# Patient Record
Sex: FEMALE | Race: BLACK OR AFRICAN AMERICAN | Employment: UNEMPLOYED | URBAN - METROPOLITAN AREA
[De-identification: names, ages, dates, MRNs, and addresses within clinical notes are randomized per-mention and may not be internally consistent; named-entity substitution may affect disease eponyms.]

---

## 2021-10-15 ENCOUNTER — TRANSFERRED RECORDS (OUTPATIENT)
Dept: HEALTH INFORMATION MANAGEMENT | Facility: CLINIC | Age: 21
End: 2021-10-15
Payer: COMMERCIAL

## 2021-10-26 ENCOUNTER — TRANSFERRED RECORDS (OUTPATIENT)
Dept: HEALTH INFORMATION MANAGEMENT | Facility: CLINIC | Age: 21
End: 2021-10-26
Payer: COMMERCIAL

## 2022-02-28 ENCOUNTER — MEDICAL CORRESPONDENCE (OUTPATIENT)
Dept: HEALTH INFORMATION MANAGEMENT | Facility: CLINIC | Age: 22
End: 2022-02-28
Payer: COMMERCIAL

## 2022-03-03 ENCOUNTER — TELEPHONE (OUTPATIENT)
Dept: CARDIOLOGY | Facility: CLINIC | Age: 22
End: 2022-03-03
Payer: COMMERCIAL

## 2022-03-03 NOTE — TELEPHONE ENCOUNTER
"[facility] Call Center    Phone Message    May a detailed message be left on voicemail: unknown     Reason for Call: Please call patient to schedule cardiology referral. Dx is not within the scope of [facility] scheduling guidelines. Thank you!    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

## 2022-03-08 ENCOUNTER — CARE COORDINATION (OUTPATIENT)
Dept: CARDIOLOGY | Facility: CLINIC | Age: 22
End: 2022-03-08
Payer: COMMERCIAL

## 2022-03-08 NOTE — PROGRESS NOTES
S/B:     Received referral via staff message from Dr. Fuentes to schedule patient next avaiable for MD (Alexandra Jones)    Sending to HF RN to review.     Encounter reviewed see plan      Plan:     Schedule with Dr. Fuentes 5/9/22. Spot held at 3:30.

## 2022-03-20 ENCOUNTER — HEALTH MAINTENANCE LETTER (OUTPATIENT)
Age: 22
End: 2022-03-20

## 2022-05-02 DIAGNOSIS — I42.2 HYPERTROPHIC CARDIOMYOPATHY (H): ICD-10-CM

## 2022-05-02 DIAGNOSIS — E74.05 DANON DISEASE (H): Primary | ICD-10-CM

## 2022-05-02 DIAGNOSIS — I47.10 PAROXYSMAL SUPRAVENTRICULAR TACHYCARDIA (H): ICD-10-CM

## 2022-05-09 ENCOUNTER — OFFICE VISIT (OUTPATIENT)
Dept: CARDIOLOGY | Facility: CLINIC | Age: 22
End: 2022-05-09
Attending: INTERNAL MEDICINE
Payer: COMMERCIAL

## 2022-05-09 ENCOUNTER — LAB (OUTPATIENT)
Dept: LAB | Facility: CLINIC | Age: 22
End: 2022-05-09
Payer: COMMERCIAL

## 2022-05-09 VITALS
DIASTOLIC BLOOD PRESSURE: 72 MMHG | OXYGEN SATURATION: 99 % | WEIGHT: 167 LBS | HEART RATE: 67 BPM | BODY MASS INDEX: 26.84 KG/M2 | SYSTOLIC BLOOD PRESSURE: 133 MMHG | HEIGHT: 66 IN

## 2022-05-09 DIAGNOSIS — F32.A DEPRESSION, UNSPECIFIED DEPRESSION TYPE: ICD-10-CM

## 2022-05-09 DIAGNOSIS — I47.10 PAROXYSMAL SUPRAVENTRICULAR TACHYCARDIA (H): ICD-10-CM

## 2022-05-09 DIAGNOSIS — E74.05 DANON DISEASE (H): ICD-10-CM

## 2022-05-09 DIAGNOSIS — I42.2 HYPERTROPHIC CARDIOMYOPATHY (H): ICD-10-CM

## 2022-05-09 DIAGNOSIS — E74.05 DANON DISEASE (H): Primary | ICD-10-CM

## 2022-05-09 DIAGNOSIS — R53.83 FATIGUE, UNSPECIFIED TYPE: ICD-10-CM

## 2022-05-09 DIAGNOSIS — I42.2 OTHER HYPERTROPHIC CARDIOMYOPATHY (H): ICD-10-CM

## 2022-05-09 LAB
ALBUMIN SERPL-MCNC: 3.9 G/DL (ref 3.4–5)
ALP SERPL-CCNC: 67 U/L (ref 40–150)
ALT SERPL W P-5'-P-CCNC: 26 U/L (ref 0–50)
ANION GAP SERPL CALCULATED.3IONS-SCNC: 5 MMOL/L (ref 3–14)
AST SERPL W P-5'-P-CCNC: 54 U/L (ref 0–45)
BILIRUB SERPL-MCNC: 0.3 MG/DL (ref 0.2–1.3)
BUN SERPL-MCNC: 8 MG/DL (ref 7–30)
CALCIUM SERPL-MCNC: 9.6 MG/DL (ref 8.5–10.1)
CHLORIDE BLD-SCNC: 107 MMOL/L (ref 94–109)
CHOLEST SERPL-MCNC: 128 MG/DL
CK SERPL-CCNC: 110 U/L (ref 30–225)
CO2 SERPL-SCNC: 29 MMOL/L (ref 20–32)
CREAT SERPL-MCNC: 0.65 MG/DL (ref 0.52–1.04)
ERYTHROCYTE [DISTWIDTH] IN BLOOD BY AUTOMATED COUNT: 14.3 % (ref 10–15)
FASTING STATUS PATIENT QL REPORTED: NO
GFR SERPL CREATININE-BSD FRML MDRD: >90 ML/MIN/1.73M2
GLUCOSE BLD-MCNC: 86 MG/DL (ref 70–99)
HCT VFR BLD AUTO: 44.2 % (ref 35–47)
HDLC SERPL-MCNC: 58 MG/DL
HGB BLD-MCNC: 14.3 G/DL (ref 11.7–15.7)
LDLC SERPL CALC-MCNC: 57 MG/DL
MAGNESIUM SERPL-MCNC: 1.9 MG/DL (ref 1.6–2.3)
MCH RBC QN AUTO: 27.9 PG (ref 26.5–33)
MCHC RBC AUTO-ENTMCNC: 32.4 G/DL (ref 31.5–36.5)
MCV RBC AUTO: 86 FL (ref 78–100)
NONHDLC SERPL-MCNC: 70 MG/DL
NT-PROBNP SERPL-MCNC: 1032 PG/ML (ref 0–450)
PLATELET # BLD AUTO: 270 10E3/UL (ref 150–450)
POTASSIUM BLD-SCNC: 4 MMOL/L (ref 3.4–5.3)
PROT SERPL-MCNC: 7.6 G/DL (ref 6.8–8.8)
RBC # BLD AUTO: 5.13 10E6/UL (ref 3.8–5.2)
SODIUM SERPL-SCNC: 141 MMOL/L (ref 133–144)
TRIGL SERPL-MCNC: 63 MG/DL
TROPONIN I SERPL HS-MCNC: 215 NG/L
WBC # BLD AUTO: 4.3 10E3/UL (ref 4–11)

## 2022-05-09 PROCEDURE — 84484 ASSAY OF TROPONIN QUANT: CPT | Performed by: PATHOLOGY

## 2022-05-09 PROCEDURE — 36415 COLL VENOUS BLD VENIPUNCTURE: CPT | Performed by: PATHOLOGY

## 2022-05-09 PROCEDURE — 83735 ASSAY OF MAGNESIUM: CPT | Performed by: PATHOLOGY

## 2022-05-09 PROCEDURE — 80053 COMPREHEN METABOLIC PANEL: CPT | Performed by: PATHOLOGY

## 2022-05-09 PROCEDURE — 82550 ASSAY OF CK (CPK): CPT | Performed by: PATHOLOGY

## 2022-05-09 PROCEDURE — G0463 HOSPITAL OUTPT CLINIC VISIT: HCPCS

## 2022-05-09 PROCEDURE — 99205 OFFICE O/P NEW HI 60 MIN: CPT | Performed by: INTERNAL MEDICINE

## 2022-05-09 PROCEDURE — 85027 COMPLETE CBC AUTOMATED: CPT | Performed by: PATHOLOGY

## 2022-05-09 PROCEDURE — 83880 ASSAY OF NATRIURETIC PEPTIDE: CPT | Performed by: PATHOLOGY

## 2022-05-09 PROCEDURE — 80061 LIPID PANEL: CPT | Performed by: PATHOLOGY

## 2022-05-09 RX ORDER — TRIAMCINOLONE ACETONIDE 0.1 %
PASTE (GRAM) DENTAL
Status: ON HOLD | COMMUNITY
Start: 2021-09-23 | End: 2024-07-31

## 2022-05-09 RX ORDER — METOPROLOL SUCCINATE 25 MG/1
37.5 TABLET, EXTENDED RELEASE ORAL DAILY
Status: ON HOLD | COMMUNITY
Start: 2022-04-06 | End: 2024-07-31

## 2022-05-09 ASSESSMENT — COLUMBIA-SUICIDE SEVERITY RATING SCALE - C-SSRS
1. WITHIN THE PAST MONTH, HAVE YOU WISHED YOU WERE DEAD OR WISHED YOU COULD GO TO SLEEP AND NOT WAKE UP?: NO
6. HAVE YOU EVER DONE ANYTHING, STARTED TO DO ANYTHING, OR PREPARED TO DO ANYTHING TO END YOUR LIFE?: NO
2. IN THE PAST MONTH, HAVE YOU ACTUALLY HAD ANY THOUGHTS OF KILLING YOURSELF?: NO

## 2022-05-09 ASSESSMENT — PAIN SCALES - GENERAL: PAINLEVEL: NO PAIN (0)

## 2022-05-09 ASSESSMENT — PATIENT HEALTH QUESTIONNAIRE - PHQ9: SUM OF ALL RESPONSES TO PHQ QUESTIONS 1-9: 16

## 2022-05-09 NOTE — NURSING NOTE
Chief Complaint   Patient presents with     New Patient     NMD      Vitals were taken and medications were reconciled.  Stepan Hilario, EMT  3:10 PM

## 2022-05-09 NOTE — NURSING NOTE
Return Appointment: Patient given instructions regarding scheduling next clinic visit. Patient demonstrated understanding of this information and agreed to call with further questions or concerns. Virtual follow up after CPX.    Patient stated she understood all health information given and agreed to call with further questions or concerns.      Depression Screening Follow-up    PHQ 5/9/2022   PHQ-9 Total Score 16   Q9: Thoughts of better off dead/self-harm past 2 weeks Several days         C-SSRS (Brief Emery) 5/9/2022   Within the last month, have you wished you were dead or wished you could go to sleep and not wake up? No   Within the last month, have you had any actual thoughts of killing yourself? No   Within the last month, have you ever done anything, started to do anything, or prepared to do anything to end your life? No         Follow Up         I have discussed the results of the Emery Screening and proposed plan of care with the provider.  Provider acknowledged their next steps to develop follow up actions and safety plans with the patient.    Martha Lawton RN

## 2022-05-09 NOTE — LETTER
"2022      RE: Mary Shaikh  [address]  [place] [address]       Dear Colleague,    Thank you for the opportunity to participate in the care of your patient, Mary Shaikh, at the [facility] HEART [facility] [place] at [facility]. Please see a copy of my visit note below.    Neurocardiomyopathy Clinic Consultation    Name: Mary Shaikh  : 2000  MRN: [id number]    May 9, 2022    Dear Dr. Swift,    I had the pleasure of seeing Mary Shaikh, a 21 year old woman today May 9, 2022 in the [facility] Failure Clinic for evaluation of Danon disease.     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  She has some muscle weakness    Works in retail   Goes to school cosmetology   Going up and down stairs      8-9 steps result in dyspnea  Would like to be able to work out again    Weight stable  No edema  No orthopnea or PND  No dizzy lighteaded  Appetite is good and improving  Energy - fatigued   QOL is ok  Mood: depresssion, no SI   Sleeping well            /72 (BP Location: Right arm, Patient Position: Chair, Cuff Size: Adult Regular)   Pulse 67   Ht 1.676 m (5' 6\")   Wt 75.8 kg (167 lb)   SpO2 99%   BMI 26.95 kg/m          CMR 21: 1) Decreased LV function with no wall motion abnormalities   2) Left ventricular hypertrophy, with asymmetric thickening of the interventricular septum. There is no evidence for systolic anterior motion of the anterior leaflet of the mitral valve, and no subaortic gradient is seen. There is also right ventricular hypertrophy noted.   3) Diffuse and patchy biventricular myocardial delayed enhancement, with subendocardial, mid myocardial and subendocardial areas of enhancement noted, not restricted to any vascular territory. 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 Differential diagnoses include other infiltrative " "conditions including severe hypertrophic cardiomyopathy, cardiac sarcoidosis, cardiac amyloidosis, other storage disorders.   4) No mediastinal adenopathy, splenomegaly or pulmonary infiltrates.       EKG: I personally reviewed the most recent EKG tracings, and they show sinus rhythm, preexcitation with prominent delta wave, prominent repolarization abnormality.     ZIO monitor, 10/2021: I have personally reviewed these images, which show 68 episodes of supraventricular tachycardia, 11 episodes of wide-complex tachycardia concerning for ventricular tachycardia.  No symptoms reported during the monitoring period.     TTE, 11/9/2021: I have personally reviewed these images, which show asymmetric hypertrophy, lower limits of normal left ventricular systolic function, abnormal strain (-15.1%).      CMR, 11/29/2021: I have personally reviewed these images, which show mildly decreased LV systolic function of 43%, apical cap, apical inferior and mid inferolateral hypokinesis, asymmetric left ventricular hypertrophy (interventricular septum 1.7 cm, inferolateral wall 0.8 cm) with LV mass of 146 g, right ventricular hypertrophy, diffuse, patchy, biventricular delayed enhancement involving the subendocardium, mid myocardium, and subepicardial areas.      Shortness of breath {UMPCARDBREATH:145735573}  Chest Pain  Weight {UMPCARDSTABLE:198813750}  Edema  Orthopnena  PND  Lightheadedness {UMPCARDINTER:543239667}  Palpitations {UMPCARDPRESYNCOPE:852876024}  ICD shocks {YES / NO:659915::\"Yes\"}  Supplemental diuretics {UMPCARDSPORADIC:989421366}  Following dietary restrictions {UMPCARDSODIUM:587461671}  Appetite   Energy  QOL     REVIEW OF SYSTEMS: 10 point ROS neg other than the symptoms noted above in the HPI.    PAST MEDICAL HISTORY:   1. 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 NSVT  3. WPW     ALLERGIES:  No Known Allergies    MEDICATIONS:   metoprolol succinate ER (TOPROL XL) 25 MG 24 hr tablet, Take 37.5 mg by mouth " daily  triamcinolone (KENALOG) 0.1 % paste, APPLY TO THE AFFECTED AREA DAILY FOR 10 DAYS    No current facility-administered medications on file prior to visit.      SOCIAL HISTORY: She lives in [place], with her Mom   Tobacco: Never smoker  Alcohol: none  Illicit: denies     FAMILY HISTORY: . Her maternal grandfather had a stroke in 2017, but otherwise she has no significant family history of past cardiovascular history. There is no past family history of sudden cardiac death.      PHYSICAL EXAM:   There were no vitals taken for this visit.  General: comfortable, conversant, NAD  HEENT: normocephalic, atraumatic, anicteric, normal oropharynx  Neck: Estimate JVP ***, normal carotid upstroke  CV: RRR, nl s1 and s2, no murmurs, gallops, or rubs   Lungs: CTAB, no crackles or wheezes, normal work of breathing  Abdomen: BS+, soft, non tender, non distended, no HSM  Extremities: warm and well perfused, *** edema  Neuro: normal speech and gait,   Psych: normal affect, oriented  Skin: no visible lesions          DIAGNOSTIC TESTING:           ASSESSMENT AND PLAN:       1. Chronic systolic heart failure/HFrEF (EF ***) secondary to {UMPCARDETIOLOGY:057848037} cardiomyopathy  NYHA Symptom Class {UMPCARDSYMP:013250548}  Stage {UMPCARDSTAGE:212829570}  Fluid status {corevolume:044862}  ACE-I/ARB/ARNi: {ACE/ARB/ARNi:519380}  BB {UMPCARDACEI/ARB:813467572}  Aldosterone antagonist {UMPCARDBB:339705362}  SGLT2:   SCD prophylaxis {SCD:447497}  %BiV pacing: *** or N/A  Cardiac Rehab: {Cardiac Rehab Referral:742485}  Sleep Apnea Evaluation: {Apnea Referral:604266}  Remote PA Pressure Monitoring (CardioMems) {Remote PA Pressure Monitoring (CardioMems):899324}        *** minutes on DOS for chart review, patient history and exam, counseling, review of diagnostic testing, coordination of care and documentation.     Thank you for allowing me to participate in the care of your patient. Please do not hesitate to contact me if you have  any questions.     Sincerely,   Forum     Hipolito Fuentes MD, PhD, FACC  Advanced Heart Failure/Transplantation/MCS  [facility]      CC:   Patient Care Team       Relationship Specialty Notifications Start End    [facility] PCP - General   7/30/15     mervin centeno per pt mother [id number]    Phone: [phone] Fax: [phone]         [facility] [address]          Please do not hesitate to contact me if you have any questions/concerns.     Sincerely,     Hipolito Fuentes MD

## 2022-05-09 NOTE — LETTER
May 9, 2022    RE:  Mary CASTELLANOS Shaikh  [address]  [facility] [address]            To whom it may concern:    Mary CASTELLANOS Shaikh is under my professional care in the cardiology appointment at the [facility]. She was here in clinic for appointments this afternoon, 5/9/2022. Please excuse her for this time missed due to these appointments.      Sincerely,    Hipolito Fuentes MD

## 2022-05-09 NOTE — PROGRESS NOTES
Neurocardiomyopathy Clinic Consultation    Name: Mary Shaikh  : 2000  MRN: [id number]    May 9, 2022    Dear Dr. Swift,    I had the pleasure of seeing Mary Shaikh, a 21 year old woman today May 9, 2022 in the [facility] Failure Clinic for evaluation of Danon disease. She is here with her mother, Shabnam, today.     As you know, Ms. 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 She was seen in the emergency department at [facility] and was found to have prexcitation on her ECG in , and this lead to an exercise ECG test which she achieved 10.8 METs, exercise duration of 9 min and 16 seconds, had no exercised induced arrhythmias, she did have a delta wave and repolarization abnormalities. Subsequently, she had an echocardiogram which showed an LVEF of 51% with asymetric LVH (septum 1.5cm). She has been following with Dr. Swift in the [facility] cardiology clinic and she obtained a CMR which showed and LVEF of 43%, LVEDD 4.9cm, IV septum 1.7cm,  normal RV function but RVH noted, no LOU, harman LGE with subendocardial, mid myocardial, and subendocardial enhancement not associated with a vascular territory. She had a ziopatch monitor demonstrating 11 wide complex tacycardias and 68 episodes of SVT.  Dr. Swift has been very thorough in having her see genetics, starting a beta blocker, and referral for ICD implantation. She has not yet pursued ICD implantation and is further discussing this with her mother and Dr. Swift.     Ms. Shaikh notes that she is now having dyspnea with walking 8-9 steps. Her goal is to go back to working out prior to . 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 She is able to climb a flight of stairs. While she is able to get through her day, she has considerable fatigue. She notes that she has had increased fatigue since starting metoprolol. She feels her dyspnea and fatigue  "have worsened since her diagnosis last year. Her weight is stable and she has no edema, orthopnea, or PND. She has not had dizziness or lightheadedness. She does note some palpitations which can be random, but no presyncope or syncope. Her appetite is improving. She is sleeping well. She is depressed and has anxiety, which has been exacerbated by a recent death in her family. She states she does not have suicidal ideation. She has had some muscle weakness. Her quality of life is reduced overall due to fatigue and dyspnea. She would like to have the majority of her care through [facility].      REVIEW OF SYSTEMS: 10 point ROS neg other than the symptoms noted above in the HPI.    PAST MEDICAL HISTORY:   1. 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 NSVT  3. WPW     ALLERGIES:  No Known Allergies    MEDICATIONS:   metoprolol succinate ER (TOPROL XL) 25 MG 24 hr tablet, Take 37.5 mg by mouth daily  triamcinolone (KENALOG) 0.1 % paste, APPLY TO THE AFFECTED AREA DAILY FOR 10 DAYS    No current facility-administered medications on file prior to visit.    SOCIAL HISTORY: She lives in [place], with her Mom   Tobacco: Never smoker  Alcohol: rare  Illicit: prior marijuana use     FAMILY HISTORY: . Her maternal grandfather had a stroke in 2017, but otherwise she has no significant family history of past cardiovascular history. There is no past family history of sudden cardiac death.    PHYSICAL EXAM:   /72 (BP Location: Right arm, Patient Position: Chair, Cuff Size: Adult Regular)   Pulse 67   Ht 1.676 m (5' 6\")   Wt 75.8 kg (167 lb)   SpO2 99%   BMI 26.95 kg/m    General: comfortable, conversant, NAD  HEENT: normocephalic, atraumatic, anicteric, normal oropharynx  Neck: Estimate JVP <8, normal carotid upstroke  CV: RRR, nl s1 and s2, no murmurs, gallops, or rubs   Lungs: CTAB, no crackles or wheezes, normal work of breathing  Abdomen: BS+, soft, non tender, non distended, no HSM  Extremities: warm and well perfused, " no edema  Neuro: normal speech and gait,   Psych: flat affect  Skin: no visible lesions    DIAGNOSTIC TESTING:      Latest Reference Range & Units 05/09/22 14:49   Sodium 133 - 144 mmol/L 141   Potassium 3.4 - 5.3 mmol/L 4.0   Chloride 94 - 109 mmol/L 107   Carbon Dioxide 20 - 32 mmol/L 29   Urea Nitrogen 7 - 30 mg/dL 8   Creatinine 0.52 - 1.04 mg/dL 0.65   GFR Estimate >60 mL/min/1.73m2 >90 [1]   Calcium 8.5 - 10.1 mg/dL 9.6   Anion Gap 3 - 14 mmol/L 5   Magnesium 1.6 - 2.3 mg/dL 1.9   Albumin 3.4 - 5.0 g/dL 3.9   Protein Total 6.8 - 8.8 g/dL 7.6   Bilirubin Total 0.2 - 1.3 mg/dL 0.3   Alkaline Phosphatase 40 - 150 U/L 67   ALT 0 - 50 U/L 26   AST 0 - 45 U/L 54 (H)   Cholesterol <200 mg/dL 128   CK Total 30 - 225 U/L 110   Patient Fasting > 8hrs?  No   HDL Cholesterol >=50 mg/dL 58   LDL Cholesterol Calculated <=100 mg/dL 57   Non HDL Cholesterol <130 mg/dL 70   N-Terminal Pro Bnp 0 - 450 pg/mL 1,032 (H) [2]   Triglycerides <150 mg/dL 63   Troponin I High Sensitivity <54 ng/L 215 (HH) [3]   Glucose 70 - 99 mg/dL 86   WBC 4.0 - 11.0 10e3/uL 4.3   Hemoglobin 11.7 - 15.7 g/dL 14.3   Hematocrit 35.0 - 47.0 % 44.2   Platelet Count 150 - 450 10e3/uL 270   RBC Count 3.80 - 5.20 10e6/uL 5.13   MCV 78 - 100 fL 86   MCH 26.5 - 33.0 pg 27.9   MCHC 31.5 - 36.5 g/dL 32.4   RDW 10.0 - 15.0 % 14.3       I have personally reviewed all of the cardiovascular testing reports performed at [facility]    exercise ECG test 10/2021:  10.8 METs, exercise duration of 9 min and 16 seconds, had no exercised induced arrhythmias, she did have a delta wave and repolarization abnormalities.    Ziopatch monitor 10/2021: 11 VT episodes 68 SVT episodes    Echocardiogram 11/2021:  LVEF of 51% with asymettric LVH (septum 1.5cm).     CMR 11/2021:  LVEF of 43%, LVEDD 4.9cm, IV septum 1.7cm,  normal RV function but RVH noted, no LOU, harman LGE with subendocardial, mid myocardial, and subendocardial enhancement not associated with a vascular territory.        ASSESSMENT AND PLAN: Ms. 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     She has diagnostic HCM based on her CMR and arrhythmias on her Ziopatch monitor. 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 Dr. Swift has already appropriately referred her to opthalmology, neuropsych, and neuromuscular neurology, which I am fully in agreement with to assess extra cardiac manifestations.  She is appropriately on metoprolol, but described worsening of depression. We discussed that she should avoid dehydration and all medications that are prescribed to her should be discussed with Dr. Swift as avoiding hypotension or arrhythmias is very important.     Last year in November she had excellent exercise tolerance with 9+ min of walking and achieving 10.8 METs. While she remains very active, I am concerned that she has had a decrease in exercise tolerance based on her increase in dyspnea and fatigue. She does not have any signs of shock.  Her renal function is normal, her AST is mildly elevated. Both her HS troponin I and NT pro BNP are elevated. Elevated troponin has been described in patients with Dannon disease. I would like to obtain a cardiopumonary stress test to objectively assess her functional capacity, especially in light of her symptoms and cardiac biomarkers.     We discussed that she may need advanced therapies based on further testing and what this would entail.   If her cardiopulmonary stress test demonstrates an expected functional capacity <50% of predicted, blood pressure drop with exercise or other changes,  I would like to pursue transplantation candidacy evaluation. She should avoid all substance use  including alcohol and marijuana. She understands that it is imperative that she should not get pregnant.  She should work with her primary care physician to obtain psychological care, which has also been discussed by Dr. Swift. I think this is especially important if advanced therapies are needed in the future.     PLAN:   -cardiopulmonary stress testing  -video visit or in person visit to discuss results of cardiopulmonary stress test  -continue regular cardiovascular follow up with Dr. Swift  -follow up with primary care regarding depression and anxiety  -neuromuscular neurology visit with at [facility] or arrange with [facility]     75 minutes on DOS for chart review, patient history and exam, counseling, review of diagnostic testing, coordination of care (including with Dr. Swift) and documentation.     Thank you for allowing me to participate in the care of your patient. Please do not hesitate to contact me if you have any questions.     Sincerely,   Forum     Forum MD Gina, PhD, [facility]  Advanced Heart Failure/Transplantation/MCS  [facility]      CC: Ameena Swift MD [facility] Cardiology

## 2022-05-09 NOTE — LETTER
2022     RE: Mary Shaikh  [address]  [place] [address]     Dear Colleague,    Thank you for referring your patient, Mary Shaikh, to the [facility] at [facility]. Please see a copy of my visit note below.    Neurocardiomyopathy Clinic Consultation    Name: Mary Shaikh  : 2000  MRN: [id number]    May 9, 2022    Dear Dr. Swift,    I had the pleasure of seeing Mary Shaikh, a 21 year old woman today May 9, 2022 in the [facility] for evaluation of Danon disease. She is here with her mother, Shabnam, today.     As you know, Ms. 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 She was seen in the emergency department at [facility] and was found to have prexcitation on her ECG in , and this lead to an exercise ECG test which she achieved 10.8 METs, exercise duration of 9 min and 16 seconds, had no exercised induced arrhythmias, she did have a delta wave and repolarization abnormalities. Subsequently, she had an echocardiogram which showed an LVEF of 51% with asymetric LVH (septum 1.5cm). She has been following with Dr. Swift in the [facility] cardiology clinic and she obtained a CMR which showed and LVEF of 43%, LVEDD 4.9cm, IV septum 1.7cm,  normal RV function but RVH noted, no LOU, harman LGE with subendocardial, mid myocardial, and subendocardial enhancement not associated with a vascular territory. She had a ziopatch monitor demonstrating 11 wide complex tacycardias and 68 episodes of SVT.  Dr. Swift has been very thorough in having her see genetics, starting a beta blocker, and referral for ICD implantation. She has not yet pursued ICD implantation and is further discussing this with her mother and Dr. Swift.     Ms. Shaikh notes that she is now having dyspnea with walking 8-9 steps. Her goal is to go back to working out  "prior to 2021. 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 She is able to climb a flight of stairs. While she is able to get through her day, she has considerable fatigue. She notes that she has had increased fatigue since starting metoprolol. She feels her dyspnea and fatigue have worsened since her diagnosis last year. Her weight is stable and she has no edema, orthopnea, or PND. She has not had dizziness or lightheadedness. She does note some palpitations which can be random, but no presyncope or syncope. Her appetite is improving. She is sleeping well. She is depressed and has anxiety, which has been exacerbated by a recent death in her family. She states she does not have suicidal ideation. She has had some muscle weakness. Her quality of life is reduced overall due to fatigue and dyspnea. She would like to have the majority of her care through [facility].      REVIEW OF SYSTEMS: 10 point ROS neg other than the symptoms noted above in the HPI.    PAST MEDICAL HISTORY:   1. 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 NSVT  3. WPW     ALLERGIES:  No Known Allergies    MEDICATIONS:   metoprolol succinate ER (TOPROL XL) 25 MG 24 hr tablet, Take 37.5 mg by mouth daily  triamcinolone (KENALOG) 0.1 % paste, APPLY TO THE AFFECTED AREA DAILY FOR 10 DAYS    No current facility-administered medications on file prior to visit.    SOCIAL HISTORY: She lives in [place], with her Mom   Tobacco: Never smoker  Alcohol: rare  Illicit: prior marijuana use     FAMILY HISTORY: . Her maternal grandfather had a stroke in 2017, but otherwise she has no significant family history of past cardiovascular history. There is no past family history of sudden cardiac death.    PHYSICAL EXAM:   /72 (BP Location: Right arm, Patient Position: Chair, Cuff Size: Adult Regular)   Pulse 67   Ht 1.676 m (5' 6\")   Wt 75.8 kg (167 lb)   SpO2 99%   BMI 26.95 kg/m    General: comfortable, conversant, NAD  HEENT: " normocephalic, atraumatic, anicteric, normal oropharynx  Neck: Estimate JVP <8, normal carotid upstroke  CV: RRR, nl s1 and s2, no murmurs, gallops, or rubs   Lungs: CTAB, no crackles or wheezes, normal work of breathing  Abdomen: BS+, soft, non tender, non distended, no HSM  Extremities: warm and well perfused, no edema  Neuro: normal speech and gait,   Psych: flat affect  Skin: no visible lesions    DIAGNOSTIC TESTING:      Latest Reference Range & Units 05/09/22 14:49   Sodium 133 - 144 mmol/L 141   Potassium 3.4 - 5.3 mmol/L 4.0   Chloride 94 - 109 mmol/L 107   Carbon Dioxide 20 - 32 mmol/L 29   Urea Nitrogen 7 - 30 mg/dL 8   Creatinine 0.52 - 1.04 mg/dL 0.65   GFR Estimate >60 mL/min/1.73m2 >90 [1]   Calcium 8.5 - 10.1 mg/dL 9.6   Anion Gap 3 - 14 mmol/L 5   Magnesium 1.6 - 2.3 mg/dL 1.9   Albumin 3.4 - 5.0 g/dL 3.9   Protein Total 6.8 - 8.8 g/dL 7.6   Bilirubin Total 0.2 - 1.3 mg/dL 0.3   Alkaline Phosphatase 40 - 150 U/L 67   ALT 0 - 50 U/L 26   AST 0 - 45 U/L 54 (H)   Cholesterol <200 mg/dL 128   CK Total 30 - 225 U/L 110   Patient Fasting > 8hrs?  No   HDL Cholesterol >=50 mg/dL 58   LDL Cholesterol Calculated <=100 mg/dL 57   Non HDL Cholesterol <130 mg/dL 70   N-Terminal Pro Bnp 0 - 450 pg/mL 1,032 (H) [2]   Triglycerides <150 mg/dL 63   Troponin I High Sensitivity <54 ng/L 215 (HH) [3]   Glucose 70 - 99 mg/dL 86   WBC 4.0 - 11.0 10e3/uL 4.3   Hemoglobin 11.7 - 15.7 g/dL 14.3   Hematocrit 35.0 - 47.0 % 44.2   Platelet Count 150 - 450 10e3/uL 270   RBC Count 3.80 - 5.20 10e6/uL 5.13   MCV 78 - 100 fL 86   MCH 26.5 - 33.0 pg 27.9   MCHC 31.5 - 36.5 g/dL 32.4   RDW 10.0 - 15.0 % 14.3       I have personally reviewed all of the cardiovascular testing reports performed at [facility]    exercise ECG test 10/2021:  10.8 METs, exercise duration of 9 min and 16 seconds, had no exercised induced arrhythmias, she did have a delta wave and repolarization abnormalities.    Ziopatch monitor 10/2021: 11 VT episodes 68 SVT  episodes    Echocardiogram 11/2021:  LVEF of 51% with asymettric LVH (septum 1.5cm).     CMR 11/2021:  LVEF of 43%, LVEDD 4.9cm, IV septum 1.7cm,  normal RV function but RVH noted, no LOU, harman LGE with subendocardial, mid myocardial, and subendocardial enhancement not associated with a vascular territory.       ASSESSMENT AND PLAN: Ms. 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     She has diagnostic HCM based on her CMR and arrhythmias on her Ziopatch monitor. 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 Dr. Swift has already appropriately referred her to opthalmology, neuropsych, and neuromuscular neurology, which I am fully in agreement with to assess extra cardiac manifestations.  She is appropriately on metoprolol, but described worsening of depression. We discussed that she should avoid dehydration and all medications that are prescribed to her should be discussed with Dr. Swift as avoiding hypotension or arrhythmias is very important.     Last year in November she had excellent exercise tolerance with 9+ min of walking and achieving 10.8 METs. While she remains very active, I am concerned that she has had a decrease in exercise tolerance based on her increase in dyspnea and fatigue. She does not have any signs of shock.  Her renal function is normal, her AST is mildly elevated. Both her HS troponin I and NT pro BNP are elevated. Elevated troponin has been described in patients with Dannon disease. I would like to obtain a cardiopumonary stress test to objectively assess her functional capacity, especially in light of her symptoms and cardiac biomarkers.     We discussed that she may need advanced  therapies based on further testing and what this would entail.   If her cardiopulmonary stress test demonstrates an expected functional capacity <50% of predicted, blood pressure drop with exercise or other changes,  I would like to pursue transplantation candidacy evaluation. She should avoid all substance use including alcohol and marijuana. She understands that it is imperative that she should not get pregnant.  She should work with her primary care physician to obtain psychological care, which has also been discussed by Dr. Swift. I think this is especially important if advanced therapies are needed in the future.     PLAN:   -cardiopulmonary stress testing  -video visit or in person visit to discuss results of cardiopulmonary stress test  -continue regular cardiovascular follow up with Dr. Swift  -follow up with primary care regarding depression and anxiety  -neuromuscular neurology visit with at [facility] or arrange with [facility]     75 minutes on DOS for chart review, patient history and exam, counseling, review of diagnostic testing, coordination of care (including with Dr. Swift) and documentation.     Thank you for allowing me to participate in the care of your patient. Please do not hesitate to contact me if you have any questions.     Sincerely,   Forum     Forum MD Gina, PhD, FACC  Advanced Heart Failure/Transplantation/MCS  [facility]      CC: Ameena Swift MD [facility] Cardiology

## 2022-05-09 NOTE — PATIENT INSTRUCTIONS
"You were seen today in the Cardiovascular Clinic at the [facility].      Cardiology Providers you saw during your visit:  Dr. Hipolito Fuentes     Recommendations:   Please follow up with your PCP  We have ordered a cardiopulmonary stress test. Please call [phone] to schedule.  Follow up with Dr. Fuentes after the CPX is complete. This appointment can be virtual.    Thank you for your visit today!   Please MyChart message or call if you have any questions or concerns.      During Business Hours:  [phone], option # 1 \"To leave a message for your care team\"     After hours, weekends or holidays:   [phone], Option #4  Ask to speak to the On-Call Cardiologist. Inform them you are a heart failure patient at the [facility].      Martha Lawton, RN BSN   Cardiology Nurse Coordinator - Heart Failure/C.O.R.E. Clinic  [facility]  [phone] option 1 to schedule an appointment or leave a message for your care team    CardioPulmonary Stress Test (CPX)  CPX is a maximal (meaning you exercise to exhaustion, not to achieve a heart rate) exercise test where we measure how well your heart/body uses oxygen or energy. It is the gold standard for measuring functional capacity and helps us differentiate limitations due to lungs, heart, or fitness.   A CPX is NOT a typical stress test. You will NOT be asked to hold your Beta Blocker medication.     You will be scheduled for a CardioPulmonary Stress Test at the [facility] ([address], [facility] [address], [phone]).       Follow these instructions:    1. Report to the GOLD waiting room in the [facility] hospital on:     2. Nothing to eat for 3 hours prior to your test. You may have clear liquids up to the time of your test    3. Please wear loose, two-piece clothing and comfortable, rubber soled shoes for walking     For Patients with Diabetes: Your RN Coordinator will give you instructions on adjusting your diabetic " medications for the day of your test                           If you have questions please contact your diabetic care team                           Remember to  bring your glucometer & insulin with you to take after your test if needed

## 2022-06-20 ENCOUNTER — TELEPHONE (OUTPATIENT)
Dept: CARDIOLOGY | Facility: CLINIC | Age: 22
End: 2022-06-20
Payer: COMMERCIAL

## 2022-06-20 NOTE — TELEPHONE ENCOUNTER
PT called back to schedule CP Stress test  Please call her at [phone] (home)   Needs to scheduled prior to 7/25 appt

## 2022-06-20 NOTE — TELEPHONE ENCOUNTER
I called Mary to let her know that she needs to reschedule her CPEX prior to seeing Dr. Fuentes on 7/25. No option to leave a voicemail.    Martha Lawton RN

## 2022-07-07 ENCOUNTER — HOSPITAL ENCOUNTER (OUTPATIENT)
Dept: CARDIOLOGY | Facility: CLINIC | Age: 22
Discharge: HOME | End: 2022-07-07
Attending: INTERNAL MEDICINE | Admitting: INTERNAL MEDICINE
Payer: COMMERCIAL

## 2022-07-07 VITALS — WEIGHT: 166.23 LBS | HEIGHT: 66 IN | BODY MASS INDEX: 26.71 KG/M2

## 2022-07-07 DIAGNOSIS — E74.05 DANON DISEASE (H): ICD-10-CM

## 2022-07-07 PROCEDURE — 94621 CARDIOPULM EXERCISE TESTING: CPT | Mod: 26 | Performed by: INTERNAL MEDICINE

## 2022-07-07 PROCEDURE — 94621 CARDIOPULM EXERCISE TESTING: CPT

## 2022-07-08 LAB
CARDIOPULMONARY ANAEROBIC THRESHOLD PREDICTED PEAK: 57 %
CARDIOPULMONARY ANAEROBIC THRESHOLD VO2: 20.5 ML/KG/MIN
CARDIOPULMONARY BLOOD PRESSURE REST: NORMAL MMHG
CARDIOPULMONARY BREATHING RESERVE REST: 87.2
CARDIOPULMONARY BREATHING RESERVE V02MAX: 24
CARDIOPULMONARY CO2 OUTPUT REST: 268 ML/MIN
CARDIOPULMONARY CO2 OUTPUT VO2MAX: 1904 ML/MIN
CARDIOPULMONARY FEV 1.0 (L) ACTUAL: 2.02
CARDIOPULMONARY FEV 1.0 (L) PRECENT: 58 %
CARDIOPULMONARY FEV 1.0 (L) PREDICTED: 3.51
CARDIOPULMONARY FEV 1.0 FVC (%) ACTUAL: 73.7
CARDIOPULMONARY FEV 1.0 FVC (%) PERCENT: 85 %
CARDIOPULMONARY FEV 1.0 FVC (%) PREDICTED: 86.6
CARDIOPULMONARY FUNCTIONAL CAPACITY MAX ML/KG/MIN: 21.89 ML/KG/MIN
CARDIOPULMONARY FUNCTIONAL CAPACITY PERCENT: 61 %
CARDIOPULMONARY FUNCTIONAL CAPACITY PREDICTED: 36 ML/KG/MIN
CARDIOPULMONARY FVC (L) ACTUAL: 2.74
CARDIOPULMONARY FVC (L) PERCENT: 68 %
CARDIOPULMONARY FVC (L) PREDICTED: 4.03
CARDIOPULMONARY HEART RATE REST: 80 BPM
CARDIOPULMONARY MET'S REST: 1.2
CARDIOPULMONARY MINUTE VENTILATION REST: 9 L/MIN
CARDIOPULMONARY MINUTE VENTILATION VO2MAX: 53.6 L/MIN
CARDIOPULMONARY MYOCARDIAC O2 DEMAND MAX: NORMAL
CARDIOPULMONARY OXYGEN CONSUMPTION REST: 4.2 ML/KG/MIN
CARDIOPULMONARY OXYGEN CONSUMPTION VO2MAX: 21.89 ML/KG/MIN
CARDIOPULMONARY OXYGEN PULSE REST: 4 ML/BEAT
CARDIOPULMONARY OXYGEN PULSE VO2MAX: 9.8 ML/BEAT
CARDIOPULMONARY OXYGEN SATURATION- OXIMETRY REST: 100 %
CARDIOPULMONARY OXYGEN SATURATION- OXIMETRY VO2MAX: 100 %
CARDIOPULMONARY PET C02 REST: 36
CARDIOPULMONARY PET C02 VO2MAX: 39
CARDIOPULMONARY PET02 REST: 106
CARDIOPULMONARY PET02 V02 MAX: 109
CARDIOPULMONARY RER: 1.07
CARDIOPULMONARY RESPIRALORY EXCHANGE RATIO VO2MAX: 1.07
CARDIOPULMONARY RESPIRALORY EXCHANGE RATIO: 0.85
CARDIOPULMONARY RESPIRATORY RATE REST: 16 BR/MIN
CARDIOPULMONARY RESPIRATORY RATE VO2MAX: 46 BR/MIN
CARDIOPULMONARY STRESS BASE 1 BP MMHG: NORMAL MMHG
CARDIOPULMONARY STRESS BASE 1 BPA: 131 BPM
CARDIOPULMONARY STRESS BASE 1 SPO2: 100 % SPO2
CARDIOPULMONARY STRESS BASE 1 TIME SEC: 0 SEC
CARDIOPULMONARY STRESS BASE 1 TIME: 1 MINS
CARDIOPULMONARY STRESS BASE 2 BP MMHG: NORMAL MMHG
CARDIOPULMONARY STRESS BASE 2 BPA: 96 BPM
CARDIOPULMONARY STRESS BASE 2 SPO2: 99 % SPO2
CARDIOPULMONARY STRESS BASE 2 TIME SEC: 0 SEC
CARDIOPULMONARY STRESS BASE 2 TIME: 3 MINS
CARDIOPULMONARY STRESS BASE 3 BP MMHG: NORMAL MMHG
CARDIOPULMONARY STRESS BASE 3 BPA: 82 BPM
CARDIOPULMONARY STRESS BASE 3 SPO2: 100 % SPO2
CARDIOPULMONARY STRESS BASE 3 TIME SEC: 0 SEC
CARDIOPULMONARY STRESS BASE 3 TIME: 5 MINS
CARDIOPULMONARY STRESS PHASE 1 BP MMHG: NORMAL MMHG
CARDIOPULMONARY STRESS PHASE 1 BPM: 116 BPM
CARDIOPULMONARY STRESS PHASE 1 SPO2: 100 % SPO2
CARDIOPULMONARY STRESS PHASE 1 TIME SEC: 0 SEC
CARDIOPULMONARY STRESS PHASE 1 TIME: 3 MINS
CARDIOPULMONARY STRESS PHASE 2 BP MMHG: NORMAL MMHG
CARDIOPULMONARY STRESS PHASE 2 BPM: 129 BPM
CARDIOPULMONARY STRESS PHASE 2 SPO2: 98 % SPO2
CARDIOPULMONARY STRESS PHASE 2 TIME SEC: 0 SEC
CARDIOPULMONARY STRESS PHASE 2 TIME: 6 MINS
CARDIOPULMONARY STRESS PHASE 3 BP MMHG: NORMAL MMHG
CARDIOPULMONARY STRESS PHASE 3 BPM: 169 BPM
CARDIOPULMONARY STRESS PHASE 3 SPO2: 100 % SPO2
CARDIOPULMONARY STRESS PHASE 3 TIME SEC: 0 SEC
CARDIOPULMONARY STRESS PHASE 3 TIME: 9 MINS
CARDIOPULMONARY SVC (L) ACTUAL: 2.14
CARDIOPULMONARY SVC (L) PERCENT: 53 %
CARDIOPULMONARY SVC (L) PREDICTED: 4.03
CARDIOPULMONARY TIDAL VOLUME REST: 560 ML
CARDIOPULMONARY TIDAL VOLUME VO2MAX: 1164 ML
CARDIOPULMONARY VE/VCO2 SLOPE: 27.16
CARDIOPULMONARY VENTILATORY EQUIVALENT 02 REST: 29
CARDIOPULMONARY VENTILATORY EQUIVALENT 02 V02: 30
CARDIOPULMONARY VENTILATORY EQUIVALENT C02 REST: 34
CARDIOPULMONARY VENTILATORY EQUIVALENT C02 SLOPE VO2MAX: 27.16
CARDIOPULMONARY VENTILATORY EQUIVALENT C02 VO2MAX: 28
CV STRESS MAX HR HE: 169
PREDICTED VO2MAX: 36
RATED PERCEIVED EXERTION: 17
STRESS ANGINA INDEX: 0
STRESS ECHO BASELINE BP: NORMAL MMHG
STRESS ECHO BASELINE HR: 63 BPM
STRESS ECHO CALCULATED PERCENT HR: 85 %
STRESS ECHO LAST STRESS BP: NORMAL MMHG
STRESS ECHO POST ESTIMATED WORKLOAD: 6.3 METS
STRESS ECHO POST EXERCISE DUR MIN: 9 MIN
STRESS ECHO POST EXERCISE DUR SEC: 0 SEC
STRESS ECHO TARGET HR: 200

## 2022-07-11 ENCOUNTER — TELEPHONE (OUTPATIENT)
Dept: CARDIOLOGY | Facility: CLINIC | Age: 22
End: 2022-07-11

## 2022-07-11 NOTE — TELEPHONE ENCOUNTER
I called Mary regarding upcoming appointment on 7/25 - this is scheduled as in person, but we had previously discussed virtual. I gave patient the number for scheduling. It is okay to make this a virtual appointment if that is what the patient would prefer.    Martha Lawton RN

## 2022-07-25 ENCOUNTER — VIRTUAL VISIT (OUTPATIENT)
Dept: CARDIOLOGY | Facility: CLINIC | Age: 22
End: 2022-07-25
Attending: INTERNAL MEDICINE
Payer: COMMERCIAL

## 2022-07-25 DIAGNOSIS — E74.05 DANON DISEASE (H): ICD-10-CM

## 2022-07-25 DIAGNOSIS — Z13.6 ENCOUNTER FOR SCREENING FOR CARDIOVASCULAR DISORDERS: Primary | ICD-10-CM

## 2022-07-25 PROCEDURE — 99215 OFFICE O/P EST HI 40 MIN: CPT | Mod: 95 | Performed by: INTERNAL MEDICINE

## 2022-07-25 PROCEDURE — G0463 HOSPITAL OUTPT CLINIC VISIT: HCPCS | Mod: PN,RTG | Performed by: INTERNAL MEDICINE

## 2022-07-25 NOTE — PATIENT INSTRUCTIONS
"You were seen today in the Cardiovascular Clinic at the [facility].      Cardiology Providers you saw during your visit:  Dr. Hipolito Fuentes     Recommendations:                Thank you for your visit today!   Please MyChart message or call if you have any questions or concerns.      During Business Hours:  [phone], option # 1 \"To leave a message for your care team\"     After hours, weekends or holidays:   [phone], Option #4  Ask to speak to the On-Call Cardiologist. Inform them you are a heart failure patient at the [facility].      Martha Lawton RN BSN   Cardiology Nurse Coordinator - Heart Failure/C.O.R.E. Clinic  [facility]  [phone] option 1 to schedule an appointment or leave a message for your care team      "

## 2022-07-25 NOTE — NURSING NOTE
Chief Complaint   Patient presents with     Follow Up     Follow up heart failure. Medications reviewed with pt, no changes per pt. No pt reported vitals today per pt.      patient denies any changes since echeck-in regarding medication and allergies and states all information entered during echeck-in remains accurate.    Amado Mcneil, Visit Facilitator/MA.

## 2022-07-25 NOTE — NURSING NOTE
Cardiac Testing: Patient given instructions regarding cardiopulmonary stress test. Discussed purpose, preparation, procedure and when to expect results reported back to the patient. Patient demonstrated understanding of this information and agreed to call with further questions or concerns.    Return Appointment: Patient given instructions regarding scheduling next clinic visit. Patient demonstrated understanding of this information and agreed to call with further questions or concerns. Follow up in 1 year with labs prior.    Patient stated she understood all health information given and agreed to call with further questions or concerns.    Martha Lawton RN

## 2022-07-25 NOTE — LETTER
2022      RE: Mary Shaikh  [address]  [place] [address]       Dear Colleague,    Thank you for the opportunity to participate in the care of your patient, Mary Shaikh, at the [facility] [place] at [facility]. Please see a copy of my visit note below.        Neurocardiomyopathy Clinic Progress Note    Name: Mary Shaikh  : 2000  MRN: [id number]    2022    Dear Dr. 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     As you know, Ms. 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 She was seen in the emergency department at [facility] and was found to have prexcitation on her ECG in , and this lead to an exercise ECG test which she achieved 10.8 METs, exercise duration of 9 min and 16 seconds, had no exercised induced arrhythmias, she did have a delta wave and repolarization abnormalities. Subsequently, she had an echocardiogram which showed an LVEF of 51% with asymetric LVH (septum 1.5cm). She has been following with Dr. Swift in the [facility] cardiology clinic and she obtained a CMR which showed and LVEF of 43%, LVEDD 4.9cm, IV septum 1.7cm,  normal RV function but RVH noted, no LOU, harman LGE with subendocardial, mid myocardial, and subendocardial enhancement not associated with a vascular territory. She had a ziopatch monitor demonstrating 11 wide complex tacycardias and 68 episodes of SVT.  Dr. Swift has been very thorough in having her see genetics, starting a beta blocker, and referral for ICD implantation. She has not yet pursued ICD implantation and is further discussing this with her mother and Dr. Swift.     I last saw Ms. Shaikh in clinic on May 9, 2022. In the interim, she completed her  cardiopulmonary stress test. She went to the [place] [facility] [place] last week due to an increase in dyspnea. Due to the wait times she left and was not seen. Her breathing is back to baseline now. She has contacted Dr. Swift's office. She has had right sided chest discomfort which has been intermittent. She has also had some fast heart beats when she had more dyspnea last week. She has not noted an increase in palpitation severity or frequency. Her weight is stable and she has no edema, orthopnea, or PND. No dizziness, lightheadedness, pre/syncope, or lifevest shocks. She continues to work and go to school. No change in her exercise tolerance. Her appetite is improving.     REVIEW OF SYSTEMS: 10 point ROS neg other than the symptoms noted above in the HPI.    PAST MEDICAL HISTORY:   1. 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 NSVT  3. WPW     ALLERGIES:  No Known Allergies    MEDICATIONS:   metoprolol succinate ER (TOPROL XL) 25 MG 24 hr tablet, Take 37.5 mg by mouth daily  triamcinolone (KENALOG) 0.1 % paste, APPLY TO THE AFFECTED AREA DAILY FOR 10 DAYS    No current facility-administered medications on file prior to visit.    SOCIAL HISTORY: She lives in [place], with her Mom   Tobacco: Never smoker  Alcohol: rare  Illicit: prior marijuana use     FAMILY HISTORY: . Her maternal grandfather had a stroke in 2017, but otherwise she has no significant family history of past cardiovascular history. There is no past family history of sudden cardiac death.    PHYSICAL EXAM:   GENERAL: Healthy, alert and no distress  EYES: Eyes grossly normal to inspection.  No discharge or erythema, or obvious scleral/conjunctival abnormalities.  RESP: No audible wheeze, cough, or visible cyanosis.  No visible retractions or increased work of breathing.    SKIN: Visible skin clear. No significant rash, abnormal pigmentation or lesions.  NEURO:Mentation and speech appropriate for age.  PSYCH: Mentation appears normal, affect  normal/bright, judgement and insight intact, normal speech and appearance well-groomed.      DIAGNOSTIC TESTING:   Cardiopulmonary stress test 7/7/2022    Peak VO2 (ml/kg/min) VE/VCO2  [place] RER   21.89        27.16        1.07            Predicted VO2 (ml/kg/min) Predicted VO2 %   36.00        61            Resting Supine BP (mmHg) Resting Standing BP (mmHg) Final Stress BP (mmHg)   120/57        149/73        166/52          Resting Supine HR (bpm) Resting Standing HR (bpm)    63        80             Max HR (bpm) Max Predicted HR (bpm) Max Perdicted HR %   169        200        85            Exercise time (min) Exercise time (sec) Estimated workload (METS)   9        0        6.3              I have personally reviewed all of the cardiovascular testing reports performed at [facility]    exercise ECG test 10/2021:  10.8 METs, exercise duration of 9 min and 16 seconds, had no exercised induced arrhythmias, she did have a delta wave and repolarization abnormalities.    Ziopatch monitor 10/2021: 11 VT episodes 68 SVT episodes    Echocardiogram 11/2021:  LVEF of 51% with asymettric LVH (septum 1.5cm).     CMR 11/2021:  LVEF of 43%, LVEDD 4.9cm, IV septum 1.7cm,  normal RV function but RVH noted, no LOU, harman LGE with subendocardial, mid myocardial, and subendocardial enhancement not associated with a vascular territory.       ASSESSMENT AND PLAN: Ms. 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     Her cardiopulmonary stress test demonstrates that she did achieve anerobic threshold. She was able to exercise for 9 min, which is consistent with her ECG stress test last year, though the total METs was lower (6.3 vs 10 METs). She had a peak V02 of 21.9 which is 61% of predicted. She achieved a VE/VC02 slope of 27.16. She has a moderate functional impairment based on  these values. Additionally, her blood pressure response to exercise was blunted. Given her stress testing results, she does not need advanced therapies at this moment. We discussed that she should continue to have regular follow up with Dr. Swift and the [facility] team. If she were to have worsening symptoms or an increase in arrhythmias we will need to re-evaluate advanced therapies. I would like to repeat a CPEX and obtain a RHC annually if she is doing well along with a clinic visit. We discussed that she should avoid all substance use, avoid pregnancy, and obtain psychological care.  She should also see neuromuscular neurology at [facility] or arrange to be seen at the [facility].     PLAN:   -follow up with Dr. Swift   -annual visit with me with CPEX, RHC   -avoid all substance use   -obtain psychological care        50  minutes on DOS for chart review, patient history and exam, counseling, review of diagnostic testing, coordination of care (including with Dr. Swift) and documentation.     Thank you for allowing me to participate in the care of your patient. Please do not hesitate to contact me if you have any questions.     Sincerely,   Forum     Forum MD Gina, PhD, FACC  Advanced Heart Failure/Transplantation/MCS  [facility]      CC: Ameena Swift MD [facility] Cardiology

## 2022-07-25 NOTE — PROGRESS NOTES
Mary is a 21 year old who is being evaluated via a billable video visit.      How would you like to obtain your AVS? MyChart  If the video visit is dropped, the invitation should be resent by: Text to cell phone: [phone]   Will anyone else be joining your video visit? No      Amado Mcneil Visit Facilitator/MA.      Video-Visit Details    Video Start Time: 2:30    Type of service:  Video Visit    Video End Time:2:55    Originating Location (pt. Location): Home    Distant Location (provider location):  [facility] HEART [facility]     Platform used for Video Visit: [employer]

## 2022-07-31 NOTE — PROGRESS NOTES
Neurocardiomyopathy Clinic Progress Note    Name: Mary Shaikh  : 2000  MRN: [id number]    2022    Dear Dr. 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     As you know, Ms. 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 She was seen in the emergency department at [facility] and was found to have prexcitation on her ECG in , and this lead to an exercise ECG test which she achieved 10.8 METs, exercise duration of 9 min and 16 seconds, had no exercised induced arrhythmias, she did have a delta wave and repolarization abnormalities. Subsequently, she had an echocardiogram which showed an LVEF of 51% with asymetric LVH (septum 1.5cm). She has been following with Dr. Swift in the [facility] cardiology clinic and she obtained a CMR which showed and LVEF of 43%, LVEDD 4.9cm, IV septum 1.7cm,  normal RV function but RVH noted, no LOU, harman LGE with subendocardial, mid myocardial, and subendocardial enhancement not associated with a vascular territory. She had a ziopatch monitor demonstrating 11 wide complex tacycardias and 68 episodes of SVT.  Dr. Swift has been very thorough in having her see genetics, starting a beta blocker, and referral for ICD implantation. She has not yet pursued ICD implantation and is further discussing this with her mother and Dr. Swift.     I last saw Ms. Shaikh in clinic on May 9, 2022. In the interim, she completed her cardiopulmonary stress test. She went to the ED at [facility] on Thursday last week due to an increase in dyspnea. Due to the wait times she left and was not seen. Her breathing is back to baseline now. She has contacted Dr. Swift's office. She has had right sided chest discomfort which has been intermittent. She has also had some fast heart beats when  she had more dyspnea last week. She has not noted an increase in palpitation severity or frequency. Her weight is stable and she has no edema, orthopnea, or PND. No dizziness, lightheadedness, pre/syncope, or lifevest shocks. She continues to work and go to school. No change in her exercise tolerance. Her appetite is improving.     REVIEW OF SYSTEMS: 10 point ROS neg other than the symptoms noted above in the HPI.    PAST MEDICAL HISTORY:   1. 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 NSVT  3. WPW     ALLERGIES:  No Known Allergies    MEDICATIONS:   metoprolol succinate ER (TOPROL XL) 25 MG 24 hr tablet, Take 37.5 mg by mouth daily  triamcinolone (KENALOG) 0.1 % paste, APPLY TO THE AFFECTED AREA DAILY FOR 10 DAYS    No current facility-administered medications on file prior to visit.    SOCIAL HISTORY: She lives in [place], with her Mom   Tobacco: Never smoker  Alcohol: rare  Illicit: prior marijuana use     FAMILY HISTORY: . Her maternal grandfather had a stroke in 2017, but otherwise she has no significant family history of past cardiovascular history. There is no past family history of sudden cardiac death.    PHYSICAL EXAM:   GENERAL: Healthy, alert and no distress  EYES: Eyes grossly normal to inspection.  No discharge or erythema, or obvious scleral/conjunctival abnormalities.  RESP: No audible wheeze, cough, or visible cyanosis.  No visible retractions or increased work of breathing.    SKIN: Visible skin clear. No significant rash, abnormal pigmentation or lesions.  NEURO:Mentation and speech appropriate for age.  PSYCH: Mentation appears normal, affect normal/bright, judgement and insight intact, normal speech and appearance well-groomed.      DIAGNOSTIC TESTING:   Cardiopulmonary stress test 7/7/2022    Peak VO2 (ml/kg/min) VE/VCO2  [place] RER   21.89        27.16        1.07            Predicted VO2 (ml/kg/min) Predicted VO2 %   36.00        61            Resting Supine BP (mmHg) Resting Standing BP  (mmHg) Final Stress BP (mmHg)   120/57        149/73        166/52          Resting Supine HR (bpm) Resting Standing HR (bpm)    63        80             Max HR (bpm) Max Predicted HR (bpm) Max Perdicted HR %   169        200        85            Exercise time (min) Exercise time (sec) Estimated workload (METS)   9        0        6.3              I have personally reviewed all of the cardiovascular testing reports performed at [facility]    exercise ECG test 10/2021:  10.8 METs, exercise duration of 9 min and 16 seconds, had no exercised induced arrhythmias, she did have a delta wave and repolarization abnormalities.    Ziopatch monitor 10/2021: 11 VT episodes 68 SVT episodes    Echocardiogram 11/2021:  LVEF of 51% with asymettric LVH (septum 1.5cm).     CMR 11/2021:  LVEF of 43%, LVEDD 4.9cm, IV septum 1.7cm,  normal RV function but RVH noted, no LOU, harman LGE with subendocardial, mid myocardial, and subendocardial enhancement not associated with a vascular territory.       ASSESSMENT AND PLAN: Ms. 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     Her cardiopulmonary stress test demonstrates that she did achieve anerobic threshold. She was able to exercise for 9 min, which is consistent with her ECG stress test last year, though the total METs was lower (6.3 vs 10 METs). She had a peak V02 of 21.9 which is 61% of predicted. She achieved a VE/VC02 slope of 27.16. She has a moderate functional impairment based on these values. Additionally, her blood pressure response to exercise was blunted. Given her stress testing results, she does not need advanced therapies at this moment. We discussed that she should continue to have regular follow up with Dr. Swift and the [facility] team. If she were to have worsening symptoms or an increase in arrhythmias we will need to  re-evaluate advanced therapies. I would like to repeat a CPEX and obtain a RHC annually if she is doing well along with a clinic visit. We discussed that she should avoid all substance use, avoid pregnancy, and obtain psychological care.  She should also see neuromuscular neurology at [facility] or arrange to be seen at the [place].     PLAN:   -follow up with Dr. Swift   -annual visit with me with CPEX, RHC   -avoid all substance use   -obtain psychological care        50  minutes on DOS for chart review, patient history and exam, counseling, review of diagnostic testing, coordination of care (including with Dr. Swift) and documentation.     Thank you for allowing me to participate in the care of your patient. Please do not hesitate to contact me if you have any questions.     Sincerely,   Forum     Forum MD Gina, PhD, [facility]  Advanced Heart Failure/Transplantation/MCS  [facility]/"[employer]th      CC: Ameena Swift MD [facility]

## 2022-09-11 ENCOUNTER — HEALTH MAINTENANCE LETTER (OUTPATIENT)
Age: 22
End: 2022-09-11

## 2023-04-30 ENCOUNTER — HEALTH MAINTENANCE LETTER (OUTPATIENT)
Age: 23
End: 2023-04-30

## 2023-07-24 ENCOUNTER — HOSPITAL ENCOUNTER (OUTPATIENT)
Dept: CARDIOLOGY | Facility: CLINIC | Age: 23
Discharge: HOME | End: 2023-07-24
Attending: INTERNAL MEDICINE | Admitting: INTERNAL MEDICINE
Payer: COMMERCIAL

## 2023-07-24 VITALS — BODY MASS INDEX: 25.37 KG/M2 | HEIGHT: 66 IN | WEIGHT: 157.85 LBS

## 2023-07-24 DIAGNOSIS — E74.05 DANON DISEASE (H): ICD-10-CM

## 2023-07-24 DIAGNOSIS — Z13.6 ENCOUNTER FOR SCREENING FOR CARDIOVASCULAR DISORDERS: ICD-10-CM

## 2023-07-24 PROCEDURE — 94621 CARDIOPULM EXERCISE TESTING: CPT | Mod: 26 | Performed by: INTERNAL MEDICINE

## 2023-07-24 PROCEDURE — 94621 CARDIOPULM EXERCISE TESTING: CPT

## 2023-07-25 LAB
CARDIOPULMONARY ANAEROBIC THRESHOLD PREDICTED PEAK: 47 %
CARDIOPULMONARY ANAEROBIC THRESHOLD VO2: 18.2 ML/KG/MIN
CARDIOPULMONARY BLOOD PRESSURE REST: NORMAL MMHG
CARDIOPULMONARY BREATHING RESERVE REST: 82.9
CARDIOPULMONARY BREATHING RESERVE V02MAX: 17
CARDIOPULMONARY CO2 OUTPUT REST: 249 ML/MIN
CARDIOPULMONARY CO2 OUTPUT VO2MAX: 1575 ML/MIN
CARDIOPULMONARY FEV 1.0 (L) ACTUAL: 1.65
CARDIOPULMONARY FEV 1.0 (L) PRECENT: 47 %
CARDIOPULMONARY FEV 1.0 (L) PREDICTED: 3.5
CARDIOPULMONARY FEV 1.0 FVC (%) ACTUAL: 73
CARDIOPULMONARY FEV 1.0 FVC (%) PERCENT: 85 %
CARDIOPULMONARY FEV 1.0 FVC (%) PREDICTED: 86
CARDIOPULMONARY FUNCTIONAL CAPACITY MAX ML/KG/MIN: 21.4 ML/KG/MIN
CARDIOPULMONARY FUNCTIONAL CAPACITY PERCENT: 60 %
CARDIOPULMONARY FUNCTIONAL CAPACITY PREDICTED: 35.8 ML/KG/MIN
CARDIOPULMONARY FVC (L) ACTUAL: 2.24
CARDIOPULMONARY FVC (L) PERCENT: 56 %
CARDIOPULMONARY FVC (L) PREDICTED: 4.03
CARDIOPULMONARY HEART RATE REST: 68 BPM
CARDIOPULMONARY MET'S REST: 1.5
CARDIOPULMONARY MINUTE VENTILATION REST: 9.9 L/MIN
CARDIOPULMONARY MINUTE VENTILATION VO2MAX: 48.5 L/MIN
CARDIOPULMONARY MYOCARDIAC O2 DEMAND MAX: NORMAL
CARDIOPULMONARY OXYGEN CONSUMPTION REST: 5.1 ML/KG/MIN
CARDIOPULMONARY OXYGEN CONSUMPTION VO2MAX: 21.4 ML/KG/MIN
CARDIOPULMONARY OXYGEN PULSE REST: 5.4 ML/BEAT
CARDIOPULMONARY OXYGEN PULSE VO2MAX: 9.6 ML/BEAT
CARDIOPULMONARY OXYGEN SATURATION- OXIMETRY REST: 100 %
CARDIOPULMONARY OXYGEN SATURATION- OXIMETRY VO2MAX: 97 %
CARDIOPULMONARY PET C02 REST: 31
CARDIOPULMONARY PET C02 VO2MAX: 37
CARDIOPULMONARY PET02 REST: 104
CARDIOPULMONARY PET02 V02 MAX: 108
CARDIOPULMONARY RER: 0.91
CARDIOPULMONARY RESPIRALORY EXCHANGE RATIO VO2MAX: 0.91
CARDIOPULMONARY RESPIRALORY EXCHANGE RATIO: 0.68
CARDIOPULMONARY RESPIRATORY RATE REST: 19 BR/MIN
CARDIOPULMONARY RESPIRATORY RATE VO2MAX: 48 BR/MIN
CARDIOPULMONARY STRESS BASE 1 BP MMHG: NORMAL MMHG
CARDIOPULMONARY STRESS BASE 1 BPA: 130 BPM
CARDIOPULMONARY STRESS BASE 1 SPO2: 100 % SPO2
CARDIOPULMONARY STRESS BASE 1 TIME SEC: 0 SEC
CARDIOPULMONARY STRESS BASE 1 TIME: 1 MINS
CARDIOPULMONARY STRESS BASE 2 BP MMHG: NORMAL MMHG
CARDIOPULMONARY STRESS BASE 2 BPA: 84 BPM
CARDIOPULMONARY STRESS BASE 2 SPO2: 100 % SPO2
CARDIOPULMONARY STRESS BASE 2 TIME SEC: 0 SEC
CARDIOPULMONARY STRESS BASE 2 TIME: 3 MINS
CARDIOPULMONARY STRESS BASE 3 BP MMHG: NORMAL MMHG
CARDIOPULMONARY STRESS BASE 3 BPA: 75 BPM
CARDIOPULMONARY STRESS BASE 3 SPO2: 100 % SPO2
CARDIOPULMONARY STRESS BASE 3 TIME SEC: 0 SEC
CARDIOPULMONARY STRESS BASE 3 TIME: 5 MINS
CARDIOPULMONARY STRESS PHASE 1 BP MMHG: NORMAL MMHG
CARDIOPULMONARY STRESS PHASE 1 BPM: 104 BPM
CARDIOPULMONARY STRESS PHASE 1 SPO2: 100 % SPO2
CARDIOPULMONARY STRESS PHASE 1 TIME SEC: 0 SEC
CARDIOPULMONARY STRESS PHASE 1 TIME: 3 MINS
CARDIOPULMONARY STRESS PHASE 2 BP MMHG: NORMAL MMHG
CARDIOPULMONARY STRESS PHASE 2 BPM: 126 BPM
CARDIOPULMONARY STRESS PHASE 2 SPO2: 100 % SPO2
CARDIOPULMONARY STRESS PHASE 2 TIME SEC: 0 SEC
CARDIOPULMONARY STRESS PHASE 2 TIME: 6 MINS
CARDIOPULMONARY STRESS PHASE 3 BP MMHG: NORMAL MMHG
CARDIOPULMONARY STRESS PHASE 3 BPM: 159 BPM
CARDIOPULMONARY STRESS PHASE 3 SPO2: 100 % SPO2
CARDIOPULMONARY STRESS PHASE 3 TIME SEC: 54 SEC
CARDIOPULMONARY STRESS PHASE 3 TIME: 6 MINS
CARDIOPULMONARY SVC (L) ACTUAL: 2.25
CARDIOPULMONARY SVC (L) PERCENT: 56 %
CARDIOPULMONARY SVC (L) PREDICTED: 4.03
CARDIOPULMONARY TIDAL VOLUME REST: 509 ML
CARDIOPULMONARY TIDAL VOLUME VO2MAX: 1013 ML
CARDIOPULMONARY VE/VCO2 SLOPE: 28.68
CARDIOPULMONARY VENTILATORY EQUIVALENT 02 REST: 27
CARDIOPULMONARY VENTILATORY EQUIVALENT 02 V02: 29
CARDIOPULMONARY VENTILATORY EQUIVALENT C02 REST: 40
CARDIOPULMONARY VENTILATORY EQUIVALENT C02 SLOPE VO2MAX: 28.68
CARDIOPULMONARY VENTILATORY EQUIVALENT C02 VO2MAX: 31
CV STRESS MAX HR HE: 159
PREDICTED VO2MAX: 35.8
RATED PERCEIVED EXERTION: 17
STRESS ANGINA INDEX: 0
STRESS ECHO BASELINE BP: NORMAL MMHG
STRESS ECHO BASELINE HR: 61 BPM
STRESS ECHO CALCULATED PERCENT HR: 80 %
STRESS ECHO LAST STRESS BP: NORMAL MMHG
STRESS ECHO POST ESTIMATED WORKLOAD: 6.1 METS
STRESS ECHO POST EXERCISE DUR MIN: 6 MIN
STRESS ECHO POST EXERCISE DUR SEC: 54 SEC
STRESS ECHO TARGET HR: 199

## 2023-07-31 DIAGNOSIS — Z13.6 ENCOUNTER FOR SCREENING FOR CARDIOVASCULAR DISORDERS: ICD-10-CM

## 2023-07-31 DIAGNOSIS — E74.05 DANON DISEASE (H): Primary | ICD-10-CM

## 2023-08-18 ENCOUNTER — VIRTUAL VISIT (OUTPATIENT)
Dept: CARDIOLOGY | Facility: CLINIC | Age: 23
End: 2023-08-18
Attending: INTERNAL MEDICINE
Payer: COMMERCIAL

## 2023-08-18 DIAGNOSIS — Z13.6 ENCOUNTER FOR SCREENING FOR CARDIOVASCULAR DISORDERS: ICD-10-CM

## 2023-08-18 DIAGNOSIS — E74.05 DANON DISEASE IN HETEROZYGOUS FEMALE (H): Primary | ICD-10-CM

## 2023-08-18 PROCEDURE — 99417 PROLNG OP E/M EACH 15 MIN: CPT | Mod: VID | Performed by: INTERNAL MEDICINE

## 2023-08-18 PROCEDURE — 99215 OFFICE O/P EST HI 40 MIN: CPT | Mod: VID | Performed by: INTERNAL MEDICINE

## 2023-08-18 ASSESSMENT — PAIN SCALES - GENERAL: PAINLEVEL: WORST PAIN (10)

## 2023-08-18 NOTE — PROGRESS NOTES
Neurocardiomyopathy Clinic Progress Note    Name: Mary Shaikh  : 2000  MRN: [id number]    2023    Dear Dr. Swift,    I had the pleasure of seeing Mary Shaikh, a 22 year old woman today in the [facility] Neuromuscular Cardiomyopathy Clinic for a virtual follow up evaluation of Danon disease.     As you know, Ms. 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 She was seen in the emergency department at [facility] and was found to have prexcitation on her ECG in , and this lead to an exercise ECG test which she achieved 10.8 METs, exercise duration of 9 min and 16 seconds, had no exercised induced arrhythmias, she did have a delta wave and repolarization abnormalities. Subsequently, she had an echocardiogram which showed an LVEF of 51% with asymetric LVH (septum 1.5cm). She has been following with Dr. Swift in the [facility] cardiology clinic and she obtained a CMR which showed and LVEF of 43%, LVEDD 4.9cm, IV septum 1.7cm,  normal RV function but RVH noted, no LOU, harman LGE with subendocardial, mid myocardial, and subendocardial enhancement not associated with a vascular territory. She had a ziopatch monitor demonstrating 11 wide complex tacycardias and 68 episodes of SVT.  Dr. Swift has been very thorough in having her see genetics, starting a beta blocker, and referral for ICD implantation. She has not yet pursued ICD implantation and is further discussing this with her mother and Dr. Swift.     I saw her in clinic 2022. She has follow up with Dr. Swift. She has not had lifevest alarms since switching to bisoprolol. She had an echocardiogram at [facility] in 2022 which showed an LVEF of 40-45% and normal RV function. She did not come for her in person visit with me on 2023 as she prefers follow up visits to be virtual.      Overall she is doing well.  She is now working remotely which she enjoys.  She continues  to have dyspnea and became dyspneic with her cardiopulmonary exercise stress test.  She similarly becomes short of breath with walking in the grocery store or climbing stairs but this is not changed in the past year.  She notes that she has back pain which is also limited her increasing her activity.  She denies any chest pain, chest pressure, chest tightness.  Her weight is stable and she has no edema, orthopnea, or PND.  She is sleeping approximately 6 to 7 hours and feels refreshed in the morning she wakes up approximately 5 AM and has some fatigue in the middle of the day and then is drowsy in the evening.  She is frustrated by the drowsiness that occurs in the evening.  She continues to have palpitations that have not changed in the last year which she describes as a racing heart and skipped beats.  She continues to wear her LifeVest and has had some alerts that occurred when she was carrying something heavy but no shocks.  She has not pursued an ICD yet as she has concerns regarding the possibility of the device being hacked.  She has not had lightheadedness or dizziness on losartan and a beta blocker but the doses have not been able to be uptitrated due to side effects   She denies presyncope or syncope.  She overall has good energy and appetite    REVIEW OF SYSTEMS: 10 point ROS neg other than the symptoms noted above in the HPI.    PAST MEDICAL HISTORY:   1. 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 NSVT  3. WPW     ALLERGIES:  No Known Allergies    MEDICATIONS:   Losartan 25m daily  metoprolol succinate ER (TOPROL XL) 25 MG 24 hr tablet, Take 37.5 mg by mouth daily  triamcinolone (KENALOG) 0.1 % paste, APPLY TO THE AFFECTED AREA DAILY FOR 10 DAYS    No current facility-administered medications on file prior to visit.    SOCIAL HISTORY: She lives in [place], with her Mom   Tobacco: Never smoker  Alcohol: rare  Illicit: prior marijuana use     FAMILY HISTORY: . Her maternal grandfather had a stroke in  2017, but otherwise she has no significant family history of past cardiovascular history. There is no past family history of sudden cardiac death.    PHYSICAL EXAM:   There were no vitals taken for this visit.  GENERAL: Healthy, alert and no distress  EYES: Eyes grossly normal to inspection.    RESP: No audible wheeze, cough, or visible cyanosis.  No visible retractions or increased work of breathing.    NEURO: Cranial nerves grossly intact.  Mentation and speech appropriate for age.      DIAGNOSTIC TESTING: personally reviewed    Cardiopulmonary exercise stress test 7/24/23    Peak VO2 (ml/kg/min) VE/VCO2  [place] RER   21.40       28.68       0.91           Predicted VO2 (ml/kg/min) Predicted VO2 %   35.80       60           Resting Supine BP (mmHg) Resting Standing BP (mmHg) Final Stress BP (mmHg)   118/60       126/80       161/85         Resting Supine HR (bpm) Resting Standing HR (bpm)    61       68            Max HR (bpm) Max Predicted HR (bpm) Max Perdicted HR %   159       199       80           Exercise time (min) Exercise time (sec) Estimated workload (METS)   6       54       6.1           Echo [facility] 11/2022: Decreased left ventricular systolic performance-mild.   The estimated left ventricular ejection fraction is 40-45%.   Left ventricular diastolic pattern suggest elevated filling pressure.   Normal right ventricular size and function.   Right ventricular hypertrophy, probable.   TR jet is not adequate to assess PA pressure.   Based on IVC geometry, the RA pressure is probably normal.     I have personally reviewed all of the cardiovascular testing reports performed at [facility]    exercise ECG test 10/2021:  10.8 METs, exercise duration of 9 min and 16 seconds, had no exercised induced arrhythmias, she did have a delta wave and repolarization abnormalities.    Ziopatch monitor 10/2021: 11 VT episodes 68 SVT episodes    Echocardiogram 11/2021:  LVEF of 51% with asymettric LVH (septum 1.5cm).     CMR 11/2021:   LVEF of 43%, LVEDD 4.9cm, IV septum 1.7cm,  normal RV function but RVH noted, no LOU, harman LGE with subendocardial, mid myocardial, and subendocardial enhancement not associated with a vascular territory.       ASSESSMENT AND PLAN: Ms. 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     She has diagnostic HCM based on her CMR and arrhythmias on her Ziopatch monitor. 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 Dr. Swift has already appropriately referred her to opthalmology, neuropsych, and neuromuscular neurology, which I am fully in agreement with to assess extra cardiac manifestations. Her echo last year showed an LVEF of 40-45%. She is both on metoprolol and losartan for nearly a year now and it may be beneficial to repeat a CMR this year to assess function and fibrosis. . We discussed that she should avoid dehydration and all medications that are prescribed to her should be discussed with Dr. Swift as avoiding hypotension or arrhythmias is very important.     Her exercise tolerance has not changed overall. Her CPEX shows that she exercised for 6 min and 54 seconds and stopped due to dysnea. She did not achieve RER. Her METs were 6.1 and on prior CPEX where she did achieve RER she had 6.3 METs. Her peak V02 while not diagnostic remains unchanged and shehad a normal blood pressure and heart rate response.     She has had NSVT and wearing a Lifevest. She is feeling more comfortable about an ICD implantation but would like to meet with Dr. Swift to get additional information.     I encouraged her to get her labs at [facility] or the [place], as previously she had an elevated AST, NT pro BNP, and troponin. She will  also check her blood pressure when she goes out.     PLAN:   -return to see Dr. Swift regarding concerns about ICD  -CMR with contrast  -labs at [facility]   -continue current medications  -neuromuscular neurology visit with at [facility] or arrange with [facility]   -follow up in 1 year in person with CPEX and labs on same day     65 minutes on DOS for chart review, patient history and exam, counseling, review of diagnostic testing, coordination of care (including with Dr. Swift) and documentation.     Thank you for allowing me to participate in the care of your patient. Please do not hesitate to contact me if you have any questions.     Sincerely,   Forum     Forum MD Gina, PhD, FACC  Advanced Heart Failure/Transplantation/MCS  [facility]/[employer]      CC: Ameena Swift MD [facility] Cardiology

## 2023-08-18 NOTE — LETTER
2023      RE: Mary Shaikh  [address]  [place] [address]       Dear Colleague,    Thank you for the opportunity to participate in the care of your patient, Mary Shaikh, at the [facility] [place] [facility]. Please see a copy of my visit note below.    Neurocardiomyopathy Clinic Progress Note    Name: Mary Shaikh  : 2000  MRN: [id number]    2023    Dear Dr. Swift,    I had the pleasure of seeing Mary Shaikh, a 22 year old woman today in the [facility] Neuromuscular Cardiomyopathy Clinic for a virtual follow up evaluation of Danon disease.     As you know, Ms. 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 She was seen in the emergency department at [facility] and was found to have prexcitation on her ECG in , and this lead to an exercise ECG test which she achieved 10.8 METs, exercise duration of 9 min and 16 seconds, had no exercised induced arrhythmias, she did have a delta wave and repolarization abnormalities. Subsequently, she had an echocardiogram which showed an LVEF of 51% with asymetric LVH (septum 1.5cm). She has been following with Dr. Swift in the [facility] cardiology clinic and she obtained a CMR which showed and LVEF of 43%, LVEDD 4.9cm, IV septum 1.7cm,  normal RV function but RVH noted, no LOU, harman LGE with subendocardial, mid myocardial, and subendocardial enhancement not associated with a vascular territory. She had a ziopatch monitor demonstrating 11 wide complex tacycardias and 68 episodes of SVT.  Dr. Swift has been very thorough in having her see genetics, starting a beta blocker, and referral for ICD implantation. She has not yet pursued ICD implantation and is further discussing this with her mother and Dr. Swift.     I saw her in clinic 2022. She has follow up with Dr. Swift. She has not had lifevest  alarms since switching to bisoprolol. She had an echocardiogram at [facility] in Nov 2022 which showed an LVEF of 40-45% and normal RV function. She did not come for her in person visit with me on August 14, 2023 as she prefers follow up visits to be virtual.      Overall she is doing well.  She is now working remotely which she enjoys.  She continues to have dyspnea and became dyspneic with her cardiopulmonary exercise stress test.  She similarly becomes short of breath with walking in the grocery store or climbing stairs but this is not changed in the past year.  She notes that she has back pain which is also limited her increasing her activity.  She denies any chest pain, chest pressure, chest tightness.  Her weight is stable and she has no edema, orthopnea, or PND.  She is sleeping approximately 6 to 7 hours and feels refreshed in the morning she wakes up approximately 5 AM and has some fatigue in the middle of the day and then is drowsy in the evening.  She is frustrated by the drowsiness that occurs in the evening.  She continues to have palpitations that have not changed in the last year which she describes as a racing heart and skipped beats.  She continues to wear her LifeVest and has had some alerts that occurred when she was carrying something heavy but no shocks.  She has not pursued an ICD yet as she has concerns regarding the possibility of the device being hacked.  She has not had lightheadedness or dizziness on losartan and a beta blocker but the doses have not been able to be uptitrated due to side effects   She denies presyncope or syncope.  She overall has good energy and appetite    REVIEW OF SYSTEMS: 10 point ROS neg other than the symptoms noted above in the HPI.    PAST MEDICAL HISTORY:   1. 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 NSVT  3. WPW     ALLERGIES:  No Known Allergies    MEDICATIONS:   Losartan 25m daily  metoprolol succinate ER (TOPROL XL) 25 MG 24 hr tablet, Take 37.5 mg by mouth  daily  triamcinolone (KENALOG) 0.1 % paste, APPLY TO THE AFFECTED AREA DAILY FOR 10 DAYS    No current facility-administered medications on file prior to visit.    SOCIAL HISTORY: She lives in [place], with her Mom   Tobacco: Never smoker  Alcohol: rare  Illicit: prior marijuana use     FAMILY HISTORY: . Her maternal grandfather had a stroke in 2017, but otherwise she has no significant family history of past cardiovascular history. There is no past family history of sudden cardiac death.    PHYSICAL EXAM:   There were no vitals taken for this visit.  GENERAL: Healthy, alert and no distress  EYES: Eyes grossly normal to inspection.    RESP: No audible wheeze, cough, or visible cyanosis.  No visible retractions or increased work of breathing.    NEURO: Cranial nerves grossly intact.  Mentation and speech appropriate for age.      DIAGNOSTIC TESTING: personally reviewed    Cardiopulmonary exercise stress test 7/24/23    Peak VO2 (ml/kg/min) VE/VCO2  [place] RER   21.40       28.68       0.91           Predicted VO2 (ml/kg/min) Predicted VO2 %   35.80       60           Resting Supine BP (mmHg) Resting Standing BP (mmHg) Final Stress BP (mmHg)   118/60       126/80       161/85         Resting Supine HR (bpm) Resting Standing HR (bpm)    61       68            Max HR (bpm) Max Predicted HR (bpm) Max Perdicted HR %   159       199       80           Exercise time (min) Exercise time (sec) Estimated workload (METS)   6       54       6.1           Echo [facility] 11/2022: Decreased left ventricular systolic performance-mild.   The estimated left ventricular ejection fraction is 40-45%.   Left ventricular diastolic pattern suggest elevated filling pressure.   Normal right ventricular size and function.   Right ventricular hypertrophy, probable.   TR jet is not adequate to assess PA pressure.   Based on IVC geometry, the RA pressure is probably normal.     I have personally reviewed all of the cardiovascular testing reports  performed at [facility]    exercise ECG test 10/2021:  10.8 METs, exercise duration of 9 min and 16 seconds, had no exercised induced arrhythmias, she did have a delta wave and repolarization abnormalities.    Ziopatch monitor 10/2021: 11 VT episodes 68 SVT episodes    Echocardiogram 11/2021:  LVEF of 51% with asymettric LVH (septum 1.5cm).     CMR 11/2021:  LVEF of 43%, LVEDD 4.9cm, IV septum 1.7cm,  normal RV function but RVH noted, no LOU, harman LGE with subendocardial, mid myocardial, and subendocardial enhancement not associated with a vascular territory.       ASSESSMENT AND PLAN: Ms. 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     She has diagnostic HCM based on her CMR and arrhythmias on her Ziopatch monitor. 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 Dr. Swift has already appropriately referred her to opthalmology, neuropsych, and neuromuscular neurology, which I am fully in agreement with to assess extra cardiac manifestations. Her echo last year showed an LVEF of 40-45%. She is both on metoprolol and losartan for nearly a year now and it may be beneficial to repeat a CMR this year to assess function and fibrosis. . We discussed that she should avoid dehydration and all medications that are prescribed to her should be discussed with Dr. Swift as avoiding hypotension or arrhythmias is very important.     Her exercise tolerance has not changed overall. Her CPEX shows that she exercised for 6 min and 54 seconds and stopped due to dysnea. She did not achieve RER. Her METs were 6.1 and on prior CPEX where she did achieve RER she had 6.3 METs. Her peak V02 while not diagnostic remains unchanged and shehad a normal  blood pressure and heart rate response.     She has had NSVT and wearing a Lifevest. She is feeling more comfortable about an ICD implantation but would like to meet with Dr. Swift to get additional information.     I encouraged her to get her labs at [facility] or the [place], as previously she had an elevated AST, NT pro BNP, and troponin. She will also check her blood pressure when she goes out.     PLAN:   -return to see Dr. Swift regarding concerns about ICD  -CMR with contrast  -labs at [facility]   -continue current medications  -neuromuscular neurology visit with at [facility] or arrange with [facility]   -follow up in 1 year in person with CPEX and labs on same day     65 minutes on DOS for chart review, patient history and exam, counseling, review of diagnostic testing, coordination of care (including with Dr. Swift) and documentation.     Thank you for allowing me to participate in the care of your patient. Please do not hesitate to contact me if you have any questions.     Sincerely,   Forum     Forum MD Gina, PhD, FACC  Advanced Heart Failure/Transplantation/MCS  [facility]      CC: Ameena Swift MD [facility] Cardiology    Virtual Visit Details    Type of service:  Video Visit     Originating Location (pt. Location): Home    Distant Location (provider location):  On-site  Platform used for Video Visit: Kelsey  Start: 8/18/2023, 3:03:50 pm.  End: 8/18/2023, 3:24:47 pm.

## 2023-08-18 NOTE — PATIENT INSTRUCTIONS
"You were seen today in the Cardiovascular Clinic at the [facility].      Cardiology Providers you saw during your visit:  Dr. Hipolito Fuentes     Recommendations:   Return to see Dr. Swift regarding concerns about ICD  Cardiac MRI with contrast  Labs at [facility]   Continue current medications  Neuromuscular neurology visit with at [facility] or arrange with [facility]   Follow up in 1 year in person with CPEX and labs on same day       Thank you for your visit today!   Please MyChart message or call if you have any questions or concerns.      During Business Hours:  [phone], option # 1 \"To leave a message for your care team\"     After hours, weekends or holidays:   [phone], Option #4  Ask to speak to the On-Call Cardiologist. Inform them you are a heart failure patient at the [facility].      Martha Lawton RN BSN   Cardiology Nurse Coordinator - Heart Failure/[facility]  [phone] option 1 to schedule an appointment or leave a message for your care team      "

## 2023-08-18 NOTE — PROGRESS NOTES
Virtual Visit Details    Type of service:  Video Visit     Originating Location (pt. Location): Home    Distant Location (provider location):  On-site  Platform used for Video Visit: Kelsey  Start: 8/18/2023, 3:03:50 pm.  End: 8/18/2023, 3:24:47 pm.

## 2023-08-18 NOTE — NURSING NOTE
Chief Complaint   Patient presents with    Follow Up     Annual follow up, review stress results from 2 weeks ago. Medications/allergies reviewed with pt, no changes per pt. No pt reported vitals today per pt.        Is the patient currently in the state of [place]? YES    Visit mode:VIDEO    If the visit is dropped, the patient can be reconnected by: VIDEO VISIT: Text to cell phone:   Telephone Information:   Mobile [phone]       Will anyone else be joining the visit? NO  (If patient encounters technical issues they should call [phone]    How would you like to obtain your AVS? MyChart    Are changes needed to the allergy or medication list? No and Pt stated no med changes    Patient denies any changes since check-in regarding medication and allergies and states all information entered during check-in remains accurate.    Amado Mcneil, Visit Facilitator/MA.

## 2023-08-21 NOTE — NURSING NOTE
Cardiac Testing: Patient given instructions regarding cardiac MRI. Discussed purpose, preparation, procedure and when to expect results reported back to the patient. Patient demonstrated understanding of this information and agreed to call with further questions or concerns.    Med Reconcile: Reviewed and verified all current medications with the patient. The updated medication list was printed and given to the patient.    Return Appointment: Patient given instructions regarding scheduling next clinic visit. Patient demonstrated understanding of this information and agreed to call with further questions or concerns. Annual follow up with Dr. Fuentes - [place] and [facility] prior.    Patient stated she understood all health information given and agreed to call with further questions or concerns.    Martha Lawton RN

## 2024-05-15 ENCOUNTER — HOSPITAL ENCOUNTER (OUTPATIENT)
Dept: CARDIOLOGY | Facility: CLINIC | Age: 24
Discharge: HOME | End: 2024-05-15
Attending: INTERNAL MEDICINE | Admitting: INTERNAL MEDICINE
Payer: COMMERCIAL

## 2024-05-15 VITALS — HEIGHT: 66 IN | BODY MASS INDEX: 25.37 KG/M2 | WEIGHT: 157.85 LBS

## 2024-05-15 DIAGNOSIS — E74.05 DANON DISEASE IN HETEROZYGOUS FEMALE (H): ICD-10-CM

## 2024-05-15 DIAGNOSIS — Z13.6 ENCOUNTER FOR SCREENING FOR CARDIOVASCULAR DISORDERS: ICD-10-CM

## 2024-05-15 PROCEDURE — 94621 CARDIOPULM EXERCISE TESTING: CPT

## 2024-05-15 PROCEDURE — 94621 CARDIOPULM EXERCISE TESTING: CPT | Mod: 26 | Performed by: INTERNAL MEDICINE

## 2024-05-16 LAB
CARDIOPULMONARY ANAEROBIC THRESHOLD PREDICTED PEAK: 56 %
CARDIOPULMONARY ANAEROBIC THRESHOLD VO2: 17.3 ML/KG/MIN
CARDIOPULMONARY BLOOD PRESSURE REST: NORMAL MMHG
CARDIOPULMONARY BREATHING RESERVE REST: 83.7
CARDIOPULMONARY BREATHING RESERVE V02MAX: 0
CARDIOPULMONARY CO2 OUTPUT REST: 218 ML/MIN
CARDIOPULMONARY CO2 OUTPUT VO2MAX: 1542 ML/MIN
CARDIOPULMONARY FEV 1.0 (L) ACTUAL: 1.46
CARDIOPULMONARY FEV 1.0 (L) PRECENT: 42 %
CARDIOPULMONARY FEV 1.0 (L) PREDICTED: 3.49
CARDIOPULMONARY FEV 1.0 FVC (%) ACTUAL: 75
CARDIOPULMONARY FEV 1.0 FVC (%) PERCENT: 87 %
CARDIOPULMONARY FEV 1.0 FVC (%) PREDICTED: 86
CARDIOPULMONARY FUNCTIONAL CAPACITY MAX ML/KG/MIN: 18.7 ML/KG/MIN
CARDIOPULMONARY FUNCTIONAL CAPACITY PERCENT: 53 %
CARDIOPULMONARY FUNCTIONAL CAPACITY PREDICTED: 35.5 ML/KG/MIN
CARDIOPULMONARY FVC (L) ACTUAL: 1.95
CARDIOPULMONARY FVC (L) PERCENT: 48 %
CARDIOPULMONARY FVC (L) PREDICTED: 4.03
CARDIOPULMONARY HEART RATE REST: 59 BPM
CARDIOPULMONARY MET'S REST: 1.1
CARDIOPULMONARY MINUTE VENTILATION REST: 8.3 L/MIN
CARDIOPULMONARY MINUTE VENTILATION VO2MAX: 51 L/MIN
CARDIOPULMONARY MYOCARDIAC O2 DEMAND MAX: NORMAL
CARDIOPULMONARY OXYGEN CONSUMPTION REST: 4 ML/KG/MIN
CARDIOPULMONARY OXYGEN CONSUMPTION VO2MAX: 18.7 ML/KG/MIN
CARDIOPULMONARY OXYGEN PULSE REST: 9.4 ML/BEAT
CARDIOPULMONARY OXYGEN PULSE VO2MAX: 4.61 ML/BEAT
CARDIOPULMONARY OXYGEN SATURATION- OXIMETRY REST: 100 %
CARDIOPULMONARY OXYGEN SATURATION- OXIMETRY VO2MAX: 99 %
CARDIOPULMONARY PET C02 REST: 34
CARDIOPULMONARY PET C02 VO2MAX: 33
CARDIOPULMONARY PET02 REST: 102
CARDIOPULMONARY PET02 V02 MAX: 114
CARDIOPULMONARY RER: 1.09
CARDIOPULMONARY RESPIRALORY EXCHANGE RATIO VO2MAX: 1.09
CARDIOPULMONARY RESPIRALORY EXCHANGE RATIO: 0.75
CARDIOPULMONARY RESPIRATORY RATE REST: 14 BR/MIN
CARDIOPULMONARY RESPIRATORY RATE VO2MAX: 45 BR/MIN
CARDIOPULMONARY STRESS BASE 1 BP MMHG: NORMAL MMHG
CARDIOPULMONARY STRESS BASE 1 BPA: 103 BPM
CARDIOPULMONARY STRESS BASE 1 SPO2: 100 % SPO2
CARDIOPULMONARY STRESS BASE 1 TIME SEC: 0 SEC
CARDIOPULMONARY STRESS BASE 1 TIME: 1 MINS
CARDIOPULMONARY STRESS BASE 2 BP MMHG: NORMAL MMHG
CARDIOPULMONARY STRESS BASE 2 BPA: 65 BPM
CARDIOPULMONARY STRESS BASE 2 SPO2: 100 % SPO2
CARDIOPULMONARY STRESS BASE 2 TIME SEC: 0 SEC
CARDIOPULMONARY STRESS BASE 2 TIME: 3 MINS
CARDIOPULMONARY STRESS BASE 3 BP MMHG: NORMAL MMHG
CARDIOPULMONARY STRESS BASE 3 BPA: 59 BPM
CARDIOPULMONARY STRESS BASE 3 SPO2: 100 % SPO2
CARDIOPULMONARY STRESS BASE 3 TIME SEC: 0 SEC
CARDIOPULMONARY STRESS BASE 3 TIME: 5 MINS
CARDIOPULMONARY STRESS PHASE 1 BP MMHG: NORMAL MMHG
CARDIOPULMONARY STRESS PHASE 1 BPM: 96 BPM
CARDIOPULMONARY STRESS PHASE 1 SPO2: 99 % SPO2
CARDIOPULMONARY STRESS PHASE 1 TIME SEC: 0 SEC
CARDIOPULMONARY STRESS PHASE 1 TIME: 2 MINS
CARDIOPULMONARY STRESS PHASE 2 BP MMHG: NORMAL MMHG
CARDIOPULMONARY STRESS PHASE 2 BPM: 104 BPM
CARDIOPULMONARY STRESS PHASE 2 SPO2: 100 % SPO2
CARDIOPULMONARY STRESS PHASE 2 TIME SEC: 0 SEC
CARDIOPULMONARY STRESS PHASE 2 TIME: 4 MINS
CARDIOPULMONARY STRESS PHASE 3 BP MMHG: NORMAL MMHG
CARDIOPULMONARY STRESS PHASE 3 BPM: 114 BPM
CARDIOPULMONARY STRESS PHASE 3 SPO2: 99 % SPO2
CARDIOPULMONARY STRESS PHASE 3 TIME SEC: 0 SEC
CARDIOPULMONARY STRESS PHASE 3 TIME: 6 MINS
CARDIOPULMONARY STRESS PHASE 4 BP MMHG: NORMAL MMHG
CARDIOPULMONARY STRESS PHASE 4 BPM: 136 BPM
CARDIOPULMONARY STRESS PHASE 4 SPO2: 99 % SPO2
CARDIOPULMONARY STRESS PHASE 4 TIME SEC: 0 SEC
CARDIOPULMONARY STRESS PHASE 4 TIME: 8 MINS
CARDIOPULMONARY STRESS PHASE 5 BPM: 142 BPM
CARDIOPULMONARY STRESS PHASE 5 SPO2: 99 % SPO2
CARDIOPULMONARY STRESS PHASE 5 TIME SEC: 17 SEC
CARDIOPULMONARY STRESS PHASE 5 TIME: 8 MINS
CARDIOPULMONARY SVC (L) ACTUAL: 2.16
CARDIOPULMONARY SVC (L) PERCENT: 54 %
CARDIOPULMONARY SVC (L) PREDICTED: 4.03
CARDIOPULMONARY TIDAL VOLUME REST: 588 ML
CARDIOPULMONARY TIDAL VOLUME VO2MAX: 1135 ML
CARDIOPULMONARY VE/VCO2 SLOPE: 32.13
CARDIOPULMONARY VENTILATORY EQUIVALENT 02 REST: 29
CARDIOPULMONARY VENTILATORY EQUIVALENT 02 V02: 36
CARDIOPULMONARY VENTILATORY EQUIVALENT C02 REST: 38
CARDIOPULMONARY VENTILATORY EQUIVALENT C02 SLOPE VO2MAX: 32.13
CARDIOPULMONARY VENTILATORY EQUIVALENT C02 VO2MAX: 33
CV STRESS MAX HR HE: 136
PREDICTED VO2MAX: 35.5
RATED PERCEIVED EXERTION: 15
STRESS ANGINA INDEX: 0
STRESS ECHO BASELINE BP: NORMAL MMHG
STRESS ECHO BASELINE HR: 59 BPM
STRESS ECHO CALCULATED PERCENT HR: 69 %
STRESS ECHO LAST STRESS BP: NORMAL MMHG
STRESS ECHO POST ESTIMATED WORKLOAD: 5.3 METS
STRESS ECHO POST EXERCISE DUR MIN: 8 MIN
STRESS ECHO POST EXERCISE DUR SEC: 17 SEC
STRESS ECHO TARGET HR: 198

## 2024-07-07 ENCOUNTER — HEALTH MAINTENANCE LETTER (OUTPATIENT)
Age: 24
End: 2024-07-07

## 2024-07-12 DIAGNOSIS — Z13.6 ENCOUNTER FOR SCREENING FOR CARDIOVASCULAR DISORDERS: ICD-10-CM

## 2024-07-12 DIAGNOSIS — E74.05 DANON DISEASE IN HETEROZYGOUS FEMALE (H): Primary | ICD-10-CM

## 2024-07-22 ENCOUNTER — TELEPHONE (OUTPATIENT)
Dept: CARDIOLOGY | Facility: CLINIC | Age: 24
End: 2024-07-22

## 2024-07-22 ENCOUNTER — LAB (OUTPATIENT)
Dept: LAB | Facility: CLINIC | Age: 24
End: 2024-07-22
Attending: INTERNAL MEDICINE
Payer: COMMERCIAL

## 2024-07-22 ENCOUNTER — OFFICE VISIT (OUTPATIENT)
Dept: CARDIOLOGY | Facility: CLINIC | Age: 24
End: 2024-07-22
Attending: INTERNAL MEDICINE
Payer: COMMERCIAL

## 2024-07-22 VITALS
DIASTOLIC BLOOD PRESSURE: 75 MMHG | HEART RATE: 60 BPM | SYSTOLIC BLOOD PRESSURE: 113 MMHG | BODY MASS INDEX: 27.63 KG/M2 | OXYGEN SATURATION: 100 % | WEIGHT: 171.1 LBS

## 2024-07-22 DIAGNOSIS — I50.23 ACUTE ON CHRONIC SYSTOLIC HEART FAILURE (H): ICD-10-CM

## 2024-07-22 DIAGNOSIS — E74.05 DANON DISEASE IN HETEROZYGOUS FEMALE (H): ICD-10-CM

## 2024-07-22 DIAGNOSIS — Z45.02 FITTING AND ADJUSTMENT OF AUTOMATIC IMPLANTABLE CARDIOVERTER-DEFIBRILLATOR: Primary | ICD-10-CM

## 2024-07-22 DIAGNOSIS — Z13.6 ENCOUNTER FOR SCREENING FOR CARDIOVASCULAR DISORDERS: ICD-10-CM

## 2024-07-22 DIAGNOSIS — Z45.02 FITTING AND ADJUSTMENT OF AUTOMATIC IMPLANTABLE CARDIOVERTER-DEFIBRILLATOR: ICD-10-CM

## 2024-07-22 DIAGNOSIS — E74.05 DANON DISEASE IN HETEROZYGOUS FEMALE (H): Primary | ICD-10-CM

## 2024-07-22 LAB
ALBUMIN SERPL BCG-MCNC: 4 G/DL (ref 3.5–5.2)
ALP SERPL-CCNC: 54 U/L (ref 40–150)
ALT SERPL W P-5'-P-CCNC: 13 U/L (ref 0–50)
ANION GAP SERPL CALCULATED.3IONS-SCNC: 11 MMOL/L (ref 7–15)
AST SERPL W P-5'-P-CCNC: 32 U/L (ref 0–45)
BILIRUB SERPL-MCNC: 0.6 MG/DL
BUN SERPL-MCNC: 4.1 MG/DL (ref 6–20)
CALCIUM SERPL-MCNC: 9.2 MG/DL (ref 8.8–10.4)
CHLORIDE SERPL-SCNC: 104 MMOL/L (ref 98–107)
CK SERPL-CCNC: 44 U/L (ref 26–192)
CREAT SERPL-MCNC: 0.58 MG/DL (ref 0.51–0.95)
EGFRCR SERPLBLD CKD-EPI 2021: >90 ML/MIN/1.73M2
ERYTHROCYTE [DISTWIDTH] IN BLOOD BY AUTOMATED COUNT: 14.9 % (ref 10–15)
GLUCOSE SERPL-MCNC: 106 MG/DL (ref 70–99)
HCO3 SERPL-SCNC: 24 MMOL/L (ref 22–29)
HCT VFR BLD AUTO: 39.8 % (ref 35–47)
HGB BLD-MCNC: 12.4 G/DL (ref 11.7–15.7)
MAGNESIUM SERPL-MCNC: 1.9 MG/DL (ref 1.7–2.3)
MCH RBC QN AUTO: 26.4 PG (ref 26.5–33)
MCHC RBC AUTO-ENTMCNC: 31.2 G/DL (ref 31.5–36.5)
MCV RBC AUTO: 85 FL (ref 78–100)
MDC_IDC_LEAD_CONNECTION_STATUS: NORMAL
MDC_IDC_LEAD_CONNECTION_STATUS: NORMAL
MDC_IDC_LEAD_IMPLANT_DT: NORMAL
MDC_IDC_LEAD_IMPLANT_DT: NORMAL
MDC_IDC_LEAD_LOCATION: NORMAL
MDC_IDC_LEAD_LOCATION: NORMAL
MDC_IDC_LEAD_LOCATION_DETAIL_1: NORMAL
MDC_IDC_LEAD_LOCATION_DETAIL_1: NORMAL
MDC_IDC_LEAD_MFG: NORMAL
MDC_IDC_LEAD_MFG: NORMAL
MDC_IDC_LEAD_MODEL: NORMAL
MDC_IDC_LEAD_MODEL: NORMAL
MDC_IDC_LEAD_POLARITY_TYPE: NORMAL
MDC_IDC_LEAD_POLARITY_TYPE: NORMAL
MDC_IDC_LEAD_SERIAL: NORMAL
MDC_IDC_LEAD_SERIAL: NORMAL
MDC_IDC_MSMT_BATTERY_DTM: NORMAL
MDC_IDC_MSMT_BATTERY_REMAINING_LONGEVITY: 150 MO
MDC_IDC_MSMT_BATTERY_REMAINING_PERCENTAGE: 100 %
MDC_IDC_MSMT_BATTERY_STATUS: NORMAL
MDC_IDC_MSMT_LEADCHNL_RA_IMPEDANCE_VALUE: 576 OHM
MDC_IDC_MSMT_LEADCHNL_RA_PACING_THRESHOLD_AMPLITUDE: 0.6 V
MDC_IDC_MSMT_LEADCHNL_RA_PACING_THRESHOLD_PULSEWIDTH: 0.4 MS
MDC_IDC_MSMT_LEADCHNL_RV_IMPEDANCE_VALUE: 365 OHM
MDC_IDC_MSMT_LEADCHNL_RV_PACING_THRESHOLD_AMPLITUDE: 0.8 V
MDC_IDC_MSMT_LEADCHNL_RV_PACING_THRESHOLD_PULSEWIDTH: 0.4 MS
MDC_IDC_PG_IMPLANT_DTM: NORMAL
MDC_IDC_PG_MFG: NORMAL
MDC_IDC_PG_MODEL: NORMAL
MDC_IDC_PG_SERIAL: NORMAL
MDC_IDC_PG_TYPE: NORMAL
MDC_IDC_SESS_CLINIC_NAME: NORMAL
MDC_IDC_SESS_DTM: NORMAL
MDC_IDC_SESS_TYPE: NORMAL
MDC_IDC_SET_BRADY_LOWRATE: 40 {BEATS}/MIN
MDC_IDC_SET_BRADY_MODE: NORMAL
MDC_IDC_SET_LEADCHNL_RA_PACING_AMPLITUDE: 3.5 V
MDC_IDC_SET_LEADCHNL_RA_PACING_CAPTURE_MODE: NORMAL
MDC_IDC_SET_LEADCHNL_RA_PACING_POLARITY: NORMAL
MDC_IDC_SET_LEADCHNL_RA_PACING_PULSEWIDTH: 0.4 MS
MDC_IDC_SET_LEADCHNL_RA_SENSING_ADAPTATION_MODE: NORMAL
MDC_IDC_SET_LEADCHNL_RA_SENSING_POLARITY: NORMAL
MDC_IDC_SET_LEADCHNL_RA_SENSING_SENSITIVITY: 0.25 MV
MDC_IDC_SET_LEADCHNL_RV_PACING_AMPLITUDE: 3.5 V
MDC_IDC_SET_LEADCHNL_RV_PACING_CAPTURE_MODE: NORMAL
MDC_IDC_SET_LEADCHNL_RV_PACING_POLARITY: NORMAL
MDC_IDC_SET_LEADCHNL_RV_PACING_PULSEWIDTH: 0.4 MS
MDC_IDC_SET_LEADCHNL_RV_SENSING_ADAPTATION_MODE: NORMAL
MDC_IDC_SET_LEADCHNL_RV_SENSING_POLARITY: NORMAL
MDC_IDC_SET_LEADCHNL_RV_SENSING_SENSITIVITY: 0.6 MV
MDC_IDC_SET_ZONE_DETECTION_INTERVAL: 240 MS
MDC_IDC_SET_ZONE_DETECTION_INTERVAL: 300 MS
MDC_IDC_SET_ZONE_DETECTION_INTERVAL: 333 MS
MDC_IDC_SET_ZONE_STATUS: NORMAL
MDC_IDC_SET_ZONE_TYPE: NORMAL
MDC_IDC_SET_ZONE_VENDOR_TYPE: NORMAL
MDC_IDC_STAT_BRADY_DTM_END: NORMAL
MDC_IDC_STAT_BRADY_DTM_START: NORMAL
MDC_IDC_STAT_BRADY_RA_PERCENT_PACED: 0 %
MDC_IDC_STAT_BRADY_RV_PERCENT_PACED: 0 %
MDC_IDC_STAT_EPISODE_RECENT_COUNT: 0
MDC_IDC_STAT_EPISODE_RECENT_COUNT_DTM_END: NORMAL
MDC_IDC_STAT_EPISODE_RECENT_COUNT_DTM_START: NORMAL
MDC_IDC_STAT_EPISODE_TYPE: NORMAL
MDC_IDC_STAT_EPISODE_VENDOR_TYPE: NORMAL
MDC_IDC_STAT_TACHYTHERAPY_ATP_DELIVERED_RECENT: 0
MDC_IDC_STAT_TACHYTHERAPY_ATP_DELIVERED_TOTAL: 0
MDC_IDC_STAT_TACHYTHERAPY_RECENT_DTM_END: NORMAL
MDC_IDC_STAT_TACHYTHERAPY_RECENT_DTM_START: NORMAL
MDC_IDC_STAT_TACHYTHERAPY_SHOCKS_ABORTED_RECENT: 0
MDC_IDC_STAT_TACHYTHERAPY_SHOCKS_ABORTED_TOTAL: 0
MDC_IDC_STAT_TACHYTHERAPY_SHOCKS_DELIVERED_RECENT: 0
MDC_IDC_STAT_TACHYTHERAPY_SHOCKS_DELIVERED_TOTAL: 0
MDC_IDC_STAT_TACHYTHERAPY_TOTAL_DTM_END: NORMAL
MDC_IDC_STAT_TACHYTHERAPY_TOTAL_DTM_START: NORMAL
NT-PROBNP SERPL-MCNC: 2958 PG/ML (ref 0–450)
PLATELET # BLD AUTO: 247 10E3/UL (ref 150–450)
POTASSIUM SERPL-SCNC: 3.6 MMOL/L (ref 3.4–5.3)
PROT SERPL-MCNC: 6.9 G/DL (ref 6.4–8.3)
RBC # BLD AUTO: 4.69 10E6/UL (ref 3.8–5.2)
SODIUM SERPL-SCNC: 139 MMOL/L (ref 135–145)
TROPONIN T SERPL HS-MCNC: 53 NG/L
WBC # BLD AUTO: 5.5 10E3/UL (ref 4–11)

## 2024-07-22 PROCEDURE — 93283 PRGRMG EVAL IMPLANTABLE DFB: CPT | Performed by: INTERNAL MEDICINE

## 2024-07-22 PROCEDURE — 80053 COMPREHEN METABOLIC PANEL: CPT | Performed by: PATHOLOGY

## 2024-07-22 PROCEDURE — 36415 COLL VENOUS BLD VENIPUNCTURE: CPT | Performed by: PATHOLOGY

## 2024-07-22 PROCEDURE — 99215 OFFICE O/P EST HI 40 MIN: CPT | Mod: 25 | Performed by: INTERNAL MEDICINE

## 2024-07-22 PROCEDURE — 99213 OFFICE O/P EST LOW 20 MIN: CPT | Performed by: INTERNAL MEDICINE

## 2024-07-22 PROCEDURE — 83880 ASSAY OF NATRIURETIC PEPTIDE: CPT | Performed by: PATHOLOGY

## 2024-07-22 PROCEDURE — 84484 ASSAY OF TROPONIN QUANT: CPT | Performed by: PATHOLOGY

## 2024-07-22 PROCEDURE — 85027 COMPLETE CBC AUTOMATED: CPT | Performed by: PATHOLOGY

## 2024-07-22 PROCEDURE — 82550 ASSAY OF CK (CPK): CPT | Performed by: PATHOLOGY

## 2024-07-22 PROCEDURE — 83735 ASSAY OF MAGNESIUM: CPT | Performed by: PATHOLOGY

## 2024-07-22 RX ORDER — LOSARTAN POTASSIUM 50 MG/1
50 TABLET ORAL AT BEDTIME
Status: ON HOLD | COMMUNITY
Start: 2024-07-02 | End: 2024-08-05

## 2024-07-22 RX ORDER — BISOPROLOL FUMARATE 5 MG/1
5 TABLET, FILM COATED ORAL AT BEDTIME
Status: ON HOLD | COMMUNITY
Start: 2024-05-13 | End: 2024-08-05

## 2024-07-22 RX ORDER — ONDANSETRON 4 MG/1
4 TABLET, ORALLY DISINTEGRATING ORAL PRN
COMMUNITY
Start: 2024-06-01

## 2024-07-22 RX ORDER — LIDOCAINE 40 MG/G
CREAM TOPICAL
Status: CANCELLED | OUTPATIENT
Start: 2024-07-22

## 2024-07-22 RX ORDER — NORGESTIMATE AND ETHINYL ESTRADIOL 0.25-0.035
1 KIT ORAL DAILY
Status: ON HOLD | COMMUNITY
Start: 2023-09-27 | End: 2024-08-05

## 2024-07-22 RX ORDER — OXYCODONE HYDROCHLORIDE 5 MG/1
5 TABLET ORAL PRN
COMMUNITY
Start: 2024-07-11

## 2024-07-22 RX ORDER — ACETAMINOPHEN 325 MG/1
650 TABLET ORAL
COMMUNITY
Start: 2024-06-21

## 2024-07-22 RX ORDER — ALBUTEROL SULFATE 90 UG/1
1-2 AEROSOL, METERED RESPIRATORY (INHALATION)
COMMUNITY
Start: 2024-03-08

## 2024-07-22 RX ORDER — LANOLIN ALCOHOL/MO/W.PET/CERES
3 CREAM (GRAM) TOPICAL
COMMUNITY
Start: 2024-06-21

## 2024-07-22 RX ORDER — SPIRONOLACTONE 25 MG/1
12.5 TABLET ORAL DAILY
Qty: 45 TABLET | Refills: 3 | Status: ON HOLD | OUTPATIENT
Start: 2024-07-22 | End: 2024-08-05

## 2024-07-22 ASSESSMENT — PAIN SCALES - GENERAL: PAINLEVEL: NO PAIN (0)

## 2024-07-22 NOTE — NURSING NOTE
Chief Complaint   Patient presents with    Follow Up     July 22, 2024 - Reason for visit: Return muscular dystrophy, 22 yo female with Danon disease presents for follow up with labs prior.       Vitals were taken and medications reconciled.    Jose R Dodge, EMT  3:04 PM

## 2024-07-22 NOTE — LETTER
2024      RE: Mary Shaikh  [address]  [place] [address]       Dear Colleague,    Thank you for the opportunity to participate in the care of your patient, Mary Shaikh, at the [facility] [place] [facility]. Please see a copy of my visit note below.    Neurocardiomyopathy Clinic Progress Note    Name: Mary Shaikh  : 2000  MRN: [id number]    2024      Dear Dr. Swift,    I had the pleasure of seeing Mary Shaikh, a 23 year old woman today in the [facility]omyopathy [facility] for a virtual follow up evaluation of Danon disease.     As you know, Ms. 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 She was seen in the emergency department at [facility] and was found to have prexcitation on her ECG in , and this lead to an exercise ECG test which she achieved 10.8 METs, exercise duration of 9 min and 16 seconds, had no exercised induced arrhythmias, she did have a delta wave and repolarization abnormalities. Subsequently, she had an echocardiogram which showed an LVEF of 51% with asymetric LVH (septum 1.5cm). She has been following with Dr. Swift in the [facility] cardiology clinic and she obtained a CMR which showed and LVEF of 43%, LVEDD 4.9cm, IV septum 1.7cm,  normal RV function but RVH noted, no LOU, harman LGE with subendocardial, mid myocardial, and subendocardial enhancement not associated with a vascular territory.     I saw her in clinic  2023 for a virtual visit. She most recently  followed up with Dr. Swift 3/5/24. She remains on losartan and bisoprolol. She had a CMR 2024 which showed a reduction in her LVEF to 26%, RVEF is estimated as normal, WMA mid-distal anterior wall is akinetic, apex is diskinetic, and distal inferior wall is akinetic, extensive LGE involving the mid apical anterior wall, mid-apical  inferior wall, and near transmural enhancement of the apical anterior wall and apex, asymmetric septal hypertrophy.  She was in the ED 6/19/24 with SVT and syncope and underwent cardioversion. Subsequently, she had an ICD implanted and had a CTI ablation on 6/20/24. She also had a laparoscopic cholecystectomy 7/17/24 for biliary diskinesia.     She is going for walks now 10-20 min with her dog and has not dyspnea or chest pain with walks. She does experience exertional dyspnea with going up a flight of stairs or walkign faster. She has some weight fluctuation, but no edema, orthopnea, or PND. She has not been told she snores. She had lightheadedness yesterday while at rest. She continues to have frequent palpitations. No presyncope or syncope. Her energy levels remain reduce and now her appetite is reduced since surgery. She is having up to 5-6 stools per day that are mostly solid but also some loose that impact her ability to eat. She specifically denies tobacco, alcohol, or illicit substance use.     REVIEW OF SYSTEMS: 10 point ROS neg other than the symptoms noted above in the HPI.    PAST MEDICAL HISTORY:   1. 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 NSVT s/p ICD 6/2024  3. WPW   4. CTI ablation 6/2024  5. Biliary dyskinesia     ALLERGIES:  No Known Allergies    MEDICATIONS:   Losartan 25m daily  Current Outpatient Medications   Medication Sig Dispense Refill     acetaminophen (TYLENOL) 325 MG tablet Take 650 mg by mouth       albuterol (PROAIR HFA/PROVENTIL HFA/VENTOLIN HFA) 108 (90 Base) MCG/ACT inhaler Inhale 1-2 puffs into the lungs       bisoprolol (ZEBETA) 5 MG tablet Take 5 mg by mouth at bedtime       losartan (COZAAR) 50 MG tablet Take 50 mg by mouth at bedtime       melatonin 3 MG tablet Take 3 mg by mouth nightly as needed       metoprolol succinate ER (TOPROL XL) 25 MG 24 hr tablet Take 37.5 mg by mouth daily       norgestimate-ethinyl estradiol (ORTHO-CYCLEN) 0.25-35 MG-MCG tablet Take 1 tablet by  mouth daily       ondansetron (ZOFRAN ODT) 4 MG ODT tab Place 4 mg under the tongue as needed       oxyCODONE (ROXICODONE) 5 MG tablet Take 5 mg by mouth as needed       triamcinolone (KENALOG) 0.1 % paste APPLY TO THE AFFECTED AREA DAILY FOR 10 DAYS (Patient not taking: Reported on 7/22/2024)       No current facility-administered medications for this visit.     SOCIAL HISTORY: She lives in [place], with her Mom   Tobacco: Never smoker  Alcohol: rare  Illicit: prior marijuana use     FAMILY HISTORY: . Her maternal grandfather had a stroke in 2017, but otherwise she has no significant family history of past cardiovascular history. There is no past family history of sudden cardiac death.    PHYSICAL EXAM:   /75 (BP Location: Right arm, Patient Position: Chair, Cuff Size: Adult Regular)   Pulse 60   Wt 77.6 kg (171 lb 1.6 oz)   SpO2 100%   BMI 27.63 kg/m    General: comfortable, conversant, NAD  HEENT: normocephalic, atraumatic, anicteric  Neck: Estimate JVP <8  CV: RRR, nl s1 and s2, no murmurs, gallops, or rubs   Lungs: CTAB, no crackles or wheezes, normal work of breathing  Abdomen: BS+, soft, RUQ tenderness, non distended  Extremities: warm and well perfused, no edema      DIAGNOSTIC TESTING: personally reviewed   Latest Reference Range & Units 07/22/24 14:52   Sodium 135 - 145 mmol/L 139   Potassium 3.4 - 5.3 mmol/L 3.6   Chloride 98 - 107 mmol/L 104   Carbon Dioxide (CO2) 22 - 29 mmol/L 24   Urea Nitrogen 6.0 - 20.0 mg/dL 4.1 (L)   Creatinine 0.51 - 0.95 mg/dL 0.58   GFR Estimate >60 mL/min/1.73m2 >90   Calcium 8.8 - 10.4 mg/dL 9.2   Anion Gap 7 - 15 mmol/L 11   Magnesium 1.7 - 2.3 mg/dL 1.9   Albumin 3.5 - 5.2 g/dL 4.0   Protein Total 6.4 - 8.3 g/dL 6.9   Alkaline Phosphatase 40 - 150 U/L 54   ALT 0 - 50 U/L 13   AST 0 - 45 U/L 32   Bilirubin Total <=1.2 mg/dL 0.6   CK Total 26 - 192 U/L 44   Glucose 70 - 99 mg/dL 106 (H)   N-Terminal Pro Bnp 0 - 450 pg/mL 2,958 (H)   Troponin T, High  Sensitivity <=14 ng/L 53 (H)   WBC 4.0 - 11.0 10e3/uL 5.5   Hemoglobin 11.7 - 15.7 g/dL 12.4   Hematocrit 35.0 - 47.0 % 39.8   Platelet Count 150 - 450 10e3/uL 247   RBC Count 3.80 - 5.20 10e6/uL 4.69   MCV 78 - 100 fL 85   MCH 26.5 - 33.0 pg 26.4 (L)   MCHC 31.5 - 36.5 g/dL 31.2 (L)   RDW 10.0 - 15.0 % 14.9   (L): Data is abnormally low  (H): Data is abnormally high      Cardiopulmonary exercise stress test 7/24/23    Peak VO2 (ml/kg/min) VE/VCO2  [place] RER   21.40       28.68       0.91           Predicted VO2 (ml/kg/min) Predicted VO2 %   35.80       60           Resting Supine BP (mmHg) Resting Standing BP (mmHg) Final Stress BP (mmHg)   118/60       126/80       161/85         Resting Supine HR (bpm) Resting Standing HR (bpm)    61       68            Max HR (bpm) Max Predicted HR (bpm) Max Perdicted HR %   159       199       80           Exercise time (min) Exercise time (sec) Estimated workload (METS)   6       54       6.1           ICD check 7/22/24: Device: [employer] D533 RESONATE HF ICD  Normal device function.  Mode: DDI 40 bpm  AP: <1%  : <1%  Intrinsic rhythm: SR 62 bpm  Lead Trends Appear Stable  Estimated battery longevity to RRT = 13 years.   Atrial Arrhythmia: None  AF [place]: 0%  Ventricular Arrhythmia: None      Echo [facility] 11/2022: Decreased left ventricular systolic performance-mild.   The estimated left ventricular ejection fraction is 40-45%.   Left ventricular diastolic pattern suggest elevated filling pressure.   Normal right ventricular size and function.   Right ventricular hypertrophy, probable.   TR jet is not adequate to assess PA pressure.   Based on IVC geometry, the RA pressure is probably normal.       I have personally reviewed all of the cardiovascular testing reports performed at [facility]    exercise ECG test 10/2021:  10.8 METs, exercise duration of 9 min and 16 seconds, had no exercised induced arrhythmias, she did have a delta wave and repolarization  abnormalities.    Ziopatch monitor 10/2021: 11 VT episodes 68 SVT episodes    Echocardiogram 11/2021:  LVEF of 51% with asymettric LVH (septum 1.5cm).     CMR 11/2021:  LVEF of 43%, LVEDD 4.9cm, IV septum 1.7cm,  normal RV function but RVH noted, no LOU, harman LGE with subendocardial, mid myocardial, and subendocardial enhancement not associated with a vascular territory.     CMR 2024: LVEF 26%, asymmetrical septal hypertrophy, multiple regional WMA, LGE in the mid-apical anterior wall, mid-apical inferior an near transmural involvement in the apical anterior and apex.     ASSESSMENT AND PLAN: Ms. 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 She has a normal CK but has not yet seen neuromuscular neurology regarding the skeletal myopathy. She had an ICD as secondary SCD prevention after syncope and SVT. She is on losartan and bisoprolol and today I will add spironolactone. She has no evidence of obstruction and an SGLT2i should be considered. She would benefit from close follow up in [facility] for titration of her heart failure medications.     Her exercise tolerance has decreased. Her CPEX demonstrates an exercise duration of 8 min and 17 seconds. She achieved an RER of 1.09 and had a peak V02 of 18.7 which is a reduction from past CPEX, and a VE/VC02 slope of 32. Her HR and BP responses were normal. Her peak V02 is 53% of predicted and consistent with severely impaired exercise tolerance. She is on the borderline of the  50% peak V02 that can be used in younger patients. I would like to get a RHC to assess invasive hemodynamics. We discussed that if her hemodynamics reflect elevated filling pressures or a reduce cardiac output that she may be admitted for an advanced therapies evaluation, which I have discussed with her what that entails. She has a caregiver and no substance use.     Her exercise tolerance has not changed overall. Her CPEX shows that she exercised for 6 min and 54 seconds and stopped due to dysnea. She did not achieve RER. Her METs were 6.1 and on prior CPEX where she did achieve RER she had 6.3 METs. Her peak V02 while not diagnostic remains unchanged and shehad a normal blood pressure and heart rate response.     She has had NSVT and wearing a Lifevest. She is feeling more comfortable about an ICD implantation but would like to meet with Dr. Swift to get additional information.     I encouraged her to get her labs at [facility] or the [facility], as previously she had an elevated AST, NT pro BNP, and troponin. She will also check her blood pressure when she goes out.     PLAN:   -ICD check   -spironolactone 12.5mg daily   -BMP in 2 weeks  -RHC   -[facility] in 2 months   -see me in 6 months  -neuromuscular neurology referral   -contact Dr. Swift     40 minutes on DOS for chart review, patient history and exam, counseling, review of diagnostic testing, coordination of care (including with Dr. Swift) and documentation.     Thank you for allowing me to participate in the care of your patient. Please do not hesitate to contact me if you have any questions.     Sincerely,   Hipolito Fuentes MD, PhD, [facility]  Advanced Heart Failure/Transplantation/MCS  [facility]/[employer]      CC: Ameena Swift MD [facility] Cardiology      Please do not hesitate to contact me if you have any questions/concerns.     Sincerely,     Hipolito Fuentes MD

## 2024-07-22 NOTE — TELEPHONE ENCOUNTER
Cath Lab Case Request/Order    Location: [address] [address] [facility] Waiting Room    Procedure: Right Heart Cath (RHC)    Procedure Date: 07/31/24    Patient Arrival Time: 0830am     Procedure Time: 3rd case to follow    Ordering Provider: Dr. Fuentes    Performing Cardiologist: Dr. Tanmay Brice    Inpatient Bed Needed: No    Post-  Procedure ELISABETH appointment scheduled (1 - 2 weeks): NO     Date: 08/22/24     Provider: gómez     Communicated Patient Instructions:     NPO, nothing to eat 8 hours and drink 2 hours before arrival time: Yes     , need to arrange a ride home - unable to drive post- procedure: Yes     Adult at home, need a responsible adult to stay with patient 24 hours post- procedure: Yes    Appointment was scheduled: Face to Face    Patient expressed understanding of above instructions and denied further questions at this time.    Madiha Castro

## 2024-07-22 NOTE — PROGRESS NOTES
Neurocardiomyopathy Clinic Progress Note    Name: Mary Shaikh  : 2000  MRN: [id number]    2024      Dear Dr. Swift,    I had the pleasure of seeing Mary Shaikh, a 23 year old woman today in the [facility] Neuromuscular Cardiomyopathy Clinic for a virtual follow up evaluation of Danon disease.     As you know, Ms. 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 She was seen in the emergency department at [facility] and was found to have prexcitation on her ECG in , and this lead to an exercise ECG test which she achieved 10.8 METs, exercise duration of 9 min and 16 seconds, had no exercised induced arrhythmias, she did have a delta wave and repolarization abnormalities. Subsequently, she had an echocardiogram which showed an LVEF of 51% with asymetric LVH (septum 1.5cm). She has been following with Dr. Swift in the [facility] cardi[place] clinic and she obtained a CMR which showed and LVEF of 43%, LVEDD 4.9cm, IV septum 1.7cm,  normal RV function but RVH noted, no LOU, harman LGE with subendocardial, mid myocardial, and subendocardial enhancement not associated with a vascular territory.     I saw her in clinic  2023 for a virtual visit. She most recently  followed up with Dr. Swift 3/5/24. She remains on losartan and bisoprolol. She had a CMR 2024 which showed a reduction in her LVEF to 26%, RVEF is estimated as normal, WMA mid-distal anterior wall is akinetic, apex is diskinetic, and distal inferior wall is akinetic, extensive LGE involving the mid apical anterior wall, mid-apical inferior wall, and near transmural enhancement of the apical anterior wall and apex, asymmetric septal hypertrophy.  She was in the ED 24 with SVT and syncope and underwent cardioversion. Subsequently, she had an ICD implanted and had a CTI ablation on 24. She also had a laparoscopic cholecystectomy 24 for biliary diskinesia.     She  is going for walks now 10-20 min with her dog and has not dyspnea or chest pain with walks. She does experience exertional dyspnea with going up a flight of stairs or walkign faster. She has some weight fluctuation, but no edema, orthopnea, or PND. She has not been told she snores. She had lightheadedness yesterday while at rest. She continues to have frequent palpitations. No presyncope or syncope. Her energy levels remain reduce and now her appetite is reduced since surgery. She is having up to 5-6 stools per day that are mostly solid but also some loose that impact her ability to eat. She specifically denies tobacco, alcohol, or illicit substance use.     REVIEW OF SYSTEMS: 10 point ROS neg other than the symptoms noted above in the HPI.    PAST MEDICAL HISTORY:   1. 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 NSVT s/p ICD 6/2024  3. WPW   4. CTI ablation 6/2024  5. Biliary dyskinesia     ALLERGIES:  No Known Allergies    MEDICATIONS:   Losartan 25m daily  Current Outpatient Medications   Medication Sig Dispense Refill    acetaminophen (TYLENOL) 325 MG tablet Take 650 mg by mouth      albuterol (PROAIR HFA/PROVENTIL HFA/VENTOLIN HFA) 108 (90 Base) MCG/ACT inhaler Inhale 1-2 puffs into the lungs      bisoprolol (ZEBETA) 5 MG tablet Take 5 mg by mouth at bedtime      losartan (COZAAR) 50 MG tablet Take 50 mg by mouth at bedtime      melatonin 3 MG tablet Take 3 mg by mouth nightly as needed      metoprolol succinate ER (TOPROL XL) 25 MG 24 hr tablet Take 37.5 mg by mouth daily      norgestimate-ethinyl estradiol (ORTHO-CYCLEN) 0.25-35 MG-MCG tablet Take 1 tablet by mouth daily      ondansetron (ZOFRAN ODT) 4 MG ODT tab Place 4 mg under the tongue as needed      oxyCODONE (ROXICODONE) 5 MG tablet Take 5 mg by mouth as needed      triamcinolone (KENALOG) 0.1 % paste APPLY TO THE AFFECTED AREA DAILY FOR 10 DAYS (Patient not taking: Reported on 7/22/2024)       No current facility-administered medications for this visit.      SOCIAL HISTORY: She lives in [place], with her Mom   Tobacco: Never smoker  Alcohol: rare  Illicit: prior marijuana use     FAMILY HISTORY: . Her maternal grandfather had a stroke in 2017, but otherwise she has no significant family history of past cardiovascular history. There is no past family history of sudden cardiac death.    PHYSICAL EXAM:   /75 (BP Location: Right arm, Patient Position: Chair, Cuff Size: Adult Regular)   Pulse 60   Wt 77.6 kg (171 lb 1.6 oz)   SpO2 100%   BMI 27.63 kg/m    General: comfortable, conversant, NAD  HEENT: normocephalic, atraumatic, anicteric  Neck: Estimate JVP <8  CV: RRR, nl s1 and s2, no murmurs, gallops, or rubs   Lungs: CTAB, no crackles or wheezes, normal work of breathing  Abdomen: BS+, soft, RUQ tenderness, non distended  Extremities: warm and well perfused, no edema      DIAGNOSTIC TESTING: personally reviewed   Latest Reference Range & Units 07/22/24 14:52   Sodium 135 - 145 mmol/L 139   Potassium 3.4 - 5.3 mmol/L 3.6   Chloride 98 - 107 mmol/L 104   Carbon Dioxide (CO2) 22 - 29 mmol/L 24   Urea Nitrogen 6.0 - 20.0 mg/dL 4.1 (L)   Creatinine 0.51 - 0.95 mg/dL 0.58   GFR Estimate >60 mL/min/1.73m2 >90   Calcium 8.8 - 10.4 mg/dL 9.2   Anion Gap 7 - 15 mmol/L 11   Magnesium 1.7 - 2.3 mg/dL 1.9   Albumin 3.5 - 5.2 g/dL 4.0   Protein Total 6.4 - 8.3 g/dL 6.9   Alkaline Phosphatase 40 - 150 U/L 54   ALT 0 - 50 U/L 13   AST 0 - 45 U/L 32   Bilirubin Total <=1.2 mg/dL 0.6   CK Total 26 - 192 U/L 44   Glucose 70 - 99 mg/dL 106 (H)   N-Terminal Pro Bnp 0 - 450 pg/mL 2,958 (H)   Troponin T, High Sensitivity <=14 ng/L 53 (H)   WBC 4.0 - 11.0 10e3/uL 5.5   Hemoglobin 11.7 - 15.7 g/dL 12.4   Hematocrit 35.0 - 47.0 % 39.8   Platelet Count 150 - 450 10e3/uL 247   RBC Count 3.80 - 5.20 10e6/uL 4.69   MCV 78 - 100 fL 85   MCH 26.5 - 33.0 pg 26.4 (L)   MCHC 31.5 - 36.5 g/dL 31.2 (L)   RDW 10.0 - 15.0 % 14.9   (L): Data is abnormally low  (H): Data is abnormally  high      Cardiopulmonary exercise stress test 7/24/23    Peak VO2 (ml/kg/min) VE/VCO2  [place] RER   21.40       28.68       0.91           Predicted VO2 (ml/kg/min) Predicted VO2 %   35.80       60           Resting Supine BP (mmHg) Resting Standing BP (mmHg) Final Stress BP (mmHg)   118/60       126/80       161/85         Resting Supine HR (bpm) Resting Standing HR (bpm)    61       68            Max HR (bpm) Max Predicted HR (bpm) Max Perdicted HR %   159       199       80           Exercise time (min) Exercise time (sec) Estimated workload (METS)   6       54       6.1           ICD check 7/22/24: Device: Owlient D533 RESONATE HF ICD  Normal device function.  Mode: DDI 40 bpm  AP: <1%  : <1%  Intrinsic rhythm: SR 62 bpm  Lead Trends Appear Stable  Estimated battery longevity to RRT = 13 years.   Atrial Arrhythmia: None  AF Stollings: 0%  Ventricular Arrhythmia: None      [place] [facility] [place]: Decreased left ventricular systolic performance-mild.   The estimated left ventricular ejection fraction is 40-45%.   Left ventricular diastolic pattern suggest elevated filling pressure.   Normal right ventricular size and function.   Right ventricular hypertrophy, probable.   TR jet is not adequate to assess PA pressure.   Based on IVC geometry, the RA pressure is probably normal.       I have personally reviewed all of the cardiovascular testing reports performed at [facility]    exercise ECG test 10/2021:  10.8 METs, exercise duration of 9 min and 16 seconds, had no exercised induced arrhythmias, she did have a delta wave and repolarization abnormalities.    Ziopatch monitor 10/2021: 11 VT episodes 68 SVT episodes    Echocardiogram 11/2021:  LVEF of 51% with asymettric LVH (septum 1.5cm).     CMR 11/2021:  LVEF of 43%, LVEDD 4.9cm, IV septum 1.7cm,  normal RV function but RVH noted, no LOU, harman LGE with subendocardial, mid myocardial, and subendocardial enhancement not associated with a vascular territory.     CMR  2024: LVEF 26%, asymmetrical septal hypertrophy, multiple regional WMA, LGE in the mid-apical anterior wall, mid-apical inferior an near transmural involvement in the apical anterior and apex.     ASSESSMENT AND PLAN: Ms. 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 She has a normal CK but has not yet seen neuromuscular neurology regarding the skeletal myopathy. She had an ICD as secondary SCD prevention after syncope and SVT. She is on losartan and bisoprolol and today I will add spironolactone. She has no evidence of obstruction and an SGLT2i should be considered. She would benefit from close follow up in [facility] for titration of her heart failure medications.     Her exercise tolerance has decreased. Her CPEX demonstrates an exercise duration of 8 min and 17 seconds. She achieved an RER of 1.09 and had a peak V02 of 18.7 which is a reduction from past CPEX, and a VE/VC02 slope of 32. Her HR and BP responses were normal. Her peak V02 is 53% of predicted and consistent with severely impaired exercise tolerance. She is on the borderline of the 50% peak V02 that can be used in younger patients. I would like to get a RHC to assess invasive hemodynamics. We discussed that if her hemodynamics reflect elevated filling pressures or a reduce cardiac output that she may be admitted for an advanced therapies evaluation, which I have discussed with her what that entails. She has a caregiver and no substance use.     Her exercise  tolerance has not changed overall. Her CPEX shows that she exercised for 6 min and 54 seconds and stopped due to dysnea. She did not achieve RER. Her METs were 6.1 and on prior CPEX where she did achieve RER she had 6.3 METs. Her peak V02 while not diagnostic remains unchanged and shehad a normal blood pressure and heart rate response.     She has had NSVT and wearing a Lifevest. She is feeling more comfortable about an ICD implantation but would like to meet with Dr. Swift to get additional information.     I encouraged her to get her labs at [facility] or the [facility], as previously she had an elevated AST, NT pro BNP, and troponin. She will also check her blood pressure when she goes out.     PLAN:   -ICD check   -spironolactone 12.5mg daily   -BMP in 2 weeks  -RHC   -CORE in 2 months   -see me in 6 months  -neuromuscular neurology referral   -contact Dr. Swift     40 minutes on DOS for chart review, patient history and exam, counseling, review of diagnostic testing, coordination of care (including with Dr. Swift) and documentation.     Thank you for allowing me to participate in the care of your patient. Please do not hesitate to contact me if you have any questions.     Sincerely,   Forum     Forum MD Gina, PhD, FACC  Advanced Heart Failure/Transplantation/MCS  [facility]      CC: Ameena Swift MD [facility] Cardiology

## 2024-07-22 NOTE — NURSING NOTE
Labs: Patient was given results of the laboratory testing obtained today. Patient was instructed to return for the next laboratory testing in 2 weeks. Patient demonstrated understanding of this information and agreed to call with further questions or concerns.     Med Reconcile: Reviewed and verified all current medications with the patient. The updated medication list was printed and given to the patient.    New Medication: Patient was educated regarding newly prescribed medication, including discussion of  the indication, administration, side effects, and when to report to MD or RN. Patient demonstrated understanding of this information and agreed to call with further questions or concerns. Spironolactone 12.5 mg daily.    Return Appointment: Patient given instructions regarding scheduling next clinic visit. Patient demonstrated understanding of this information and agreed to call with further questions or concerns. CORE in 2 months, Dr. Fuentes in 6 months.    Right Heart Catheterization: Patient was instructed regarding right heart catheterization, including discussion of the procedure, preparation, intra-procedural steps, and recovery at home. Patient demonstrated understanding of this information and agreed to call with further questions or concerns.    Patient stated she understood all health information given and agreed to call with further questions or concerns.    Martha Lawton RN

## 2024-07-22 NOTE — PATIENT INSTRUCTIONS
"You were seen today in the Cardiovascular Clinic at the [facility].      Cardiology Providers you saw during your visit:  Dr. Hipolito Fuentes     Recommendations:   ICD check today in clinic.  Start spironolactone 12.5 mg once daily.  Check labs in 2 weeks (basic metabolic panel).  Right heart catheterization - see instructions below.  CORE Enrollment (heart failure clinic) in 2 months, labs and device check prior.  Neuromuscular neurology referral placed. They will call you to schedule.  Follow up with Dr. Fuentes in 6 months with labs and device check prior.       Thank you for your visit today!   Please MyChart message or call if you have any questions or concerns.      During Business Hours:  [phone], option # 1 \"To leave a message for your care team\"     After hours, weekends or holidays:   [phone], Option #4  Ask to speak to the On-Call Cardiologist. Inform them you are a heart failure patient at the [facility].      Martha Lawton RN BSN   Cardiology Nurse Coordinator - Heart Failure/C.O.R.E. Clinic  [facility]  [phone] option 1 to schedule an appointment or leave a message for your care team      Pre-procedure instructions - Right Heart Cath and/or Biopsy and/or Pulmonary Angiogram  Patient Education    Your arrival time is _______.  Location is [address] Waiting Room  Please plan on being at the hospital all day.  At any time, emergencies and/or urgent cases may come up which could delay the start of your procedure.    Pre-procedure instructions - Right heart catheterization  No solid food for 8 hours prior  Nothing to drink 2 hours prior to arrival time  You can take your morning medications (except diabetic and blood thinners) with sips of water  We recommend you arrange for a ride to drop you off and pick you up, in the instance, you are unable to drive home, however " you should be able to function as you normally would after the procedure     Diabetic Medication Instructions  Hold oral diabetic medication in morning of your procedure and for 48 hours after IV contrast is given  Typical instructions for insulin diabetic medication holding are below. However, please reach out to your Primary Care Provider or Endocrinologist for specific instructions  DO NOT take any oral diabetic medication, short-acting diabetes medications/insulin, humalog or regular insulin the morning of your test  Take   dose of long-acting insulin (Lantus, Levemir) the day of your test  Remember to bring your glucometer and insulin with you to take after your test if needed  GLP-1 Agonists Instructions  DO NOT take injectable GLP-1 agonists semaglutide (Ozempic, Wegovy), dulaglutide (Trulicity), exenatide ER (Bydureon), tirzepatide (Mounjaro), or oral semaglutide (Rybelsus) for 7 days prior your procedure  Hold once daily injectable GLP-1 agonists exenatide (Byetta), liraglutide (Saxenda, Victoza), lixisenatide (Soligua) the day before and day of your procedure                Anticoagulation Medication Instructions   NA  Nurse to write N/A if not currently taking

## 2024-07-31 ENCOUNTER — REFERRAL (OUTPATIENT)
Dept: TRANSPLANT | Facility: CLINIC | Age: 24
End: 2024-07-31

## 2024-07-31 ENCOUNTER — APPOINTMENT (OUTPATIENT)
Dept: CARDIOLOGY | Facility: CLINIC | Age: 24
DRG: 286 | End: 2024-07-31
Attending: INTERNAL MEDICINE
Payer: COMMERCIAL

## 2024-07-31 ENCOUNTER — APPOINTMENT (OUTPATIENT)
Dept: MEDSURG UNIT | Facility: CLINIC | Age: 24
DRG: 286 | End: 2024-07-31
Attending: INTERNAL MEDICINE
Payer: COMMERCIAL

## 2024-07-31 ENCOUNTER — APPOINTMENT (OUTPATIENT)
Dept: LAB | Facility: CLINIC | Age: 24
DRG: 286 | End: 2024-07-31
Attending: INTERNAL MEDICINE
Payer: COMMERCIAL

## 2024-07-31 ENCOUNTER — APPOINTMENT (OUTPATIENT)
Dept: GENERAL RADIOLOGY | Facility: CLINIC | Age: 24
DRG: 286 | End: 2024-07-31
Attending: INTERNAL MEDICINE
Payer: COMMERCIAL

## 2024-07-31 ENCOUNTER — HOSPITAL ENCOUNTER (INPATIENT)
Facility: CLINIC | Age: 24
LOS: 5 days | Discharge: HOME | DRG: 286 | End: 2024-08-05
Attending: INTERNAL MEDICINE | Admitting: INTERNAL MEDICINE
Payer: COMMERCIAL

## 2024-07-31 DIAGNOSIS — E74.05 DANON DISEASE IN HETEROZYGOUS FEMALE (H): ICD-10-CM

## 2024-07-31 DIAGNOSIS — I47.10 PAROXYSMAL SVT (SUPRAVENTRICULAR TACHYCARDIA) (H): Primary | ICD-10-CM

## 2024-07-31 DIAGNOSIS — I50.23 ACUTE ON CHRONIC SYSTOLIC HEART FAILURE (H): ICD-10-CM

## 2024-07-31 LAB
ABO/RH(D): NORMAL
ALBUMIN SERPL BCG-MCNC: 3.7 G/DL (ref 3.5–5.2)
ALP SERPL-CCNC: 48 U/L (ref 40–150)
ALT SERPL W P-5'-P-CCNC: 23 U/L (ref 0–50)
ANION GAP SERPL CALCULATED.3IONS-SCNC: 11 MMOL/L (ref 7–15)
ANION GAP SERPL CALCULATED.3IONS-SCNC: 9 MMOL/L (ref 7–15)
ANTIBODY SCREEN: NEGATIVE
AST SERPL W P-5'-P-CCNC: 41 U/L (ref 0–45)
BASE EXCESS BLDV CALC-SCNC: -2 MMOL/L (ref -3–3)
BASE EXCESS BLDV CALC-SCNC: 1.2 MMOL/L (ref -3–3)
BASE EXCESS BLDV CALC-SCNC: 2.9 MMOL/L (ref -3–3)
BASOPHILS # BLD AUTO: 0 10E3/UL (ref 0–0.2)
BASOPHILS # BLD AUTO: 0 10E3/UL (ref 0–0.2)
BASOPHILS NFR BLD AUTO: 1 %
BASOPHILS NFR BLD AUTO: 1 %
BILIRUB SERPL-MCNC: 0.6 MG/DL
BUN SERPL-MCNC: 6.3 MG/DL (ref 6–20)
BUN SERPL-MCNC: 6.4 MG/DL (ref 6–20)
CALCIUM SERPL-MCNC: 8.5 MG/DL (ref 8.8–10.4)
CALCIUM SERPL-MCNC: 9 MG/DL (ref 8.8–10.4)
CHLORIDE SERPL-SCNC: 107 MMOL/L (ref 98–107)
CHLORIDE SERPL-SCNC: 110 MMOL/L (ref 98–107)
CK SERPL-CCNC: 52 U/L (ref 26–192)
CK SERPL-CCNC: 62 U/L (ref 26–192)
CREAT SERPL-MCNC: 0.57 MG/DL (ref 0.51–0.95)
CREAT SERPL-MCNC: 0.66 MG/DL (ref 0.51–0.95)
EGFRCR SERPLBLD CKD-EPI 2021: >90 ML/MIN/1.73M2
EGFRCR SERPLBLD CKD-EPI 2021: >90 ML/MIN/1.73M2
EOSINOPHIL # BLD AUTO: 0 10E3/UL (ref 0–0.7)
EOSINOPHIL # BLD AUTO: 0 10E3/UL (ref 0–0.7)
EOSINOPHIL NFR BLD AUTO: 1 %
EOSINOPHIL NFR BLD AUTO: 1 %
ERYTHROCYTE [DISTWIDTH] IN BLOOD BY AUTOMATED COUNT: 15.2 % (ref 10–15)
ERYTHROCYTE [DISTWIDTH] IN BLOOD BY AUTOMATED COUNT: 15.4 % (ref 10–15)
GLUCOSE BLDC GLUCOMTR-MCNC: 100 MG/DL (ref 70–99)
GLUCOSE BLDC GLUCOMTR-MCNC: 136 MG/DL (ref 70–99)
GLUCOSE BLDC GLUCOMTR-MCNC: 67 MG/DL (ref 70–99)
GLUCOSE SERPL-MCNC: 91 MG/DL (ref 70–99)
GLUCOSE SERPL-MCNC: 95 MG/DL (ref 70–99)
HBA1C MFR BLD: 6.1 %
HCG INTACT+B SERPL-ACNC: <1 MIU/ML
HCO3 BLDV-SCNC: 22 MMOL/L (ref 21–28)
HCO3 BLDV-SCNC: 26 MMOL/L (ref 21–28)
HCO3 BLDV-SCNC: 27 MMOL/L (ref 21–28)
HCO3 SERPL-SCNC: 21 MMOL/L (ref 22–29)
HCO3 SERPL-SCNC: 22 MMOL/L (ref 22–29)
HCT VFR BLD AUTO: 37.4 % (ref 35–47)
HCT VFR BLD AUTO: 39.4 % (ref 35–47)
HGB BLD-MCNC: 11.6 G/DL (ref 11.7–15.7)
HGB BLD-MCNC: 11.7 G/DL (ref 11.7–15.7)
HGB BLD-MCNC: 12 G/DL (ref 11.7–15.7)
HGB BLD-MCNC: 12.1 G/DL (ref 11.7–15.7)
HGB BLD-MCNC: 12.2 G/DL (ref 11.7–15.7)
IMM GRANULOCYTES # BLD: 0 10E3/UL
IMM GRANULOCYTES # BLD: 0 10E3/UL
IMM GRANULOCYTES NFR BLD: 0 %
IMM GRANULOCYTES NFR BLD: 0 %
INR PPP: 1.25 (ref 0.85–1.15)
IRON BINDING CAPACITY (ROCHE): 392 UG/DL (ref 240–430)
IRON SATN MFR SERPL: 6 % (ref 15–46)
IRON SERPL-MCNC: 25 UG/DL (ref 37–145)
LVEF ECHO: NORMAL
LYMPHOCYTES # BLD AUTO: 1.7 10E3/UL (ref 0.8–5.3)
LYMPHOCYTES # BLD AUTO: 1.8 10E3/UL (ref 0.8–5.3)
LYMPHOCYTES NFR BLD AUTO: 40 %
LYMPHOCYTES NFR BLD AUTO: 40 %
MAGNESIUM SERPL-MCNC: 2 MG/DL (ref 1.7–2.3)
MCH RBC QN AUTO: 26.5 PG (ref 26.5–33)
MCH RBC QN AUTO: 26.8 PG (ref 26.5–33)
MCHC RBC AUTO-ENTMCNC: 31 G/DL (ref 31.5–36.5)
MCHC RBC AUTO-ENTMCNC: 31 G/DL (ref 31.5–36.5)
MCV RBC AUTO: 85 FL (ref 78–100)
MCV RBC AUTO: 87 FL (ref 78–100)
MONOCYTES # BLD AUTO: 0.3 10E3/UL (ref 0–1.3)
MONOCYTES # BLD AUTO: 0.4 10E3/UL (ref 0–1.3)
MONOCYTES NFR BLD AUTO: 7 %
MONOCYTES NFR BLD AUTO: 8 %
NEUTROPHILS # BLD AUTO: 2.3 10E3/UL (ref 1.6–8.3)
NEUTROPHILS # BLD AUTO: 2.3 10E3/UL (ref 1.6–8.3)
NEUTROPHILS NFR BLD AUTO: 50 %
NEUTROPHILS NFR BLD AUTO: 51 %
NRBC # BLD AUTO: 0 10E3/UL
NRBC # BLD AUTO: 0 10E3/UL
NRBC BLD AUTO-RTO: 0 /100
NRBC BLD AUTO-RTO: 0 /100
O2/TOTAL GAS SETTING VFR VENT: 21 %
OXYHGB MFR BLDV: 44 % (ref 70–75)
OXYHGB MFR BLDV: 59 % (ref 70–75)
OXYHGB MFR BLDV: 69 % (ref 70–75)
OXYHGB MFR BLDV: 70 % (ref 70–75)
OXYHGB MFR BLDV: 78 % (ref 70–75)
OXYHGB MFR BLDV: 90 % (ref 70–75)
PCO2 BLDV: 35 MM HG (ref 40–50)
PCO2 BLDV: 38 MM HG (ref 40–50)
PCO2 BLDV: 39 MM HG (ref 40–50)
PH BLDV: 7.41 [PH] (ref 7.32–7.43)
PH BLDV: 7.43 [PH] (ref 7.32–7.43)
PH BLDV: 7.46 [PH] (ref 7.32–7.43)
PLATELET # BLD AUTO: 245 10E3/UL (ref 150–450)
PLATELET # BLD AUTO: 250 10E3/UL (ref 150–450)
PO2 BLDV: 33 MM HG (ref 25–47)
PO2 BLDV: 38 MM HG (ref 25–47)
PO2 BLDV: 38 MM HG (ref 25–47)
POTASSIUM SERPL-SCNC: 3.9 MMOL/L (ref 3.4–5.3)
POTASSIUM SERPL-SCNC: 4.3 MMOL/L (ref 3.4–5.3)
PROT SERPL-MCNC: 6.3 G/DL (ref 6.4–8.3)
RBC # BLD AUTO: 4.38 10E6/UL (ref 3.8–5.2)
RBC # BLD AUTO: 4.55 10E6/UL (ref 3.8–5.2)
SAO2 % BLDV: 59.9 % (ref 70–75)
SAO2 % BLDV: 70.6 % (ref 70–75)
SAO2 % BLDV: 70.8 % (ref 70–75)
SODIUM SERPL-SCNC: 140 MMOL/L (ref 135–145)
SODIUM SERPL-SCNC: 140 MMOL/L (ref 135–145)
SPECIMEN EXPIRATION DATE: NORMAL
TSH SERPL DL<=0.005 MIU/L-ACNC: 1.68 UIU/ML (ref 0.3–4.2)
WBC # BLD AUTO: 4.4 10E3/UL (ref 4–11)
WBC # BLD AUTO: 4.5 10E3/UL (ref 4–11)

## 2024-07-31 PROCEDURE — 999N000127 HC STATISTIC PERIPHERAL IV START W US GUIDANCE

## 2024-07-31 PROCEDURE — 200N000002 HC R&B ICU UMMC

## 2024-07-31 PROCEDURE — 999N000208 ECHOCARDIOGRAM COMPLETE

## 2024-07-31 PROCEDURE — 85004 AUTOMATED DIFF WBC COUNT: CPT | Performed by: INTERNAL MEDICINE

## 2024-07-31 PROCEDURE — C1751 CATH, INF, PER/CENT/MIDLINE: HCPCS | Performed by: INTERNAL MEDICINE

## 2024-07-31 PROCEDURE — 250N000009 HC RX 250: Performed by: INTERNAL MEDICINE

## 2024-07-31 PROCEDURE — 82247 BILIRUBIN TOTAL: CPT | Performed by: INTERNAL MEDICINE

## 2024-07-31 PROCEDURE — 93306 TTE W/DOPPLER COMPLETE: CPT | Mod: 26 | Performed by: INTERNAL MEDICINE

## 2024-07-31 PROCEDURE — 93451 RIGHT HEART CATH: CPT | Mod: 26 | Performed by: INTERNAL MEDICINE

## 2024-07-31 PROCEDURE — 83735 ASSAY OF MAGNESIUM: CPT | Performed by: INTERNAL MEDICINE

## 2024-07-31 PROCEDURE — 86900 BLOOD TYPING SEROLOGIC ABO: CPT | Performed by: INTERNAL MEDICINE

## 2024-07-31 PROCEDURE — 83550 IRON BINDING TEST: CPT | Performed by: INTERNAL MEDICINE

## 2024-07-31 PROCEDURE — 84702 CHORIONIC GONADOTROPIN TEST: CPT | Performed by: INTERNAL MEDICINE

## 2024-07-31 PROCEDURE — 71045 X-RAY EXAM CHEST 1 VIEW: CPT | Mod: 26 | Performed by: RADIOLOGY

## 2024-07-31 PROCEDURE — 999N000132 HC STATISTIC PP CARE STAGE 1

## 2024-07-31 PROCEDURE — 250N000013 HC RX MED GY IP 250 OP 250 PS 637: Performed by: INTERNAL MEDICINE

## 2024-07-31 PROCEDURE — 80048 BASIC METABOLIC PNL TOTAL CA: CPT | Performed by: INTERNAL MEDICINE

## 2024-07-31 PROCEDURE — 4A133B3 MONITORING OF ARTERIAL PRESSURE, PULMONARY, PERCUTANEOUS APPROACH: ICD-10-PCS | Performed by: INTERNAL MEDICINE

## 2024-07-31 PROCEDURE — 93005 ELECTROCARDIOGRAM TRACING: CPT

## 2024-07-31 PROCEDURE — 02HQ32Z INSERTION OF MONITORING DEVICE INTO RIGHT PULMONARY ARTERY, PERCUTANEOUS APPROACH: ICD-10-PCS | Performed by: INTERNAL MEDICINE

## 2024-07-31 PROCEDURE — 999N000065 XR CHEST PORT 1 VIEW

## 2024-07-31 PROCEDURE — C8929 TTE W OR WO FOL WCON,DOPPLER: HCPCS

## 2024-07-31 PROCEDURE — 93283 PRGRMG EVAL IMPLANTABLE DFB: CPT | Mod: 26 | Performed by: INTERNAL MEDICINE

## 2024-07-31 PROCEDURE — 36415 COLL VENOUS BLD VENIPUNCTURE: CPT | Performed by: INTERNAL MEDICINE

## 2024-07-31 PROCEDURE — 272N000001 HC OR GENERAL SUPPLY STERILE: Performed by: INTERNAL MEDICINE

## 2024-07-31 PROCEDURE — 999N000142 HC STATISTIC PROCEDURE PREP ONLY

## 2024-07-31 PROCEDURE — 250N000011 HC RX IP 250 OP 636: Performed by: INTERNAL MEDICINE

## 2024-07-31 PROCEDURE — 84443 ASSAY THYROID STIM HORMONE: CPT | Performed by: INTERNAL MEDICINE

## 2024-07-31 PROCEDURE — 82805 BLOOD GASES W/O2 SATURATION: CPT | Performed by: INTERNAL MEDICINE

## 2024-07-31 PROCEDURE — 80053 COMPREHEN METABOLIC PANEL: CPT | Performed by: INTERNAL MEDICINE

## 2024-07-31 PROCEDURE — C1894 INTRO/SHEATH, NON-LASER: HCPCS | Performed by: INTERNAL MEDICINE

## 2024-07-31 PROCEDURE — 85025 COMPLETE CBC W/AUTO DIFF WBC: CPT | Performed by: INTERNAL MEDICINE

## 2024-07-31 PROCEDURE — 93451 RIGHT HEART CATH: CPT | Performed by: INTERNAL MEDICINE

## 2024-07-31 PROCEDURE — 4A023N6 MEASUREMENT OF CARDIAC SAMPLING AND PRESSURE, RIGHT HEART, PERCUTANEOUS APPROACH: ICD-10-PCS | Performed by: INTERNAL MEDICINE

## 2024-07-31 PROCEDURE — 258N000003 HC RX IP 258 OP 636: Performed by: INTERNAL MEDICINE

## 2024-07-31 PROCEDURE — 93280 PM DEVICE PROGR EVAL DUAL: CPT

## 2024-07-31 PROCEDURE — 85610 PROTHROMBIN TIME: CPT | Performed by: INTERNAL MEDICINE

## 2024-07-31 PROCEDURE — 83036 HEMOGLOBIN GLYCOSYLATED A1C: CPT | Performed by: INTERNAL MEDICINE

## 2024-07-31 PROCEDURE — 82550 ASSAY OF CK (CPK): CPT | Performed by: INTERNAL MEDICINE

## 2024-07-31 PROCEDURE — 255N000002 HC RX 255 OP 636: Performed by: INTERNAL MEDICINE

## 2024-07-31 PROCEDURE — 93010 ELECTROCARDIOGRAM REPORT: CPT | Mod: XU | Performed by: INTERNAL MEDICINE

## 2024-07-31 PROCEDURE — 4A1239Z MONITORING OF CARDIAC OUTPUT, PERCUTANEOUS APPROACH: ICD-10-PCS | Performed by: INTERNAL MEDICINE

## 2024-07-31 PROCEDURE — 85025 COMPLETE CBC W/AUTO DIFF WBC: CPT

## 2024-07-31 PROCEDURE — 82810 BLOOD GASES O2 SAT ONLY: CPT

## 2024-07-31 RX ORDER — ISOSORBIDE DINITRATE 10 MG/1
10 TABLET ORAL
Status: DISCONTINUED | OUTPATIENT
Start: 2024-07-31 | End: 2024-07-31

## 2024-07-31 RX ORDER — MAGNESIUM HYDROXIDE/ALUMINUM HYDROXICE/SIMETHICONE 120; 1200; 1200 MG/30ML; MG/30ML; MG/30ML
30 SUSPENSION ORAL EVERY 4 HOURS PRN
Status: DISCONTINUED | OUTPATIENT
Start: 2024-07-31 | End: 2024-08-05 | Stop reason: HOSPADM

## 2024-07-31 RX ORDER — ENOXAPARIN SODIUM 100 MG/ML
40 INJECTION SUBCUTANEOUS EVERY 24 HOURS
Status: DISCONTINUED | OUTPATIENT
Start: 2024-07-31 | End: 2024-08-05 | Stop reason: HOSPADM

## 2024-07-31 RX ORDER — LOSARTAN POTASSIUM 50 MG/1
50 TABLET ORAL AT BEDTIME
Status: DISCONTINUED | OUTPATIENT
Start: 2024-07-31 | End: 2024-07-31

## 2024-07-31 RX ORDER — SPIRONOLACTONE 25 MG
12.5 TABLET ORAL DAILY
Status: DISCONTINUED | OUTPATIENT
Start: 2024-07-31 | End: 2024-07-31

## 2024-07-31 RX ORDER — ALBUTEROL SULFATE 90 UG/1
1-2 AEROSOL, METERED RESPIRATORY (INHALATION)
Status: DISCONTINUED | OUTPATIENT
Start: 2024-07-31 | End: 2024-08-05 | Stop reason: HOSPADM

## 2024-07-31 RX ORDER — LIDOCAINE 40 MG/G
CREAM TOPICAL
Status: COMPLETED | OUTPATIENT
Start: 2024-07-31 | End: 2024-07-31

## 2024-07-31 RX ORDER — LIDOCAINE 40 MG/G
CREAM TOPICAL
Status: DISCONTINUED | OUTPATIENT
Start: 2024-07-31 | End: 2024-08-05 | Stop reason: HOSPADM

## 2024-07-31 RX ORDER — NORGESTIMATE AND ETHINYL ESTRADIOL 0.25-0.035
1 KIT ORAL DAILY
Status: DISCONTINUED | OUTPATIENT
Start: 2024-07-31 | End: 2024-07-31

## 2024-07-31 RX ORDER — BUMETANIDE 0.25 MG/ML
2 INJECTION INTRAMUSCULAR; INTRAVENOUS ONCE
Status: COMPLETED | OUTPATIENT
Start: 2024-07-31 | End: 2024-07-31

## 2024-07-31 RX ORDER — HYDRALAZINE HYDROCHLORIDE 25 MG/1
25 TABLET, FILM COATED ORAL 3 TIMES DAILY
Status: DISCONTINUED | OUTPATIENT
Start: 2024-07-31 | End: 2024-07-31

## 2024-07-31 RX ORDER — NITROGLYCERIN 0.4 MG/1
0.4 TABLET SUBLINGUAL EVERY 5 MIN PRN
Status: DISCONTINUED | OUTPATIENT
Start: 2024-07-31 | End: 2024-08-05 | Stop reason: HOSPADM

## 2024-07-31 RX ORDER — ONDANSETRON 4 MG/1
4 TABLET, ORALLY DISINTEGRATING ORAL EVERY 6 HOURS PRN
Status: DISCONTINUED | OUTPATIENT
Start: 2024-07-31 | End: 2024-08-05 | Stop reason: HOSPADM

## 2024-07-31 RX ORDER — BUMETANIDE 0.25 MG/ML
4 INJECTION INTRAMUSCULAR; INTRAVENOUS ONCE
Status: DISCONTINUED | OUTPATIENT
Start: 2024-07-31 | End: 2024-07-31

## 2024-07-31 RX ORDER — BISOPROLOL FUMARATE 5 MG/1
5 TABLET, FILM COATED ORAL AT BEDTIME
Status: DISCONTINUED | OUTPATIENT
Start: 2024-07-31 | End: 2024-07-31

## 2024-07-31 RX ORDER — ACETAMINOPHEN 650 MG/1
650 SUPPOSITORY RECTAL EVERY 4 HOURS PRN
Status: DISCONTINUED | OUTPATIENT
Start: 2024-07-31 | End: 2024-08-05 | Stop reason: HOSPADM

## 2024-07-31 RX ORDER — ACETAMINOPHEN 325 MG/1
650 TABLET ORAL EVERY 4 HOURS PRN
Status: DISCONTINUED | OUTPATIENT
Start: 2024-07-31 | End: 2024-08-05 | Stop reason: HOSPADM

## 2024-07-31 RX ADMIN — BUMETANIDE 2 MG: 0.25 INJECTION INTRAMUSCULAR; INTRAVENOUS at 14:51

## 2024-07-31 RX ADMIN — ACETAMINOPHEN 650 MG: 325 TABLET, FILM COATED ORAL at 21:38

## 2024-07-31 RX ADMIN — SODIUM NITROPRUSSIDE 2 MCG/KG/MIN: 25 INJECTION, SOLUTION, CONCENTRATE INTRAVENOUS at 20:56

## 2024-07-31 RX ADMIN — ENOXAPARIN SODIUM 40 MG: 40 INJECTION SUBCUTANEOUS at 19:55

## 2024-07-31 RX ADMIN — LIDOCAINE: 40 CREAM TOPICAL at 09:40

## 2024-07-31 RX ADMIN — HUMAN ALBUMIN MICROSPHERES AND PERFLUTREN 6 ML: 10; .22 INJECTION, SOLUTION INTRAVENOUS at 15:15

## 2024-07-31 RX ADMIN — SODIUM NITROPRUSSIDE 2 MCG/KG/MIN: 25 INJECTION, SOLUTION, CONCENTRATE INTRAVENOUS at 23:48

## 2024-07-31 RX ADMIN — SODIUM NITROPRUSSIDE 0.25 MCG/KG/MIN: 25 INJECTION, SOLUTION, CONCENTRATE INTRAVENOUS at 15:51

## 2024-07-31 ASSESSMENT — ACTIVITIES OF DAILY LIVING (ADL)
ADLS_ACUITY_SCORE: 25
ADLS_ACUITY_SCORE: 35
ADLS_ACUITY_SCORE: 35
ADLS_ACUITY_SCORE: 25
ADLS_ACUITY_SCORE: 25
ADLS_ACUITY_SCORE: 35
ADLS_ACUITY_SCORE: 25
ADLS_ACUITY_SCORE: 35
ADLS_ACUITY_SCORE: 35
ADLS_ACUITY_SCORE: 25
ADLS_ACUITY_SCORE: 35
ADLS_ACUITY_SCORE: 25
ADLS_ACUITY_SCORE: 25

## 2024-07-31 NOTE — Clinical Note
dry, intact, no bleeding and no hematoma. 7fr sheath in place. Adah locked to sheath at 49cm. Balloon deflated. Pressure bag to CVP and PA port. Bio-patch sutures and 2 tegaderms.

## 2024-07-31 NOTE — PLAN OF CARE
Admitted/transferred from: Cardiac Cath Lab  Reason for admission/transfer: Leave in [place]  Patient status upon admission/transfer: Alert and oriented, swan tadeo catheter  Interventions: Diuresed with Bumex x1 and started on Nipride  Plan: Continue to titrate nipride and CLEO q8h.  2 RN skin assessment: completed by Rangel SMITH RN and Neha MARS RN  Sexual Orientation and Gender Identity (SOGI) smartfom completed: Not Done  Result of skin assessment and interventions/actions: Scars/lap sites from recent surgeries  Height, weight, drug calc weight: Done  Patient belongings (see Flowsheet - Adult Profile for details): See flowsheets  MDRO education (if applicable): N/a    ICU End of Shift Summary. See flowsheets for vital signs and detailed assessment.    Changes this shift: Alert and oriented x4. Leave in swan in place with FICKs q8h see flowsheets for numbers. Nipride on at 2, to stay straight rate of 2 overnight per Cards fellow. Room air. One dose of Bumex given, voiding to bedside commode.     Plan: Continue to monitor and contact team with any changes/concerns. Check labs at 2000

## 2024-07-31 NOTE — DISCHARGE INSTRUCTIONS
[facility]                        Interventional Cardiology  Discharge Instructions   Post Right Heart Cath       AFTER YOU GO HOME:  DO drink plenty of fluids  DO resume your regular diet and medications unless otherwise instructed by your Primary Physician  Do Not scrub the procedure site vigorously  No lotion or powder to the puncture site for 3 days    CALL YOUR PRIMARY PHYSICIAN IF: You may resume all normal activity.  Monitor neck site for bleeding, swelling, or voice changes. If you notice bleeding or swelling immediately apply pressure to the site and call number below to speak with Cardiology Fellow.  If you experience any changes in your breathing you should call your doctor immediately or come to the closest Emergency Department.  Do not drive yourself.    ADDITIONAL INSTRUCTIONS: Medications: You are to resume all home medications including anticoagulation therapy unless otherwise advised by your primary cardiologist or nurse coordinator.    Follow Up: Per your primary cardiology team    If you have any questions or concerns regarding your procedure site please call [phone] at anytime and ask for Cardiology Fellow on call.  They are available 24 hours a day.  You may also contact the Cardiology Clinic after hours number at [phone].                                                       Telephone Numbers [phone] Monday-Friday 8:00 am to 4:30 pm    [phone] After 4:30 pm Monday-Friday, Weekends & Holidays  Ask for Interventional Cardiologist on call. Someone is on call 24 hours/day   [facility] toll free number [phone] Monday-Friday 8:00 am to 4:30 pm   [facility] Emergency Dept [phone]

## 2024-07-31 NOTE — H&P
History and Physical    Cards 2      Date of Service: 24  Date of Admission: 2024  Patient Name: Mary Shaikh  : 2000  MRN: [id number]       Chief complaint:   SOB - Sent from cath lab.      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      In regards her CV history, She was initially seen in [place] [facility] [place] and was found to have prexcitation on her ECG in , and this lead to an exercise ECG test which she achieved 10.8 METs, exercise duration of 9 min and 16 seconds, had no exercised induced arrhythmias, she did have a delta wave and repolarization abnormalities. Subsequently, she had an echocardiogram which showed an LVEF of 51% with asymetric LVH (septum 1.5cm). She has been following with Dr. Swift in the [facility] [place] and she obtained a CMR which showed and LVEF of 43%, LVEDD 4.9cm, IV septum 1.7cm,  normal RV function but RVH noted, no LOU, harman LGE with subendocardial, mid myocardial, and subendocardial enhancement not associated with a vascular territory. She was sent to Dr Fuentes's clinic on , who started for on Losartan and Bisoprolol.     She was in the ED 24 with SVT and syncope and underwent cardioversion. Subsequently, she had an ICD implanted and had a CTI ablation on 24. She also had a laparoscopic cholecystectomy 24 for biliary diskinesia.     The last time she was evaluated by Dr Fuentes was on  where she started spironolactone. It was noted a drop in VO2 to 18.7% from  and her Her peak V02 is 53% of predicted and consistent with severely impaired exercise tolerance. It was decided to proceed with RHC.     Rhc was done on  and  patient was noted to be on ''ambulatory cardiogenic shock'', it was decide to admit to ICU for further management.     At arrival to the ICU, the patient was hemodynamic stable. She refers orthopnea, PND, and dyspnea with minimal efforts. NYHA III     Review of Symptoms:     Comprehensive 10 point review of systems was negative unless otherwise noted in the HPI.     Past Medical History:     Medical: HFrEF, Danon disease  Surgery: Cholecystectomy (7/17/24)   Allergy: None      Allergies:     None      Outpatient Medications:     Current Facility-Administered Medications   Medication Dose Route Frequency Provider Last Rate Last Admin    acetaminophen (TYLENOL) Suppository 650 mg  650 mg Rectal Q4H PRN Edenilson Rivera MD        acetaminophen (TYLENOL) tablet 650 mg  650 mg Oral Q4H PRN Edenilson Rivera MD        albuterol (PROVENTIL HFA/VENTOLIN HFA) inhaler  1-2 puff Inhalation Q2H PRN Edenilson Rivera MD        alum & mag hydroxide-simethicone (MAALOX) suspension 30 mL  30 mL Oral Q4H PRN Edenilson Rivera MD        bumetanide (BUMEX) injection 4 mg  4 mg Intravenous Once Edenilson Rivera MD        lidocaine (LMX4) cream   Topical Q1H PRN Edenilson Rivera MD        lidocaine 1 % 0.1-1 mL  0.1-1 mL Other Q1H PRN Edenilson Rivera MD        medication instruction   Does not apply Continuous PRN Edenilson Rivera MD        nitroGLYcerin (NITROSTAT) sublingual tablet 0.4 mg  0.4 mg Sublingual Q5 Min PRN Edenilson Rivera MD        ondansetron (ZOFRAN ODT) ODT tab 4 mg  4 mg Sublingual Q6H PRN Edenilson Rivera MD        sodium chloride (PF) 0.9% PF flush 3 mL  3 mL Intracatheter Q8H Edenilson Rivera MD        sodium chloride (PF) 0.9% PF flush 3 mL  3 mL Intracatheter q1 min prn Edenilson Rivera MD            Family History:   No history of heart disease in his family      Social History:     Social History      Tobacco Use    Smoking status: Never    Smokeless tobacco: Never   Substance Use Topics    Alcohol use: Never    Drug use: Never       Tobacco Use: Denies using smokeless tobacco and smoking.   Illicit Drug Use: Denies  Alcohol Use: Denies     Physical Exam:   Blood pressure 125/69, pulse 62, temperature 98.3  F (36.8  C), temperature source Oral, resp. rate 16, weight 77.3 kg (170 lb 6.7 oz), SpO2 100%, not currently breastfeeding.  Temp (24hrs), Av.7  F (37.1  C), Min:98.3  F (36.8  C), Max:99.1  F (37.3  C)      Gen: no acute distress  HEENT: no scleral icterus, pupils equal and reactive to light, moist mucous membranes, no nasal discharge.  NECK: no adenopathy, no asymmetry, masses, or scars, thyroid normal to palpation and no bruits, JVP elevated  CARDIOVASCULAR: normal rate, regular rhythm. S1/S2 normal, no murmurs, rubs or gallops   RESPIRATORY: clear to auscultation bilaterally, no rales, rhonchi or wheezes, no use of accessory muscles, no retractions, respirations unlabored   ABDOMEN: soft, tender abdomen without rebound or guarding, no hepatosplenomegaly, no palpable masses, bowel sounds present  EXTREMITIES: peripheral pulses normal, no peripheral edema, warm, capillary refill < 2 seconds  Skin: no ecchymoses, no rashes  NEURO: oriented to person, place, year, and situation;   PSYCH: affect appropriate      Data:   CMP  Recent Labs   Lab 24  1612 24  1419 24  1255 24  0858   NA  --  140  --  140   POTASSIUM  --  3.9  --  4.3   CHLORIDE  --  110*  --  107   CO2  --  21*  --  22   ANIONGAP  --  9  --  11   * 91 67* 95   BUN  --  6.3  --  6.4   CR  --  0.57  --  0.66   GFRESTIMATED  --  >90  --  >90   WHITNYE  --  8.5*  --  9.0   MAG  --  2.0  --   --    PROTTOTAL  --  6.3*  --   --    ALBUMIN  --  3.7  --   --    BILITOTAL  --  0.6  --   --    ALKPHOS  --  48  --   --    AST  --  41  --   --    ALT  --  23  --   --      CBC  Recent Labs   Lab 24  1419 24  1117  07/31/24  1107 07/31/24  1106 07/31/24  0858   WBC 4.4  --   --   --  4.5   RBC 4.38  --   --   --  4.55   HGB 11.6* 11.7 12.0 12.1 12.2   HCT 37.4  --   --   --  39.4   MCV 85  --   --   --  87   MCH 26.5  --   --   --  26.8   MCHC 31.0*  --   --   --  31.0*   RDW 15.2*  --   --   --  15.4*     --   --   --  245     INR  Recent Labs   Lab 07/31/24  0858   INR 1.25*     Arterial Blood Gas  Recent Labs   Lab 07/31/24  1611   O2PER 21        EKG:        RHC: 07/31  BSA 1.87 m2  /74/89 mmHg  RA 21/25/20 mmHg  RV 51/20mmHg  PA 51/29/36 mmHg  PCWP 30/40/29 mmHg  TD CO/CI 2.9/1.55   Juan CO/CI 3.13/1.68   PVR 1.9   SVR 1762  PA sat 43.5%   PCW Oximeter sat 98% Right sided filling pressures are severely elevated. Left sided filling pressures are severely elevated. Moderately elevated pulmonary artery hypertension. Reduced cardiac output level.     TTE:  Interpretation Summary  Left ventricular function is decreased. The ejection fraction is 20-25%  (severely reduced).  Global right ventricular function is moderately reduced.  Moderate biatrial enlargement is present.  IVC diameter >2.1 cm collapsing <50% with sniff suggests a high RA pressure  estimated at 15 mmHg or greater.  Trivial pericardial effusion is present.  There is no prior study for direct comparison.     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      Todays Plan:  Starting Nipride  Bumetanide 2mg IV  Following hemodynamically   Following organ function and perfusion     Neurological:  > Dannon disease     Important to know underlying neuromuscular dysfunction before advance therapies consideration.     Plan:  Consulting neurology     Respiratory:  > History of asthma     Plan:  Restart  albuterol     Cardiovascular:  >Cardiogenic Shock SCAI B  >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  >History of SVT with preexitation   >S/P ICD sudden cardiac death prevention.      Hemodynamics    CVP PA - Mean  CO/CI PVR SVR MAP   07/31 20 51/29 - 36 3.13/1.68 1.9 1762                                   Inotrope: None   Vasodilators/Afterload: Nipride - MAP:65-70, CI:>2.1  Diuresis: Bumetanide 2mg once   Volume status: Hypervolemic and warm/cold  Advance Therapies: Undergoing evaluation.     No signs of organ dysfunction and or hypoperfusion so far. Making adequate UO. She benefits from GDMT optimization. Planning to start advance therapies evaluation.     Plan:  Planning to start afterload reduction to optimize cardiac output     Gastrointestinal  S/P Cholecystectomy     Plan:  Following up abdominal exam.     Renal   > WNL Cr and UO    Plan:  Following kidney function and UO     Hematology/Oncology   > No concerns     Plan:  Following cell lines     Code Status: Full Code    Patient seen and discussed with Dr Blum who agrees with the plan detailed above. Please see attending addendum for changes to plan.     Edenilson Rivera MD   Cardiology Fellow

## 2024-07-31 NOTE — PRE-PROCEDURE
[facility]   Interventional Cardiology        Consenting/Education for Right Heart Catheterization     Patient understands that we would like to perform a right heart catheterization. This procedure will be performed by the interventional cardiologist.    Patient understands during the right heart catheterization a fine tube (catheter) is put into the vein of the groin/neck.  It is carefully passed along until it reaches the heart and then goes up into the blood vessels of the lungs. This is done to measure a variety of pressures in your heart and can tell us how well the heart is filling and emptying, as well as monitor fluid status.     Patient also understands risks and complications of the procedure which include, but are not limited to bruising/swelling around the incision site, infection, bleeding, allergic reaction to local anesthetic.      Patient verbalized understanding of risks and benefits of the right heart catheterization and has elected to proceed with the procedure.       Sarita AHUMADA, MILAGROS  [facility] Interventional Cardiology

## 2024-08-01 ENCOUNTER — APPOINTMENT (OUTPATIENT)
Dept: ULTRASOUND IMAGING | Facility: CLINIC | Age: 24
DRG: 286 | End: 2024-08-01
Attending: INTERNAL MEDICINE
Payer: COMMERCIAL

## 2024-08-01 ENCOUNTER — APPOINTMENT (OUTPATIENT)
Dept: CT IMAGING | Facility: CLINIC | Age: 24
DRG: 286 | End: 2024-08-01
Attending: INTERNAL MEDICINE
Payer: COMMERCIAL

## 2024-08-01 ENCOUNTER — CARE COORDINATION (OUTPATIENT)
Dept: TRANSPLANT | Facility: CLINIC | Age: 24
End: 2024-08-01

## 2024-08-01 ENCOUNTER — APPOINTMENT (OUTPATIENT)
Dept: GENERAL RADIOLOGY | Facility: CLINIC | Age: 24
DRG: 286 | End: 2024-08-01
Attending: INTERNAL MEDICINE
Payer: COMMERCIAL

## 2024-08-01 PROBLEM — F33.1 MODERATE EPISODE OF RECURRENT MAJOR DEPRESSIVE DISORDER (H): Status: ACTIVE | Noted: 2018-03-30

## 2024-08-01 PROBLEM — I47.29 NSVT (NONSUSTAINED VENTRICULAR TACHYCARDIA) (H): Status: ACTIVE | Noted: 2021-12-07

## 2024-08-01 PROBLEM — I42.2 HYPERTROPHIC NONOBSTRUCTIVE CARDIOMYOPATHY (H): Status: ACTIVE | Noted: 2021-12-07

## 2024-08-01 PROBLEM — J45.909 MODERATE ASTHMA WITHOUT COMPLICATION: Status: ACTIVE | Noted: 2018-03-30

## 2024-08-01 PROBLEM — F41.9 ANXIETY: Status: ACTIVE | Noted: 2018-03-30

## 2024-08-01 PROBLEM — I50.20 HEART FAILURE WITH REDUCED EJECTION FRACTION (H): Status: ACTIVE | Noted: 2024-07-02

## 2024-08-01 PROBLEM — K82.8 BILIARY DYSKINESIA: Status: ACTIVE | Noted: 2024-07-11

## 2024-08-01 PROBLEM — I47.10 PAROXYSMAL SVT (SUPRAVENTRICULAR TACHYCARDIA) (H): Status: ACTIVE | Noted: 2021-12-07

## 2024-08-01 PROBLEM — I42.8 NONISCHEMIC CARDIOMYOPATHY (H): Status: ACTIVE | Noted: 2022-02-07

## 2024-08-01 PROBLEM — Z95.810 ICD (IMPLANTABLE CARDIOVERTER-DEFIBRILLATOR) IN PLACE: Status: ACTIVE | Noted: 2024-07-12

## 2024-08-01 PROBLEM — K81.1 CHRONIC CHOLECYSTITIS: Status: ACTIVE | Noted: 2024-07-17

## 2024-08-01 PROBLEM — I45.6 WOLFF PARKINSON WHITE PATTERN SEEN ON ELECTROCARDIOGRAM: Status: ACTIVE | Noted: 2021-10-19

## 2024-08-01 LAB
ALBUMIN SERPL BCG-MCNC: 3.8 G/DL (ref 3.5–5.2)
ALBUMIN SERPL BCG-MCNC: 4.2 G/DL (ref 3.5–5.2)
ALBUMIN UR-MCNC: NEGATIVE MG/DL
ALP SERPL-CCNC: 50 U/L (ref 40–150)
ALP SERPL-CCNC: 60 U/L (ref 40–150)
ALT SERPL W P-5'-P-CCNC: 24 U/L (ref 0–50)
ALT SERPL W P-5'-P-CCNC: 24 U/L (ref 0–50)
ANION GAP SERPL CALCULATED.3IONS-SCNC: 12 MMOL/L (ref 7–15)
ANION GAP SERPL CALCULATED.3IONS-SCNC: 13 MMOL/L (ref 7–15)
ANION GAP SERPL CALCULATED.3IONS-SCNC: 9 MMOL/L (ref 7–15)
APPEARANCE UR: CLEAR
AST SERPL W P-5'-P-CCNC: 37 U/L (ref 0–45)
AST SERPL W P-5'-P-CCNC: 39 U/L (ref 0–45)
BASE EXCESS BLDV CALC-SCNC: -1.2 MMOL/L (ref -3–3)
BASE EXCESS BLDV CALC-SCNC: 1.5 MMOL/L (ref -3–3)
BASE EXCESS BLDV CALC-SCNC: 1.7 MMOL/L (ref -3–3)
BASE EXCESS BLDV CALC-SCNC: 4.7 MMOL/L (ref -3–3)
BASOPHILS # BLD AUTO: 0 10E3/UL (ref 0–0.2)
BASOPHILS NFR BLD AUTO: 1 %
BILIRUB SERPL-MCNC: 0.7 MG/DL
BILIRUB SERPL-MCNC: 0.7 MG/DL
BILIRUB UR QL STRIP: NEGATIVE
BUN SERPL-MCNC: 3.2 MG/DL (ref 6–20)
BUN SERPL-MCNC: 4.8 MG/DL (ref 6–20)
BUN SERPL-MCNC: 5.2 MG/DL (ref 6–20)
CALCIUM SERPL-MCNC: 8.7 MG/DL (ref 8.8–10.4)
CALCIUM SERPL-MCNC: 9 MG/DL (ref 8.8–10.4)
CALCIUM SERPL-MCNC: 9.5 MG/DL (ref 8.8–10.4)
CHLORIDE SERPL-SCNC: 103 MMOL/L (ref 98–107)
CHLORIDE SERPL-SCNC: 103 MMOL/L (ref 98–107)
CHLORIDE SERPL-SCNC: 104 MMOL/L (ref 98–107)
COLOR UR AUTO: ABNORMAL
CREAT SERPL-MCNC: 0.53 MG/DL (ref 0.51–0.95)
CREAT SERPL-MCNC: 0.55 MG/DL (ref 0.51–0.95)
CREAT SERPL-MCNC: 0.58 MG/DL (ref 0.51–0.95)
CYSTATIN C (ROCHE): 0.6 MG/L (ref 0.6–1)
EGFRCR SERPLBLD CKD-EPI 2021: >90 ML/MIN/1.73M2
EOSINOPHIL # BLD AUTO: 0 10E3/UL (ref 0–0.7)
EOSINOPHIL NFR BLD AUTO: 1 %
ERYTHROCYTE [DISTWIDTH] IN BLOOD BY AUTOMATED COUNT: 15 % (ref 10–15)
GFR/BSA.PRED SERPLBLD CYS-BASED-ARV: >90 ML/MIN/1.73M2
GLUCOSE BLDC GLUCOMTR-MCNC: 115 MG/DL (ref 70–99)
GLUCOSE BLDC GLUCOMTR-MCNC: 94 MG/DL (ref 70–99)
GLUCOSE BLDC GLUCOMTR-MCNC: 96 MG/DL (ref 70–99)
GLUCOSE SERPL-MCNC: 188 MG/DL (ref 70–99)
GLUCOSE SERPL-MCNC: 96 MG/DL (ref 70–99)
GLUCOSE SERPL-MCNC: 98 MG/DL (ref 70–99)
GLUCOSE UR STRIP-MCNC: NEGATIVE MG/DL
HCO3 BLDV-SCNC: 23 MMOL/L (ref 21–28)
HCO3 BLDV-SCNC: 26 MMOL/L (ref 21–28)
HCO3 BLDV-SCNC: 27 MMOL/L (ref 21–28)
HCO3 BLDV-SCNC: 29 MMOL/L (ref 21–28)
HCO3 SERPL-SCNC: 24 MMOL/L (ref 22–29)
HCO3 SERPL-SCNC: 25 MMOL/L (ref 22–29)
HCO3 SERPL-SCNC: 25 MMOL/L (ref 22–29)
HCT VFR BLD AUTO: 34.5 % (ref 35–47)
HGB BLD-MCNC: 11.2 G/DL (ref 11.7–15.7)
HGB UR QL STRIP: ABNORMAL
IMM GRANULOCYTES # BLD: 0 10E3/UL
IMM GRANULOCYTES NFR BLD: 0 %
KETONES UR STRIP-MCNC: NEGATIVE MG/DL
LACTATE SERPL-SCNC: 1.1 MMOL/L (ref 0.7–2)
LACTATE SERPL-SCNC: 2.2 MMOL/L (ref 0.7–2)
LEUKOCYTE ESTERASE UR QL STRIP: NEGATIVE
LYMPHOCYTES # BLD AUTO: 1.8 10E3/UL (ref 0.8–5.3)
LYMPHOCYTES NFR BLD AUTO: 35 %
MAGNESIUM SERPL-MCNC: 1.9 MG/DL (ref 1.7–2.3)
MCH RBC QN AUTO: 27.1 PG (ref 26.5–33)
MCHC RBC AUTO-ENTMCNC: 32.5 G/DL (ref 31.5–36.5)
MCV RBC AUTO: 83 FL (ref 78–100)
MDC_IDC_LEAD_CONNECTION_STATUS: NORMAL
MDC_IDC_LEAD_CONNECTION_STATUS: NORMAL
MDC_IDC_LEAD_IMPLANT_DT: NORMAL
MDC_IDC_LEAD_IMPLANT_DT: NORMAL
MDC_IDC_LEAD_LOCATION: NORMAL
MDC_IDC_LEAD_LOCATION: NORMAL
MDC_IDC_LEAD_LOCATION_DETAIL_1: NORMAL
MDC_IDC_LEAD_LOCATION_DETAIL_1: NORMAL
MDC_IDC_LEAD_MFG: NORMAL
MDC_IDC_LEAD_MFG: NORMAL
MDC_IDC_LEAD_MODEL: NORMAL
MDC_IDC_LEAD_MODEL: NORMAL
MDC_IDC_LEAD_POLARITY_TYPE: NORMAL
MDC_IDC_LEAD_POLARITY_TYPE: NORMAL
MDC_IDC_LEAD_SERIAL: NORMAL
MDC_IDC_LEAD_SERIAL: NORMAL
MDC_IDC_MSMT_BATTERY_DTM: NORMAL
MDC_IDC_MSMT_BATTERY_REMAINING_LONGEVITY: 150 MO
MDC_IDC_MSMT_BATTERY_REMAINING_PERCENTAGE: 100 %
MDC_IDC_MSMT_BATTERY_STATUS: NORMAL
MDC_IDC_MSMT_LEADCHNL_RA_IMPEDANCE_VALUE: 642 OHM
MDC_IDC_MSMT_LEADCHNL_RA_PACING_THRESHOLD_AMPLITUDE: 0.6 V
MDC_IDC_MSMT_LEADCHNL_RA_PACING_THRESHOLD_PULSEWIDTH: 0.4 MS
MDC_IDC_MSMT_LEADCHNL_RV_IMPEDANCE_VALUE: 393 OHM
MDC_IDC_MSMT_LEADCHNL_RV_PACING_THRESHOLD_AMPLITUDE: 0.7 V
MDC_IDC_MSMT_LEADCHNL_RV_PACING_THRESHOLD_PULSEWIDTH: 0.4 MS
MDC_IDC_PG_IMPLANT_DTM: NORMAL
MDC_IDC_PG_MFG: NORMAL
MDC_IDC_PG_MODEL: NORMAL
MDC_IDC_PG_SERIAL: NORMAL
MDC_IDC_PG_TYPE: NORMAL
MDC_IDC_SESS_CLINIC_NAME: NORMAL
MDC_IDC_SESS_DTM: NORMAL
MDC_IDC_SESS_TYPE: NORMAL
MDC_IDC_SET_BRADY_LOWRATE: 40 {BEATS}/MIN
MDC_IDC_SET_BRADY_MODE: NORMAL
MDC_IDC_SET_LEADCHNL_RA_PACING_AMPLITUDE: 3.5 V
MDC_IDC_SET_LEADCHNL_RA_PACING_CAPTURE_MODE: NORMAL
MDC_IDC_SET_LEADCHNL_RA_PACING_POLARITY: NORMAL
MDC_IDC_SET_LEADCHNL_RA_PACING_PULSEWIDTH: 0.4 MS
MDC_IDC_SET_LEADCHNL_RA_SENSING_ADAPTATION_MODE: NORMAL
MDC_IDC_SET_LEADCHNL_RA_SENSING_POLARITY: NORMAL
MDC_IDC_SET_LEADCHNL_RA_SENSING_SENSITIVITY: 0.25 MV
MDC_IDC_SET_LEADCHNL_RV_PACING_AMPLITUDE: 3.5 V
MDC_IDC_SET_LEADCHNL_RV_PACING_CAPTURE_MODE: NORMAL
MDC_IDC_SET_LEADCHNL_RV_PACING_POLARITY: NORMAL
MDC_IDC_SET_LEADCHNL_RV_PACING_PULSEWIDTH: 0.4 MS
MDC_IDC_SET_LEADCHNL_RV_SENSING_ADAPTATION_MODE: NORMAL
MDC_IDC_SET_LEADCHNL_RV_SENSING_POLARITY: NORMAL
MDC_IDC_SET_LEADCHNL_RV_SENSING_SENSITIVITY: 0.6 MV
MDC_IDC_SET_ZONE_DETECTION_INTERVAL: 240 MS
MDC_IDC_SET_ZONE_DETECTION_INTERVAL: 300 MS
MDC_IDC_SET_ZONE_DETECTION_INTERVAL: 333 MS
MDC_IDC_SET_ZONE_STATUS: NORMAL
MDC_IDC_SET_ZONE_TYPE: NORMAL
MDC_IDC_SET_ZONE_VENDOR_TYPE: NORMAL
MDC_IDC_STAT_BRADY_DTM_END: NORMAL
MDC_IDC_STAT_BRADY_DTM_START: NORMAL
MDC_IDC_STAT_BRADY_RA_PERCENT_PACED: 0 %
MDC_IDC_STAT_BRADY_RV_PERCENT_PACED: 0 %
MDC_IDC_STAT_EPISODE_RECENT_COUNT: 0
MDC_IDC_STAT_EPISODE_RECENT_COUNT_DTM_END: NORMAL
MDC_IDC_STAT_EPISODE_RECENT_COUNT_DTM_START: NORMAL
MDC_IDC_STAT_EPISODE_TYPE: NORMAL
MDC_IDC_STAT_EPISODE_VENDOR_TYPE: NORMAL
MDC_IDC_STAT_TACHYTHERAPY_ATP_DELIVERED_RECENT: 0
MDC_IDC_STAT_TACHYTHERAPY_ATP_DELIVERED_TOTAL: 0
MDC_IDC_STAT_TACHYTHERAPY_RECENT_DTM_END: NORMAL
MDC_IDC_STAT_TACHYTHERAPY_RECENT_DTM_START: NORMAL
MDC_IDC_STAT_TACHYTHERAPY_SHOCKS_ABORTED_RECENT: 0
MDC_IDC_STAT_TACHYTHERAPY_SHOCKS_ABORTED_TOTAL: 0
MDC_IDC_STAT_TACHYTHERAPY_SHOCKS_DELIVERED_RECENT: 0
MDC_IDC_STAT_TACHYTHERAPY_SHOCKS_DELIVERED_TOTAL: 0
MDC_IDC_STAT_TACHYTHERAPY_TOTAL_DTM_END: NORMAL
MDC_IDC_STAT_TACHYTHERAPY_TOTAL_DTM_START: NORMAL
MONOCYTES # BLD AUTO: 0.5 10E3/UL (ref 0–1.3)
MONOCYTES NFR BLD AUTO: 10 %
MUCOUS THREADS #/AREA URNS LPF: PRESENT /LPF
NEUTROPHILS # BLD AUTO: 2.8 10E3/UL (ref 1.6–8.3)
NEUTROPHILS NFR BLD AUTO: 53 %
NITRATE UR QL: NEGATIVE
NRBC # BLD AUTO: 0 10E3/UL
NRBC BLD AUTO-RTO: 0 /100
O2/TOTAL GAS SETTING VFR VENT: 21 %
OXYHGB MFR BLDV: 60 % (ref 70–75)
OXYHGB MFR BLDV: 65 % (ref 70–75)
OXYHGB MFR BLDV: 71 % (ref 70–75)
OXYHGB MFR BLDV: 75 % (ref 70–75)
PCO2 BLDV: 34 MM HG (ref 40–50)
PCO2 BLDV: 41 MM HG (ref 40–50)
PCO2 BLDV: 41 MM HG (ref 40–50)
PCO2 BLDV: 42 MM HG (ref 40–50)
PH BLDV: 7.41 [PH] (ref 7.32–7.43)
PH BLDV: 7.41 [PH] (ref 7.32–7.43)
PH BLDV: 7.43 [PH] (ref 7.32–7.43)
PH BLDV: 7.46 [PH] (ref 7.32–7.43)
PH UR STRIP: 7 [PH] (ref 5–7)
PLATELET # BLD AUTO: 238 10E3/UL (ref 150–450)
PO2 BLDV: 35 MM HG (ref 25–47)
PO2 BLDV: 35 MM HG (ref 25–47)
PO2 BLDV: 40 MM HG (ref 25–47)
PO2 BLDV: 44 MM HG (ref 25–47)
POTASSIUM SERPL-SCNC: 3.6 MMOL/L (ref 3.4–5.3)
POTASSIUM SERPL-SCNC: 3.6 MMOL/L (ref 3.4–5.3)
POTASSIUM SERPL-SCNC: 3.7 MMOL/L (ref 3.4–5.3)
PROT SERPL-MCNC: 6.4 G/DL (ref 6.4–8.3)
PROT SERPL-MCNC: 7.3 G/DL (ref 6.4–8.3)
RBC # BLD AUTO: 4.14 10E6/UL (ref 3.8–5.2)
RBC URINE: 160 /HPF
SAO2 % BLDV: 61.3 % (ref 70–75)
SAO2 % BLDV: 65.7 % (ref 70–75)
SAO2 % BLDV: 72.3 % (ref 70–75)
SAO2 % BLDV: 76.7 % (ref 70–75)
SODIUM SERPL-SCNC: 138 MMOL/L (ref 135–145)
SODIUM SERPL-SCNC: 140 MMOL/L (ref 135–145)
SODIUM SERPL-SCNC: 140 MMOL/L (ref 135–145)
SP GR UR STRIP: 1.01 (ref 1–1.03)
UROBILINOGEN UR STRIP-MCNC: NORMAL MG/DL
WBC # BLD AUTO: 5.2 10E3/UL (ref 4–11)
WBC URINE: 1 /HPF

## 2024-08-01 PROCEDURE — 250N000011 HC RX IP 250 OP 636: Performed by: INTERNAL MEDICINE

## 2024-08-01 PROCEDURE — 83605 ASSAY OF LACTIC ACID: CPT | Performed by: INTERNAL MEDICINE

## 2024-08-01 PROCEDURE — 70486 CT MAXILLOFACIAL W/O DYE: CPT

## 2024-08-01 PROCEDURE — 93925 LOWER EXTREMITY STUDY: CPT

## 2024-08-01 PROCEDURE — 83735 ASSAY OF MAGNESIUM: CPT | Performed by: INTERNAL MEDICINE

## 2024-08-01 PROCEDURE — 70486 CT MAXILLOFACIAL W/O DYE: CPT | Mod: 26 | Performed by: RADIOLOGY

## 2024-08-01 PROCEDURE — 85025 COMPLETE CBC W/AUTO DIFF WBC: CPT | Performed by: INTERNAL MEDICINE

## 2024-08-01 PROCEDURE — 80323 ALKALOIDS NOS: CPT | Performed by: INTERNAL MEDICINE

## 2024-08-01 PROCEDURE — 84450 TRANSFERASE (AST) (SGOT): CPT | Performed by: INTERNAL MEDICINE

## 2024-08-01 PROCEDURE — 93970 EXTREMITY STUDY: CPT | Mod: XS

## 2024-08-01 PROCEDURE — 81001 URINALYSIS AUTO W/SCOPE: CPT | Performed by: INTERNAL MEDICINE

## 2024-08-01 PROCEDURE — 82805 BLOOD GASES W/O2 SATURATION: CPT | Performed by: INTERNAL MEDICINE

## 2024-08-01 PROCEDURE — 93005 ELECTROCARDIOGRAM TRACING: CPT

## 2024-08-01 PROCEDURE — 82040 ASSAY OF SERUM ALBUMIN: CPT | Performed by: INTERNAL MEDICINE

## 2024-08-01 PROCEDURE — 93970 EXTREMITY STUDY: CPT | Mod: 26 | Performed by: RADIOLOGY

## 2024-08-01 PROCEDURE — 250N000013 HC RX MED GY IP 250 OP 250 PS 637: Performed by: STUDENT IN AN ORGANIZED HEALTH CARE EDUCATION/TRAINING PROGRAM

## 2024-08-01 PROCEDURE — 71250 CT THORAX DX C-: CPT | Mod: 26 | Performed by: STUDENT IN AN ORGANIZED HEALTH CARE EDUCATION/TRAINING PROGRAM

## 2024-08-01 PROCEDURE — 99233 SBSQ HOSP IP/OBS HIGH 50: CPT | Mod: GC | Performed by: INTERNAL MEDICINE

## 2024-08-01 PROCEDURE — 87086 URINE CULTURE/COLONY COUNT: CPT | Performed by: INTERNAL MEDICINE

## 2024-08-01 PROCEDURE — 70450 CT HEAD/BRAIN W/O DYE: CPT

## 2024-08-01 PROCEDURE — 200N000002 HC R&B ICU UMMC

## 2024-08-01 PROCEDURE — 250N000009 HC RX 250: Performed by: INTERNAL MEDICINE

## 2024-08-01 PROCEDURE — 81378 HLA I & II TYPING HR: CPT | Performed by: INTERNAL MEDICINE

## 2024-08-01 PROCEDURE — 84155 ASSAY OF PROTEIN SERUM: CPT | Performed by: INTERNAL MEDICINE

## 2024-08-01 PROCEDURE — 71250 CT THORAX DX C-: CPT

## 2024-08-01 PROCEDURE — 99222 1ST HOSP IP/OBS MODERATE 55: CPT | Performed by: NURSE PRACTITIONER

## 2024-08-01 PROCEDURE — 82610 CYSTATIN C: CPT | Performed by: INTERNAL MEDICINE

## 2024-08-01 PROCEDURE — 71046 X-RAY EXAM CHEST 2 VIEWS: CPT

## 2024-08-01 PROCEDURE — 71046 X-RAY EXAM CHEST 2 VIEWS: CPT | Mod: 26 | Performed by: RADIOLOGY

## 2024-08-01 PROCEDURE — 74176 CT ABD & PELVIS W/O CONTRAST: CPT | Mod: 26 | Performed by: STUDENT IN AN ORGANIZED HEALTH CARE EDUCATION/TRAINING PROGRAM

## 2024-08-01 PROCEDURE — 258N000003 HC RX IP 258 OP 636: Performed by: INTERNAL MEDICINE

## 2024-08-01 PROCEDURE — 93930 UPPER EXTREMITY STUDY: CPT | Mod: 26 | Performed by: RADIOLOGY

## 2024-08-01 PROCEDURE — 93010 ELECTROCARDIOGRAM REPORT: CPT | Performed by: INTERNAL MEDICINE

## 2024-08-01 PROCEDURE — 250N000013 HC RX MED GY IP 250 OP 250 PS 637: Performed by: INTERNAL MEDICINE

## 2024-08-01 PROCEDURE — 93925 LOWER EXTREMITY STUDY: CPT | Mod: 26 | Performed by: RADIOLOGY

## 2024-08-01 PROCEDURE — 70450 CT HEAD/BRAIN W/O DYE: CPT | Mod: 26 | Performed by: RADIOLOGY

## 2024-08-01 PROCEDURE — 93970 EXTREMITY STUDY: CPT

## 2024-08-01 PROCEDURE — 93930 UPPER EXTREMITY STUDY: CPT

## 2024-08-01 RX ORDER — LOSARTAN POTASSIUM 25 MG/1
1 TABLET ORAL
Status: ON HOLD | COMMUNITY
Start: 2024-04-02 | End: 2024-08-05

## 2024-08-01 RX ORDER — LIDOCAINE 40 MG/G
CREAM TOPICAL
OUTPATIENT
Start: 2024-08-01

## 2024-08-01 RX ORDER — POTASSIUM CHLORIDE 750 MG/1
20 TABLET, EXTENDED RELEASE ORAL ONCE
Status: COMPLETED | OUTPATIENT
Start: 2024-08-01 | End: 2024-08-01

## 2024-08-01 RX ORDER — NORGESTIMATE AND ETHINYL ESTRADIOL 0.25-0.035
1 KIT ORAL AT BEDTIME
COMMUNITY
Start: 2024-06-18

## 2024-08-01 RX ORDER — BUMETANIDE 0.25 MG/ML
1 INJECTION INTRAMUSCULAR; INTRAVENOUS ONCE
Status: COMPLETED | OUTPATIENT
Start: 2024-08-01 | End: 2024-08-01

## 2024-08-01 RX ORDER — POLYETHYLENE GLYCOL 3350 17 G/17G
17 POWDER, FOR SOLUTION ORAL DAILY
COMMUNITY
Start: 2024-06-21

## 2024-08-01 RX ORDER — LOPERAMIDE HCL 2 MG
4 CAPSULE ORAL PRN
COMMUNITY
Start: 2024-06-01

## 2024-08-01 RX ORDER — HYDROMORPHONE HYDROCHLORIDE 2 MG/1
2 TABLET ORAL EVERY 6 HOURS PRN
COMMUNITY
Start: 2024-07-17

## 2024-08-01 RX ADMIN — POTASSIUM CHLORIDE 20 MEQ: 750 TABLET, EXTENDED RELEASE ORAL at 18:26

## 2024-08-01 RX ADMIN — ACETAMINOPHEN 650 MG: 325 TABLET, FILM COATED ORAL at 10:30

## 2024-08-01 RX ADMIN — SACUBITRIL AND VALSARTAN 1 TABLET: 24; 26 TABLET, FILM COATED ORAL at 11:41

## 2024-08-01 RX ADMIN — BUMETANIDE 1 MG: 0.25 INJECTION INTRAMUSCULAR; INTRAVENOUS at 11:42

## 2024-08-01 RX ADMIN — ENOXAPARIN SODIUM 40 MG: 40 INJECTION SUBCUTANEOUS at 21:20

## 2024-08-01 RX ADMIN — SODIUM NITROPRUSSIDE 0.75 MCG/KG/MIN: 25 INJECTION, SOLUTION, CONCENTRATE INTRAVENOUS at 13:49

## 2024-08-01 RX ADMIN — POTASSIUM CHLORIDE 20 MEQ: 750 TABLET, EXTENDED RELEASE ORAL at 06:47

## 2024-08-01 RX ADMIN — POTASSIUM CHLORIDE 20 MEQ: 750 TABLET, EXTENDED RELEASE ORAL at 02:06

## 2024-08-01 RX ADMIN — ACETAMINOPHEN 650 MG: 325 TABLET, FILM COATED ORAL at 18:39

## 2024-08-01 RX ADMIN — SACUBITRIL AND VALSARTAN 1 TABLET: 49; 51 TABLET, FILM COATED ORAL at 21:21

## 2024-08-01 RX ADMIN — ACETAMINOPHEN 650 MG: 325 TABLET, FILM COATED ORAL at 05:40

## 2024-08-01 ASSESSMENT — ACTIVITIES OF DAILY LIVING (ADL)
ADLS_ACUITY_SCORE: 25

## 2024-08-01 ASSESSMENT — ENCOUNTER SYMPTOMS
EYES NEGATIVE: 1
WEAKNESS: 1
VOMITING: 0
BLURRED VISION: 0
FEVER: 0
DIZZINESS: 1
COUGH: 0
ABDOMINAL PAIN: 0
DIARRHEA: 0
CONSTIPATION: 0
SHORTNESS OF BREATH: 1
NAUSEA: 0
MUSCULOSKELETAL NEGATIVE: 1
CHILLS: 0
CONSTITUTIONAL NEGATIVE: 1
PALPITATIONS: 0
DOUBLE VISION: 0
WEIGHT LOSS: 0

## 2024-08-01 ASSESSMENT — LIFESTYLE VARIABLES: SUBSTANCE_ABUSE: 0

## 2024-08-01 NOTE — PROGRESS NOTES
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       Subjective / Interval   Patient refers that he was feeling ok. Before shift change it was decided to leave the nipride at 2 (she was achieving CI more than 2.1). During the night due to hypotension (while sleeping) it was decided to decrease the nipride up to 0.5.     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       Todays Plan:  Weaning nipride as much as possible,   Starting Entresto 24/26 in AM - We might increase dose if SBP/MAP tolerates  Following hemodynamics  Following organ function and perfusion      Neurological:  > Dannon disease      Important to know underlying neuromuscular dysfunction before advance therapies consideration.      Plan:  Consulting neurology      Respiratory:  > History of asthma      Plan:  Restart albuterol      Cardiovascular:  >Cardiogenic Shock SCAI B  >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  >History of SVT with preexitation   >S/P ICD sudden cardiac death prevention.       Hemodynamics     CVP PA - Mean  CO/CI PVR SVR MAP   07/31 20 51/29 - 36 3.13/1.68 1.9 1762      08/01  12  35/24/27 4/2.1   2.6 1194  77                                          Inotrope: None   Vasodilators/Afterload: Nipride - MAP:65-70, CI:>2.1  Diuresis: Bumetanide  1mg once today PRN as needed  Volume status: Hypervolemic and warm/cold  Advance Therapies: Waiting for financial clearance     No signs of organ dysfunction and or hypoperfusion so far. Making adequate UO. She benefits from GDMT optimization. Planning to start advance therapies evaluation.      Plan:  Planning to start afterload reduction to optimize cardiac output   Weaning nipride as much as possible,   Starting Entresto 24/26 in AM - We might increase dose if SBP/MAP tolerates  Following hemodynamics  Following organ function and perfusion      Gastrointestinal  S/P Cholecystectomy      Plan:  Following up abdominal exam.      Renal   > WNL Cr and UO     Plan:  Following kidney function and UO      Hematology/Oncology   > No concerns     ID  > No concerns for infection so far.        Code Status: Full Code     Patient seen and discussed with Dr Blum who agrees with the plan detailed above. Please see attending addendum for changes to plan.      Edenilson Rivera MD   Cardiology Fellow       Physical Exam:   /67   Pulse 63   Temp 97.6  F (36.4  C) (Axillary)   Resp 14   Wt 78.1 kg (172 lb 2.9 oz)   SpO2 100%   BMI 27.80 kg/m    Temp:  [97.6  F (36.4  C)-98.7  F (37.1  C)] 97.6  F (36.4  C)  Pulse:  [58-90] 63  Resp:  [12-16] 14  BP: ()/() 102/67  SpO2:  [92 %-100 %] 100 %  Wt Readings from Last 2 Encounters:   08/01/24 78.1 kg (172 lb 2.9 oz)   07/22/24 77.6 kg (171 lb 1.6 oz)       Intake/Output Summary (Last 24 hours) at 8/1/2024 1250  Last data filed at 8/1/2024 1210  Gross per 24 hour   Intake 1757.2 ml   Output 5800 ml   Net -4042.8 ml       Exam:  General: In bed, in NAD  HEENT: PERRL, no scleral icterus or injection  CARDIAC: RRR, no m/r/g appreciated. Peripheral pulses dopplered  RESP: Mechanical ventilation; CTAB, no wheezes, rhonchi or crackles appreciated.  GI: soft, BS hypoactive  : Ortiz  EXTREMITIES: NO LE edema, pulses 2+. Femoral access site w/o bleeding,  dressing c/d/i.   SKIN: No acute lesions appreciated  NEURO: Intubated and sedated       Labs:     CMP  Recent Labs   Lab 08/01/24  0740 08/01/24  0517 08/01/24  0514 07/31/24  2346 07/31/24  2341 07/31/24  1612 07/31/24  1419 07/31/24  1255 07/31/24  0858   NA  --   --  138  --  140  --  140  --  140   POTASSIUM  --   --  3.6  --  3.6  --  3.9  --  4.3   CHLORIDE  --   --  104  --  103  --  110*  --  107   CO2  --   --  25  --  24  --  21*  --  22   ANIONGAP  --   --  9  --  13  --  9  --  11   * 94 96 136* 188*   < > 91   < > 95   BUN  --   --  5.2*  --  4.8*  --  6.3  --  6.4   CR  --   --  0.55  --  0.58  --  0.57  --  0.66   GFRESTIMATED  --   --  >90  --  >90  --  >90  --  >90   WHITNEY  --   --  8.7*  --  9.0  --  8.5*  --  9.0   MAG  --   --  1.9  --   --   --  2.0  --   --    PROTTOTAL  --   --  6.4  --   --   --  6.3*  --   --    ALBUMIN  --   --  3.8  --   --   --  3.7  --   --    BILITOTAL  --   --  0.7  --   --   --  0.6  --   --    ALKPHOS  --   --  50  --   --   --  48  --   --    AST  --   --  37  --   --   --  41  --   --    ALT  --   --  24  --   --   --  23  --   --     < > = values in this interval not displayed.     CBC  Recent Labs   Lab 08/01/24  0514 07/31/24  1419 07/31/24  1110 07/31/24  1107 07/31/24  1106 07/31/24  0858   WBC 5.2 4.4  --   --   --  4.5   HGB 11.2* 11.6* 11.7 12.0   < > 12.2   HCT 34.5* 37.4  --   --   --  39.4   MCV 83 85  --   --   --  87    250  --   --   --  245    < > = values in this interval not displayed.     INR  Recent Labs   Lab 07/31/24  0858   INR 1.25*     Arterial Blood Gas  Recent Labs   Lab 08/01/24  0830 08/01/24  0514 07/31/24  2341 07/31/24  1824   O2PER 21 21 21 21       Medications:     Current Facility-Administered Medications   Medication Dose Route Frequency Provider Last Rate Last Admin    enoxaparin ANTICOAGULANT (LOVENOX) injection 40 mg  40 mg Subcutaneous Q24H Edenilson Rivera MD   40 mg at 07/31/24 1955     sacubitril-valsartan (ENTRESTO) 24-26 MG per tablet 1 tablet  1 tablet Oral BID Edenilson Rivera MD   1 tablet at 08/01/24 1141    sodium chloride (PF) 0.9% PF flush 3 mL  3 mL Intracatheter Q8H Edenilson Rivera MD   3 mL at 08/01/24 1142       Current Facility-Administered Medications   Medication Dose Route Frequency Provider Last Rate Last Admin    medication instruction   Does not apply Continuous PRN Edenilson Rivera MD        nitroPRUsside (NIPRIDE) 100 mg, sodium thiosulfate 1,000 mg in D5W 62.5 mL IV infusion (conc: 1.6 mg/mL)  0.25-5 mcg/kg/min (Dosing Weight) Intravenous Continuous Mell Monte MD 2.2 mL/hr at 08/01/24 1200 0.75 mcg/kg/min at 08/01/24 1200       Recent Imaging:

## 2024-08-01 NOTE — CONSULTS
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    Initially seen at [facility] emergency department in 2021 with preexcitation on her EKG, echocardiogram demonstrated LVEF 51% with asymmetric left ventricular hypertrophy. She has been following with Dr. Swift in the [facility] cardiology clinic and she obtained a CMR which showed and LVEF of 43%, LVEDD 4.9cm, IV septum 1.7cm,  normal RV function but RVH noted, no LOU, harman LGE with subendocardial, mid myocardial, and subendocardial enhancement not associated with a vascular territory. She was sent to Dr Fuentes's clinic on 2022, who started for on Losartan and Bisoprolol.     She presented to the emergency department 6/19/2024 with SVT and syncope and underwent a cardioversion, subsequently she had an ICD implanted and a CTI ablation 6/20/2024.  Last month she also had a laparoscopic cholecystectomy for biliary dyskinesia.  She was again seen by Dr. Fuentes in 07/22/2024 and was noted to have a VO2 drop from 18.7% to 21% and her peak VO2 of 53% of predicted consistent with severely impaired exercise tolerance.  On 7/31 she presented to the cardiac Cath Lab for RHC and was noted to be in ambulatory cardiogenic shock and was admitted to the intensive care unit for further management.    Most recent hemodynamics in epic with CVP of 12, PA pressure of 35/24, cardiac index of 2.1, SVR of 1194 on 0.75 mcg/kg/min of nitroprusside.  Heart failure cardiology initiated Entresto to assist with afterload reduction.    ROS:    Review of Systems   Constitutional: Negative.  Negative for chills, fever and weight loss.   HENT: Negative.     Eyes:  Negative.  Negative for blurred vision and double vision.   Respiratory:  Positive for shortness of breath. Negative for cough.         SOB with activities.    Cardiovascular:  Negative for palpitations and leg swelling.   Gastrointestinal:  Negative for abdominal pain, constipation, diarrhea, nausea and vomiting.   Genitourinary: Negative.    Musculoskeletal: Negative.    Skin: Negative.    Neurological:  Positive for dizziness and weakness.        Dizziness and weakness with activity.    Endo/Heme/Allergies: Negative.    Psychiatric/Behavioral:  Negative for substance abuse.         PMH:  No past medical history on file.      PSH:  Past Surgical History:   Procedure Laterality Date    CV RIGHT HEART CATH MEASUREMENTS RECORDED N/A 7/31/2024    Procedure: Heart Cath Right Heart Cath;  Surgeon: Tanmay Brice MD;  [facility]       Past Surgical History:   Procedure Laterality Date    CV RIGHT HEART CATH MEASUREMENTS RECORDED N/A 7/31/2024    Procedure: Heart Cath Right Heart Cath;  Surgeon: Tanmay Brice MD;  [facility]         FH:  Negative family history for heart disease.     SH:  Social History     Socioeconomic History    Marital status: Single   Tobacco Use    Smoking status: Never    Smokeless tobacco: Never   Substance and Sexual Activity    Alcohol use: Never    Drug use: Never     Social Determinants of Health     Financial Resource Strain: Low Risk  (7/10/2024)    Received from [facility]    Financial Resource Strain     Difficulty of Paying Living Expenses: 3   Food Insecurity: No Food Insecurity (7/10/2024)    Received from [facility]    Food Insecurity     Worried About Running Out of Food in the Last Year: 1   Transportation Needs: No Transportation Needs (7/10/2024)    Received from [facility]    Transportation Needs     Lack of Transportation  (Medical): 1   Social Connections: Socially Integrated (7/10/2024)    Received from [facility]    Social Connections     Frequency of Communication with Friends and Family: 0   Interpersonal Safety: Not At Risk (6/20/2024)    Received from [facility]    Humiliation, Afraid, Rape, and Kick questionnaire     Fear of Current or Ex-Partner: No     Emotionally Abused: No     Physically Abused: No     Sexually Abused: No   Housing Stability: Low Risk  (7/10/2024)    Received from [facility]    Housing Stability     Unable to Pay for Housing in the Last Year: 1       Home Meds:  Medications Prior to Admission   Medication Sig Dispense Refill Last Dose    acetaminophen (TYLENOL) 325 MG tablet Take 650 mg by mouth   Unknown    albuterol (PROAIR HFA/PROVENTIL HFA/VENTOLIN HFA) 108 (90 Base) MCG/ACT inhaler Inhale 1-2 puffs into the lungs   7/30/2024 at 2000    bisoprolol (ZEBETA) 5 MG tablet Take 5 mg by mouth at bedtime   7/30/2024 at 2000    losartan (COZAAR) 50 MG tablet Take 50 mg by mouth at bedtime   7/30/2024 at 2000    melatonin 3 MG tablet Take 3 mg by mouth nightly as needed   7/30/2024 at 2000    norgestimate-ethinyl estradiol (ORTHO-CYCLEN) 0.25-35 MG-MCG tablet Take 1 tablet by mouth daily   7/31/2024 at 0700    ondansetron (ZOFRAN ODT) 4 MG ODT tab Place 4 mg under the tongue as needed   Past Week at prn    oxyCODONE (ROXICODONE) 5 MG tablet Take 5 mg by mouth as needed   Past Month at prn    sertraline (ZOLOFT) 50 MG tablet Take 50 mg by mouth daily   Past Week    spironolactone (ALDACTONE) 25 MG tablet Take 0.5 tablets (12.5 mg) by mouth daily 45 tablet 3 7/30/2024 at 2000    HYDROmorphone (DILAUDID) 2 MG tablet Take 2 mg by mouth every 6 hours as needed for pain       loperamide (IMODIUM) 2 MG capsule Take 4 mg by mouth as needed for diarrhea       losartan (COZAAR) 25 MG tablet Take 1 tablet by mouth daily at 2 pm        norgestimate-ethinyl estradiol (ESTARYLLA) 0.25-35 MG-MCG tablet Take 1 tablet by mouth at bedtime       omeprazole (PRILOSEC) 20 MG DR capsule Take 20 mg by mouth daily       polyethylene glycol (MIRALAX) 17 GM/Dose powder Take 17 g by mouth daily       sertraline (ZOLOFT) 50 MG tablet Take 50 mg by mouth daily          Allergies:  No Known Allergies      Physical Exam:  Temp:  [97.6  F (36.4  C)-98.7  F (37.1  C)] 97.6  F (36.4  C)  Pulse:  [58-90] 58  Resp:  [12-16] 14  BP: ()/() 96/71  SpO2:  [92 %-100 %] 100 %  Gen: NAD, resting comfortably in bed, conversational  HEENT: normocephalic, atraumatic cranium, EOMI, sclerae anicteric. Midline trachea.   Lungs: No respiratory distress. No accessory muscle use.   CV: NSR on bedside tele. No dependent edema.   Abd: Non distended, nontender, no guarding  Musculoskeletal: grossly intact  Neuro: AOx3, CN II-VII. No obvious focal deficits.   Mental: normal mood and affect, regular rate of speech    Labs:  ABG No lab results found in last 7 days.  CBC  Recent Labs   Lab 08/01/24  0514 07/31/24  1419 07/31/24  1110 07/31/24  1107 07/31/24  1106 07/31/24  0858   WBC 5.2 4.4  --   --   --  4.5   HGB 11.2* 11.6* 11.7 12.0   < > 12.2    250  --   --   --  245    < > = values in this interval not displayed.     BMP  Recent Labs   Lab 08/01/24  0740 08/01/24  0517 08/01/24  0514 07/31/24  2346 07/31/24  2341 07/31/24  1612 07/31/24  1419 07/31/24  1255 07/31/24  0858   NA  --   --  138  --  140  --  140  --  140   POTASSIUM  --   --  3.6  --  3.6  --  3.9  --  4.3   CHLORIDE  --   --  104  --  103  --  110*  --  107   CO2  --   --  25  --  24  --  21*  --  22   BUN  --   --  5.2*  --  4.8*  --  6.3  --  6.4   CR  --   --  0.55  --  0.58  --  0.57  --  0.66   * 94 96 136* 188*   < > 91   < > 95    < > = values in this interval not displayed.     LFT  Recent Labs   Lab 08/01/24  0514 07/31/24  1419 07/31/24  0858   AST 37 41  --    ALT 24 23  --    ALKPHOS  50 48  --    BILITOTAL 0.7 0.6  --    ALBUMIN 3.8 3.7  --    INR  --   --  1.25*     PancreasNo lab results found in last 7 days.    Imaging:  Exam: XR CHEST PORT 1 VIEW, 7/31/2024 2:23 PM     Comparison: None     History: [place]     Findings:  Portable AP view of the chest. Right IJ [place] catheter tip  projects over the distal right pulmonary artery. Left implantable  cardiac defibrillator with leads projecting over the right atrium and  right ventricle. Cardiomediastinal silhouette is enlarged. Mild  streaky left greater than right basilar opacities. No significant  pleural effusion. No pneumothorax.                                                                    Impression:   1. Right IJ [place] catheter tip projects over the distal right  pulmonary artery.  2. Cardiomegaly with mild left greater than right streaky basilar  opacities, likely atelectasis and/or edema.      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 Remains in ICU on nitroprusside infusion.     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   Thank you for the opportunity to participate in the care of this patient.  Follow up at heart failure conference Friday Morning.     Patient and plan discussed with Dr. Mulvihill.    Reuben Billings DNP APRN CNP CCRN   Cardiovascular Surgery   Pager [phone]

## 2024-08-01 NOTE — PLAN OF CARE
ICU End of Shift Summary. See flowsheets for vital signs and detailed assessment.    Changes this shift: SR/SB 50-60s with brief episode of being AV paced overnight, ICD settings DDI @ 40. 0000 CLEO - CVP 14 PA 37/20 CI 2.9  SVO2 69 with nipride at 2. Nipride was at 2 in evening, but reduced dt soft MAPs. SVO2 this AM 70 with nipride at 0.75 and no worsening s/sx. Voiding adequately, no additional diuresis this shift. 20meq K given x1, additional 20meq ordered for this AM. [place] @ 49. Tylenol given x2 for headache.     Plan:  Trend hemodynamics and labs. Continue cardiac workup.

## 2024-08-01 NOTE — PLAN OF CARE
ICU End of Shift Summary. See flowsheets for vital signs and detailed assessment.    Changes this shift: Alert and oriented. SBA to bedside commode. Nipride to keep MAP 65-70, currently at 1. Entresto started today to wean Nipride. [place] at 49. [place] q8h; last CO 4.4, CI 2.3, CVP 7. A few episodes of HR up to 150s ranging from a few seconds to a couple minutes; pt asymptomatic during this time, MD notified. Diuresed with 1 mg Bumex, currently -1.5 L. Workup for transplant/LVAD initiated. Parents at bedside and updated.    Plan: Continue with transplant workup. Wean Nipride as able.

## 2024-08-01 NOTE — CONSULTS
Neurocritical Care Consultation    Reason for critical care consult: Neurological evaluation as a part of heart failure workup  Admitting Team: Cardiology 2  Date of Service:  08/01/2024  Date of Admission:  7/31/2024  Hospital Day: 2    History of Present Illness:  Mary Shaikh is a 23 year old female with a past medical history of genetically confirmed Dannon disease and associated hypertropic cardiomyopathy admitted on 7/31/2024 for cardiogenic shock after presenting for a planned RHC. Cardiology is in the process of working her up for advanced therapies such as heart transplant or LVAD. As a part of this workup, neurology critical care was consulted for evaluation of any neuromuscular deficit 2/2 known Dannon disease.     The patient appears to have been referred to neurology outpatient a number of times but notably none of these visits materialized. According to Mary, she was diagnosed with Dannon disease in 2021. She has almost exclusively presented with cardiac symptoms. She states that she will occasionally become SOB with exertion such as when walking up and down stairs. In addition, she describes orthopnea for which she became a side sleeper. To her knowledge, there have not been any significant periods of muscle weakness or pain associated with her illness.       Assessment/Plan  Mary Shaikh is a 23 year old female with a past medical history of genetically confirmed Dannon disease and associated hypertropic cardiomyopathy admitted on 7/31/2024 for cardiogenic shock after presenting for a planned RHC. Cardiology is in the process of working her up for advanced therapies such as heart transplant or LVAD. As a part of this workup, neurology critical care was consulted for evaluation of any neuromuscular deficit 2/2 known Dannon disease.     Neuro  #Dannon disease, genetically confirmed LAMP2 mutation  Patient with genetically confirmed Dannon disease. Her disease process appears to primarily have  impacted her cardiac muscle. Her neurological exam was fortunately entirely normal. She has no apparent muscle weakness in any muscle group tested. Please see below for detailed neurological exam.  Given the present information available to us including clinical exam, there are no overt signs of neurological deficiency that suggest a poor neurological outcome as a response to proposed advanced therapies per cardiology.  -No neurological findings that would suggest poor neurological outcome as a result of cardiothoracic surgery    Neurology will sign off at this time. Please do not hesitate to reach out with any further questions or concerns.       The patient was seen and discussed with the N[facility] attending, Dr. Delgadillo.    Jj Allen MD  Neurology Resident, PGY-2      No Known Allergies    Current Medications:  Current Facility-Administered Medications   Medication Dose Route Frequency Provider Last Rate Last Admin    enoxaparin ANTICOAGULANT (LOVENOX) injection 40 mg  40 mg Subcutaneous Q24H Edenilson Rivera MD   40 mg at 07/31/24 1955    sacubitril-valsartan (ENTRESTO) 49-51 MG per tablet 1 tablet  1 tablet Oral BID Edenilson Rivera MD        sodium chloride (PF) 0.9% PF flush 3 mL  3 mL Intracatheter Q8H Edenilson Rivera MD   3 mL at 08/01/24 1142       PRN Medications:  Current Facility-Administered Medications   Medication Dose Route Frequency Provider Last Rate Last Admin    acetaminophen (TYLENOL) Suppository 650 mg  650 mg Rectal Q4H PRN Edenilson Rivera MD        acetaminophen (TYLENOL) tablet 650 mg  650 mg Oral Q4H PRN Edenilson Rivera MD   650 mg at 08/01/24 1030    albuterol (PROVENTIL HFA/VENTOLIN HFA) inhaler  1-2 puff Inhalation Q2H PRN Edenilson Rivera MD        alum & mag hydroxide-simethicone (MAALOX) suspension 30 mL  30 mL Oral Q4H PRN Edenilson Rivera MD        lidocaine (LMX4) cream   Topical Q1H PRN Edenilson Rivera MD     "    lidocaine 1 % 0.1-1 mL  0.1-1 mL Other Q1H PRN Edenilson Rivera MD        medication instruction   Does not apply Continuous PRN Edenilson Rivera MD        nitroGLYcerin (NITROSTAT) sublingual tablet 0.4 mg  0.4 mg Sublingual Q5 Min PRN Edenilson Rivera MD        ondansetron (ZOFRAN ODT) ODT tab 4 mg  4 mg Sublingual Q6H PRN Edenilson Rivera MD        sodium chloride (PF) 0.9% PF flush 3 mL  3 mL Intracatheter q1 min prn Edenilson Rivera MD           Infusions:  Current Facility-Administered Medications   Medication Dose Route Frequency Provider Last Rate Last Admin    medication instruction   Does not apply Continuous PRN Edenilson Rivera MD        nitroPRUsside (NIPRIDE) 100 mg, sodium thiosulfate 1,000 mg in D5W 62.5 mL IV infusion (conc: 1.6 mg/mL)  0.25-5 mcg/kg/min (Dosing Weight) Intravenous Continuous Mell Monte MD 2.2 mL/hr at 08/01/24 1400 0.75 mcg/kg/min at 08/01/24 1400         Physical Examination  Vitals: BP 99/55   Pulse 74   Temp 97.6  F (36.4  C) (Axillary)   Resp 14   Ht 1.626 m (5' 4\")   Wt 78.1 kg (172 lb 2.9 oz)   SpO2 98%   BMI 29.55 kg/m      General: Alert, interactive; Sitting up in bed, NAD, cooperative  HEENT: Normocephalic, atraumatic  Resp: No increased work of breathing  Cardio: sinus rhythm  Abdomen: Nontender  Extremities: Warm and well perfused, peripheral pulses present, no edema  Skin: Not jaundiced, no rash, no ecchymoses  Psych: Normal mood and affect  Neuro:   Mental status: Attentive, interactive; Recalls remote and recent history with accuracy  Speech/Language: Fluent speech without paraphasic errors; No dysarthria; naming, repetition, comprehension intact  Cranial nerves: Visual fields intact to confrontation, Eyes conjugate, PERRL, EOMI w/ normal and smooth pursuit, face expression symmetric, facial sensation intact and symmetric, hearing intact to conversation, shoulder shrug strong, palate rise " symmetric, tongue/uvula midline.  Motor:   Bulk: Appropriate  Tone: Appropriate  Spontaneous movements: No myoclonus, asterixis, or tremor  Power: *All strength assessments based on MRC grading     Right Left   Arm abduction 5/5 5/5   Elbow Flex 5/5 5/5   Elbow Extension 5/5 5/5   Wrist Extension 5/5 5/5   FDI  5/5 5/5   APB 5/5 5/5     5/5 5/5   Hip Flexion 5/5 5/5   Knee Flexion 5/5 5/5   Knee Extension 5/5 5/5   Dorsiflexion  5/5 5/5   Plantarflexion 5/5 5/5               Reflexes:    Right Left   Triceps 2+ 2+   Biceps 2+ 2+   BR 2+ 2+   Scanlon NR NR   Pectoral Not assessed Not assessed   Patella 2+ 2+   Achilles  2+ 2+        No crossed adductors or spread. Toes downgoing bilaterally.    Sensory:   Intact to LT, PP x4E; No lateral extinction   Coordination:   FNF intact bilaterally without dysmetria; Normal heel-shin test bilaterally  Finger tapping with normal amplitude and frequency; Rolling hands normal rate and coordinated  Gait/Station:   Deferred      Labs and Imaging:  All relevant imaging and laboratory values personally reviewed.

## 2024-08-01 NOTE — PHARMACY-ADMISSION MEDICATION HISTORY
Pharmacist Admission Medication History    Admission medication history is complete. The information provided in this note is only as accurate as the sources available at the time of the update.    Information Source(s): Hospital records and CareEverywhere/SureScripts via  chart review    Pertinent Information:   - Last doses collected by RN on 7/22 PTA. Upon review of medications, chart review, and Surescripts, no additional interview deemed necessary.    Changes made to PTA medication list:  Added:   Sertraline 50 mg tab    Allergies reviewed with patient and updates made in EHR: unable to assess    Medication History Completed By: Dao Arroyo RPH 8/1/2024 11:44 AM    PTA Med List   Medication Sig Last Dose    acetaminophen (TYLENOL) 325 MG tablet Take 650 mg by mouth Unknown    albuterol (PROAIR HFA/PROVENTIL HFA/VENTOLIN HFA) 108 (90 Base) MCG/ACT inhaler Inhale 1-2 puffs into the lungs 7/30/2024 at 2000    bisoprolol (ZEBETA) 5 MG tablet Take 5 mg by mouth at bedtime 7/30/2024 at 2000    losartan (COZAAR) 50 MG tablet Take 50 mg by mouth at bedtime 7/30/2024 at 2000    melatonin 3 MG tablet Take 3 mg by mouth nightly as needed 7/30/2024 at 2000    norgestimate-ethinyl estradiol (ORTHO-CYCLEN) 0.25-35 MG-MCG tablet Take 1 tablet by mouth daily 7/31/2024 at 0700    ondansetron (ZOFRAN ODT) 4 MG ODT tab Place 4 mg under the tongue as needed Past Week at prn    oxyCODONE (ROXICODONE) 5 MG tablet Take 5 mg by mouth as needed Past Month at prn    sertraline (ZOLOFT) 50 MG tablet Take 50 mg by mouth daily Past Week    spironolactone (ALDACTONE) 25 MG tablet Take 0.5 tablets (12.5 mg) by mouth daily 7/30/2024 at 2000     Dao Arroyo, Pharm.D., R.Ph., PGY2 Critical Care Pharmacy Resident

## 2024-08-01 NOTE — PROGRESS NOTES
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    Initially seen at [facility] emergency department in 2021 with preexcitation on her EKG, echocardiogram demonstrated LVEF 51% with asymmetric left ventricular hypertrophy. She has been following with Dr. Swift in the [facility] cardiology clinic and she obtained a CMR which showed and LVEF of 43%, LVEDD 4.9cm, IV septum 1.7cm,  normal RV function but RVH noted, no LOU, harman LGE with subendocardial, mid myocardial, and subendocardial enhancement not associated with a vascular territory. She was sent to Dr Fuentes's clinic on 2022, who started for on Losartan and Bisoprolol.     She presented to the emergency department 6/19/2024 with SVT and syncope and underwent a cardioversion, subsequently she had an ICD implanted and a CTI ablation 6/20/2024.  Last month she also had a laparoscopic cholecystectomy for biliary dyskinesia.  She was again seen by Dr. Fuentes in 07/22/2024 and was noted to have a VO2 drop from 18.7% to 21% and her peak VO2 of 53% of predicted consistent with severely impaired exercise tolerance.  On 7/31 she presented to the cardiac Cath Lab for RHC and was noted to be in ambulatory cardiogenic shock and was admitted to the intensive care unit for further management.    Most recent hemodynamics in epic with CVP of 12, PA pressure of 35/24, cardiac index of 2.1, SVR of 1194 on 0.75 mcg/kg/min of nitroprusside.  Heart failure cardiology initiated Entresto to assist with afterload reduction.    ROS:    Review of Systems   Constitutional: Negative.  Negative for chills, fever and weight loss.   HENT: Negative.     Eyes:  Negative.  Negative for blurred vision and double vision.   Respiratory:  Positive for shortness of breath. Negative for cough.         SOB with activities.    Cardiovascular:  Negative for palpitations and leg swelling.   Gastrointestinal:  Negative for abdominal pain, constipation, diarrhea, nausea and vomiting.   Genitourinary: Negative.    Musculoskeletal: Negative.    Skin: Negative.    Neurological:  Positive for dizziness and weakness.        Dizziness and weakness with activity.    Endo/Heme/Allergies: Negative.    Psychiatric/Behavioral:  Negative for substance abuse.         PMH:  No past medical history on file.      PSH:  Past Surgical History:   Procedure Laterality Date    CV RIGHT HEART CATH MEASUREMENTS RECORDED N/A 7/31/2024    Procedure: Heart Cath Right Heart Cath;  Surgeon: Tanmay Brice MD;  [facility]       Past Surgical History:   Procedure Laterality Date    CV RIGHT HEART CATH MEASUREMENTS RECORDED N/A 7/31/2024    Procedure: Heart Cath Right Heart Cath;  Surgeon: Tanmay Brice MD;  [facility]         FH:  Negative family history for heart disease.     SH:  Social History     Socioeconomic History    Marital status: Single   Tobacco Use    Smoking status: Never    Smokeless tobacco: Never   Substance and Sexual Activity    Alcohol use: Never    Drug use: Never     Social Determinants of Health     Financial Resource Strain: Low Risk  (7/10/2024)    [facility]    Financial Resource Strain     Difficulty of Paying Living Expenses: 3   Food Insecurity: No Food Insecurity (7/10/2024)    [facility]    Food Insecurity     Worried About Running Out of Food in the Last Year: 1   Transportation Needs: No Transportation Needs (7/10/2024)    [facility]    Transportation Needs     Lack of Transportation  (Medical): 1   Social Connections: Socially Integrated (7/10/2024)    Received from [facility]    Social Connections     Frequency of Communication with Friends and Family: 0   Interpersonal Safety: Not At Risk (6/20/2024)    Received from [facility]    Humiliation, Afraid, Rape, and Kick questionnaire     Fear of Current or Ex-Partner: No     Emotionally Abused: No     Physically Abused: No     Sexually Abused: No   Housing Stability: Low Risk  (7/10/2024)    Received from [facility]    Housing Stability     Unable to Pay for Housing in the Last Year: 1       Home Meds:  Medications Prior to Admission   Medication Sig Dispense Refill Last Dose    acetaminophen (TYLENOL) 325 MG tablet Take 650 mg by mouth   Unknown    albuterol (PROAIR HFA/PROVENTIL HFA/VENTOLIN HFA) 108 (90 Base) MCG/ACT inhaler Inhale 1-2 puffs into the lungs   7/30/2024 at 2000    bisoprolol (ZEBETA) 5 MG tablet Take 5 mg by mouth at bedtime   7/30/2024 at 2000    losartan (COZAAR) 50 MG tablet Take 50 mg by mouth at bedtime   7/30/2024 at 2000    melatonin 3 MG tablet Take 3 mg by mouth nightly as needed   7/30/2024 at 2000    norgestimate-ethinyl estradiol (ORTHO-CYCLEN) 0.25-35 MG-MCG tablet Take 1 tablet by mouth daily   7/31/2024 at 0700    ondansetron (ZOFRAN ODT) 4 MG ODT tab Place 4 mg under the tongue as needed   Past Week at prn    oxyCODONE (ROXICODONE) 5 MG tablet Take 5 mg by mouth as needed   Past Month at prn    sertraline (ZOLOFT) 50 MG tablet Take 50 mg by mouth daily   Past Week    spironolactone (ALDACTONE) 25 MG tablet Take 0.5 tablets (12.5 mg) by mouth daily 45 tablet 3 7/30/2024 at 2000    HYDROmorphone (DILAUDID) 2 MG tablet Take 2 mg by mouth every 6 hours as needed for pain       loperamide (IMODIUM) 2 MG capsule Take 4 mg by mouth as needed for diarrhea       losartan (COZAAR) 25 MG tablet Take 1 tablet by mouth daily at 2 pm        norgestimate-ethinyl estradiol (ESTARYLLA) 0.25-35 MG-MCG tablet Take 1 tablet by mouth at bedtime       omeprazole (PRILOSEC) 20 MG DR capsule Take 20 mg by mouth daily       polyethylene glycol (MIRALAX) 17 GM/Dose powder Take 17 g by mouth daily       sertraline (ZOLOFT) 50 MG tablet Take 50 mg by mouth daily          Allergies:  No Known Allergies      Physical Exam:  Temp:  [97.6  F (36.4  C)-98.7  F (37.1  C)] 97.6  F (36.4  C)  Pulse:  [58-90] 58  Resp:  [12-16] 14  BP: ()/() 96/71  SpO2:  [92 %-100 %] 100 %  Gen: NAD, resting comfortably in bed, conversational  HEENT: normocephalic, atraumatic cranium, EOMI, sclerae anicteric. Midline trachea.   Lungs: No respiratory distress. No accessory muscle use.   CV: NSR on bedside tele. No dependent edema.   Abd: Non distended, nontender, no guarding  Musculoskeletal: grossly intact  Neuro: AOx3, CN II-VII. No obvious focal deficits.   Mental: normal mood and affect, regular rate of speech    Labs:  ABG No lab results found in last 7 days.  CBC  Recent Labs   Lab 08/01/24  0514 07/31/24  1419 07/31/24  1110 07/31/24  1107 07/31/24  1106 07/31/24  0858   WBC 5.2 4.4  --   --   --  4.5   HGB 11.2* 11.6* 11.7 12.0   < > 12.2    250  --   --   --  245    < > = values in this interval not displayed.     BMP  Recent Labs   Lab 08/01/24  0740 08/01/24  0517 08/01/24  0514 07/31/24  2346 07/31/24  2341 07/31/24  1612 07/31/24  1419 07/31/24  1255 07/31/24  0858   NA  --   --  138  --  140  --  140  --  140   POTASSIUM  --   --  3.6  --  3.6  --  3.9  --  4.3   CHLORIDE  --   --  104  --  103  --  110*  --  107   CO2  --   --  25  --  24  --  21*  --  22   BUN  --   --  5.2*  --  4.8*  --  6.3  --  6.4   CR  --   --  0.55  --  0.58  --  0.57  --  0.66   * 94 96 136* 188*   < > 91   < > 95    < > = values in this interval not displayed.     LFT  Recent Labs   Lab 08/01/24  0514 07/31/24  1419 07/31/24  0858   AST 37 41  --    ALT 24 23  --    ALKPHOS  50 48  --    BILITOTAL 0.7 0.6  --    ALBUMIN 3.8 3.7  --    INR  --   --  1.25*     PancreasNo lab results found in last 7 days.    Imaging:  Exam: XR CHEST PORT 1 VIEW, 7/31/2024 2:23 PM     Comparison: None     History: Vienna gerry     Findings:  Portable AP view of the chest. Right IJ Vienna-Gerry catheter tip  projects over the distal right pulmonary artery. Left implantable  cardiac defibrillator with leads projecting over the right atrium and  right ventricle. Cardiomediastinal silhouette is enlarged. Mild  streaky left greater than right basilar opacities. No significant  pleural effusion. No pneumothorax.                                                                    Impression:   1. Right IJ Vienna-Gerry catheter tip projects over the distal right  pulmonary artery.  2. Cardiomegaly with mild left greater than right streaky basilar  opacities, likely atelectasis and/or edema.      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 Remains in ICU on nitroprusside infusion.     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   Thank you for the opportunity to participate in the care of this patient.  Follow up at heart failure conference Friday Morning.     Patient and plan discussed with Dr. Mulvihill.    Reuben Billings DNP APRN CNP CCRN   Cardiovascular Surgery   Pager [phone]

## 2024-08-02 ENCOUNTER — APPOINTMENT (OUTPATIENT)
Dept: GENERAL RADIOLOGY | Facility: CLINIC | Age: 24
DRG: 286 | End: 2024-08-02
Attending: INTERNAL MEDICINE
Payer: COMMERCIAL

## 2024-08-02 ENCOUNTER — APPOINTMENT (OUTPATIENT)
Dept: ULTRASOUND IMAGING | Facility: CLINIC | Age: 24
DRG: 286 | End: 2024-08-02
Attending: INTERNAL MEDICINE
Payer: COMMERCIAL

## 2024-08-02 LAB
ABO/RH(D): NORMAL
ALBUMIN SERPL BCG-MCNC: 4 G/DL (ref 3.5–5.2)
ALBUMIN SERPL BCG-MCNC: 4.5 G/DL (ref 3.5–5.2)
ALP SERPL-CCNC: 62 U/L (ref 40–150)
ALP SERPL-CCNC: 78 U/L (ref 40–150)
ALT SERPL W P-5'-P-CCNC: 19 U/L (ref 0–50)
ALT SERPL W P-5'-P-CCNC: 22 U/L (ref 0–50)
ANION GAP SERPL CALCULATED.3IONS-SCNC: 12 MMOL/L (ref 7–15)
ANION GAP SERPL CALCULATED.3IONS-SCNC: 17 MMOL/L (ref 7–15)
APTT PPP: 30 SECONDS (ref 22–38)
AST SERPL W P-5'-P-CCNC: 31 U/L (ref 0–45)
AST SERPL W P-5'-P-CCNC: 32 U/L (ref 0–45)
ATRIAL RATE - MUSE: 62 BPM
ATRIAL RATE - MUSE: 67 BPM
BACTERIA UR CULT: NORMAL
BASE EXCESS BLDV CALC-SCNC: 1.7 MMOL/L (ref -3–3)
BASE EXCESS BLDV CALC-SCNC: 2.1 MMOL/L (ref -3–3)
BASE EXCESS BLDV CALC-SCNC: 2.3 MMOL/L (ref -3–3)
BASE EXCESS BLDV CALC-SCNC: 3.7 MMOL/L (ref -3–3)
BASOPHILS # BLD AUTO: 0 10E3/UL (ref 0–0.2)
BASOPHILS NFR BLD AUTO: 1 %
BILIRUB SERPL-MCNC: 0.6 MG/DL
BILIRUB SERPL-MCNC: 0.7 MG/DL
BUN SERPL-MCNC: 3 MG/DL (ref 6–20)
BUN SERPL-MCNC: 3.3 MG/DL (ref 6–20)
CALCIUM SERPL-MCNC: 10 MG/DL (ref 8.8–10.4)
CALCIUM SERPL-MCNC: 9.2 MG/DL (ref 8.8–10.4)
CHLORIDE SERPL-SCNC: 100 MMOL/L (ref 98–107)
CHLORIDE SERPL-SCNC: 102 MMOL/L (ref 98–107)
CHOLEST SERPL-MCNC: 139 MG/DL
CMV IGG SERPL IA-ACNC: 4.2 U/ML
CMV IGG SERPL IA-ACNC: ABNORMAL
CREAT SERPL-MCNC: 0.49 MG/DL (ref 0.51–0.95)
CREAT SERPL-MCNC: 0.56 MG/DL (ref 0.51–0.95)
DIASTOLIC BLOOD PRESSURE - MUSE: NORMAL MMHG
DIASTOLIC BLOOD PRESSURE - MUSE: NORMAL MMHG
DRVVT SCREEN RATIO: 0.79
EBV VCA IGG SER IA-ACNC: 328 U/ML
EBV VCA IGG SER IA-ACNC: POSITIVE
EGFRCR SERPLBLD CKD-EPI 2021: >90 ML/MIN/1.73M2
EGFRCR SERPLBLD CKD-EPI 2021: >90 ML/MIN/1.73M2
EOSINOPHIL # BLD AUTO: 0 10E3/UL (ref 0–0.7)
EOSINOPHIL NFR BLD AUTO: 0 %
ERYTHROCYTE [DISTWIDTH] IN BLOOD BY AUTOMATED COUNT: 15 % (ref 10–15)
FERRITIN SERPL-MCNC: 29 NG/ML (ref 6–175)
GLUCOSE SERPL-MCNC: 107 MG/DL (ref 70–99)
GLUCOSE SERPL-MCNC: 107 MG/DL (ref 70–99)
GLUCOSE SERPL-MCNC: 122 MG/DL (ref 70–99)
HAV AB SER QL IA: REACTIVE
HAV IGM SERPL QL IA: NONREACTIVE
HBA1C MFR BLD: 7.3 %
HBV CORE AB SERPL QL IA: NONREACTIVE
HBV SURFACE AB SERPL IA-ACNC: <3.5 M[IU]/ML
HBV SURFACE AB SERPL IA-ACNC: NONREACTIVE M[IU]/ML
HBV SURFACE AG SERPL QL IA: NONREACTIVE
HCG INTACT+B SERPL-ACNC: <1 MIU/ML
HCO3 BLDV-SCNC: 26 MMOL/L (ref 21–28)
HCO3 BLDV-SCNC: 27 MMOL/L (ref 21–28)
HCO3 SERPL-SCNC: 23 MMOL/L (ref 22–29)
HCO3 SERPL-SCNC: 24 MMOL/L (ref 22–29)
HCT VFR BLD AUTO: 36 % (ref 35–47)
HCV AB SERPL QL IA: NONREACTIVE
HDLC SERPL-MCNC: 50 MG/DL
HGB BLD-MCNC: 11.3 G/DL (ref 11.7–15.7)
HGB FREE PLAS-MCNC: 40 MG/DL
HIV 1+2 AB+HIV1 P24 AG SERPL QL IA: NONREACTIVE
HOLD SPECIMEN: NORMAL
HSV1 IGG SERPL QL IA: 0.09 INDEX
HSV1 IGG SERPL QL IA: NORMAL
HSV2 IGG SERPL QL IA: 0.05 INDEX
HSV2 IGG SERPL QL IA: NORMAL
IMM GRANULOCYTES # BLD: 0 10E3/UL
IMM GRANULOCYTES NFR BLD: 0 %
INR PPP: 1.25 (ref 0.85–1.15)
INTERPRETATION ECG - MUSE: NORMAL
INTERPRETATION ECG - MUSE: NORMAL
IRON BINDING CAPACITY (ROCHE): 414 UG/DL (ref 240–430)
IRON SATN MFR SERPL: 25 % (ref 15–46)
IRON SERPL-MCNC: 103 UG/DL (ref 37–145)
LA PPP-IMP: NEGATIVE
LACTATE SERPL-SCNC: 0.8 MMOL/L (ref 0.7–2)
LACTATE SERPL-SCNC: 2.2 MMOL/L (ref 0.7–2)
LACTATE SERPL-SCNC: 2.9 MMOL/L (ref 0.7–2)
LACTATE SERPL-SCNC: 3.1 MMOL/L (ref 0.7–2)
LDLC SERPL CALC-MCNC: 78 MG/DL
LUPUS INTERPRETATION: NORMAL
LYMPHOCYTES # BLD AUTO: 1.6 10E3/UL (ref 0.8–5.3)
LYMPHOCYTES NFR BLD AUTO: 33 %
MAGNESIUM SERPL-MCNC: 1.7 MG/DL (ref 1.7–2.3)
MAGNESIUM SERPL-MCNC: 1.8 MG/DL (ref 1.7–2.3)
MCH RBC QN AUTO: 26.6 PG (ref 26.5–33)
MCHC RBC AUTO-ENTMCNC: 31.4 G/DL (ref 31.5–36.5)
MCV RBC AUTO: 85 FL (ref 78–100)
MONOCYTES # BLD AUTO: 0.4 10E3/UL (ref 0–1.3)
MONOCYTES NFR BLD AUTO: 8 %
NEUTROPHILS # BLD AUTO: 2.8 10E3/UL (ref 1.6–8.3)
NEUTROPHILS NFR BLD AUTO: 58 %
NONHDLC SERPL-MCNC: 89 MG/DL
NRBC # BLD AUTO: 0 10E3/UL
NRBC BLD AUTO-RTO: 0 /100
NT-PROBNP SERPL-MCNC: 1569 PG/ML (ref 0–450)
O2/TOTAL GAS SETTING VFR VENT: 21 %
OXYHGB MFR BLDV: 68 % (ref 70–75)
OXYHGB MFR BLDV: 69 % (ref 70–75)
OXYHGB MFR BLDV: 71 % (ref 70–75)
OXYHGB MFR BLDV: 84 % (ref 70–75)
P AXIS - MUSE: 57 DEGREES
P AXIS - MUSE: 92 DEGREES
PCO2 BLDV: 38 MM HG (ref 40–50)
PCO2 BLDV: 38 MM HG (ref 40–50)
PCO2 BLDV: 40 MM HG (ref 40–50)
PCO2 BLDV: 41 MM HG (ref 40–50)
PH BLDV: 7.43 [PH] (ref 7.32–7.43)
PH BLDV: 7.44 [PH] (ref 7.32–7.43)
PH BLDV: 7.44 [PH] (ref 7.32–7.43)
PH BLDV: 7.47 [PH] (ref 7.32–7.43)
PHOSPHATE SERPL-MCNC: 3.3 MG/DL (ref 2.5–4.5)
PLATELET # BLD AUTO: 226 10E3/UL (ref 150–450)
PO2 BLDV: 38 MM HG (ref 25–47)
PO2 BLDV: 38 MM HG (ref 25–47)
PO2 BLDV: 40 MM HG (ref 25–47)
PO2 BLDV: 50 MM HG (ref 25–47)
POTASSIUM SERPL-SCNC: 3.6 MMOL/L (ref 3.4–5.3)
POTASSIUM SERPL-SCNC: 3.7 MMOL/L (ref 3.4–5.3)
PR INTERVAL - MUSE: 114 MS
PR INTERVAL - MUSE: 128 MS
PREALB SERPL-MCNC: 10.1 MG/DL (ref 20–40)
PROT SERPL-MCNC: 7.1 G/DL (ref 6.4–8.3)
PROT SERPL-MCNC: 8.3 G/DL (ref 6.4–8.3)
PSA SERPL DL<=0.01 NG/ML-MCNC: <0.01 NG/ML
PTT RATIO: 1.09
QRS DURATION - MUSE: 116 MS
QRS DURATION - MUSE: 134 MS
QT - MUSE: 456 MS
QT - MUSE: 486 MS
QTC - MUSE: 481 MS
QTC - MUSE: 493 MS
R AXIS - MUSE: 20 DEGREES
R AXIS - MUSE: 36 DEGREES
RBC # BLD AUTO: 4.25 10E6/UL (ref 3.8–5.2)
RPR SER QL: REACTIVE
RPR SER-TITR: NORMAL {TITER}
SAO2 % BLDV: 68.9 % (ref 70–75)
SAO2 % BLDV: 69.9 % (ref 70–75)
SAO2 % BLDV: 72.5 % (ref 70–75)
SAO2 % BLDV: 86.1 % (ref 70–75)
SODIUM SERPL-SCNC: 138 MMOL/L (ref 135–145)
SODIUM SERPL-SCNC: 140 MMOL/L (ref 135–145)
SPECIMEN EXPIRATION DATE: NORMAL
SYSTOLIC BLOOD PRESSURE - MUSE: NORMAL MMHG
SYSTOLIC BLOOD PRESSURE - MUSE: NORMAL MMHG
T AXIS - MUSE: 199 DEGREES
T AXIS - MUSE: 207 DEGREES
T GONDII IGG SER QL: <3 IU/ML (ref 0–7.1)
T PALLIDUM AB SER QL: REACTIVE
THROMBIN TIME: 16.5 SECONDS (ref 13–19)
TRANSFERRIN SERPL-MCNC: 334 MG/DL (ref 200–360)
TRIGL SERPL-MCNC: 57 MG/DL
TSH SERPL DL<=0.005 MIU/L-ACNC: 1.42 UIU/ML (ref 0.3–4.2)
VENTRICULAR RATE- MUSE: 62 BPM
VENTRICULAR RATE- MUSE: 67 BPM
VZV IGG SER QL IA: 53 INDEX
VZV IGG SER QL IA: NORMAL
WBC # BLD AUTO: 4.7 10E3/UL (ref 4–11)

## 2024-08-02 PROCEDURE — 86777 TOXOPLASMA ANTIBODY: CPT | Performed by: INTERNAL MEDICINE

## 2024-08-02 PROCEDURE — 86593 SYPHILIS TEST NON-TREP QUANT: CPT | Performed by: INTERNAL MEDICINE

## 2024-08-02 PROCEDURE — 99254 IP/OBS CNSLTJ NEW/EST MOD 60: CPT | Mod: GC | Performed by: PSYCHIATRY & NEUROLOGY

## 2024-08-02 PROCEDURE — 87340 HEPATITIS B SURFACE AG IA: CPT | Performed by: INTERNAL MEDICINE

## 2024-08-02 PROCEDURE — 93922 UPR/L XTREMITY ART 2 LEVELS: CPT | Mod: 26 | Performed by: RADIOLOGY

## 2024-08-02 PROCEDURE — 250N000013 HC RX MED GY IP 250 OP 250 PS 637: Performed by: INTERNAL MEDICINE

## 2024-08-02 PROCEDURE — 86900 BLOOD TYPING SEROLOGIC ABO: CPT | Performed by: INTERNAL MEDICINE

## 2024-08-02 PROCEDURE — 83605 ASSAY OF LACTIC ACID: CPT | Performed by: STUDENT IN AN ORGANIZED HEALTH CARE EDUCATION/TRAINING PROGRAM

## 2024-08-02 PROCEDURE — 250N000013 HC RX MED GY IP 250 OP 250 PS 637: Performed by: STUDENT IN AN ORGANIZED HEALTH CARE EDUCATION/TRAINING PROGRAM

## 2024-08-02 PROCEDURE — 99291 CRITICAL CARE FIRST HOUR: CPT | Mod: GC | Performed by: STUDENT IN AN ORGANIZED HEALTH CARE EDUCATION/TRAINING PROGRAM

## 2024-08-02 PROCEDURE — 86696 HERPES SIMPLEX TYPE 2 TEST: CPT | Performed by: INTERNAL MEDICINE

## 2024-08-02 PROCEDURE — 84702 CHORIONIC GONADOTROPIN TEST: CPT | Performed by: INTERNAL MEDICINE

## 2024-08-02 PROCEDURE — 83051 HEMOGLOBIN PLASMA: CPT | Performed by: INTERNAL MEDICINE

## 2024-08-02 PROCEDURE — 258N000003 HC RX IP 258 OP 636: Performed by: STUDENT IN AN ORGANIZED HEALTH CARE EDUCATION/TRAINING PROGRAM

## 2024-08-02 PROCEDURE — 86665 EPSTEIN-BARR CAPSID VCA: CPT | Performed by: INTERNAL MEDICINE

## 2024-08-02 PROCEDURE — 84466 ASSAY OF TRANSFERRIN: CPT | Performed by: INTERNAL MEDICINE

## 2024-08-02 PROCEDURE — 83735 ASSAY OF MAGNESIUM: CPT | Performed by: INTERNAL MEDICINE

## 2024-08-02 PROCEDURE — 84100 ASSAY OF PHOSPHORUS: CPT | Performed by: INTERNAL MEDICINE

## 2024-08-02 PROCEDURE — 82247 BILIRUBIN TOTAL: CPT | Performed by: INTERNAL MEDICINE

## 2024-08-02 PROCEDURE — 86644 CMV ANTIBODY: CPT | Performed by: INTERNAL MEDICINE

## 2024-08-02 PROCEDURE — 71045 X-RAY EXAM CHEST 1 VIEW: CPT

## 2024-08-02 PROCEDURE — 83036 HEMOGLOBIN GLYCOSYLATED A1C: CPT | Performed by: INTERNAL MEDICINE

## 2024-08-02 PROCEDURE — 82805 BLOOD GASES W/O2 SATURATION: CPT | Performed by: INTERNAL MEDICINE

## 2024-08-02 PROCEDURE — 86706 HEP B SURFACE ANTIBODY: CPT | Performed by: INTERNAL MEDICINE

## 2024-08-02 PROCEDURE — 86803 HEPATITIS C AB TEST: CPT | Performed by: INTERNAL MEDICINE

## 2024-08-02 PROCEDURE — 120N000003 HC R&B IMCU UMMC

## 2024-08-02 PROCEDURE — 83550 IRON BINDING TEST: CPT | Performed by: INTERNAL MEDICINE

## 2024-08-02 PROCEDURE — 84450 TRANSFERASE (AST) (SGOT): CPT | Performed by: INTERNAL MEDICINE

## 2024-08-02 PROCEDURE — 250N000009 HC RX 250: Performed by: STUDENT IN AN ORGANIZED HEALTH CARE EDUCATION/TRAINING PROGRAM

## 2024-08-02 PROCEDURE — 83880 ASSAY OF NATRIURETIC PEPTIDE: CPT | Performed by: INTERNAL MEDICINE

## 2024-08-02 PROCEDURE — 80321 ALCOHOLS BIOMARKERS 1OR 2: CPT | Performed by: INTERNAL MEDICINE

## 2024-08-02 PROCEDURE — 80307 DRUG TEST PRSMV CHEM ANLYZR: CPT | Performed by: INTERNAL MEDICINE

## 2024-08-02 PROCEDURE — 85730 THROMBOPLASTIN TIME PARTIAL: CPT | Performed by: INTERNAL MEDICINE

## 2024-08-02 PROCEDURE — 76700 US EXAM ABDOM COMPLETE: CPT | Mod: 26 | Performed by: RADIOLOGY

## 2024-08-02 PROCEDURE — 250N000011 HC RX IP 250 OP 636: Performed by: STUDENT IN AN ORGANIZED HEALTH CARE EDUCATION/TRAINING PROGRAM

## 2024-08-02 PROCEDURE — 84134 ASSAY OF PREALBUMIN: CPT | Performed by: INTERNAL MEDICINE

## 2024-08-02 PROCEDURE — 71045 X-RAY EXAM CHEST 1 VIEW: CPT | Mod: 26 | Performed by: RADIOLOGY

## 2024-08-02 PROCEDURE — 85025 COMPLETE CBC W/AUTO DIFF WBC: CPT | Performed by: INTERNAL MEDICINE

## 2024-08-02 PROCEDURE — 85610 PROTHROMBIN TIME: CPT | Performed by: INTERNAL MEDICINE

## 2024-08-02 PROCEDURE — 86787 VARICELLA-ZOSTER ANTIBODY: CPT | Performed by: INTERNAL MEDICINE

## 2024-08-02 PROCEDURE — G0103 PSA SCREENING: HCPCS | Performed by: INTERNAL MEDICINE

## 2024-08-02 PROCEDURE — 86709 HEPATITIS A IGM ANTIBODY: CPT | Performed by: STUDENT IN AN ORGANIZED HEALTH CARE EDUCATION/TRAINING PROGRAM

## 2024-08-02 PROCEDURE — 84443 ASSAY THYROID STIM HORMONE: CPT | Performed by: INTERNAL MEDICINE

## 2024-08-02 PROCEDURE — 76700 US EXAM ABDOM COMPLETE: CPT

## 2024-08-02 PROCEDURE — 86481 TB AG RESPONSE T-CELL SUSP: CPT | Performed by: INTERNAL MEDICINE

## 2024-08-02 PROCEDURE — 36415 COLL VENOUS BLD VENIPUNCTURE: CPT | Performed by: STUDENT IN AN ORGANIZED HEALTH CARE EDUCATION/TRAINING PROGRAM

## 2024-08-02 PROCEDURE — 83735 ASSAY OF MAGNESIUM: CPT | Performed by: STUDENT IN AN ORGANIZED HEALTH CARE EDUCATION/TRAINING PROGRAM

## 2024-08-02 PROCEDURE — 80061 LIPID PANEL: CPT | Performed by: INTERNAL MEDICINE

## 2024-08-02 PROCEDURE — 86592 SYPHILIS TEST NON-TREP QUAL: CPT | Performed by: INTERNAL MEDICINE

## 2024-08-02 PROCEDURE — 93922 UPR/L XTREMITY ART 2 LEVELS: CPT

## 2024-08-02 PROCEDURE — 85390 FIBRINOLYSINS SCREEN I&R: CPT | Mod: 26 | Performed by: PATHOLOGY

## 2024-08-02 PROCEDURE — 250N000011 HC RX IP 250 OP 636: Performed by: INTERNAL MEDICINE

## 2024-08-02 PROCEDURE — 84155 ASSAY OF PROTEIN SERUM: CPT | Performed by: INTERNAL MEDICINE

## 2024-08-02 PROCEDURE — 86780 TREPONEMA PALLIDUM: CPT | Performed by: INTERNAL MEDICINE

## 2024-08-02 PROCEDURE — 99255 IP/OBS CONSLTJ NEW/EST HI 80: CPT | Performed by: INTERNAL MEDICINE

## 2024-08-02 PROCEDURE — 250N000013 HC RX MED GY IP 250 OP 250 PS 637

## 2024-08-02 PROCEDURE — 83605 ASSAY OF LACTIC ACID: CPT | Performed by: INTERNAL MEDICINE

## 2024-08-02 PROCEDURE — 86708 HEPATITIS A ANTIBODY: CPT | Performed by: INTERNAL MEDICINE

## 2024-08-02 PROCEDURE — 82728 ASSAY OF FERRITIN: CPT | Performed by: INTERNAL MEDICINE

## 2024-08-02 PROCEDURE — 86704 HEP B CORE ANTIBODY TOTAL: CPT | Performed by: INTERNAL MEDICINE

## 2024-08-02 RX ORDER — DIGOXIN 125 MCG
500 TABLET ORAL DAILY
Status: COMPLETED | OUTPATIENT
Start: 2024-08-02 | End: 2024-08-03

## 2024-08-02 RX ORDER — BUMETANIDE 1 MG/1
1 TABLET ORAL ONCE
Status: COMPLETED | OUTPATIENT
Start: 2024-08-02 | End: 2024-08-02

## 2024-08-02 RX ORDER — POTASSIUM CHLORIDE 750 MG/1
20 TABLET, EXTENDED RELEASE ORAL ONCE
Status: DISCONTINUED | OUTPATIENT
Start: 2024-08-02 | End: 2024-08-03

## 2024-08-02 RX ORDER — SPIRONOLACTONE 25 MG/1
25 TABLET ORAL DAILY
Status: DISCONTINUED | OUTPATIENT
Start: 2024-08-02 | End: 2024-08-05 | Stop reason: HOSPADM

## 2024-08-02 RX ORDER — PROCHLORPERAZINE MALEATE 5 MG
10 TABLET ORAL EVERY 6 HOURS PRN
Status: DISCONTINUED | OUTPATIENT
Start: 2024-08-02 | End: 2024-08-05 | Stop reason: HOSPADM

## 2024-08-02 RX ORDER — MAGNESIUM SULFATE HEPTAHYDRATE 40 MG/ML
4 INJECTION, SOLUTION INTRAVENOUS ONCE
Status: COMPLETED | OUTPATIENT
Start: 2024-08-02 | End: 2024-08-02

## 2024-08-02 RX ORDER — PROCHLORPERAZINE 25 MG
25 SUPPOSITORY, RECTAL RECTAL EVERY 12 HOURS PRN
Status: DISCONTINUED | OUTPATIENT
Start: 2024-08-02 | End: 2024-08-05 | Stop reason: HOSPADM

## 2024-08-02 RX ORDER — ISOSORBIDE DINITRATE 20 MG/1
40 TABLET ORAL ONCE
Status: COMPLETED | OUTPATIENT
Start: 2024-08-02 | End: 2024-08-02

## 2024-08-02 RX ORDER — MAGNESIUM SULFATE HEPTAHYDRATE 40 MG/ML
2 INJECTION, SOLUTION INTRAVENOUS ONCE
Status: DISCONTINUED | OUTPATIENT
Start: 2024-08-02 | End: 2024-08-02

## 2024-08-02 RX ORDER — POTASSIUM CHLORIDE 750 MG/1
20 TABLET, EXTENDED RELEASE ORAL ONCE
Status: COMPLETED | OUTPATIENT
Start: 2024-08-02 | End: 2024-08-02

## 2024-08-02 RX ADMIN — SACUBITRIL AND VALSARTAN 1 TABLET: 97; 103 TABLET, FILM COATED ORAL at 20:10

## 2024-08-02 RX ADMIN — ISOSORBIDE DINITRATE 40 MG: 20 TABLET ORAL at 01:29

## 2024-08-02 RX ADMIN — ONDANSETRON 4 MG: 4 TABLET, ORALLY DISINTEGRATING ORAL at 01:57

## 2024-08-02 RX ADMIN — DIGOXIN 500 MCG: 125 TABLET ORAL at 18:28

## 2024-08-02 RX ADMIN — ENOXAPARIN SODIUM 40 MG: 40 INJECTION SUBCUTANEOUS at 20:10

## 2024-08-02 RX ADMIN — POTASSIUM CHLORIDE 20 MEQ: 750 TABLET, EXTENDED RELEASE ORAL at 18:28

## 2024-08-02 RX ADMIN — SPIRONOLACTONE 25 MG: 25 TABLET, FILM COATED ORAL at 12:44

## 2024-08-02 RX ADMIN — BUMETANIDE 1 MG: 1 TABLET ORAL at 12:40

## 2024-08-02 RX ADMIN — MAGNESIUM SULFATE IN WATER 4 G: 40 INJECTION, SOLUTION INTRAVENOUS at 15:50

## 2024-08-02 RX ADMIN — POTASSIUM CHLORIDE 20 MEQ: 750 TABLET, EXTENDED RELEASE ORAL at 06:36

## 2024-08-02 RX ADMIN — SACUBITRIL AND VALSARTAN 1 TABLET: 97; 103 TABLET, FILM COATED ORAL at 09:27

## 2024-08-02 RX ADMIN — EMPAGLIFLOZIN 10 MG: 10 TABLET, FILM COATED ORAL at 12:40

## 2024-08-02 RX ADMIN — SODIUM NITROPRUSSIDE 1 MCG/KG/MIN: 25 INJECTION, SOLUTION, CONCENTRATE INTRAVENOUS at 05:29

## 2024-08-02 RX ADMIN — PROCHLORPERAZINE MALEATE 10 MG: 5 TABLET ORAL at 04:07

## 2024-08-02 ASSESSMENT — ACTIVITIES OF DAILY LIVING (ADL)
ADLS_ACUITY_SCORE: 27
ADLS_ACUITY_SCORE: 26
ADLS_ACUITY_SCORE: 25
ADLS_ACUITY_SCORE: 26
ADLS_ACUITY_SCORE: 26
ADLS_ACUITY_SCORE: 25
ADLS_ACUITY_SCORE: 26
ADLS_ACUITY_SCORE: 27
ADLS_ACUITY_SCORE: 25
ADLS_ACUITY_SCORE: 26
ADLS_ACUITY_SCORE: 26
ADLS_ACUITY_SCORE: 25
ADLS_ACUITY_SCORE: 25
ADLS_ACUITY_SCORE: 26

## 2024-08-02 NOTE — PROGRESS NOTES
S/B:  Pt referred for Hybrid evaluation.   Called patient and introduced the VAD Team.  Spoke with patient and mom to discuss the evaluation process for Hybrid evaluation and to make a plan for further follow up and education.      A:  Discussed:    1:  Tests, scans, and consults required for Hybrid evaluation.  Pt is currently inpatient      2.  The need for a caregiver to be present at VAD PVE, Palliative and SW visits.      R:   Patient and mom will be present for a PVE on 8/2 at 1 pm at bedside.

## 2024-08-02 NOTE — CONSULTS
Discharge Pharmacy Test Claim    Patient's commercial [employer] plan covers Jardiance with an expected monthly copay of $0 only if patient is also taking metformin. If not taking metformin, Jardiance will require a prior authorization. Spironolactone is covered with an expected monthly copay of $0, however a prescription for the 25mg tablets of this medication was filled at a pharmacy on 7/22/2024. It will be too soon for the insurance to pay for a new prescription of that strength until 8/22/2024.    Test Claim Copay Notes   Jardiance 0.00 Covered if taking Metformin, PA required if not   Spironolactone 0.00 25mg tabs last filled 7/22/24, next fill available 8/22/24        Angie Simpson  [facility] Pharmacy Liaison  Phone: [phone] Fax: [phone]  Available on Teams & [facility]

## 2024-08-02 NOTE — PROGRESS NOTES
CLINICAL NUTRITION SERVICES - ASSESSMENT NOTE    Current BMI: Body mass index is 29.55 kg/m .   BMI is within the recommendation of < 35 for potential heart transplant      Nutrition Prescription    RECOMMENDATIONS FOR MDs/PROVIDERS TO ORDER:  None currently     Malnutrition Status:    Unable to determine due to unable to complete NFPE and history, will follow up    Recommendations already ordered by Registered Dietitian (RD):  None currently     Future/Additional Recommendations:  Follow up to obtain nutrition history, NFPE and provide education on nutrition w/ LVAD and heart transplant.      REASON FOR ASSESSMENT  Mary Shaikh is a/an 23 year old female assessed by the dietitian for Provider Order - Nutrition Education - Assess/Educate potential heart transplant recipient. Nutrition Education. Frailty assessment for transplant patients who are being evaluated for Ventricular Assist Device (VAD) Placements.    Patient admitted from the cath lab with concerns of cardiogenic shock.      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 Patient busy at with other providers at multiple attempted visits, will follow up.     CURRENT NUTRITION ORDERS  Diet: Regular    Intake/Tolerance:  Pt eating [facility] food in addition to ordering some meals here.    LABS   07/31/24 08:58 07/31/24 14:19 07/31/24 23:41 08/01/24 05:14 08/01/24 07:40 08/01/24 16:53 08/02/24 03:30 08/02/24 03:56   Sodium 140 140 140 138  140  138   Potassium 4.3 3.9 3.6 3.6  3.7  3.7   Chloride 107 110 (H) 103 104  103  102   Carbon Dioxide (CO2) 22 21 (L) 24 25  25  24   Urea Nitrogen 6.4 6.3 4.8 (L) 5.2 (L)  3.2 (L)  3.0 (L)   Creatinine 0.66 0.57 0.58 0.55  0.53  0.49 (L)   GFR Estimate >90 >90 >90 >90  >90  >90   Calcium 9.0 8.5 (L) 9.0 8.7 (L)  9.5  9.2   Anion Gap 11 9 13  "9  12  12   Magnesium  2.0  1.9    1.7   Phosphorus        3.3   Albumin  3.7  3.8  4.2  4.0   Protein Total  6.3 (L)  6.4  7.3  7.1   Alkaline Phosphatase  48  50  60  62   ALT  23  24  24  19   AST  41  37  39  32   Bilirubin Total  0.6  0.7  0.7  0.6   Cholesterol        139   Ferritin        29   Glucose 95 91 188 (H) 96  98  107 (H)  107 (H)   HDL Cholesterol        50   Hemoglobin A1C  6.1 (H)      7.3 (H)   Iron  25 (L)      103   Iron Binding Capacity  392      414   Iron Sat Index  6 (L)      25   Lactic Acid     2.2 (H) 1.1 0.8    LDL Cholesterol Calculated        78   Non HDL Cholesterol        89   Transferrin        334.0   Triglycerides        57       MEDICATIONS  Reviewed     ANTHROPOMETRICS  Height: 162.6 cm (5' 4\")  Most Recent Weight: 78.1 kg (172 lb 2.9 oz)    IBW: 54.5 kg   Body mass index is 29.55 kg/m . BMI Category: Overweight BMI 25-29.9  Weight History: No significant weight loss noted.  Wt Readings from Last 20 Encounters:   08/01/24 78.1 kg (172 lb 2.9 oz)   07/22/24 77.6 kg (171 lb 1.6 oz)   05/15/24 71.6 kg (157 lb 13.6 oz)   07/24/23 71.6 kg (157 lb 13.6 oz)   07/07/22 75.4 kg (166 lb 3.6 oz)   05/09/22 75.8 kg (167 lb)         ASSESSED NUTRITION NEEDS  Dosing Weight: 60.4 kg (Adjusted BW)   Estimated Energy Needs: 4044-8697 kcals/day (25 - 30 kcals/kg)  Justification: Increased needs and Critical illness  Estimated Protein Needs:  grams protein/day (1.5 - 2 grams of pro/kg)  Justification: Increased needs and Critical illness  Estimated Fluid Needs: 3252-8715 mL/day (1 mL/kcal)   Justification: Maintenance    PHYSICAL FINDINGS  See malnutrition section below.    Berhane Score: 21  Per EMR: Skin  Skin WDL: .WDL except, integrity  Skin Integrity: other (see comments)  Other (see comments): ex lap sites  Device Skin Interventions Taken: No adjustments needed    MALNUTRITION  % Intake: Decreased intake does not meet criteria  % Weight Loss: None noted  Subcutaneous Fat Loss: Unable " "to assess   Muscle Loss: Unable to assess  Fluid Accumulation/Edema: None noted  Malnutrition Diagnosis: Unable to determine due to unable to complete NFPE and history, will follow up    NUTRITION DIAGNOSIS  Food- and nutrition-related knowledge deficit related to LVAD vs heart transplant as evidenced by new education for patient/family.       INTERVENTIONS  Implementation  Nutrition education for nutrition relationship to health/disease     Goals  Patient to consume % of nutritionally adequate meal trays TID, or the equivalent with supplements/snacks.        Monitoring/Evaluation  Progress toward goals will be monitored and evaluated per protocol.    Corrine Gorman, MS, RDN, LD  4C MICU RD  Vocera - \"4C Clinical Dietitian\"  Weekend/Holiday RD - \"Weekend Clinical Dietitian\"    "

## 2024-08-02 NOTE — PROGRESS NOTES
Transplant Social Work Services Progress Note      Date of Initial Social Work Evaluation: 8/2/2024  Collaborated with: Patient, Mary and Mother, Emily    Data: Mary admitted following the cath lab with concerns of cardiogenic shock. Mary was referred for IP Hybrid evaluation. She was observed to be laying in bed talking on the phone. Upon SW arrival, Pt hung up phone and called mother to return to bedside.     Intervention: SW met with patient at bedside. Mother was present in the room. SW introduced self and introduced the evaluation process for transplant and VAD. SW completed initial assessment with patient and mother present. SW introduced Health Care Directive and provided blank form to patient.   Patient has appropriate, positive supports and has denied psychosocial concerns that would inhibit her potential eligibility for transplant or VAD. Patient's mother expressed need for additional financial supports in the future, however needs would not hinder eligibility and support can further be discussed with SW in future.  Patient assessed to be an appropriate fit for advanced therapies. SW to enter full Transplant/VAD evaluation next week.    Assessment: S Patient observably pleasant and interactive throughout assessment. Both patient and mother engaged appropriately and were forthcoming with information. Patient and mother asked questions appropriately. Patient denied questions and/or concerns at this time. Patient assessed to be an appropriate fit for advanced therapies. SW to enter full Transplant/VAD evaluation next week.  Education provided by SW: SW discussed transplant/VAD evaluation, health care directive, and discussed support groups available. SW will continue to follow to provide support as appropriate.  Plan:    Discharge Plans in Progress: TBD    Barriers to d/c plan: TBD    Follow up Plan: SW will continue to follow for ongoing psychosocial support and discuss advanced therapies as  appropriate.     Adina Choi, MSW, LGSW, APSW  Heart Transplant/[facility]   Teams/[facility]  Ph. [phone]

## 2024-08-02 NOTE — PROGRESS NOTES
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       Subjective / Interval   Patient refers that he was feeling ok. Before shift change it was decided to leave the nipride at 2 (she was achieving CI more than 2.1). During the night due to hypotension (while sleeping) it was decided to decrease the nipride up to 0.5.     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       Todays Plan:  -Weaning nipride as much as possible  -If we are able to wean the nipride completely, we will be removing the swan and send him to the floor   -Entresto 97/103  -Start Jardiance 10  -Start Spirinolactone 25  -Following hemodynamics  -Following organ function and perfusion      Neurological:  > Dannon disease      Per neurology, No neurological findings that would suggest poor neurological outcome as a result of cardiothoracic surgery      Plan:  No concerns     Respiratory:  > History of asthma      Plan:  Restart albuterol      Cardiovascular:  >Cardiogenic Shock SCAI B  >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  >History of SVT with preexitation   >S/P ICD sudden cardiac death prevention.       Hemodynamics     CVP PA - Mean  PCWP CO/CI PVR SVR MAP   07/31 20 51/29 - 36  3.13/1.68 1.9 1762      08/01  12  35/24/27 18 4/2.1   2.6 1194  77     08/02 5  "  23/16 13  5.7/3    960 79                       LVAD/Transplant Evaluation Checklist   [x] labs -   [x] CPX -   [x] RHC  [x] CVTS consult   [x] Social work consult  [] Palliative care consult   [x] Neuropsych consult - Not done anymore?  [] Nutrition consult   [] Dental   [x] Abd US   [x] extremity US and JUANA - Normal   [x] carotid US - Doesn't qualify   [] PFTs - not done patient is in the hospital   [] 6 minute walk  [x] CT head  [x] CT c/a/p   [x] colonoscopy - Doesn't qualify   [x] PSA  [x] Utox/nicotine and cotinine/PeTH      Inotrope: None   Vasodilators/Afterload: Nipride - MAP:<90 CI:>2.1  Diuresis: Bumetanide 1mg PO  Volume status: Hypervolemic and warm/cold  Advance Therapies: Undergoing evaluation   GDMT:   - BB: None   - ACEI/ARB/ARNI: Entresto 97/103  - SGLT2i: Jardiance 10mg  - MRA: Spirinolactone 25mg      No signs of organ dysfunction and or hypoperfusion so far. Making adequate UO. She benefits from GDMT optimization. Undergoing advance therapies evaluation.      Plan:  -Weaning nipride as much as possible  -If we are able to wean the nipride completely, we will be removing the swan and send him to the floor   -Entresto 97/103  -Start Jardiance 10  -Start Spirinolactone 25  -Following hemodynamics  -Following organ function and perfusion      Gastrointestinal  S/P Cholecystectomy      Plan:  Following up abdominal exam.      Renal   > WNL Cr and UO     Plan:  Following kidney function and UO      Hematology/Oncology   > No concerns     ID  > No concerns for infection so far.        Code Status: Full Code     Patient seen and discussed with Dr Fay who agrees with the plan detailed above. Please see attending addendum for changes to plan.      Edenilson Rivera MD   Cardiology Fellow       Physical Exam:   BP 99/70   Pulse 84   Temp 98.7  F (37.1  C) (Oral)   Resp 15   Ht 1.626 m (5' 4\")   Wt 78.1 kg (172 lb 2.9 oz)   SpO2 100%   BMI 29.55 kg/m    Temp:  [97.6  F (36.4  C)-98.7  F " (37.1  C)] 98.7  F (37.1  C)  Pulse:  [] 84  Resp:  [14-16] 15  BP: ()/(48-89) 99/70  SpO2:  [77 %-100 %] 100 %  Wt Readings from Last 2 Encounters:   08/01/24 78.1 kg (172 lb 2.9 oz)   07/22/24 77.6 kg (171 lb 1.6 oz)         Intake/Output Summary (Last 24 hours) at 8/2/2024 1115  Last data filed at 8/2/2024 0925  Gross per 24 hour   Intake 428 ml   Output 3600 ml   Net -3172 ml          Exam:  General: In bed, in NAD  HEENT: PERRL, no scleral icterus or injection  CARDIAC: RRR, no m/r/g appreciated. Peripheral pulses dopplered  RESP: Mechanical ventilation; CTAB, no wheezes, rhonchi or crackles appreciated.  GI: soft, BS hypoactive  : Ortiz  EXTREMITIES: NO LE edema, pulses 2+. Femoral access site w/o bleeding, dressing c/d/i.   SKIN: No acute lesions appreciated  NEURO: Intubated and sedated       Labs:     CMP  Recent Labs   Lab 08/02/24  0356 08/01/24  1654 08/01/24  1653 08/01/24  0740 08/01/24  0517 08/01/24  0514 07/31/24  2346 07/31/24  2341 07/31/24  1612 07/31/24  1419     --  140  --   --  138  --  140  --  140   POTASSIUM 3.7  --  3.7  --   --  3.6  --  3.6  --  3.9   CHLORIDE 102  --  103  --   --  104  --  103  --  110*   CO2 24  --  25  --   --  25  --  24  --  21*   ANIONGAP 12  --  12  --   --  9  --  13  --  9   *  107* 96 98 115*   < > 96   < > 188*   < > 91   BUN 3.0*  --  3.2*  --   --  5.2*  --  4.8*  --  6.3   CR 0.49*  --  0.53  --   --  0.55  --  0.58  --  0.57   GFRESTIMATED >90  --  >90  --   --  >90  --  >90  --  >90   WHITNEY 9.2  --  9.5  --   --  8.7*  --  9.0  --  8.5*   MAG 1.7  --   --   --   --  1.9  --   --   --  2.0   PHOS 3.3  --   --   --   --   --   --   --   --   --    PROTTOTAL 7.1  --  7.3  --   --  6.4  --   --   --  6.3*   ALBUMIN 4.0  --  4.2  --   --  3.8  --   --   --  3.7   BILITOTAL 0.6  --  0.7  --   --  0.7  --   --   --  0.6   ALKPHOS 62  --  60  --   --  50  --   --   --  48   AST 32  --  39  --   --  37  --   --   --  41   ALT 19  --  24   --   --  24  --   --   --  23    < > = values in this interval not displayed.     CBC  Recent Labs   Lab 08/02/24  0356 08/01/24  0514 07/31/24  1419 07/31/24  1110 07/31/24  1106 07/31/24  0858   WBC 4.7 5.2 4.4  --   --  4.5   HGB 11.3* 11.2* 11.6* 11.7   < > 12.2   HCT 36.0 34.5* 37.4  --   --  39.4   MCV 85 83 85  --   --  87    238 250  --   --  245    < > = values in this interval not displayed.     INR  Recent Labs   Lab 08/02/24  0356 07/31/24  0858   INR 1.25* 1.25*     Arterial Blood Gas  Recent Labs   Lab 08/02/24  0838 08/02/24  0330 08/01/24  2118 08/01/24  1727   O2PER 21 21 21 21       Medications:     Current Facility-Administered Medications   Medication Dose Route Frequency Provider Last Rate Last Admin    bumetanide (BUMEX) tablet 1 mg  1 mg Oral Once Mallory Guan MD        enoxaparin ANTICOAGULANT (LOVENOX) injection 40 mg  40 mg Subcutaneous Q24H Edenilson Rivera MD   40 mg at 08/01/24 2120    potassium chloride wisam ER (KLOR-CON M10) CR tablet 20 mEq  20 mEq Oral Once Mell Monte MD        sacubitril-valsartan (ENTRESTO)  MG per tablet 1 tablet  1 tablet Oral BID Erika Gan MD   1 tablet at 08/02/24 0927    sodium chloride (PF) 0.9% PF flush 3 mL  3 mL Intracatheter Q8H Edenilson Rivera MD   3 mL at 08/01/24 1142       Current Facility-Administered Medications   Medication Dose Route Frequency Provider Last Rate Last Admin    medication instruction   Does not apply Continuous PRN Edenilson Rivera MD        nitroPRUsside (NIPRIDE) 100 mg, sodium thiosulfate 1,000 mg in D5W 62.5 mL IV infusion (conc: 1.6 mg/mL)  0.25-5 mcg/kg/min (Dosing Weight) Intravenous Continuous Mohan Ortega MD 1.4 mL/hr at 08/02/24 0915 0.5 mcg/kg/min at 08/02/24 0915       Recent Imaging:

## 2024-08-02 NOTE — PLAN OF CARE
"Goal Outcome Evaluation:  ICU End of Shift Summary. See flowsheets for vital signs and detailed assessment.    Changes this shift:   Alert and oriented x 4 following commands, sba to commode. Paced DDI, Ci 2.7 Co 5.1 Sv02 68 CVP 9, on nipride @1 cosistently able map goal over night due to nausea and discomfort map goal changed to >90. On room air sat >95. X1 bm, able to void spontaneously. Having nausea and vomoting ondansetron & compazine given.   Plan:     Problem: Adult Inpatient Plan of Care  Goal: Plan of Care Review  Description: The Plan of Care Review/Shift note should be completed every shift.  The Outcome Evaluation is a brief statement about your assessment that the patient is improving, declining, or no change.  This information will be displayed automatically on your shift  note.  Outcome: Progressing  Goal: Patient-Specific Goal (Individualized)  Description: You can add care plan individualizations to a care plan. Examples of Individualization might be:  \"Parent requests to be called daily at 9am for status\", \"I have a hard time hearing out of my right ear\", or \"Do not touch me to wake me up as it startles  me\".  Outcome: Progressing  Goal: Absence of Hospital-Acquired Illness or Injury  Outcome: Progressing  Intervention: Identify and Manage Fall Risk  Recent Flowsheet Documentation  Taken 8/2/2024 0400 by Ever Paul, RN  Safety Promotion/Fall Prevention:   activity supervised   clutter free environment maintained   increased rounding and observation   increase visualization of patient   nonskid shoes/slippers when out of bed   patient and family education   room near nurse's station   room organization consistent   safety round/check completed   supervised activity   treat reversible contributory factors  Taken 8/2/2024 0000 by Ever Paul, RN  Safety Promotion/Fall Prevention:   activity supervised   clutter free environment maintained   increased rounding and observation   increase " visualization of patient   nonskid shoes/slippers when out of bed   patient and family education   room near nurse's station   room organization consistent   safety round/check completed   supervised activity   treat reversible contributory factors  Taken 8/1/2024 2000 by Ever Paul RN  Safety Promotion/Fall Prevention:   activity supervised   clutter free environment maintained   increased rounding and observation   increase visualization of patient   nonskid shoes/slippers when out of bed   patient and family education   room near nurse's station   room organization consistent   safety round/check completed   supervised activity   treat reversible contributory factors  Intervention: Prevent Skin Injury  Recent Flowsheet Documentation  Taken 8/2/2024 0600 by Ever Paul RN  Body Position: position changed independently  Taken 8/2/2024 0400 by Ever Paul RN  Body Position: position changed independently  Device Skin Pressure Protection: absorbent pad utilized/changed  Taken 8/2/2024 0000 by Ever Paul RN  Body Position: position changed independently  Device Skin Pressure Protection: absorbent pad utilized/changed  Taken 8/1/2024 2200 by Ever Paul RN  Body Position: position changed independently  Taken 8/1/2024 2000 by Ever Paul RN  Body Position: position changed independently  Device Skin Pressure Protection: absorbent pad utilized/changed  Intervention: Prevent and Manage VTE (Venous Thromboembolism) Risk  Recent Flowsheet Documentation  Taken 8/2/2024 0400 by Ever Paul RN  VTE Prevention/Management: (ambulating frequently)   SCDs off (sequential compression devices)   patient refused intervention  Taken 8/2/2024 0000 by Ever Paul RN  VTE Prevention/Management: (ambulating frequently)   SCDs off (sequential compression devices)   patient refused intervention  Taken 8/1/2024 2000 by Ever Paul RN  VTE Prevention/Management: (ambulating  frequently)   SCDs off (sequential compression devices)   patient refused intervention  Intervention: Prevent Infection  Recent Flowsheet Documentation  Taken 8/2/2024 0400 by Ever Paul RN  Infection Prevention:   environmental surveillance performed   equipment surfaces disinfected   personal protective equipment utilized   rest/sleep promoted   single patient room provided  Taken 8/2/2024 0000 by Ever Paul RN  Infection Prevention:   environmental surveillance performed   equipment surfaces disinfected   personal protective equipment utilized   rest/sleep promoted   single patient room provided  Taken 8/1/2024 2000 by Ever Paul RN  Infection Prevention:   environmental surveillance performed   equipment surfaces disinfected   personal protective equipment utilized   rest/sleep promoted   single patient room provided  Goal: Optimal Comfort and Wellbeing  Outcome: Progressing  Intervention: Monitor Pain and Promote Comfort  Recent Flowsheet Documentation  Taken 8/2/2024 0400 by Ever Paul RN  Pain Management Interventions:   care clustered   quiet environment facilitated   relaxation techniques promoted   rest  Taken 8/2/2024 0000 by Ever Paul RN  Pain Management Interventions:   care clustered   quiet environment facilitated   relaxation techniques promoted   rest  Taken 8/1/2024 2000 by Ever Paul RN  Pain Management Interventions:   care clustered   quiet environment facilitated   relaxation techniques promoted   rest  Intervention: Provide Person-Centered Care  Recent Flowsheet Documentation  Taken 8/2/2024 0400 by Ever Paul RN  Trust Relationship/Rapport:   questions answered   reassurance provided   thoughts/feelings acknowledged   choices provided  Taken 8/2/2024 0000 by Ever Paul RN  Trust Relationship/Rapport:   questions answered   reassurance provided   thoughts/feelings acknowledged   choices provided  Taken 8/1/2024 2000 by Mert Leroy  MARBIN Curtis  Trust Relationship/Rapport:   questions answered   reassurance provided   thoughts/feelings acknowledged   choices provided  Goal: Readiness for Transition of Care  Outcome: Progressing     Problem: Suicide Risk  Goal: Absence of Self-Harm  Outcome: Progressing  Intervention: Assess Risk to Self and Maintain Safety  Recent Flowsheet Documentation  Taken 8/2/2024 0400 by Ever Paul RN  Enhanced Safety Measures: room near unit station  Taken 8/2/2024 0000 by Ever Paul RN  Enhanced Safety Measures: room near unit station  Taken 8/1/2024 2000 by Ever Paul RN  Enhanced Safety Measures: room near unit station  Intervention: Promote Psychosocial Wellbeing  Recent Flowsheet Documentation  Taken 8/2/2024 0400 by Ever Paul RN  Supportive Measures:   active listening utilized   goal-setting facilitated   positive reinforcement provided   relaxation techniques promoted  Sleep/Rest Enhancement:   awakenings minimized   consistent schedule promoted   family presence promoted   natural light exposure provided   regular sleep/rest pattern promoted   relaxation techniques promoted  Family/Support System Care:   involvement promoted   presence promoted   self-care encouraged   support provided  Taken 8/2/2024 0000 by Ever Paul RN  Supportive Measures:   active listening utilized   goal-setting facilitated   positive reinforcement provided   relaxation techniques promoted  Sleep/Rest Enhancement:   awakenings minimized   consistent schedule promoted   family presence promoted   natural light exposure provided   regular sleep/rest pattern promoted   relaxation techniques promoted  Family/Support System Care:   involvement promoted   presence promoted   self-care encouraged   support provided  Taken 8/1/2024 2000 by Ever Paul RN  Supportive Measures:   active listening utilized   goal-setting facilitated   positive reinforcement provided   relaxation techniques  promoted  Sleep/Rest Enhancement:   awakenings minimized   consistent schedule promoted   family presence promoted   natural light exposure provided   regular sleep/rest pattern promoted   relaxation techniques promoted  Family/Support System Care:   involvement promoted   presence promoted   self-care encouraged   support provided     Problem: Heart Failure  Goal: Optimal Coping  Outcome: Progressing  Intervention: Support Psychosocial Response  Recent Flowsheet Documentation  Taken 8/2/2024 0400 by Ever Paul RN  Supportive Measures:   active listening utilized   goal-setting facilitated   positive reinforcement provided   relaxation techniques promoted  Family/Support System Care:   involvement promoted   presence promoted   self-care encouraged   support provided  Taken 8/2/2024 0000 by Ever Paul RN  Supportive Measures:   active listening utilized   goal-setting facilitated   positive reinforcement provided   relaxation techniques promoted  Family/Support System Care:   involvement promoted   presence promoted   self-care encouraged   support provided  Taken 8/1/2024 2000 by Ever Paul RN  Supportive Measures:   active listening utilized   goal-setting facilitated   positive reinforcement provided   relaxation techniques promoted  Family/Support System Care:   involvement promoted   presence promoted   self-care encouraged   support provided  Goal: Optimal Cardiac Output  Outcome: Progressing  Intervention: Optimize Cardiac Output  Recent Flowsheet Documentation  Taken 8/2/2024 0400 by Ever Paul RN  Environmental Support:   calm environment promoted   environmental consistency promoted   personal routine supported   rest periods encouraged  Taken 8/2/2024 0000 by Ever Paul RN  Environmental Support:   calm environment promoted   environmental consistency promoted   personal routine supported   rest periods encouraged  Taken 8/1/2024 2000 by Ever Paul  RN  Environmental Support:   calm environment promoted   environmental consistency promoted   personal routine supported   rest periods encouraged  Goal: Stable Heart Rate and Rhythm  Outcome: Progressing  Goal: Optimal Functional Ability  Outcome: Progressing  Intervention: Optimize Functional Ability  Recent Flowsheet Documentation  Taken 8/2/2024 0400 by Ever Paul RN  Self-Care Promotion: independence encouraged  Activity Management:   activity adjusted per tolerance   up to bedside commode  Taken 8/2/2024 0000 by Ever Paul RN  Self-Care Promotion: independence encouraged  Activity Management:   activity adjusted per tolerance   up to bedside commode  Taken 8/1/2024 2000 by Ever Paul RN  Self-Care Promotion: independence encouraged  Activity Management:   activity adjusted per tolerance   up to bedside commode  Goal: Fluid and Electrolyte Balance  Outcome: Progressing  Intervention: Monitor and Manage Fluid and Electrolyte Balance  Recent Flowsheet Documentation  Taken 8/2/2024 0400 by Ever Paul RN  Fluid/Electrolyte Management: fluids provided  Taken 8/2/2024 0000 by Ever Paul RN  Fluid/Electrolyte Management: fluids provided  Taken 8/1/2024 2000 by Ever Paul RN  Fluid/Electrolyte Management: fluids provided  Goal: Improved Oral Intake  Outcome: Progressing  Goal: Effective Oxygenation and Ventilation  Outcome: Progressing  Intervention: Promote Airway Secretion Clearance  Recent Flowsheet Documentation  Taken 8/2/2024 0400 by Ever Paul RN  Cough And Deep Breathing: done independently per patient  Activity Management:   activity adjusted per tolerance   up to bedside commode  Taken 8/2/2024 0000 by Ever Paul RN  Cough And Deep Breathing: done independently per patient  Activity Management:   activity adjusted per tolerance   up to bedside commode  Taken 8/1/2024 2000 by Ever Paul RN  Cough And Deep Breathing: done independently per  patient  Activity Management:   activity adjusted per tolerance   up to bedside commode  Intervention: Optimize Oxygenation and Ventilation  Recent Flowsheet Documentation  Taken 8/2/2024 0600 by Ever Paul RN  Head of Bed (HOB) Positioning: HOB at 20-30 degrees  Taken 8/2/2024 0400 by Ever Paul RN  Airway/Ventilation Management:   airway patency maintained   pulmonary hygiene promoted  Head of Bed (HOB) Positioning: HOB at 20-30 degrees  Taken 8/2/2024 0000 by Ever Paul RN  Airway/Ventilation Management:   airway patency maintained   pulmonary hygiene promoted  Head of Bed (HOB) Positioning: HOB at 20-30 degrees  Taken 8/1/2024 2200 by Ever Paul RN  Head of Bed (HOB) Positioning: HOB at 20-30 degrees  Taken 8/1/2024 2000 by Ever Paul RN  Airway/Ventilation Management:   airway patency maintained   pulmonary hygiene promoted  Head of Bed (HOB) Positioning: HOB at 20-30 degrees  Goal: Effective Breathing Pattern During Sleep  Outcome: Progressing  Intervention: Monitor and Manage Obstructive Sleep Apnea  Recent Flowsheet Documentation  Taken 8/2/2024 0400 by Ever Paul RN  Medication Review/Management:   medications reviewed   high-risk medications identified  Taken 8/2/2024 0000 by Ever Paul RN  Medication Review/Management:   medications reviewed   high-risk medications identified  Taken 8/1/2024 2000 by Ever Paul RN  Medication Review/Management:   medications reviewed   high-risk medications identified     Problem: Risk for Delirium  Goal: Optimal Coping  Outcome: Progressing  Intervention: Optimize Psychosocial Adjustment to Delirium  Recent Flowsheet Documentation  Taken 8/2/2024 0400 by Ever Paul RN  Supportive Measures:   active listening utilized   goal-setting facilitated   positive reinforcement provided   relaxation techniques promoted  Family/Support System Care:   involvement promoted   presence promoted   self-care  encouraged   support provided  Taken 8/2/2024 0000 by Ever Paul RN  Supportive Measures:   active listening utilized   goal-setting facilitated   positive reinforcement provided   relaxation techniques promoted  Family/Support System Care:   involvement promoted   presence promoted   self-care encouraged   support provided  Taken 8/1/2024 2000 by Ever Paul RN  Supportive Measures:   active listening utilized   goal-setting facilitated   positive reinforcement provided   relaxation techniques promoted  Family/Support System Care:   involvement promoted   presence promoted   self-care encouraged   support provided  Goal: Improved Behavioral Control  Outcome: Progressing  Intervention: Prevent and Manage Agitation  Recent Flowsheet Documentation  Taken 8/2/2024 0400 by Ever Paul RN  Environment Familiarity/Consistency:   daily routine followed   familiar objects from home provided  Taken 8/2/2024 0000 by Ever Paul RN  Environment Familiarity/Consistency:   daily routine followed   familiar objects from home provided  Taken 8/1/2024 2000 by Ever Paul RN  Environment Familiarity/Consistency:   daily routine followed   familiar objects from home provided  Intervention: Minimize Safety Risk  Recent Flowsheet Documentation  Taken 8/2/2024 0400 by Ever Paul RN  Communication Enhancement Strategies: call light answered in person  Enhanced Safety Measures: room near unit station  Trust Relationship/Rapport:   questions answered   reassurance provided   thoughts/feelings acknowledged   choices provided  Taken 8/2/2024 0000 by Ever Paul RN  Communication Enhancement Strategies: call light answered in person  Enhanced Safety Measures: room near unit station  Trust Relationship/Rapport:   questions answered   reassurance provided   thoughts/feelings acknowledged   choices provided  Taken 8/1/2024 2000 by Ever Paul RN  Communication Enhancement Strategies: call  light answered in person  Enhanced Safety Measures: room near unit station  Trust Relationship/Rapport:   questions answered   reassurance provided   thoughts/feelings acknowledged   choices provided  Goal: Improved Attention and Thought Clarity  Outcome: Progressing  Intervention: Maximize Cognitive Function  Recent Flowsheet Documentation  Taken 8/2/2024 0400 by Ever Paul RN  Sensory Stimulation Regulation:   care clustered   lighting decreased   quiet environment promoted  Taken 8/2/2024 0000 by Ever Paul RN  Sensory Stimulation Regulation:   care clustered   lighting decreased   quiet environment promoted  Taken 8/1/2024 2000 by Ever Paul RN  Sensory Stimulation Regulation:   care clustered   lighting decreased   quiet environment promoted  Goal: Improved Sleep  Outcome: Progressing  Intervention: Promote Sleep  Recent Flowsheet Documentation  Taken 8/2/2024 0400 by Ever Paul RN  Sleep/Rest Enhancement:   awakenings minimized   consistent schedule promoted   family presence promoted   natural light exposure provided   regular sleep/rest pattern promoted   relaxation techniques promoted  Taken 8/2/2024 0000 by Ever Paul RN  Sleep/Rest Enhancement:   awakenings minimized   consistent schedule promoted   family presence promoted   natural light exposure provided   regular sleep/rest pattern promoted   relaxation techniques promoted  Taken 8/1/2024 2000 by Ever Paul RN  Sleep/Rest Enhancement:   awakenings minimized   consistent schedule promoted   family presence promoted   natural light exposure provided   regular sleep/rest pattern promoted   relaxation techniques promoted

## 2024-08-02 NOTE — CONSULTS
General ID Service: Initial Consultation     Patient:  Mary Shaikh, Date of birth 2000, Medical record number [id number]  Date of Visit:  August 2, 2024  Consult Requested by: Dr. 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  Admitted from cath lab with concern for cardiogenic shock being evaluated for LVAD and/or heart transplant  Hx of syphilis  - early, treated in Oct 2021 with Positive RPR and Treponema Ab who had an RPR Titer 1:32  -tested 8/2024 and found to have RPR titer 1:4  HIV neg 10/2021  6/2023 GC/C, HPV negative    Recommendations:    -appropriately treated for syphilis in 2021  -appropriate response with titer dropping >4 fold from 1:32 to 1:4  -no further treatment needed    Discussion:  Patient with history of hypertrophic cardiomyopathy here with cardiogenic shock being worked up for LVAD/heart transplant. She was found to have positive RPR with titer 1:4. She was treated with PCN G x1 for early syphilis in 2021 and her titer dropped from 1:32 to 1:4. As such the treatment was effective. There is no new treatment needed.   She can proceed with her transplant workup. Please call with other questions. We will sign off.       Attestation:  I have reviewed today's vital signs, medications, labs and imaging.  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 As she was getting worked up for a LVAD/heart transplant she was noted to have a positive RPR and titer of 1:4. I was therefore consulted.   In reviewing her records she had a positive RPR with titer 1:32 in Oct 2021. It was felt to be early syphilis and she was treated with PCN G 2.4 million units x1 in Oct 2021. HIV was tested and negative at that time.      She currently denies recent partners and not currently concerned for STIs. No fevers or chills. No rashes.            Review of Systems:   Full 12 point ROS obtained, pertinent positives and negatives as above.       Past Medical History:   No past medical history on file.  Past Surgical History:   Procedure Laterality Date    CV RIGHT HEART CATH MEASUREMENTS RECORDED N/A 7/31/2024    Procedure: Heart Cath Right Heart Cath;  Surgeon: Tanmay Brice MD;  Location:  HEART CARDIAC CATH LAB         Allergies:    No Known Allergies         Current Antimicrobials:     None       Family History:   No family history on file.         Social History:     Social History     Socioeconomic History    Marital status: Single     Spouse name: Not on file    Number of children: Not on file    Years of education: Not on file    Highest education level: Not on file   Occupational History    Not on file   Tobacco Use    Smoking status: Never    Smokeless tobacco: Never   Substance and Sexual Activity    Alcohol use: Never    Drug use: Never    Sexual activity: Not on file   Other Topics Concern    Not on file   Social History Narrative    Not on file     Social Determinants of Health     Financial Resource Strain: Low Risk  (7/10/2024)    Received from [facility]    Financial Resource Strain     Difficulty of Paying Living Expenses: 3     Difficulty of Paying Living Expenses: Not on file   Food Insecurity: No Food Insecurity (7/10/2024)    Received from [facility]    Food Insecurity     Worried About Running Out of Food in the Last Year: 1   Transportation Needs: No Transportation Needs (7/10/2024)    Received from [facility]    Transportation Needs     Lack of Transportation (Medical): 1   Physical Activity: Not on file   Stress: Not on file   Social Connections: Socially Integrated (7/10/2024)    Received from [facility] [employer] [facility]    Social Connections     Frequency of Communication with Friends and Family: 0   Interpersonal Safety: Not At Risk (6/20/2024)    Received from [facility]    Humiliation, Afraid, Rape, and Kick questionnaire     Fear of Current or Ex-Partner: No     Emotionally Abused: No     Physically Abused: No     Sexually Abused: No   Housing Stability: Low Risk  (7/10/2024)    Received from [facility]    Housing Stability     Unable to Pay for Housing in the Last Year: 1              Physical Exam:   Ranges forvital signs:  Temp:  [97.8  F (36.6  C)-98.7  F (37.1  C)] 98.3  F (36.8  C)  Pulse:  [] 96  Resp:  [14-17] 17  BP: ()/() 125/68  SpO2:  [77 %-100 %] 96 %    Intake/Output Summary (Last 24 hours) at 8/2/2024 1629  Last data filed at 8/2/2024 1600  Gross per 24 hour   Intake 660.12 ml   Output 3800 ml   Net -3139.88 ml       Exam:  GENERAL:  well-developed, well-nourished, sitting in bed in no acute distress.   ENT:  Head is normocephalic, atraumatic. Oropharynx is moist without exudates or ulcers.  EYES:  Eyes have anicteric sclerae.    NECK:  Supple.  LUNGS:  Clear to auscultation.  CARDIOVASCULAR:  Regular rate and rhythm with no murmurs, gallops or rubs.  ABDOMEN:  Normal bowel sounds, soft, nontender.  EXT: Extremities warm and without edema.  SKIN:  No acute rashes.  Line is in place without any surrounding erythema.  NEUROLOGIC:  Grossly nonfocal.         Laboratory Data:   Reviewed.  Pertinent for:    RPR positive  Treponema Ab positive  RPR Titer 1:32    Culture data:    Toxoplasma ab negative  Quantiferon gold pending         Imaging:      Reviewed but not relavant

## 2024-08-02 NOTE — PROGRESS NOTES
Heart Transplant Intake Call  Date:  8/2/24    Pt referred for inpatient Heart Transplant evaluation. Called patient and introduced the Heart Transplant Program.  Spoke with patient to discuss the evaluation process for transplant.    Discussed need for caregiver support during evaluation process, and post transplant and/or VAD implant. Pt's 30-day caregiver will be parents. (VAD ONLY) Pt's long term caregiver support will be parents.    Discussed importance of avoiding nicotine, cannabis, illegal drugs, and using alcohol only minimally or none at all as decided upon between patient and advanced heart failure cardiologist. Discussed age and BMI requirements for transplant listing.    Heart Transplant Packet and information for [facility] was sent to the patient via [email or web address]. Patient was encouraged to review and sign this information. They were encouraged to have a caregiver present during heart transplant teaching.     Plan: Patient verbalized understanding and in agreement with the plan for evaluation. Was engaged and forthcoming with information. Asked appropriate questions in regard to this process. Patient denied any further transplant related questions and/or concerns.  Patient given the contact information to the transplant office and understands to contact coordinator with any further questions or concerns.    Todd    Referring Provider: Hipolito Fuentes  Primary Care Provider: [facility]  Diagnosis: Dannon disease with associated hypertrophic cardiomyopathy    Critical History     Smoking/nicotine use history: None  Alcohol use history: None  Drug use history: None  Cancer history: None  BMI: 29  Dialysis: None    Routine Health Maintenance  Last Dental Visit: 2-3 years ago Discussed importance of seeing dentist at least once every 12 months in order to maintain transplant eligibility. Pt encouraged to ask their dental clinic to send a letter of dental clearance to the  transplant office.   Last Pap Smear: 6/2023 Records to be obtained from: [facility]  Discussed importance of vaccines prior to transplant. Pt is aware that the COVID-19 vaccine is currently a requirement for transplant listing    Comments    Referral intake process completed.  Patient is aware that after financial approval is received, medical records will be requested.     Reminded patient to complete questionnaire, complete medical records release, and review packet.  Assessed patient for special needs (ie--wheelchair, assistance, guardian, and ):  None   Patient instructed to call [phone] with questions.

## 2024-08-02 NOTE — CONSULTS
Brief Gyn Consult Note     Patient is a 23 year old who is undergoing workup for heart transplant vs. LVAD.   Gynecology consulted for updated pap testing.     Reviewed previous pap testing. Pap testing completed last 1 year ago.   - completed 6/27/2023, result: NILM     Per [facility]CCP guidelines, okay to continue with routine screening.  Given age 21-29, recommend cytology (pap) alone every 3 years.     Thus, patient is currently up to date on pap testing and does not require additional screening until 6/2026.   Discussed with ordering provider. Per provider, no additional needs from gynecology team.   Gynecology team will sign off.     Sarita Garsia MD   OBGYN resident PGY4  08/02/2024 8:30 AM     [facility] staff:  Appreciate note by Dr. Garsia. I have reviewed, edited, and agree with the note.        Valerie Jones MD, FACOG  8/2/2024  10:58 AM

## 2024-08-02 NOTE — CONSULTS
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    Review of ECGs this admission demonstrates sinus rhythm with evidence of preexcitation with delta wave present.    Telemetry from the SVT episode this afternoon demonstrates narrow complex, regular tachycardia consistent with SVT.  It terminated with vagal maneuvers today.    We reviewed the recent EP study which demonstrated that patient's ventricular preexcitation appreciated on the ECG is due to a fascicular ventricular connection.  The EP study confirmed that this connection does not participate in tachycardia.  Additional findings from the EP study included parahisian RV pacing which showed retrograde VA conduction through the AV node, but there was no evidence for accessory pathway conduction.  There was also no evidence for left-sided accessory pathway with CS v/s RA pacing.     It appears that patient has had brief episodes of symptomatic SVT while in the hospital, the most recent one was responsive to vagal maneuvers.  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    Medication management options are limited at this time given that patient is admitted for cardiogenic shock.  As such, we would not recommend initiation of beta-blockers or calcium channel blockers at this time.  We would favor initiation of digoxin for management of her SVT.    If her SVT increases in frequency or duration, she may require another EP study to further evaluate these SVT episodes.    Recommendations:  1.  Start oral digoxin.  Would recommend digoxin 0.5mg PO daily for two days followed by 0.25mg PO daily.  2.  Once patient is optimized from a hemodynamic standpoint, low-dose beta-blocker may help as an adjunct agent for her SVT management, but will defer to primary team on when to initiate this given her admission for cardiogenic shock.  3.  As above, if SVT increases in frequency or duration she may require another EP study in the future to further characterize the arrhythmia and determine whether it would be amenable to an ablation.     Patient was seen and plan of care was discussed with attending physician Dr. Will. Final recommendations pending Attending Attestation. Please don't hesitate to contact our team with any additional questions or concerns.    Jose R Richards MD  Cardiology Fellow PGY4    I very much appreciated the opportunity to  assess Mary Shaikh in the hospital with CV Fellow Dr Richards. The note above summarizes my findings and current recommendations.  Please do not hesitate to contact my office if you have any questions or concerns.      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    Patient experienced syncope in June 2024 without prodrome.  This was her first episode of syncope.  She was seen at a local hospital ([facility] [place]) for SVT and syncope.  She underwent cardioversion.  She had an EP study, ICD implantation, and CTI ablation on 6/20/2024.    Full details of the EP study are outlined below.  She was found to have ventricular preexcitation due to a fascicular ventricular conduction, which does not participate in the tachycardia.  She underwent successful ablation of atrial flutter with CTI.    She underwent right heart cath yesterday which demonstrated elevated right and left-sided filling pressures.  She had reduced cardiac indices.  Clinical picture was consistent with ambulatory cardiogenic shock.  She was admitted to the CCU for [place] guided therapies with IV nipride and diuresis.  She has been transitioned off the nipride infusion to oral afterload reducing agents.  An advanced therapies evaluation was opened.    She had an episode of SVT this afternoon, which terminated with vagal maneuvers.  After this episode, EP was consulted for further recommendations.    During my interview, patient states she is able to sense the SVT with symptoms of palpitations.  She denies any lightheadedness or dizziness.  No chest pain or shortness of breath.       Review of Systems:  A comprehensive ROS was performed and found to be negative or non-contributory with the exception of that noted in the HPI above.    No past medical history on file.  Past Surgical History:   Procedure Laterality Date    CV RIGHT HEART CATH MEASUREMENTS RECORDED N/A 7/31/2024    Procedure: Heart Cath Right Heart Cath;  Surgeon: Tanmay Brice MD;  Location:  HEART CARDIAC CATH LAB     Social  History     Socioeconomic History    Marital status: Single   Tobacco Use    Smoking status: Never    Smokeless tobacco: Never   Substance and Sexual Activity    Alcohol use: Never    Drug use: Never     Social Determinants of Health     Financial Resource Strain: Low Risk  (7/10/2024)    Received from [facility]    Financial Resource Strain     Difficulty of Paying Living Expenses: 3   Food Insecurity: No Food Insecurity (7/10/2024)    Received from [facility]    Food Insecurity     Worried About Running Out of Food in the Last Year: 1   Transportation Needs: No Transportation Needs (7/10/2024)    Received from [facility]    Transportation Needs     Lack of Transportation (Medical): 1   Social Connections: Socially Integrated (7/10/2024)    Received from [facility]    Social Connections     Frequency of Communication with Friends and Family: 0   Interpersonal Safety: Not At Risk (6/20/2024)    Received from [facility]    Humiliation, Afraid, Rape, and Kick questionnaire     Fear of Current or Ex-Partner: No     Emotionally Abused: No     Physically Abused: No     Sexually Abused: No   Housing Stability: Low Risk  (7/10/2024)    Received from [facility]    Housing Stability     Unable to Pay for Housing in the Last Year: 1      No family history on file.  Medications Prior to Admission   Medication Sig Dispense Refill Last Dose    acetaminophen (TYLENOL) 325 MG tablet Take 650 mg by mouth   Unknown    albuterol (PROAIR HFA/PROVENTIL HFA/VENTOLIN HFA) 108 (90 Base) MCG/ACT inhaler Inhale 1-2 puffs into the lungs   7/30/2024 at 2000    bisoprolol (ZEBETA) 5 MG tablet Take 5 mg by mouth at bedtime   7/30/2024 at 2000    losartan (COZAAR) 50 MG tablet Take 50 mg by mouth at bedtime   7/30/2024 at 2000    melatonin 3 MG tablet Take 3 mg by mouth  nightly as needed   7/30/2024 at 2000    norgestimate-ethinyl estradiol (ORTHO-CYCLEN) 0.25-35 MG-MCG tablet Take 1 tablet by mouth daily   7/31/2024 at 0700    ondansetron (ZOFRAN ODT) 4 MG ODT tab Place 4 mg under the tongue as needed   Past Week at prn    oxyCODONE (ROXICODONE) 5 MG tablet Take 5 mg by mouth as needed   Past Month at prn    sertraline (ZOLOFT) 50 MG tablet Take 50 mg by mouth daily   Past Week    spironolactone (ALDACTONE) 25 MG tablet Take 0.5 tablets (12.5 mg) by mouth daily 45 tablet 3 7/30/2024 at 2000    HYDROmorphone (DILAUDID) 2 MG tablet Take 2 mg by mouth every 6 hours as needed for pain       loperamide (IMODIUM) 2 MG capsule Take 4 mg by mouth as needed for diarrhea       losartan (COZAAR) 25 MG tablet Take 1 tablet by mouth daily at 2 pm       norgestimate-ethinyl estradiol (ESTARYLLA) 0.25-35 MG-MCG tablet Take 1 tablet by mouth at bedtime       omeprazole (PRILOSEC) 20 MG DR capsule Take 20 mg by mouth daily       polyethylene glycol (MIRALAX) 17 GM/Dose powder Take 17 g by mouth daily       sertraline (ZOLOFT) 50 MG tablet Take 50 mg by mouth daily        Current Facility-Administered Medications   Medication Dose Route Frequency Provider Last Rate Last Admin    acetaminophen (TYLENOL) Suppository 650 mg  650 mg Rectal Q4H PRN Edenilson Rivera MD        acetaminophen (TYLENOL) tablet 650 mg  650 mg Oral Q4H PRN Edenilson Rivera MD   650 mg at 08/01/24 1839    albuterol (PROVENTIL HFA/VENTOLIN HFA) inhaler  1-2 puff Inhalation Q2H PRN Edenilson Rivera MD        alum & mag hydroxide-simethicone (MAALOX) suspension 30 mL  30 mL Oral Q4H PRN Edenilson Rivera MD        empagliflozin (JARDIANCE) tablet 10 mg  10 mg Oral Daily Edenilson Rivera MD   10 mg at 08/02/24 1240    enoxaparin ANTICOAGULANT (LOVENOX) injection 40 mg  40 mg Subcutaneous Q24H Edenilson Rivera MD   40 mg at 08/01/24 2120    lidocaine (LMX4) cream   Topical Q1H PRN  "Edenilson Rivera MD        lidocaine 1 % 0.1-1 mL  0.1-1 mL Other Q1H PRN Edenilson Rivera MD        magnesium sulfate 2 g in 50 mL sterile water intermittent infusion  2 g Intravenous Once Edenilson Rivera MD        medication instruction   Does not apply Continuous PRN Edenilson Rivera MD        nitroGLYcerin (NITROSTAT) sublingual tablet 0.4 mg  0.4 mg Sublingual Q5 Min PRN Edenilson Rivera MD        nitroPRUsside (NIPRIDE) 100 mg, sodium thiosulfate 1,000 mg in D5W 62.5 mL IV infusion (conc: 1.6 mg/mL)  0.25-5 mcg/kg/min (Dosing Weight) Intravenous Continuous Mohan Ortega MD   Stopped at 08/02/24 1240    ondansetron (ZOFRAN ODT) ODT tab 4 mg  4 mg Sublingual Q6H PRN Edenilson Rivera MD   4 mg at 08/02/24 0157    potassium chloride wiasm ER (KLOR-CON M10) CR tablet 20 mEq  20 mEq Oral Once Mell Monte MD        prochlorperazine (COMPAZINE) injection 10 mg  10 mg Intravenous Q6H PRN Mohan Ortega MD        Or    prochlorperazine (COMPAZINE) tablet 10 mg  10 mg Oral Q6H PRN Mohan Ortega MD   10 mg at 08/02/24 0407    Or    prochlorperazine (COMPAZINE) suppository 25 mg  25 mg Rectal Q12H PRN Mohan Ortega MD        sacubitril-valsartan (ENTRESTO)  MG per tablet 1 tablet  1 tablet Oral BID Erika Gan MD   1 tablet at 08/02/24 0927    sodium chloride (PF) 0.9% PF flush 3 mL  3 mL Intracatheter Q8H Edenilson Rivera MD   3 mL at 08/02/24 1240    sodium chloride (PF) 0.9% PF flush 3 mL  3 mL Intracatheter q1 min prn Edenilson Rivera MD        spironolactone (ALDACTONE) tablet 25 mg  25 mg Oral Daily Edenilson Rivera MD   25 mg at 08/02/24 1244       Objective   Physical Exam:    Blood pressure 107/78, pulse 71, temperature 98.3  F (36.8  C), temperature source Oral, resp. rate 14, height 1.626 m (5' 4\"), weight 78.1 kg (172 lb 2.9 oz), SpO2 93%, not currently breastfeeding.    Exam:  General: no acute distress, " resting comfortably in bed, family at bedside  HEENT: no scleral icterus or injection  CARDIAC: RRR, no murmurs. No lower extremity edema bilaterally.  RESP:  CTAB  GI: Soft, nondistended, non-tender  EXTREMITIES: no LE edema  SKIN: No acute lesions appreciated  NEURO: No focal deficits    Labs & Studies: I personally reviewed the following studies:  CMP  Recent Labs   Lab 08/02/24  0356 08/01/24  1654 08/01/24  1653 08/01/24  0740 08/01/24  0517 08/01/24  0514 07/31/24  2346 07/31/24  2341 07/31/24  1612 07/31/24  1419     --  140  --   --  138  --  140  --  140   POTASSIUM 3.7  --  3.7  --   --  3.6  --  3.6  --  3.9   CHLORIDE 102  --  103  --   --  104  --  103  --  110*   CO2 24  --  25  --   --  25  --  24  --  21*   ANIONGAP 12  --  12  --   --  9  --  13  --  9   *  107* 96 98 115*   < > 96   < > 188*   < > 91   BUN 3.0*  --  3.2*  --   --  5.2*  --  4.8*  --  6.3   CR 0.49*  --  0.53  --   --  0.55  --  0.58  --  0.57   GFRESTIMATED >90  --  >90  --   --  >90  --  >90  --  >90   WHITNEY 9.2  --  9.5  --   --  8.7*  --  9.0  --  8.5*   MAG 1.7  --   --   --   --  1.9  --   --   --  2.0   PHOS 3.3  --   --   --   --   --   --   --   --   --    PROTTOTAL 7.1  --  7.3  --   --  6.4  --   --   --  6.3*   ALBUMIN 4.0  --  4.2  --   --  3.8  --   --   --  3.7   BILITOTAL 0.6  --  0.7  --   --  0.7  --   --   --  0.6   ALKPHOS 62  --  60  --   --  50  --   --   --  48   AST 32  --  39  --   --  37  --   --   --  41   ALT 19  --  24  --   --  24  --   --   --  23    < > = values in this interval not displayed.     CBC  Recent Labs   Lab 08/02/24  0356 08/01/24  0514 07/31/24  1419 07/31/24  1110 07/31/24  1106 07/31/24  0858   WBC 4.7 5.2 4.4  --   --  4.5   RBC 4.25 4.14 4.38  --   --  4.55   HGB 11.3* 11.2* 11.6* 11.7   < > 12.2   HCT 36.0 34.5* 37.4  --   --  39.4   MCV 85 83 85  --   --  87   MCH 26.6 27.1 26.5  --   --  26.8   MCHC 31.4* 32.5 31.0*  --   --  31.0*   RDW 15.0 15.0 15.2*  --   --  15.4*     238 250  --   --  245    < > = values in this interval not displayed.     INR  Recent Labs   Lab 24  0356 24  0858   INR 1.25* 1.25*            No data to display                  Recent Results (from the past 4320 hour(s))   Echo Complete   Result Value    LVEF  20-25% (severely reduced)    [facility]    [id number]  [id number]  [id number]  [id number]^THOR COOK^CARMEN^MALIHA     [facility],[place]  Echocardiography Laboratory  [address] [place] [id number]     Name: MS. JE PARK  MRN: [id number]  : 2000  Study Date: 2024 02:23 PM  Age: 23 yrs  Gender: Female  Patient Location: [facility] [place]  Reason For Study: Heart Failure  Ordering Physician: CARMEN HDEZ  Referring Physician: LESLEE RAZO  Performed By: Royce Donato RDCS     BSA: 1.9 m2  Height: 66 in  Weight: 170 lb  HR: 65  BP: 118/89 mmHg  ______________________________________________________________________________  Procedure  Echocardiogram with two-dimensional, color and spectral Doppler performed.  Contrast Optison. Optison (NDC #5855-5020-99) given intravenously. Patient was  given 6 ml mixture of 3 ml Optison and 6 ml saline. 3 ml wasted.  ______________________________________________________________________________  Interpretation Summary  Left ventricular function is decreased. The ejection fraction is 20-25%  (severely reduced).  Global right ventricular function is moderately reduced.  Moderate biatrial enlargement is present.  IVC diameter >2.1 cm collapsing <50% with sniff suggests a high RA pressure  estimated at 15 mmHg or greater.  Trivial pericardial effusion is present.  There is no prior study for direct comparison.  ______________________________________________________________________________  Left Ventricle  Left ventricular wall thickness is normal. Borderline left ventricular  dilation is present. Left ventricular function is decreased. The  ejection  fraction is 20-25% (severely reduced). Left ventricular diastolic function is  indeterminate. Severe diffuse hypokinesis is present.     Right Ventricle  The right ventricle is normal size. Global right ventricular function is  moderately reduced. A pacemaker lead is noted in the right ventricle.     Atria  Moderate biatrial enlargement is present.     Mitral Valve  The mitral valve is normal.     Aortic Valve  Aortic valve is normal in structure and function.     Tricuspid Valve  The tricuspid valve is normal. Mild tricuspid insufficiency is present. The  right ventricular systolic pressure is approximated at 29.2 mmHg plus the  right atrial pressure.     Pulmonic Valve  The pulmonic valve is normal.     Vessels  The thoracic aorta is normal. The aorta root is normal. The pulmonary artery  is normal. IVC diameter >2.1 cm collapsing <50% with sniff suggests a high RA  pressure estimated at 15 mmHg or greater.     Pericardium  Trivial pericardial effusion is present.     Compared to Previous Study  There is no prior study for direct comparison.  ______________________________________________________________________________  MMode/2D Measurements & Calculations  IVSd: 1.2 cm     LVIDd: 5.7 cm  LVIDs: 5.4 cm  LVPWd: 0.94 cm  FS: 6.6 %  LV mass(C)d: 245.4 grams  LV mass(C)dI: 131.5 grams/m2  EPSS: 2.3 cm  asc Aorta Diam: 2.6 cm  LVOT diam: 2.2 cm  LVOT area: 3.6 cm2  Asc Ao diam index BSA (cm/m2): 1.4  Asc Ao diam index Ht(cm/m): 1.5  LA Volume Index (BP): 43.5 ml/m2  RWT: 0.33     TAPSE: 1.2 cm     Doppler Measurements & Calculations  MV E max lisandro: 120.0 cm/sec  MV A max lisandro: 25.1 cm/sec  MV E/A: 4.8  MV dec slope: 1445 cm/sec2  MV dec time: 0.08 sec  PA acc time: 0.06 sec  TR max lisandro: 270.1 cm/sec  TR max P.2 mmHg  E/E' av.9  Lateral E/e': 17.9  Medial E/e': 34.0     ______________________________________________________________________________  Report approved by: Pedro Cassidy 2024 03:49  PM           Telemetry 8/2/24                RHC 7/31/24  RIGHT HEART CATHETERIZATION:    === Baseline ====    BSA 1.87 m2  /74/89 mmHg  RA 21/25/20 mmHg  RV 51/20mmHg  PA 51/29/36 mmHg  PCWP 30/40/29 mmHg  TD CO/CI 2.9/1.55   Juan CO/CI 3.13/1.68   PVR 1.9   SVR 1762  PA sat 43.5%   PCW Oximeter sat 98% Right sided filling pressures are severely elevated. Left sided filling pressures are severely elevated. Moderately elevated pulmonary artery hypertension. Reduced cardiac output level.       Right sided filling pressures are severely elevated.    Left sided filling pressures are severely elevated.    Moderately elevated pulmonary artery hypertension.    Reduced cardiac output level.     Severely elevated biventricular filling pressures with ambulatory cardiogenic shock      Device Interrogation 7/31/24  Patient seen in [address] for evaluation and iterative programming of their ICD per MD orders.      Device: [employer] D533 RESONATE HF ICD  Normal device function.  Mode: DDI 40 bpm  AP: <1%  : <1%  Intrinsic rhythm: SB 51 bpm w/ frequent PVC's and PVC runs of 4-6 beats  Patient has had 98,600 PVC's over the last 33 days.  Lead Trends Appear Stable.  Estimated battery longevity to RRT = 13 years.  Atrial Arrhythmia: None.  Ventricular Arrhythmia: None.  Setting Changes: None.     ERIKA Brock RN     EP STUDY / ABLATION REPORT     Patient's Name:  Mary Shaikh   MR Number:  [id number]     Date of Procedure:  6/20/2024     :  DARIA Roy, ALISON       Procedure Performed:   EP Study including pacing / recording from Coronary sinus (CS)   Three-dimensional Electro-anatomic mapping   Radiofrequency (RF) Ablation of atrial flutter     Indication:   Atrial flutter   Ventricular pre-excitation     Catheters used:   RA - Fatimah catheter   His - Deflectable D-curve Octapolar   RV - Deflectable D-curve Quadripolar   CS - Decapolar   Mapping / Ablation Thermocool     Sheaths used:   Right Femoral  Vein: 6.5F, 7.5F.  The 7.5F sheath was later upgraded to   Vizigo for catheter stability purposes.   Left Femoral Vein: 6.5F, 7.5F     Description of Procedure:   After getting informed consent, patient was brought to Cardiac EP lab in   fasting and non-sedated state. The procedure was done under general   anesthesia.  Both the groins were prepped and draped in usual sterile   fashion and above-mentioned sheaths were placed using modified Seldinger   technique. The above-mentioned catheters were placed in appropriate   positions under fluoroscopic guidance. Baseline data was obtained and then   pacing maneuvers were performed with findings as described later in the   report.  The patient presented to the lab in sinus rhythm with ventricular   pre-excitation. Mapping and ablation are detailed below.     HV interval was short and stayed unchanged with incremental pacing and   extra-stimulus testing consistent with fasciculoventricular pathway that   does not participate in tachycardia.  SVT could not be induced with   programmed stimulation. CTI ablation was performed due to clinical   presentation with atrial flutter.     Mapping and Ablation of CTI for atrial flutter:   CARTO three-dimensional electro anatomic mapping system was used. The   patient was in sinus rhythm at baseline. An activation map of the   cavotricuspid isthmus (CTI) was created during CS pacing. RF applications   were then performed, starting from the tricuspid valve with power settings   of 35 [place] and dragging the catheter back towards the inferior vena cava.   The RF application was continued until we confirmed the bidirectional   block with coronary sinus and RA pacing. The bidirectional block was   confirmed by pacing from both sides of the ablation line on the isthmus.   The catheters and sheaths were then removed, and Vascade closure device   and manual pressure was used for hemostasis.     Complications: No immediate complications.      Findings:   Baseline   Basic intervals:   Presenting rhythm: Sinus rhythm   SCL: 1033 msec.   AH: 54 msec.   HV: 30 msec.   QRS: 131 msec.   QT: 495 msec.     Para-Hisian RV Pacing (PHIS):   1.  Retrograde VA conduction through AV node, no evidence for accessory   pathway conduction     CS v/s RA pacin.  No evidence for left sided manifest accessory pathway.     Incremental Atrial Pacing (IAP):   1.  Wenckebach block (WBB) cycle length = 350 msec; HV was unchanged.     Atrial Extra-stimulus testing (AES):   AERP: 280 @ 600 msec; HV was unchanged.     Post Ablation testing:   Basic intervals:   Presenting rhythm: Sinus rhythm   SCL: 965 msec.   AH: 59 msec.   HV: 30 msec.   QRS: 129 msec.   QT: 442 msec.     Incremental Atrial Pacing (IAP):   1.  Wenckebach block (WBB) cycle length = 350 msec.      Transisthmus conduction time:   1.  Pacing from CS = 173 ms   2.  Pacing from lateral RA= 171 ms     CONCLUSIONS:   Ventricular pre-excitation is due to fasciculoventricular connection and   does not participate in tachycardia.   Successful ablation of atrial flutter.     MEDICATIONS USED:   1.  Please see anesthesiology records for details

## 2024-08-02 NOTE — PROGRESS NOTES
"Met with patient and family to present the HM3 as a possible treatment option.      Discussed patient and caregiver responsibilities before and after VAD implant. Clarified that only 2 caregivers may be trained. Clarified the need for a caregiver to be present for education and to assist patient for at least first 30 days after a patient returns home.  Patient's designated caregiver is her mother, Alice.     Discussed basic overview of VAD equipment, on going management, need for anticoagulation, regular dressing change, grounded three-pronged outlet and functioning telephone. Also discussed frequency of follow-up clinic appointments and the need to stay locally for at least 2-4 weeks.  Reviewed restrictions of having a VAD such as no swimming, bathing, boats, MRI's, or arc welding.     Provided and discussed the VAD educational brochure, information regarding the VAD and transplant support groups, discussed the 4/2024 EOGO recall, and \"VAD What You Should Know\" with additional details. Patient and mother signed \"VAD What You Should Know\" document (or agreed to have VAD Coordinator sign on their behalf). VAD coordinator contact information provided.  Encouraged patient and family to call with questions. Learners verbalized understanding of the above information.   "

## 2024-08-03 ENCOUNTER — APPOINTMENT (OUTPATIENT)
Dept: OCCUPATIONAL THERAPY | Facility: CLINIC | Age: 24
DRG: 286 | End: 2024-08-03
Attending: INTERNAL MEDICINE
Payer: COMMERCIAL

## 2024-08-03 LAB
ABO/RH(D): NORMAL
ALBUMIN SERPL BCG-MCNC: 4.3 G/DL (ref 3.5–5.2)
ALBUMIN SERPL BCG-MCNC: 4.3 G/DL (ref 3.5–5.2)
ALP SERPL-CCNC: 77 U/L (ref 40–150)
ALP SERPL-CCNC: 79 U/L (ref 40–150)
ALT SERPL W P-5'-P-CCNC: 17 U/L (ref 0–50)
ALT SERPL W P-5'-P-CCNC: 25 U/L (ref 0–50)
AMPHETAMINES SERPL QL SCN: NEGATIVE NG/ML
ANION GAP SERPL CALCULATED.3IONS-SCNC: 11 MMOL/L (ref 7–15)
ANION GAP SERPL CALCULATED.3IONS-SCNC: 14 MMOL/L (ref 7–15)
ANNOTATION COMMENT IMP: NORMAL
AST SERPL W P-5'-P-CCNC: 33 U/L (ref 0–45)
AST SERPL W P-5'-P-CCNC: 40 U/L (ref 0–45)
BARBITURATES SERPL QL SCN: NEGATIVE NG/ML
BASE EXCESS BLDV CALC-SCNC: 2.1 MMOL/L (ref -3–3)
BASOPHILS # BLD AUTO: 0.1 10E3/UL (ref 0–0.2)
BASOPHILS NFR BLD AUTO: 1 %
BENZODIAZ SERPL QL SCN: NEGATIVE NG/ML
BILIRUB SERPL-MCNC: 0.6 MG/DL
BILIRUB SERPL-MCNC: 0.6 MG/DL
BUN SERPL-MCNC: 6 MG/DL (ref 6–20)
BUN SERPL-MCNC: 7.1 MG/DL (ref 6–20)
BUPRENORPHINE SERPL-MCNC: NEGATIVE NG/ML
CALCIUM SERPL-MCNC: 9 MG/DL (ref 8.8–10.4)
CALCIUM SERPL-MCNC: 9.7 MG/DL (ref 8.8–10.4)
CANNABINOIDS SERPL QL SCN: NEGATIVE NG/ML
CHLORIDE SERPL-SCNC: 101 MMOL/L (ref 98–107)
CHLORIDE SERPL-SCNC: 101 MMOL/L (ref 98–107)
COCAINE SERPL QL SCN: NEGATIVE NG/ML
CREAT SERPL-MCNC: 0.64 MG/DL (ref 0.51–0.95)
CREAT SERPL-MCNC: 0.64 MG/DL (ref 0.51–0.95)
EGFRCR SERPLBLD CKD-EPI 2021: >90 ML/MIN/1.73M2
EGFRCR SERPLBLD CKD-EPI 2021: >90 ML/MIN/1.73M2
EOSINOPHIL # BLD AUTO: 0.1 10E3/UL (ref 0–0.7)
EOSINOPHIL NFR BLD AUTO: 1 %
ERYTHROCYTE [DISTWIDTH] IN BLOOD BY AUTOMATED COUNT: 15.5 % (ref 10–15)
GAMMA INTERFERON BACKGROUND BLD IA-ACNC: 0 IU/ML
GLUCOSE SERPL-MCNC: 119 MG/DL (ref 70–99)
GLUCOSE SERPL-MCNC: 123 MG/DL (ref 70–99)
HCO3 BLDV-SCNC: 26 MMOL/L (ref 21–28)
HCO3 SERPL-SCNC: 23 MMOL/L (ref 22–29)
HCO3 SERPL-SCNC: 27 MMOL/L (ref 22–29)
HCT VFR BLD AUTO: 49.1 % (ref 35–47)
HGB BLD-MCNC: 15.4 G/DL (ref 11.7–15.7)
IMM GRANULOCYTES # BLD: 0 10E3/UL
IMM GRANULOCYTES NFR BLD: 0 %
LACTATE SERPL-SCNC: 1.8 MMOL/L (ref 0.7–2)
LACTATE SERPL-SCNC: 2.5 MMOL/L (ref 0.7–2)
LACTATE SERPL-SCNC: 2.7 MMOL/L (ref 0.7–2)
LYMPHOCYTES # BLD AUTO: 2.8 10E3/UL (ref 0.8–5.3)
LYMPHOCYTES NFR BLD AUTO: 40 %
M TB IFN-G BLD-IMP: NEGATIVE
M TB IFN-G CD4+ BCKGRND COR BLD-ACNC: 10 IU/ML
MAGNESIUM SERPL-MCNC: 2.5 MG/DL (ref 1.7–2.3)
MCH RBC QN AUTO: 26.3 PG (ref 26.5–33)
MCHC RBC AUTO-ENTMCNC: 31.4 G/DL (ref 31.5–36.5)
MCV RBC AUTO: 84 FL (ref 78–100)
METHADONE SERPL QL SCN: NEGATIVE NG/ML
METHAMPHET SERPL QL: NEGATIVE NG/ML
MITOGEN IGNF BCKGRD COR BLD-ACNC: 0 IU/ML
MITOGEN IGNF BCKGRD COR BLD-ACNC: 0.01 IU/ML
MONOCYTES # BLD AUTO: 0.6 10E3/UL (ref 0–1.3)
MONOCYTES NFR BLD AUTO: 9 %
NEUTROPHILS # BLD AUTO: 3.4 10E3/UL (ref 1.6–8.3)
NEUTROPHILS NFR BLD AUTO: 49 %
NRBC # BLD AUTO: 0 10E3/UL
NRBC BLD AUTO-RTO: 0 /100
O2/TOTAL GAS SETTING VFR VENT: 21 %
OPIATES SERPL QL SCN: NEGATIVE NG/ML
OXYCODONE SERPL QL: NEGATIVE NG/ML
OXYHGB MFR BLDV: 94 % (ref 70–75)
PCO2 BLDV: 38 MM HG (ref 40–50)
PCP SERPL QL SCN: NEGATIVE NG/ML
PH BLDV: 7.44 [PH] (ref 7.32–7.43)
PLATELET # BLD AUTO: 292 10E3/UL (ref 150–450)
PO2 BLDV: 73 MM HG (ref 25–47)
POTASSIUM SERPL-SCNC: 4.1 MMOL/L (ref 3.4–5.3)
POTASSIUM SERPL-SCNC: 4.3 MMOL/L (ref 3.4–5.3)
PROT SERPL-MCNC: 7.8 G/DL (ref 6.4–8.3)
PROT SERPL-MCNC: 7.9 G/DL (ref 6.4–8.3)
QUANTIFERON MITOGEN: 10 IU/ML
QUANTIFERON NIL TUBE: 0 IU/ML
QUANTIFERON TB1 TUBE: 0.01 IU/ML
QUANTIFERON TB2 TUBE: 0
RBC # BLD AUTO: 5.86 10E6/UL (ref 3.8–5.2)
SAO2 % BLDV: 95.5 % (ref 70–75)
SODIUM SERPL-SCNC: 138 MMOL/L (ref 135–145)
SODIUM SERPL-SCNC: 139 MMOL/L (ref 135–145)
SPECIMEN EXPIRATION DATE: NORMAL
WBC # BLD AUTO: 7 10E3/UL (ref 4–11)

## 2024-08-03 PROCEDURE — 84450 TRANSFERASE (AST) (SGOT): CPT | Performed by: INTERNAL MEDICINE

## 2024-08-03 PROCEDURE — 97166 OT EVAL MOD COMPLEX 45 MIN: CPT | Mod: GO

## 2024-08-03 PROCEDURE — 83605 ASSAY OF LACTIC ACID: CPT | Performed by: STUDENT IN AN ORGANIZED HEALTH CARE EDUCATION/TRAINING PROGRAM

## 2024-08-03 PROCEDURE — 250N000013 HC RX MED GY IP 250 OP 250 PS 637: Performed by: INTERNAL MEDICINE

## 2024-08-03 PROCEDURE — 99232 SBSQ HOSP IP/OBS MODERATE 35: CPT | Mod: GC | Performed by: STUDENT IN AN ORGANIZED HEALTH CARE EDUCATION/TRAINING PROGRAM

## 2024-08-03 PROCEDURE — 120N000003 HC R&B IMCU UMMC

## 2024-08-03 PROCEDURE — 82805 BLOOD GASES W/O2 SATURATION: CPT | Performed by: INTERNAL MEDICINE

## 2024-08-03 PROCEDURE — 80053 COMPREHEN METABOLIC PANEL: CPT | Performed by: INTERNAL MEDICINE

## 2024-08-03 PROCEDURE — 83735 ASSAY OF MAGNESIUM: CPT | Performed by: INTERNAL MEDICINE

## 2024-08-03 PROCEDURE — 85025 COMPLETE CBC W/AUTO DIFF WBC: CPT | Performed by: INTERNAL MEDICINE

## 2024-08-03 PROCEDURE — 999N000147 HC STATISTIC PT IP EVAL DEFER

## 2024-08-03 PROCEDURE — 250N000013 HC RX MED GY IP 250 OP 250 PS 637: Performed by: STUDENT IN AN ORGANIZED HEALTH CARE EDUCATION/TRAINING PROGRAM

## 2024-08-03 PROCEDURE — 86900 BLOOD TYPING SEROLOGIC ABO: CPT | Performed by: INTERNAL MEDICINE

## 2024-08-03 PROCEDURE — 97530 THERAPEUTIC ACTIVITIES: CPT | Mod: GO

## 2024-08-03 PROCEDURE — 82247 BILIRUBIN TOTAL: CPT | Performed by: INTERNAL MEDICINE

## 2024-08-03 PROCEDURE — 36415 COLL VENOUS BLD VENIPUNCTURE: CPT | Performed by: STUDENT IN AN ORGANIZED HEALTH CARE EDUCATION/TRAINING PROGRAM

## 2024-08-03 PROCEDURE — 84155 ASSAY OF PROTEIN SERUM: CPT | Performed by: INTERNAL MEDICINE

## 2024-08-03 PROCEDURE — 250N000011 HC RX IP 250 OP 636: Performed by: INTERNAL MEDICINE

## 2024-08-03 PROCEDURE — 258N000003 HC RX IP 258 OP 636: Performed by: INTERNAL MEDICINE

## 2024-08-03 RX ADMIN — SACUBITRIL AND VALSARTAN 1 TABLET: 97; 103 TABLET, FILM COATED ORAL at 09:11

## 2024-08-03 RX ADMIN — SODIUM CHLORIDE, POTASSIUM CHLORIDE, SODIUM LACTATE AND CALCIUM CHLORIDE 500 ML: 600; 310; 30; 20 INJECTION, SOLUTION INTRAVENOUS at 10:53

## 2024-08-03 RX ADMIN — SACUBITRIL AND VALSARTAN 1 TABLET: 97; 103 TABLET, FILM COATED ORAL at 20:48

## 2024-08-03 RX ADMIN — EMPAGLIFLOZIN 10 MG: 10 TABLET, FILM COATED ORAL at 09:11

## 2024-08-03 RX ADMIN — SPIRONOLACTONE 25 MG: 25 TABLET, FILM COATED ORAL at 09:08

## 2024-08-03 RX ADMIN — ENOXAPARIN SODIUM 40 MG: 40 INJECTION SUBCUTANEOUS at 20:48

## 2024-08-03 RX ADMIN — DIGOXIN 500 MCG: 125 TABLET ORAL at 09:08

## 2024-08-03 ASSESSMENT — ACTIVITIES OF DAILY LIVING (ADL)
ADLS_ACUITY_SCORE: 27

## 2024-08-03 NOTE — PROGRESS NOTES
4C OT: SLUMS completed 8/3, see score below (21/30).      08/03/24 1115   Appointment Info   Signing Clinician's Name / Credentials (OT) Alida Scott OTR/L   Living Environment   People in Home parent(s)   Current Living Arrangements house   Home Accessibility stairs to enter home;stairs within home   Number of Stairs, Main Entrance 1   Stair Railings, Main Entrance none   Number of Stairs, Within Home, Primary greater than 10 stairs;other (see comments)  (split level, 6+7 stairs)   Stair Railings, Within Home, Primary railings safe and in good condition   Transportation Anticipated family or friend will provide   Living Environment Comments Pt lives in a split level home w/ parents, 1 LISA, no hand rail. Pt has ~6 steps down, 7 steps up w/ hand rail present. Pt has a walk in shower, no shower chair or grab bars.   Self-Care   Usual Activity Tolerance good   Current Activity Tolerance good   Regular Exercise Yes   Activity/Exercise Type walking   Exercise Amount/Frequency 3-5 times/wk   Equipment Currently Used at Home none   Fall history within last six months no   Number of times patient has fallen within last six months   (Pt reports no falls, per chart review, 1 fall)   Activity/Exercise/Self-Care Comment Pt was IND w/ ADLs/functional mobility PTA. Denies fall history. No use of AD.   Instrumental Activities of Daily Living (IADL)   IADL Comments Shares IADLs w/ spouse. Assist available PRN w/ heavy ADL/IADL from family. Enjoys traveling, spending time with family and with dog.   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   Existing Precautions/Restrictions fall;cardiac   General Observations and Info Activity: Ambulate   Cognitive Status Examination   Orientation Status orientation to person, place and time   Cognitive Status Comments Cognition appears functionally intact, SLUMS completed, see below   Cognitive Screens/Assessments   Cognitive Assessments Completed SLUMS   [facility] Mental Status Exam (SLUMS):  Total Score out of /30 21   UMS Norms 21-26 equals mild neurocognitive disorder   SLUMS Domains assessed: orientation, memory, attention, executive functions   SLUMS Interpretation Administered SLUMS per MD request, pt scoring 21/30 indicating potential mild neurocognitive disorder per assessment scoring criteria. Pt losing points on subsections assessing numeric calculation and registration, delayed recall with interference, immediate recall with interference, registration and digit span, visual spatial and executive functions.   Visual Perception   Visual Impairment/Limitations corrective lenses full-time   Sensory   Sensory Quick Adds sensation intact   Pain Assessment   Patient Currently in Pain No   Posture   Posture not impaired   Range of Motion Comprehensive   General Range of Motion no range of motion deficits identified   Strength Comprehensive (MMT)   General Manual Muscle Testing (MMT) Assessment no strength deficits identified   Muscle Tone Assessment   Muscle Tone Quick Adds No deficits were identified   Coordination   Upper Extremity Coordination No deficits were identified   Transfers   Transfers sit-stand transfer   Sit-Stand Transfer   Sit-Stand [place] (Transfers) supervision   Activities of Daily Living   BADL Assessment/Intervention no deficits identified   Clinical Impression   Criteria for Skilled Therapeutic Interventions Met (OT) Yes, treatment indicated   OT Diagnosis  Risk for deconditioning given possible extended IP stay   OT Problem List-Impairments impacting ADL problems related to;strength;activity tolerance impaired   Assessment of Occupational Performance 1-3 Performance Deficits   Identified Performance Deficits Functional transfers/mobility, IADLs   Planned Therapy Interventions (OT) IADL retraining;home program guidelines;progressive activity/exercise;risk factor education;strengthening   Clinical Decision Making Complexity (OT) detailed assessment/moderate complexity   Risk & Benefits of therapy have been explained evaluation/treatment results reviewed;care plan/treatment goals reviewed;risks/benefits reviewed;current/potential barriers reviewed;participants voiced agreement with care plan;participants included;patient   Clinical Impression Comments Pt willl benefit from skilled OT services to progress IND w/ ADLs/IADLs and facilitate return to PLOF.   OT Total Evaluation Time   OT Eval, Moderate Complexity Minutes (31326) 15   OT Goals   Therapy Frequency (OT) 2 times/week   OT Predicted Duration/Target Date for Goal Attainment 10/04/24   OT Goals Cardiac Phase 1   OT: Understanding of cardiac education to maximize quality of life, condition management, and health outcomes Patient;Caregiver;Verbalize;Demonstrate   OT: Perform aerobic activity with stable cardiovascular response 15 minutes;ambulation;NuStep;treadmill   OT: Functional/aerobic ambulation tolerance with stable cardiovascular response in order to return to home and community environment Supervision/SBA;Greater than 300 feet;Goal Met   OT: Navigation of stairs simulating home set up with stable cardiovascular response in order to return to home and community environment 7 stairs;Supervision/SBA;Modified independent   Interventions   Interventions Quick Adds Therapeutic Activity   Therapeutic Activities   Therapeutic Activity Minutes (19502) 28   OT Discharge Planning   OT Plan CR- NuStep, HEP   OT Discharge  Recommendation (DC Rec) home with assist   OT Rationale for DC Rec Pt presents at/near baseline, anticipate will be safe to d/c home w/ A when medically ready. Will monitor and update recs pending medical course.   OT Brief overview of current status SBA-IND   Total Session Time   Timed Code Treatment Minutes 28   Total Session Time (sum of timed and untimed services) 43

## 2024-08-03 NOTE — PLAN OF CARE
Physical Therapy: Orders received. Chart reviewed and discussed with care team.? Physical Therapy not indicated due to patient demonstrating functional mobility at baseline independence with no acute PT needs per OT. OT following and will address impairments and Cardiac Rehab while awaiting transplant.? Defer discharge recommendations to OT and medical team.? Will complete orders. Please re-consult PT if any new needs arise.

## 2024-08-03 NOTE — PROGRESS NOTES
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       Subjective / Interval   Patient refers that he was feeling ok. Nipride was discontinued yesterday and she has been doing well with no concern for shortness of breath, chest pain or abdominal discomfort.     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       Todays Plan:  -Off Nipride   -Cont GDMT as tolerated   -Cardiac Rehab consult placed .     Neurological:  > Dannon disease      Per neurology, No neurological findings that would suggest poor neurological outcome as a result of cardiothoracic surgery      Plan:  No concerns     Respiratory:  > History of asthma      Plan:  Restart albuterol      Cardiovascular:  >Cardiogenic Shock SCAI B  >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  >History of SVT with preexitation   >S/P ICD sudden cardiac death prevention.       Hemodynamics     CVP PA - Mean  PCWP CO/CI PVR SVR MAP   07/31 20 51/29 - 36  3.13/1.68 1.9 1762      08/01  12  35/24/27 18 4/2.1   2.6 1194  77     08/02 5   23/16 13  5.7/3    960 79                       LVAD/Transplant Evaluation Checklist   [x] labs -   [x] CPX -   [x] RHC  [x] CVTS consult   [x] Social work consult  [] Palliative care consult   [x] Neuropsych consult - Not done anymore?  [] Nutrition consult    [] Dental   [x] Abd US   [x] extremity US and JUANA - Normal   [x] carotid US - Doesn't qualify   [] PFTs - not done patient is in the hospital   [] 6 minute walk  [x] CT head  [x] CT c/a/p   [x] colonoscopy - Doesn't qualify   [x] PSA  [x] Utox/nicotine and cotinine/PeTH      Inotrope: None   Vasodilators/Afterload: Nipride - MAP:<90 CI:>2.1  Diuresis: Bumetanide 1mg PO  Volume status: Hypervolemic and warm/cold  Advance Therapies: Undergoing evaluation   GDMT:   - BB: None   - ACEI/ARB/ARNI: Entresto 97/103  - SGLT2i: Jardiance 10mg  - MRA: Spirinolactone 25mg      No signs of organ dysfunction and or hypoperfusion so far. Making adequate UO. She benefits from GDMT optimization. Undergoing advance therapies evaluation.      Plan:  -Off Nipride    -Cont Entresto 97/103  -  ml LR given 2/2 concern for over diuresis   -Start Jardiance 10  -Start Spirinolactone 25  -Following hemodynamics  -Following organ function and perfusion   -Cardiac Rehab Ordered   -EP was consulted and recommended    > Digoxin 0.5mg PO daily for two days followed by 0.25mg PO daily.    > Once patient is optimized from a hemodynamic standpoint, low-dose beta-blocker may help as an adjunct agent     Gastrointestinal  S/P Cholecystectomy   - No acute concerns currently.      Plan:  Following up abdominal exam.      Renal   > WNL Cr and UO     Plan:  Following kidney function and UO      Hematology/Oncology   > No concerns     ID  #Hx of syphilis  Early, treated in Oct 2021 with Positive RPR and Treponema Ab who had an RPR Titer 1:32. Tested 8/2024 and found to have RPR titer 1:4. ID  was consulted during this admission, She was treated with PCN G x1 for early syphilis in 2021 and her titer dropped from 1:32 to 1:4. As such the treatment was effective. There is no new treatment needed.        Code Status: Full Code     Patient seen and discussed with Dr Fay who agrees with the plan detailed above. Please see attending addendum for  "changes to plan.      Edenilson Rivera MD   Cardiology Fellow       Physical Exam:   /71   Pulse 73   Temp 98.5  F (36.9  C) (Oral)   Resp 18   Ht 1.626 m (5' 4\")   Wt 78.1 kg (172 lb 2.9 oz)   SpO2 100%   BMI 29.55 kg/m    Temp:  [98.2  F (36.8  C)-98.6  F (37  C)] 98.5  F (36.9  C)  Pulse:  [] 73  Resp:  [15-27] 18  BP: ()/(59-73) 109/71  SpO2:  [96 %-100 %] 100 %  Wt Readings from Last 2 Encounters:   08/01/24 78.1 kg (172 lb 2.9 oz)   07/22/24 77.6 kg (171 lb 1.6 oz)         Intake/Output Summary (Last 24 hours) at 8/2/2024 1115  Last data filed at 8/2/2024 0925  Gross per 24 hour   Intake 428 ml   Output 3600 ml   Net -3172 ml          Exam:  General: In bed, in NAD  HEENT: PERRL, no scleral icterus or injection  CARDIAC: RRR, no m/r/g appreciated. Peripheral pulses dopplered  RESP: Mechanical ventilation; CTAB, no wheezes, rhonchi or crackles appreciated.  GI: soft, BS hypoactive  : Ortiz  EXTREMITIES: NO LE edema, pulses 2+. Femoral access site w/o bleeding, dressing c/d/i.   SKIN: No acute lesions appreciated  NEURO: Intubated and sedated       Labs:     CMP  Recent Labs   Lab 08/03/24  0532 08/02/24  1550 08/02/24  0356 08/01/24  1654 08/01/24  1653 08/01/24  0517 08/01/24  0514    140 138  --  140  --  138   POTASSIUM 4.1 3.6 3.7  --  3.7  --  3.6   CHLORIDE 101 100 102  --  103  --  104   CO2 23 23 24  --  25  --  25   ANIONGAP 14 17* 12  --  12  --  9   * 122* 107*  107* 96 98   < > 96   BUN 7.1 3.3* 3.0*  --  3.2*  --  5.2*   CR 0.64 0.56 0.49*  --  0.53  --  0.55   GFRESTIMATED >90 >90 >90  --  >90  --  >90   WHITNEY 9.0 10.0 9.2  --  9.5  --  8.7*   MAG 2.5* 1.8 1.7  --   --   --  1.9   PHOS  --   --  3.3  --   --   --   --    PROTTOTAL 7.9 8.3 7.1  --  7.3  --  6.4   ALBUMIN 4.3 4.5 4.0  --  4.2  --  3.8   BILITOTAL 0.6 0.7 0.6  --  0.7  --  0.7   ALKPHOS 77 78 62  --  60  --  50   AST 33 31 32  --  39  --  37   ALT 17 22 19  --  24  --  24    < > = values " in this interval not displayed.     CBC  Recent Labs   Lab 08/03/24  0532 08/02/24  0356 08/01/24  0514 07/31/24  1419   WBC 7.0 4.7 5.2 4.4   HGB 15.4 11.3* 11.2* 11.6*   HCT 49.1* 36.0 34.5* 37.4   MCV 84 85 83 85    226 238 250     INR  Recent Labs   Lab 08/02/24  0356 07/31/24  0858   INR 1.25* 1.25*     Arterial Blood Gas  Recent Labs   Lab 08/03/24  0532 08/02/24  2100 08/02/24  1326 08/02/24  0838   O2PER 21 21 21 21       Medications:     Current Facility-Administered Medications   Medication Dose Route Frequency Provider Last Rate Last Admin    empagliflozin (JARDIANCE) tablet 10 mg  10 mg Oral Daily Edenilson Rivera MD   10 mg at 08/03/24 0911    enoxaparin ANTICOAGULANT (LOVENOX) injection 40 mg  40 mg Subcutaneous Q24H Edenilson Rivera MD   40 mg at 08/02/24 2010    sacubitril-valsartan (ENTRESTO)  MG per tablet 1 tablet  1 tablet Oral BID Erika Gan MD   1 tablet at 08/03/24 0911    sodium chloride (PF) 0.9% PF flush 3 mL  3 mL Intracatheter Q8H Edenilson Rivera MD   3 mL at 08/02/24 1240    spironolactone (ALDACTONE) tablet 25 mg  25 mg Oral Daily Edenilson Rivera MD   25 mg at 08/03/24 0908       Current Facility-Administered Medications   Medication Dose Route Frequency Provider Last Rate Last Admin    medication instruction   Does not apply Continuous PRN Edenilson Rivera MD           Recent Imaging:

## 2024-08-03 NOTE — PLAN OF CARE
Problem: Adult Inpatient Plan of Care  Goal: Plan of Care Review  Description: The Plan of Care Review/Shift note should be completed every shift.  The Outcome Evaluation is a brief statement about your assessment that the patient is improving, declining, or no change.  This information will be displayed automatically on your shift  note.  Outcome: Progressing   Goal Outcome Evaluation:

## 2024-08-03 NOTE — PLAN OF CARE
ICU End of Shift Summary. See flowsheets for vital signs and detailed assessment.    Changes this shift: Nipride gtt weaned off at 1240. [place] removed at 1750 per Cards 2 request. See emar for FICKs. Multiple runs of SVT 160s-180s this afternoon/evening; able to self-convert with Valsalva maneuvers which patient is mostly independently initiating based on feeling heart racing and/or seeing monitor alarm. Digoxin started this evening per EP. Electrolytes checked as well due to large UO today with diuresis, K+ and Mag replaced. LA 3.1, 2.9; MD aware. Many family members at bedside today and updated by team, all questions answered as they arose.     Plan:  Can transfer to  if bed becomes available. Continue to closely monitor VS with known SVT. Update team with changes. Update family if patient moves rooms and with any acute changes.      Goal Outcome Evaluation:      Plan of Care Reviewed With: patient, parent    Overall Patient Progress: improvingOverall Patient Progress: improving    Outcome Evaluation: Nipride off, [place] out, transfer orders in place

## 2024-08-04 LAB
ALBUMIN SERPL BCG-MCNC: 4 G/DL (ref 3.5–5.2)
ALBUMIN SERPL BCG-MCNC: 4.2 G/DL (ref 3.5–5.2)
ALP SERPL-CCNC: 74 U/L (ref 40–150)
ALP SERPL-CCNC: 81 U/L (ref 40–150)
ALT SERPL W P-5'-P-CCNC: 25 U/L (ref 0–50)
ALT SERPL W P-5'-P-CCNC: 32 U/L (ref 0–50)
ANION GAP SERPL CALCULATED.3IONS-SCNC: 11 MMOL/L (ref 7–15)
ANION GAP SERPL CALCULATED.3IONS-SCNC: 13 MMOL/L (ref 7–15)
AST SERPL W P-5'-P-CCNC: 39 U/L (ref 0–45)
AST SERPL W P-5'-P-CCNC: 42 U/L (ref 0–45)
BASOPHILS # BLD AUTO: 0.1 10E3/UL (ref 0–0.2)
BASOPHILS NFR BLD AUTO: 1 %
BILIRUB SERPL-MCNC: 0.5 MG/DL
BILIRUB SERPL-MCNC: 0.7 MG/DL
BUN SERPL-MCNC: 14.3 MG/DL (ref 6–20)
BUN SERPL-MCNC: 7.8 MG/DL (ref 6–20)
CALCIUM SERPL-MCNC: 9.5 MG/DL (ref 8.8–10.4)
CALCIUM SERPL-MCNC: 9.7 MG/DL (ref 8.8–10.4)
CHLORIDE SERPL-SCNC: 101 MMOL/L (ref 98–107)
CHLORIDE SERPL-SCNC: 102 MMOL/L (ref 98–107)
CREAT SERPL-MCNC: 0.54 MG/DL (ref 0.51–0.95)
CREAT SERPL-MCNC: 0.55 MG/DL (ref 0.51–0.95)
EGFRCR SERPLBLD CKD-EPI 2021: >90 ML/MIN/1.73M2
EGFRCR SERPLBLD CKD-EPI 2021: >90 ML/MIN/1.73M2
EOSINOPHIL # BLD AUTO: 0.1 10E3/UL (ref 0–0.7)
EOSINOPHIL NFR BLD AUTO: 2 %
ERYTHROCYTE [DISTWIDTH] IN BLOOD BY AUTOMATED COUNT: 15.5 % (ref 10–15)
GLUCOSE SERPL-MCNC: 144 MG/DL (ref 70–99)
GLUCOSE SERPL-MCNC: 97 MG/DL (ref 70–99)
HCO3 SERPL-SCNC: 22 MMOL/L (ref 22–29)
HCO3 SERPL-SCNC: 24 MMOL/L (ref 22–29)
HCT VFR BLD AUTO: 48.7 % (ref 35–47)
HGB BLD-MCNC: 15.4 G/DL (ref 11.7–15.7)
IMM GRANULOCYTES # BLD: 0 10E3/UL
IMM GRANULOCYTES NFR BLD: 0 %
LABORATORY REPORT: NORMAL
LYMPHOCYTES # BLD AUTO: 3 10E3/UL (ref 0.8–5.3)
LYMPHOCYTES NFR BLD AUTO: 50 %
MAGNESIUM SERPL-MCNC: 2.1 MG/DL (ref 1.7–2.3)
MCH RBC QN AUTO: 26.7 PG (ref 26.5–33)
MCHC RBC AUTO-ENTMCNC: 31.6 G/DL (ref 31.5–36.5)
MCV RBC AUTO: 85 FL (ref 78–100)
MONOCYTES # BLD AUTO: 0.6 10E3/UL (ref 0–1.3)
MONOCYTES NFR BLD AUTO: 10 %
NEUTROPHILS # BLD AUTO: 2.3 10E3/UL (ref 1.6–8.3)
NEUTROPHILS NFR BLD AUTO: 37 %
NRBC # BLD AUTO: 0 10E3/UL
NRBC BLD AUTO-RTO: 0 /100
PETH INTERPRETATION: NORMAL
PLATELET # BLD AUTO: 282 10E3/UL (ref 150–450)
PLPETH BLD-MCNC: <10 NG/ML
POPETH BLD-MCNC: <10 NG/ML
POTASSIUM SERPL-SCNC: 3.9 MMOL/L (ref 3.4–5.3)
POTASSIUM SERPL-SCNC: 4.5 MMOL/L (ref 3.4–5.3)
PROT SERPL-MCNC: 7.3 G/DL (ref 6.4–8.3)
PROT SERPL-MCNC: 7.8 G/DL (ref 6.4–8.3)
RBC # BLD AUTO: 5.76 10E6/UL (ref 3.8–5.2)
SODIUM SERPL-SCNC: 136 MMOL/L (ref 135–145)
SODIUM SERPL-SCNC: 137 MMOL/L (ref 135–145)
WBC # BLD AUTO: 6.1 10E3/UL (ref 4–11)

## 2024-08-04 PROCEDURE — 85025 COMPLETE CBC W/AUTO DIFF WBC: CPT | Performed by: INTERNAL MEDICINE

## 2024-08-04 PROCEDURE — 99232 SBSQ HOSP IP/OBS MODERATE 35: CPT | Mod: GC | Performed by: STUDENT IN AN ORGANIZED HEALTH CARE EDUCATION/TRAINING PROGRAM

## 2024-08-04 PROCEDURE — 250N000011 HC RX IP 250 OP 636: Performed by: INTERNAL MEDICINE

## 2024-08-04 PROCEDURE — 250N000013 HC RX MED GY IP 250 OP 250 PS 637: Performed by: INTERNAL MEDICINE

## 2024-08-04 PROCEDURE — 83735 ASSAY OF MAGNESIUM: CPT | Performed by: INTERNAL MEDICINE

## 2024-08-04 PROCEDURE — 250N000013 HC RX MED GY IP 250 OP 250 PS 637: Performed by: STUDENT IN AN ORGANIZED HEALTH CARE EDUCATION/TRAINING PROGRAM

## 2024-08-04 PROCEDURE — 80053 COMPREHEN METABOLIC PANEL: CPT | Performed by: INTERNAL MEDICINE

## 2024-08-04 PROCEDURE — 36415 COLL VENOUS BLD VENIPUNCTURE: CPT | Performed by: INTERNAL MEDICINE

## 2024-08-04 PROCEDURE — 84450 TRANSFERASE (AST) (SGOT): CPT | Performed by: INTERNAL MEDICINE

## 2024-08-04 PROCEDURE — 250N000013 HC RX MED GY IP 250 OP 250 PS 637

## 2024-08-04 PROCEDURE — 99254 IP/OBS CNSLTJ NEW/EST MOD 60: CPT | Performed by: STUDENT IN AN ORGANIZED HEALTH CARE EDUCATION/TRAINING PROGRAM

## 2024-08-04 PROCEDURE — 84155 ASSAY OF PROTEIN SERUM: CPT | Performed by: INTERNAL MEDICINE

## 2024-08-04 PROCEDURE — 120N000003 HC R&B IMCU UMMC

## 2024-08-04 RX ORDER — FUROSEMIDE 20 MG
20 TABLET ORAL DAILY
Status: DISCONTINUED | OUTPATIENT
Start: 2024-08-04 | End: 2024-08-05 | Stop reason: HOSPADM

## 2024-08-04 RX ORDER — CARVEDILOL 3.12 MG/1
3.12 TABLET ORAL 2 TIMES DAILY WITH MEALS
Status: DISCONTINUED | OUTPATIENT
Start: 2024-08-04 | End: 2024-08-05 | Stop reason: HOSPADM

## 2024-08-04 RX ORDER — DIGOXIN 125 MCG
250 TABLET ORAL DAILY
Status: DISCONTINUED | OUTPATIENT
Start: 2024-08-04 | End: 2024-08-05 | Stop reason: HOSPADM

## 2024-08-04 RX ADMIN — FUROSEMIDE 20 MG: 20 TABLET ORAL at 10:45

## 2024-08-04 RX ADMIN — EMPAGLIFLOZIN 10 MG: 10 TABLET, FILM COATED ORAL at 08:32

## 2024-08-04 RX ADMIN — SPIRONOLACTONE 25 MG: 25 TABLET, FILM COATED ORAL at 08:32

## 2024-08-04 RX ADMIN — CARVEDILOL 3.12 MG: 3.12 TABLET, FILM COATED ORAL at 10:45

## 2024-08-04 RX ADMIN — DIGOXIN 250 MCG: 125 TABLET ORAL at 08:31

## 2024-08-04 RX ADMIN — SACUBITRIL AND VALSARTAN 1 TABLET: 97; 103 TABLET, FILM COATED ORAL at 08:32

## 2024-08-04 RX ADMIN — ENOXAPARIN SODIUM 40 MG: 40 INJECTION SUBCUTANEOUS at 20:12

## 2024-08-04 RX ADMIN — CARVEDILOL 3.12 MG: 3.12 TABLET, FILM COATED ORAL at 18:25

## 2024-08-04 RX ADMIN — SACUBITRIL AND VALSARTAN 1 TABLET: 97; 103 TABLET, FILM COATED ORAL at 20:12

## 2024-08-04 ASSESSMENT — ACTIVITIES OF DAILY LIVING (ADL)
ADLS_ACUITY_SCORE: 25
ADLS_ACUITY_SCORE: 27
ADLS_ACUITY_SCORE: 25
ADLS_ACUITY_SCORE: 25
ADLS_ACUITY_SCORE: 27
ADLS_ACUITY_SCORE: 27
ADLS_ACUITY_SCORE: 25
ADLS_ACUITY_SCORE: 27
ADLS_ACUITY_SCORE: 25
ADLS_ACUITY_SCORE: 25
ADLS_ACUITY_SCORE: 27
ADLS_ACUITY_SCORE: 25
ADLS_ACUITY_SCORE: 27
ADLS_ACUITY_SCORE: 27
ADLS_ACUITY_SCORE: 25
ADLS_ACUITY_SCORE: 25
ADLS_ACUITY_SCORE: 24
ADLS_ACUITY_SCORE: 24
ADLS_ACUITY_SCORE: 27
ADLS_ACUITY_SCORE: 25
ADLS_ACUITY_SCORE: 24

## 2024-08-04 NOTE — PLAN OF CARE
Goal Outcome Evaluation:      Plan of Care Reviewed With: patient, parent    Overall Patient Progress: improvingOverall Patient Progress: improving    Outcome Evaluation: Cleared for lower level of care.    Major Shift Events:  4 events of SVT, pt able to self convert back to sinus rhythm using valsalva maneuvers.     Plan: 6C orders, awaiting bed availabilty. Possible discharge tomorrow or Tuesday.     For vital signs and complete assessments, please see documentation flowsheets.

## 2024-08-04 NOTE — PROGRESS NOTES
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       Subjective / Interval   No acute events over the night. She had 3 episodes of SVT that resolved with vagal maneuvers. She denies any concern for chest pain, shortness of breath, abdominal pain or syncope.     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       Todays Plan:  -Started on Coreg 3.125 mg BiD    -Started on Lasix 20 mg PO   -Cardiac Rehab consult placed .     #Cardiogenic Shock SCAI B  #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  #History of SVT with preexitation   #S/P ICD sudden cardiac death prevention.       Hemodynamics     CVP PA - Mean  PCWP CO/CI PVR SVR MAP   07/31 20 51/29 - 36  3.13/1.68 1.9 1762      08/01  12  35/24/27 18 4/2.1   2.6 1194  77     08/02 5   23/16 13  5.7/3    960 79                       LVAD/Transplant Evaluation Checklist   [x] labs -   [x] CPX -   [x] RHC  [x] CVTS consult   [x] Social work consult  [  ] Palliative care consult   [x] Neuropsych consult - Not done anymore?  [  ] Nutrition consult   [  ] Dental   [x] Abd US   [x] extremity US and JUANA - Normal   [x] carotid US - Doesn't qualify   [] PFTs - not done patient is in the hospital   [] 6 minute walk  [x] CT head  [x] CT c/a/p   [x] colonoscopy - Doesn't qualify   [x] PSA  [x]  "Utox/nicotine and cotinine/PeTH      Inotrope: None   Vasodilators/Afterload: Nipride - MAP:<90 CI:>2.1  Diuresis: Bumetanide 1 mg PO  Volume status: Hypervolemic and warm/cold  Advance Therapies: Undergoing evaluation   GDMT:   - BB: Started on Coreg 3.125 mg BiD    - ACEI/ARB/ARNI: Entresto 97/103  - SGLT2i: Jardiance 10mg  - MRA: Spirinolactone 25mg      No signs of organ dysfunction and or hypoperfusion so far. Making adequate UO. She benefits from GDMT optimization. Undergoing advance therapies evaluation.      Plan:  -Off Nipride    - Cont Entresto 97/103  -  ml LR given 2/2 concern for over diuresis   - Start Jardiance 10  - Start Spirinolactone 25  - Started on Lasix 20 mg PO  - Cardiac Rehab Ordered   - EP was consulted and recommended    > Digoxin 0.5mg PO daily for two days followed by 0.25mg PO daily.    > Once patient is optimized from a hemodynamic standpoint, low-dose beta-blocker may help as an adjunct agent     #S/P Cholecystectomy   - No acute concerns currently.     #Hx of syphilis  Early, treated in Oct 2021 with Positive RPR and Treponema Ab who had an RPR Titer 1:32. Tested 8/2024 and found to have RPR titer 1:4. ID  was consulted during this admission, She was treated with PCN G x1 for early syphilis in 2021 and her titer dropped from 1:32 to 1:4. As such the treatment was effective. There is no new treatment needed.     #Dannon disease   - Continue to monitor     #MDD  - Hold PTA Sertraline     #History of asthma   - Restart albuterol      Code Status: Full Code     Patient seen and discussed with Dr Fay who agrees with the plan detailed above. Please see attending addendum for changes to plan.      Edenilson Rivera MD   Cardiology Fellow       Physical Exam:   /77   Pulse 87   Temp 98.4  F (36.9  C) (Oral)   Resp 15   Ht 1.626 m (5' 4\")   Wt 69 kg (152 lb 1.9 oz)   SpO2 100%   BMI 26.11 kg/m    Temp:  [98.1  F (36.7  C)-98.4  F (36.9  C)] 98.4  F (36.9 "  C)  Pulse:  [] 87  Resp:  [15-26] 15  BP: ()/(57-87) 106/77  SpO2:  [99 %-100 %] 100 %  Wt Readings from Last 2 Encounters:   08/03/24 69 kg (152 lb 1.9 oz)   07/22/24 77.6 kg (171 lb 1.6 oz)         Intake/Output Summary (Last 24 hours) at 8/2/2024 1115  Last data filed at 8/2/2024 0925  Gross per 24 hour   Intake 428 ml   Output 3600 ml   Net -3172 ml          Exam:  General: In bed, in NAD  HEENT: PERRL, no scleral icterus or injection  CARDIAC: RRR, no m/r/g appreciated. Peripheral pulses dopplered  RESP: Mechanical ventilation; CTAB, no wheezes, rhonchi or crackles appreciated.  GI: soft, BS hypoactive  : Ortiz  EXTREMITIES: NO LE edema, pulses 2+. Femoral access site w/o bleeding, dressing c/d/i.   SKIN: No acute lesions appreciated  NEURO: Intubated and sedated       Labs:     CMP  Recent Labs   Lab 08/04/24  0548 08/03/24  1615 08/03/24  0532 08/02/24  1550 08/02/24  0356    139 138 140 138   POTASSIUM 4.5 4.3 4.1 3.6 3.7   CHLORIDE 102 101 101 100 102   CO2 24 27 23 23 24   ANIONGAP 11 11 14 17* 12   GLC 97 119* 123* 122* 107*  107*   BUN 7.8 6.0 7.1 3.3* 3.0*   CR 0.55 0.64 0.64 0.56 0.49*   GFRESTIMATED >90 >90 >90 >90 >90   WHITNEY 9.5 9.7 9.0 10.0 9.2   MAG 2.1  --  2.5* 1.8 1.7   PHOS  --   --   --   --  3.3   PROTTOTAL 7.3 7.8 7.9 8.3 7.1   ALBUMIN 4.0 4.3 4.3 4.5 4.0   BILITOTAL 0.7 0.6 0.6 0.7 0.6   ALKPHOS 74 79 77 78 62   AST 42 40 33 31 32   ALT 32 25 17 22 19     CBC  Recent Labs   Lab 08/04/24  0548 08/03/24  0532 08/02/24  0356 08/01/24  0514   WBC 6.1 7.0 4.7 5.2   HGB 15.4 15.4 11.3* 11.2*   HCT 48.7* 49.1* 36.0 34.5*   MCV 85 84 85 83    292 226 238     INR  Recent Labs   Lab 08/02/24  0356 07/31/24  0858   INR 1.25* 1.25*     Arterial Blood Gas  Recent Labs   Lab 08/03/24  0532 08/02/24  2100 08/02/24  1326 08/02/24  0838   O2PER 21 21 21 21       Medications:     Current Facility-Administered Medications   Medication Dose Route Frequency Provider Last Rate Last  Admin    carvedilol (COREG) tablet 3.125 mg  3.125 mg Oral BID w/meals Mallory Guan MD   3.125 mg at 08/04/24 1045    digoxin (LANOXIN) tablet 250 mcg  250 mcg Oral Daily Mallory Guan MD   250 mcg at 08/04/24 0831    empagliflozin (JARDIANCE) tablet 10 mg  10 mg Oral Daily Edenilson Rivera MD   10 mg at 08/04/24 0832    enoxaparin ANTICOAGULANT (LOVENOX) injection 40 mg  40 mg Subcutaneous Q24H Edenilson Rivera MD   40 mg at 08/03/24 2048    furosemide (LASIX) tablet 20 mg  20 mg Oral Daily Mallory Guan MD   20 mg at 08/04/24 1045    sacubitril-valsartan (ENTRESTO)  MG per tablet 1 tablet  1 tablet Oral BID Erika Gan MD   1 tablet at 08/04/24 0832    sodium chloride (PF) 0.9% PF flush 3 mL  3 mL Intracatheter Q8H Edenilson Rivera MD   3 mL at 08/04/24 0415    spironolactone (ALDACTONE) tablet 25 mg  25 mg Oral Daily Edenilson Rivera MD   25 mg at 08/04/24 0832       Current Facility-Administered Medications   Medication Dose Route Frequency Provider Last Rate Last Admin    medication instruction   Does not apply Continuous PRN Edenilson Rivera MD           Recent Imaging:

## 2024-08-04 NOTE — CONSULTS
"Palliative Care Consultation Note  [facility]      Patient: Mary Shaikh  Date of Admission:  7/31/2024    Requesting Clinician / Team: Cardiology  Reason for consult: Goals of care as a part of advanced therapies work up     Recommendations & Counseling     GOALS OF CARE:   Life-prolonging without limits   See full list of transplant/LVAD assessment questions and answers below.   I introduced the role of palliative care team, and our role in patient support in and out of the hospital, before and after heart transplant or LVAD placement. She demonstrated good understanding of her heart disease and the options that have been discussed. She has strong social support from her family and good coping skills. We talked about the complications that can rarely happen with a transplant but can be quite devastating. These include developing chronic critical illness including needing a tracheostomy and long-term ventilation, dialysis, and/or strokes. We discussed what outcomes would be acceptable and what outcomes would not be acceptable to her and potential situations that she would want doctors and loved ones to know that she would not want to be kept alive. We also discussed surviving the transplant but \"not recovering,\" and the various levels of that including being disabled but still being able to live in the community to having to live in a nursing home/facility the rest of his/her life. She has had many experiences with her own health and her family's which inform her decisions and views of her healthcare.  She was engaged in the process, and understands the risks of the these procedures, but is hopeful this will help her improve, or at least maintain her current quality of life and live longer. I have no concerns from a palliative standpoint with going forward with LVAD placement or heart transplant.      ADVANCE CARE PLANNING:  Advanced Care Planning Facilitation " requested  to complete an Advance Directive. Patient requests her mother be named as Health Care Agent.  They have the blank paperwork to complete the healthcare directive and are planning to do so.    There is no POLST form on file, defer to patient and/or next of kin for decisions   Code status: Full Code    MEDICAL MANAGEMENT:   We are not actively managing symptoms at this time.    PSYCHOSOCIAL/SPIRITUAL:  Family - Parents, 4 siblings, grandparents, and aunts/uncles      Palliative Care will sign off. Thank you for the consult and allowing us to aid in the care of Mary Shaikh. Please let us know if we can be of assistance in the future.       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 She was admitted with ambulatory cardiogenic shock with elevated filling pressures and severe biventricular dysfunction on 8/1 for swan-guided therapies with IV afterload reduction and diuresis. Hospital course thus far c/b paroxysmal SVT. Palliative care consulted as part of advanced therapies workup.     Today, the patient was seen for:  HFrEF 2/2 Dannon Disease  HCM  Paroxysmal SVT, s/p ICD  Heart Transplant evaluation  Advanced care planning and goals of care discussion    Palliative Care Summary:   Met with patient and her mom at bedside. Explained role of palliative care assessment in heart transplant/LVAD workup including introductory conversation to goals of care and conversations about healthcare wishes in certain situations.     She describes feeling overwhelmed by how quickly the plan of care has changed in the  "conversations about advanced therapies has increased.  Mom notes that they had expected this to be a single day procedure and to be able to have time to process and work through advanced therapies workup, particularly given how involved the last couple months have been for her with regards to procedures (ICD was placed and she had an ablation in June and then she had her gallbladder out 2 weeks ago).  Patient states that when she does feel overwhelmed, she tries to meditate as well as reframing her thoughts, that all of this happening is not necessarily a bad thing.    Palliative Care questions for pre- LVAD/Heart Transplant patients:    What are your personal hopes/goals for receiving the transplant/LVAD?  States that she would like to be able to get back to some semblance of \"normal life\" and be herself.  She would like to be less anxious about leaving her home when she is alone, and she would like to be able to get back to work.  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    What are the activities/abilities that make your life worth living?  Being independent and spending meaningful time with family.    What does a typical day look like for you?  During the week, she stays home a lot, as she is fearful about venturing out of her home alone and having a cardiac event.  She will take her dog on walks.  On the weekends, she will get out with her mom for shopping trips.    There are risks for kidney failure in patients with advancing heart disease who need heart transplant or LVAD support.  The places/locations that offer dialysis and accept patients with LVADs may be limited in your community.  What do you know about dialysis? How would your possible need for dialysis influence your quality of life?  At this time, she does not have any thoughts about dialysis and no set limitations regarding this therapy.    The need to be on a ventilator/breathing machine through a " tracheostomy (a tube in your neck) is a risk with LVAD and heart transplant. What are with circumstances you would want your medical providers and family to keep you alive with long term mechanical ventilation?At this time, she thinks she would be open to longer term vent weaning if necessary.    Surgery for heart transplant and LVAD placement carry a risk of stroke which, for some people, may limit their ability to communicate their wishes to others.  After a stroke, what sort of outcomes would be unacceptable to you (ie needing to live in nursing facility, loss of independence.. Etc.) While she does value her independence, she did not have any ideas about limitations or outcomes that would be unacceptable after a stroke at this time.    Prognosis, Goals, & Planning:    Functional Status just prior to this current hospitalization:  Outpatient Palliative Performance Score (PPS) = Around 70%.    Prognosis, Goals, and/or Advance Care Planning:  See above    Code Status was addressed today:   No patient is full code.    Patient's decision making preferences: shared with support from loved ones        Patient has decision-making capacity today for complex decisions:Intact            Coping, Meaning, & Spirituality:   Mood, coping, and/or meaning in the context of serious illness were addressed today: Yes. Acknowledges fear/anxiety about being away from home alone in case she has a cardiac event.    Social:   -Lives with Parents. 4 siblings (1 sister in [place], 1 brother in [employer] currently stationed in [facility], and 2 brothers locally).  -Has a dog  -Went to [employer] school    Medications:  I have reviewed this patient's medication profile and medications from this hospitalization.     ROS:  Comprehensive ROS is reviewed and is negative except as here & per HPI:     Physical Exam   Vital Signs with Ranges  Temp:  [98.1  F (36.7  C)-98.4  F (36.9  C)] 98.4  F (36.9  C)  Pulse:  [] 87  Resp:  [15-26] 15  BP:  ()/(57-87) 106/77  SpO2:  [99 %-100 %] 100 %  152 lbs 1.88 oz    PHYSICAL EXAM:  Vitals were reviewed  Temp: 98.4  F (36.9  C) Temp src: Oral BP: 106/77 Pulse: 87   Resp: 15 SpO2: 100 % O2 Device: None (Room air)    Gen: Sitting up on edge of bed. NAD.  Eyes: Conjunctiva clear. Sclera anicteric.  HENT: No trauma; Moist mucous membranes.  Resp: No increased work of breathing.  Msk: no gross deformity, No sarcopenia  Skin:  no jaundice  Ext: warm, well perfused.   Neuro: A&O x 3; CN II-XII grossly intact  Mental status/Psych: Does not appear overtly anxious or depressed.    Data reviewed:  Results for orders placed or performed during the hospital encounter of 07/31/24 (from the past 24 hour(s))   Comprehensive metabolic panel   Result Value Ref Range    Sodium 139 135 - 145 mmol/L    Potassium 4.3 3.4 - 5.3 mmol/L    Carbon Dioxide (CO2) 27 22 - 29 mmol/L    Anion Gap 11 7 - 15 mmol/L    Urea Nitrogen 6.0 6.0 - 20.0 mg/dL    Creatinine 0.64 0.51 - 0.95 mg/dL    GFR Estimate >90 >60 mL/min/1.73m2    Calcium 9.7 8.8 - 10.4 mg/dL    Chloride 101 98 - 107 mmol/L    Glucose 119 (H) 70 - 99 mg/dL    Alkaline Phosphatase 79 40 - 150 U/L    AST 40 0 - 45 U/L    ALT 25 0 - 50 U/L    Protein Total 7.8 6.4 - 8.3 g/dL    Albumin 4.3 3.5 - 5.2 g/dL    Bilirubin Total 0.6 <=1.2 mg/dL   Lactic acid whole blood   Result Value Ref Range    Lactic Acid 1.8 0.7 - 2.0 mmol/L   CBC with Platelets & Differential    Narrative    The following orders were created for panel order CBC with Platelets & Differential.  Procedure                               Abnormality         Status                     ---------                               -----------         ------                     CBC with platelets and d...[[id number]]  Abnormal            Final result                 Please view results for these tests on the individual orders.   Comprehensive metabolic panel   Result Value Ref Range    Sodium 137 135 - 145 mmol/L    Potassium  4.5 3.4 - 5.3 mmol/L    Carbon Dioxide (CO2) 24 22 - 29 mmol/L    Anion Gap 11 7 - 15 mmol/L    Urea Nitrogen 7.8 6.0 - 20.0 mg/dL    Creatinine 0.55 0.51 - 0.95 mg/dL    GFR Estimate >90 >60 mL/min/1.73m2    Calcium 9.5 8.8 - 10.4 mg/dL    Chloride 102 98 - 107 mmol/L    Glucose 97 70 - 99 mg/dL    Alkaline Phosphatase 74 40 - 150 U/L    AST 42 0 - 45 U/L    ALT 32 0 - 50 U/L    Protein Total 7.3 6.4 - 8.3 g/dL    Albumin 4.0 3.5 - 5.2 g/dL    Bilirubin Total 0.7 <=1.2 mg/dL   Magnesium   Result Value Ref Range    Magnesium 2.1 1.7 - 2.3 mg/dL   CBC with platelets and differential   Result Value Ref Range    WBC Count 6.1 4.0 - 11.0 10e3/uL    RBC Count 5.76 (H) 3.80 - 5.20 10e6/uL    Hemoglobin 15.4 11.7 - 15.7 g/dL    Hematocrit 48.7 (H) 35.0 - 47.0 %    MCV 85 78 - 100 fL    MCH 26.7 26.5 - 33.0 pg    MCHC 31.6 31.5 - 36.5 g/dL    RDW 15.5 (H) 10.0 - 15.0 %    Platelet Count 282 150 - 450 10e3/uL    % Neutrophils 37 %    % Lymphocytes 50 %    % Monocytes 10 %    % Eosinophils 2 %    % Basophils 1 %    % Immature Granulocytes 0 %    NRBCs per 100 WBC 0 <1 /100    Absolute Neutrophils 2.3 1.6 - 8.3 10e3/uL    Absolute Lymphocytes 3.0 0.8 - 5.3 10e3/uL    Absolute Monocytes 0.6 0.0 - 1.3 10e3/uL    Absolute Eosinophils 0.1 0.0 - 0.7 10e3/uL    Absolute Basophils 0.1 0.0 - 0.2 10e3/uL    Absolute Immature Granulocytes 0.0 <=0.4 10e3/uL    Absolute NRBCs 0.0 10e3/uL          60 MINUTES SPENT BY ME on the date of service doing chart review, history, exam, documentation & further activities per the note.

## 2024-08-04 NOTE — PROGRESS NOTES
ICU End of Shift Summary. See flowsheets for vital signs and detailed assessment.    Changes this shift: No acute changes this shift.    Plan: Continue plan of care

## 2024-08-05 ENCOUNTER — DOCUMENTATION ONLY (OUTPATIENT)
Dept: TRANSPLANT | Facility: CLINIC | Age: 24
End: 2024-08-05

## 2024-08-05 ENCOUNTER — ANCILLARY PROCEDURE (OUTPATIENT)
Dept: CARDIOLOGY | Facility: CLINIC | Age: 24
DRG: 286 | End: 2024-08-05
Attending: INTERNAL MEDICINE
Payer: COMMERCIAL

## 2024-08-05 ENCOUNTER — DOCUMENTATION ONLY (OUTPATIENT)
Dept: OTHER | Facility: CLINIC | Age: 24
End: 2024-08-05

## 2024-08-05 VITALS
TEMPERATURE: 97 F | BODY MASS INDEX: 26.82 KG/M2 | DIASTOLIC BLOOD PRESSURE: 69 MMHG | RESPIRATION RATE: 16 BRPM | HEART RATE: 87 BPM | HEIGHT: 64 IN | SYSTOLIC BLOOD PRESSURE: 104 MMHG | OXYGEN SATURATION: 98 % | WEIGHT: 157.1 LBS

## 2024-08-05 DIAGNOSIS — I50.20 HEART FAILURE WITH REDUCED EJECTION FRACTION (H): ICD-10-CM

## 2024-08-05 DIAGNOSIS — E74.05 DANON DISEASE IN HETEROZYGOUS FEMALE (H): Primary | ICD-10-CM

## 2024-08-05 LAB
ALBUMIN SERPL BCG-MCNC: 4.1 G/DL (ref 3.5–5.2)
ALP SERPL-CCNC: 72 U/L (ref 40–150)
ALT SERPL W P-5'-P-CCNC: 24 U/L (ref 0–50)
ANION GAP SERPL CALCULATED.3IONS-SCNC: 11 MMOL/L (ref 7–15)
AST SERPL W P-5'-P-CCNC: 46 U/L (ref 0–45)
BASOPHILS # BLD AUTO: 0.1 10E3/UL (ref 0–0.2)
BASOPHILS NFR BLD AUTO: 2 %
BILIRUB SERPL-MCNC: 0.6 MG/DL
BUN SERPL-MCNC: 11.6 MG/DL (ref 6–20)
CALCIUM SERPL-MCNC: 9.7 MG/DL (ref 8.8–10.4)
CHLORIDE SERPL-SCNC: 103 MMOL/L (ref 98–107)
COTININE SERPL-MCNC: <5 NG/ML
CREAT SERPL-MCNC: 0.51 MG/DL (ref 0.51–0.95)
EGFRCR SERPLBLD CKD-EPI 2021: >90 ML/MIN/1.73M2
EOSINOPHIL # BLD AUTO: 0.1 10E3/UL (ref 0–0.7)
EOSINOPHIL NFR BLD AUTO: 3 %
ERYTHROCYTE [DISTWIDTH] IN BLOOD BY AUTOMATED COUNT: 15.3 % (ref 10–15)
GLUCOSE SERPL-MCNC: 93 MG/DL (ref 70–99)
HCO3 SERPL-SCNC: 23 MMOL/L (ref 22–29)
HCT VFR BLD AUTO: 46.7 % (ref 35–47)
HGB BLD-MCNC: 14.5 G/DL (ref 11.7–15.7)
IMM GRANULOCYTES # BLD: 0 10E3/UL
IMM GRANULOCYTES NFR BLD: 0 %
LYMPHOCYTES # BLD AUTO: 3.1 10E3/UL (ref 0.8–5.3)
LYMPHOCYTES NFR BLD AUTO: 57 %
MAGNESIUM SERPL-MCNC: 2 MG/DL (ref 1.7–2.3)
MCH RBC QN AUTO: 26.2 PG (ref 26.5–33)
MCHC RBC AUTO-ENTMCNC: 31 G/DL (ref 31.5–36.5)
MCV RBC AUTO: 84 FL (ref 78–100)
MDC_IDC_LEAD_CONNECTION_STATUS: NORMAL
MDC_IDC_LEAD_CONNECTION_STATUS: NORMAL
MDC_IDC_LEAD_IMPLANT_DT: NORMAL
MDC_IDC_LEAD_IMPLANT_DT: NORMAL
MDC_IDC_LEAD_LOCATION: NORMAL
MDC_IDC_LEAD_LOCATION: NORMAL
MDC_IDC_LEAD_LOCATION_DETAIL_1: NORMAL
MDC_IDC_LEAD_LOCATION_DETAIL_1: NORMAL
MDC_IDC_LEAD_MFG: NORMAL
MDC_IDC_LEAD_MFG: NORMAL
MDC_IDC_LEAD_MODEL: NORMAL
MDC_IDC_LEAD_MODEL: NORMAL
MDC_IDC_LEAD_POLARITY_TYPE: NORMAL
MDC_IDC_LEAD_POLARITY_TYPE: NORMAL
MDC_IDC_LEAD_SERIAL: NORMAL
MDC_IDC_LEAD_SERIAL: NORMAL
MDC_IDC_MSMT_BATTERY_DTM: NORMAL
MDC_IDC_MSMT_BATTERY_REMAINING_LONGEVITY: 150 MO
MDC_IDC_MSMT_BATTERY_REMAINING_PERCENTAGE: 100 %
MDC_IDC_MSMT_BATTERY_STATUS: NORMAL
MDC_IDC_MSMT_LEADCHNL_RA_IMPEDANCE_VALUE: 843 OHM
MDC_IDC_MSMT_LEADCHNL_RA_PACING_THRESHOLD_AMPLITUDE: 0.4 V
MDC_IDC_MSMT_LEADCHNL_RA_PACING_THRESHOLD_PULSEWIDTH: 0.4 MS
MDC_IDC_MSMT_LEADCHNL_RV_IMPEDANCE_VALUE: 432 OHM
MDC_IDC_MSMT_LEADCHNL_RV_PACING_THRESHOLD_AMPLITUDE: 0.7 V
MDC_IDC_MSMT_LEADCHNL_RV_PACING_THRESHOLD_PULSEWIDTH: 0.4 MS
MDC_IDC_PG_IMPLANT_DTM: NORMAL
MDC_IDC_PG_MFG: NORMAL
MDC_IDC_PG_MODEL: NORMAL
MDC_IDC_PG_SERIAL: NORMAL
MDC_IDC_PG_TYPE: NORMAL
MDC_IDC_SESS_CLINIC_NAME: NORMAL
MDC_IDC_SESS_DTM: NORMAL
MDC_IDC_SESS_TYPE: NORMAL
MDC_IDC_SET_BRADY_LOWRATE: 40 {BEATS}/MIN
MDC_IDC_SET_BRADY_MODE: NORMAL
MDC_IDC_SET_LEADCHNL_RA_PACING_AMPLITUDE: 3.5 V
MDC_IDC_SET_LEADCHNL_RA_PACING_CAPTURE_MODE: NORMAL
MDC_IDC_SET_LEADCHNL_RA_PACING_POLARITY: NORMAL
MDC_IDC_SET_LEADCHNL_RA_PACING_PULSEWIDTH: 0.4 MS
MDC_IDC_SET_LEADCHNL_RA_SENSING_ADAPTATION_MODE: NORMAL
MDC_IDC_SET_LEADCHNL_RA_SENSING_POLARITY: NORMAL
MDC_IDC_SET_LEADCHNL_RA_SENSING_SENSITIVITY: 0.25 MV
MDC_IDC_SET_LEADCHNL_RV_PACING_AMPLITUDE: 3.5 V
MDC_IDC_SET_LEADCHNL_RV_PACING_CAPTURE_MODE: NORMAL
MDC_IDC_SET_LEADCHNL_RV_PACING_POLARITY: NORMAL
MDC_IDC_SET_LEADCHNL_RV_PACING_PULSEWIDTH: 0.4 MS
MDC_IDC_SET_LEADCHNL_RV_SENSING_ADAPTATION_MODE: NORMAL
MDC_IDC_SET_LEADCHNL_RV_SENSING_POLARITY: NORMAL
MDC_IDC_SET_LEADCHNL_RV_SENSING_SENSITIVITY: 0.6 MV
MDC_IDC_SET_ZONE_DETECTION_INTERVAL: 240 MS
MDC_IDC_SET_ZONE_DETECTION_INTERVAL: 300 MS
MDC_IDC_SET_ZONE_DETECTION_INTERVAL: 333 MS
MDC_IDC_SET_ZONE_STATUS: NORMAL
MDC_IDC_SET_ZONE_TYPE: NORMAL
MDC_IDC_SET_ZONE_VENDOR_TYPE: NORMAL
MDC_IDC_STAT_BRADY_DTM_END: NORMAL
MDC_IDC_STAT_BRADY_DTM_START: NORMAL
MDC_IDC_STAT_BRADY_RA_PERCENT_PACED: 0 %
MDC_IDC_STAT_BRADY_RV_PERCENT_PACED: 0 %
MDC_IDC_STAT_EPISODE_RECENT_COUNT: 0
MDC_IDC_STAT_EPISODE_RECENT_COUNT: 7
MDC_IDC_STAT_EPISODE_RECENT_COUNT_DTM_END: NORMAL
MDC_IDC_STAT_EPISODE_RECENT_COUNT_DTM_START: NORMAL
MDC_IDC_STAT_EPISODE_TYPE: NORMAL
MDC_IDC_STAT_EPISODE_VENDOR_TYPE: NORMAL
MDC_IDC_STAT_TACHYTHERAPY_ATP_DELIVERED_RECENT: 0
MDC_IDC_STAT_TACHYTHERAPY_ATP_DELIVERED_TOTAL: 0
MDC_IDC_STAT_TACHYTHERAPY_RECENT_DTM_END: NORMAL
MDC_IDC_STAT_TACHYTHERAPY_RECENT_DTM_START: NORMAL
MDC_IDC_STAT_TACHYTHERAPY_SHOCKS_ABORTED_RECENT: 0
MDC_IDC_STAT_TACHYTHERAPY_SHOCKS_ABORTED_TOTAL: 0
MDC_IDC_STAT_TACHYTHERAPY_SHOCKS_DELIVERED_RECENT: 0
MDC_IDC_STAT_TACHYTHERAPY_SHOCKS_DELIVERED_TOTAL: 0
MDC_IDC_STAT_TACHYTHERAPY_TOTAL_DTM_END: NORMAL
MDC_IDC_STAT_TACHYTHERAPY_TOTAL_DTM_START: NORMAL
MONOCYTES # BLD AUTO: 0.4 10E3/UL (ref 0–1.3)
MONOCYTES NFR BLD AUTO: 8 %
NEUTROPHILS # BLD AUTO: 1.6 10E3/UL (ref 1.6–8.3)
NEUTROPHILS NFR BLD AUTO: 30 %
NICOTINE SERPL-MCNC: <5 NG/ML
NRBC # BLD AUTO: 0 10E3/UL
NRBC BLD AUTO-RTO: 0 /100
PLATELET # BLD AUTO: 290 10E3/UL (ref 150–450)
POTASSIUM SERPL-SCNC: 4.1 MMOL/L (ref 3.4–5.3)
PROT SERPL-MCNC: 7.4 G/DL (ref 6.4–8.3)
RBC # BLD AUTO: 5.53 10E6/UL (ref 3.8–5.2)
SODIUM SERPL-SCNC: 137 MMOL/L (ref 135–145)
WBC # BLD AUTO: 5.3 10E3/UL (ref 4–11)

## 2024-08-05 PROCEDURE — 85025 COMPLETE CBC W/AUTO DIFF WBC: CPT | Performed by: INTERNAL MEDICINE

## 2024-08-05 PROCEDURE — 250N000013 HC RX MED GY IP 250 OP 250 PS 637: Performed by: INTERNAL MEDICINE

## 2024-08-05 PROCEDURE — 80053 COMPREHEN METABOLIC PANEL: CPT | Performed by: INTERNAL MEDICINE

## 2024-08-05 PROCEDURE — 99239 HOSP IP/OBS DSCHRG MGMT >30: CPT | Mod: GC | Performed by: STUDENT IN AN ORGANIZED HEALTH CARE EDUCATION/TRAINING PROGRAM

## 2024-08-05 PROCEDURE — 93283 PRGRMG EVAL IMPLANTABLE DFB: CPT

## 2024-08-05 PROCEDURE — 93283 PRGRMG EVAL IMPLANTABLE DFB: CPT | Mod: 26 | Performed by: INTERNAL MEDICINE

## 2024-08-05 PROCEDURE — 83735 ASSAY OF MAGNESIUM: CPT | Performed by: INTERNAL MEDICINE

## 2024-08-05 PROCEDURE — 250N000013 HC RX MED GY IP 250 OP 250 PS 637: Performed by: STUDENT IN AN ORGANIZED HEALTH CARE EDUCATION/TRAINING PROGRAM

## 2024-08-05 PROCEDURE — 36415 COLL VENOUS BLD VENIPUNCTURE: CPT | Performed by: INTERNAL MEDICINE

## 2024-08-05 PROCEDURE — 250N000013 HC RX MED GY IP 250 OP 250 PS 637

## 2024-08-05 RX ORDER — FUROSEMIDE 20 MG
20 TABLET ORAL DAILY
Qty: 30 TABLET | Refills: 1 | Status: SHIPPED | OUTPATIENT
Start: 2024-08-06 | End: 2024-08-22

## 2024-08-05 RX ORDER — DIGOXIN 250 MCG
250 TABLET ORAL DAILY
Qty: 30 TABLET | Refills: 1 | Status: SHIPPED | OUTPATIENT
Start: 2024-08-06 | End: 2024-08-22

## 2024-08-05 RX ORDER — NITROGLYCERIN 0.4 MG/1
TABLET SUBLINGUAL
Qty: 10 TABLET | Refills: 1 | Status: SHIPPED | OUTPATIENT
Start: 2024-08-05

## 2024-08-05 RX ORDER — SPIRONOLACTONE 25 MG/1
25 TABLET ORAL DAILY
Qty: 30 TABLET | Refills: 1 | Status: SHIPPED | OUTPATIENT
Start: 2024-08-06 | End: 2024-08-22

## 2024-08-05 RX ORDER — CARVEDILOL 3.12 MG/1
3.12 TABLET ORAL 2 TIMES DAILY WITH MEALS
Qty: 60 TABLET | Refills: 1 | Status: SHIPPED | OUTPATIENT
Start: 2024-08-05 | End: 2024-08-22

## 2024-08-05 RX ADMIN — SPIRONOLACTONE 25 MG: 25 TABLET, FILM COATED ORAL at 09:43

## 2024-08-05 RX ADMIN — SACUBITRIL AND VALSARTAN 1 TABLET: 97; 103 TABLET, FILM COATED ORAL at 09:47

## 2024-08-05 RX ADMIN — DIGOXIN 250 MCG: 125 TABLET ORAL at 09:43

## 2024-08-05 RX ADMIN — FUROSEMIDE 20 MG: 20 TABLET ORAL at 09:43

## 2024-08-05 RX ADMIN — EMPAGLIFLOZIN 10 MG: 10 TABLET, FILM COATED ORAL at 09:42

## 2024-08-05 RX ADMIN — CARVEDILOL 3.12 MG: 3.12 TABLET, FILM COATED ORAL at 09:43

## 2024-08-05 ASSESSMENT — ACTIVITIES OF DAILY LIVING (ADL)
ADLS_ACUITY_SCORE: 24
ADLS_ACUITY_SCORE: 23
ADLS_ACUITY_SCORE: 24
ADLS_ACUITY_SCORE: 24
ADLS_ACUITY_SCORE: 23
ADLS_ACUITY_SCORE: 23
ADLS_ACUITY_SCORE: 24
ADLS_ACUITY_SCORE: 23
ADLS_ACUITY_SCORE: 23

## 2024-08-05 NOTE — CONSULTS
"23 year old woman presenting with Dannon's disease/ HFrEF. CORE consult requested.     Mary and her mother were provided with a CHF book.  I reviewed the importance of daily weights, 2 gm sodium diet, 2L fluid restriction and compliance with medications upon hospital discharge.  CORE contact phone numbers provided and patient is encouraged to call with any questions or concerns, including any weight gain or loss of 2 or more pounds in 24 hours or 5 or more pounds in 1 week.      Appointment made in CORE clinic on  . Placed on waitlist for Zoya Tomlinson NP.  Labs scheduled for Cathy Nagy   Thank you for the consult.        Stella Soares RN BSN  Cardiology Care Coordinator - Heart Failure/ [facility]   Questions and [phone]   First press #1 for the Supply and then press #4 for \"Send a message to your care team\"       "

## 2024-08-05 NOTE — PROGRESS NOTES
Care Management Follow Up    Additional Information:  08/05: TERAW did notarized the HCD for this Pt and forwarded it to [facility] and equally charted the Pt record.    Hermilo JAMISON [address] [facility]  P [phone]   Fax: [phone]  Email: [email or web address]

## 2024-08-05 NOTE — PROGRESS NOTES
Transferred from:  4C  Via: Wheelchair  Reason for transfer: Pt appropriate for 6C- improved patient condition  Family: Aware of transfer  Belongings: Sent with pt  Chart: Sent with pt  Medications: Meds from bin sent with pt  Report called from: Frances CASTELLANOS RN @ [phone]  Teaching: Orientation to unit, call don't fall, use of call light, meal times, visiting hours,  when to call for the RN (angina/sob/dizzyness, etc.), and enforced importance of safety.  2 RN skin check performed by Mulu PENDLETON.  No skin issues noted.

## 2024-08-05 NOTE — PROGRESS NOTES
"Consult Dental Clearance : VAD or Cardiac Transplant      Referring MD & Reason for Visit: Dental Service was asked by Mell Monte MD, to see Mary Shaikh for a COMPREHENSIVE oral assessment regarding Heart transplant evaluation and/or Ventricular Assist Device (VAD) evaluation.    HPI:  This patient is a 23 year old female with a history of genetically confirmed Dannon disease with a pathogenic LAMP2 mutation (c. 129 T>A) and associated hypertropic cardiomyopathy and preexcitation pattern and arrhythmias s/p ICD implantation  who presents with from the cath lab with concerns of cardiogenic shock.       Dental Findings:   Dental and oropharyngeal history is pertinent for undergoing LVAD/heart txp eval, requiring dental clearance; dental CT completed 8/1; pt has established dental home, but infrequent care - pt reports last dental visit was \"a few years ago\". Insignificant social hx.  The patient reports no oral/dental pain or concerns at this time.    Tooth #1 is present and erupted (maxillary right third molar. No issues noted with the third molar at present. Patient has a health oral cavity and good dental condition.    EXAM: CT DENTAL WO CONTRAST 8/1/2024 2:45 PM     HISTORY:  Heart failure, LCAD/OHT work up     TECHNIQUE: 3-D reconstruction by the technologists, with curved  multiplanar reformat of thin section imaging through the mandible and  maxilla obtained without intravenous contrast.     FINDINGS:  No significant soft tissue swelling or mass. Normal facial bone  alignment. No bony erosion. No periapical dental lucency.      Visualized portions of the paranasal sinuses are clear. Right frontal  sinus is hypoplastic. Mild leftward deviation of the nasal septum.  Normal temporomandibular joints.                                                                      IMPRESSION: Normal mandible and maxilla on dental CT evaluation.    Dental Review of Dental CT:  Same as radiology. Normal dentition " and oral supporting structures with no evidence of oral infection.    Dental Clearance Assessment:  no clinical signs of odontogenic infection - pt denies pain; dentition is intact, biofilm control is excellent.     Dental Service Recommendations:  Patient is cleared from a dental perspective for Cardiac surgery  Continue with Dental Hygiene oral care plan  Resume comprehensive dental care at established dental home upon discharge and when medically cleared to do so.      Edwin Garcia DDS  Staff Attending Dentist  Pager [phone]

## 2024-08-05 NOTE — CONSULTS
"Patient of Dr. Frances Vanegas seen inpatient for psychosocial assessment.   45 minutes spent with patient. 100% of visit consisted of counseling related to issues surrounding heart transplant and LVAD.    Psychosocial Assessment   Name: Mary Shaikh     MRN:  [id number]        Patient Name / Age / Race: Mary Shaikh 23 year old -American   Source of Information: Patient and Mother   : Adina Choi, MSW, LGSW, APSW   Interview Date: August 2, 2024   Reason for Referral: Heart Transplant and MCS Support     Current Living Situation   Location ([facility]): [address] [place] [id number]   With Whom: Living arrangements - the patient lives with their family (mother and father) - Patient has a dog as well.   Type of Home: single family- Split level home with seven steps to get upstairs and about 7 to get down stairs. Pt has one step to get into the house from outside.   Working Phone? Yes  Pt and parents all have their own cellphone. No landline.   Three Pronged Outlet? Yes    Distance from Hospital: 10-15 miles   Need to Relocate Post Surgery? No   Discussed? Yes      Vocational/Employment/Financial   Employment: Unemployed   Occupation: Mary currently unemployed. 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 She is looking to work, however does not want to over exert herself.   Education: Highschool Diploma   ? No   Income: savings and other: parent's support   Insurance: Private Insurance   Name of Private Insurance: [employer]   Medication Coverage: Patient reports coverage has been okay and that co-pay has been \"a couple of dollars.\" Pt's mother stated that coverage depends and that as more things progress, cost has increased. Mother was uncertain on highest copay thus far d/t all medications coming through the mail.    In current situation, pt. can afford medication and supply costs:  Yes    Other Financial " Concerns/Issues: No immediate concerns. Mother expressed interest with discussing additional financial resources and supports in the future to support. Mother expressed having a previous friend who navigated their cancer journey with [employer] and was amazing with amount of support and resources that were available.     Family/Social Support   Marital Status: single (never )   Partner Name: N/A   Length of Time : N/A   Partner s Employment: N/A   Relationship: other: N/A   Children: N/A   Parents: Emily (Mother) and Jose (Father)   Siblings: 3 brothers: Luis Alberto (28), Bhanu (19), and BRADEN (12)  1 sister: Narda (25)   Other Family or Friends Close by: N/A   Support System: available, helpful   Primary Support Person: Mother and Father   Issues: No issues identified at this time; provided education on caregiver responsibilities for heart transplant and VAD recipients post surgery.     Activities/Functional Ability   Current Level: Ambulatory, independent with ADLs   Daily Activities: Pt reports ability to engage in ADLs. Patient enjoys going on walks and being more active. Pt reports d/t symptoms, not being as active. Pt has increasingly experienced shortness of breath, fatigue, and coughing leading to reduced interest in doing things.   Transportation: self ; Pt reports that she has become more dependent on parents driving her to places she needs d/t symptoms.     Medical Status   Patient s understanding of need for surgical intervention: Patient affirmed understanding and asked appropriate questions throughout the assessment.   Advanced Directive? No    Details: SW provided blank Health Care Directive document to patient and provided education on rationale and how to complete document. Pt notified that hospital has notaries present if she were to complete before discharge.   Adherence History: Patient compliant with previous recommendations and taking medications.   Ability to Adhere to Complex  Medical Regime: Appropriate     Behavioral   Chemical Dependency Issues: No - Patient declined current usage. There is previous record of presenting to ED in 2021 for side effects of marijuana usage. Current PEth is negative. Audit C: 0   Smoker? No   Psychiatric impairment: Yes  - Patient endorsed anxiety surrounding being in large crowds, occasional being by herself, and over thinking. Patient reported not currently seeing a therapist. Patient denied history of suicidal thoughts, action, or intent. PHQ-9: 14; JOSESITO-7: 18   Coping Style/Strategies: Patient reported engaging in the following coping strategies: breathing, chewing gum, drinking ice cold water, taking walks, listening to music, and meditating.   Recent Legal History: No   Teaching Completed During Assessment Related To Transplant and Mechanical Circulatory Support: Housing and relocation needs post surgery.  Caregiver needs post surgery.  Financial issues related to surgery.  Risks of alcohol use post surgery.  Common psychosocial stressors pre/post surgery.  Support group availability.   Psychosocial Risks Reviewed Related To Transplant and Mechanical Circulatory Support: Increased stress related to your emotional, family, social, employment, or financial situation.  Affect on work and/or disability benefits.  Affect on future health and life insurance.  Outcome expectations may not be met.  Mental Health risks: anxiety, depression, PTSD, guilt, grief and chronic fatigue.     Observed Behavior   Well informed? Yes  Angry? No   Process info well? Yes  Hostile? No   Evasive? No Oriented X3? Yes    Cautious/Suspicious? No Motivated? Yes    Appropriate Behavior? Yes  Depressed? Yes    Appropriate Affect? Yes  Anxious? Yes    Interview Observations: Patient was interactive and forthcoming throughout assessment. Patient asked appropriate questions and remain engaged throughout entirety of assessment. Patient's mother engaged and forthcoming throughout  assessment as well.     Selection Criteria Met   Plan for Support Yes    Chemical Dependence Yes     Smoking Yes    Mental Health Yes     Adequate Finances Yes      Risk Issues:    - No risk issues identified    Final Evaluation/Assessment:     Social work met with patient as a part of dual Heart Transplant and LVAD evaluation. Pt is new to the evaluation process. 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 Pt presented from the cath lab with concerns of cardiogenic shock. Pt and mother met with LVAD coordinators for PVE.    Patient identified a caregiver support plan for both LVAD and heart transplant. Patient named both mother and father as shared primary caregiver. Pt's mother was present in the room and agreeable to plan. Pt and parents currently reside in [place], [place].    SW has no current concerns with chemical dependency, smoking, mental health, or finances that would preclude patient from pursuing advanced therapies. Patient has declined current substance use, however there is previous record of presenting to ED in 2021 for side effects of marijuana usage. Patient has negative PEth from current visit. Patient reported moderate anxiety symptoms and endorsed previously seeing a therapist, however noted not currently. SW would recommend patient follow up with mental health professional to support ongoing symptoms and to support active coping. Patient's mother indicated discussing financial resources in the future would be beneficial, however denied current concerns.    From a psychosocial perspective, Pt is an appropriate candidate for heart transplant and LVAD.      Patient understands risks and benefits of Heart Transplant and Mechanical Circulatory Support: Yes     Recommendation:    Good     Audit C Alcohol Screening  Tool  AUDIT   Questions 0 1 2 3 4 Score   How often do you have a drink  containing alcohol? Never Monthly or less 2 to 4  times a  month 2 to 3  times a  week 4 or more  times a  week 0   How many drinks containing alcohol  do you have on a typical day when you are drinking? 1 or 2 3 or 4 5 or 6 7 to 9 10 or more 0   How often do you have more than five  or more drinks on one occasion? Never Less  than  monthly Monthly Weekly Daily or  almost  daily 0     Scoring: A score of 4 for adult men or 3 for adult women is an indication of hazardous drinking (risk for  physical or physiological harm). A score of 5 or more for adult men or 4 or more for adult women is an  indication of an alcohol use disorder.     JOSESITO-7  0= Not at all  1=Several Days   2=Over half the days  3=Nearly every day    Feeling nervous, anxious, or on edge [ 3 ]  Not able to stop or control worrying [ 2 ]  Worrying too much about different things [ 3 ]  Trouble relaxing [ 3 ]  Being so restless that it's hard to sit still [ 3 ]  Becoming easily annoyed or irritable [ 2 ]  Feeling afraid as if something awful might happen [ 2 ]    Total Score    If you checked off any problems, how difficult have these made it for you to do your work, take care of things at home, or get along with other people?    Not difficult at all________  Somewhat difficult__X_____  Very difficult_______  Extremely difficult_______    Scoring  Scores of 5, 10, and 15 are taken as the cut-off points for mild, moderate and severe anxiety, respectively. When using as a screening tool, further evaluation is recommended when the score is 10 or greater.       Source: Bibi HENNING, Haylee K, Mina GOTTLIEBW, Sy B. A brief measure for assessing generalized anxiety  disorder. Arch [employer]ern Med. 2006;166:3736-8172.    PATIENT HEALTH QUESTIONNAIRE-9 (PHQ - 9)      Over the last two weeks, how often have you been bothered by any the following symptoms?  Please use the following scale;  0 = Not  at all  1 = Several days but less than half  2 = More than half of the days  3 = Nearly every day    1) Little interest or pleasure in doing things [ 2 ]  2) Feeling down, depressed, or hopeless.[ 2 ]  3) Trouble falling or staying asleep, or sleeping too much [ 1 ]  4) Feeling tired or having little energy.[ 2 ]  5) Poor appetite or overeating [ 3 ]  6) Feeling bad about yourself - or that you are a failure or have let yourself or your family down [ 1 ]  7) Trouble concentrating on things, such as reading the newspaper or watching television [ 1 ]  8) Moving or speaking so slowly that other people could have noticed. Or the opposite-being fidgety or restless that you have been moving around a lot more than usual [ 1 ]  9) Thoughts that you would be better off dead, or of hurting yourself in some way [ 1 ]    Finally, how difficult have these problems made it for you to do your work, take care of things at home, or get along with other people?  Not difficult at all, somewhat difficult, very difficult or extremely difficult?     Total Score: 14      Interpreting PHQ-9 Scores Actions Based on PH9 Score   Score Action  Minimal depression 0-4          < 4         The score suggests the patient may not need depression treatment   Mild depression 5-9                > 5 - 14  Physician uses clinical judgment about treatment, based on patient's duration of symptoms and functional impairment  Moderate depression 10-14   Moderately severe depression 15-19   > 15      Warrants treatment for depression, using antidepressant, psychotherapy and/or a combination of treatment.  Severe depression 20-27       * PHQ-9 is described in more detail at the [place] on Depression & Primary Care website   [email or web address]/[email or web address]/forms/phq9/       Signature: Adina Choi     Title: Licensed Independent Clinical                Interview Date: August 5, 2024

## 2024-08-05 NOTE — PROGRESS NOTES
Care Management Discharge Note    Discharge Date: 08/05/2024       Discharge Disposition:  Home    Discharge Services:  No Services    Discharge DME:  No DME    Discharge Transportation: Mother of patient    Private pay costs discussed: Not applicable    Does the patient's insurance plan have a 3 day qualifying hospital stay waiver?  No    PAS Confirmation Code:  Not Applicable  Patient/family educated on Medicare website which has current facility and service quality ratings:  Not Applicable    Education Provided on the Discharge Plan:  Yes  Persons Notified of Discharge Plans: Yes; provided to patient and mother  Patient/Family in Agreement with the Plan:  Yes    Handoff Referral Completed: No    Additional Information:  SW met with patient at bedside for supportive visit prior to discharge. SW inquired about any questions related to advanced therapies evaluation. Patient and mother declined questions and affirmed understanding assessment. Patient completed HCD document and requested it to be notarized prior to discharge. Notary notified and will complete with Pt prior to discharge. No additional support identified at this time.    Adina Choi, MSW, LGSW, APSW  Heart Transplant/MCS   [employer]  Ph. [phone]

## 2024-08-05 NOTE — DISCHARGE SUMMARY
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      HOSPITAL COURSE BY PROBLEM:     #Cardiogenic Shock SCAI B  #HFrEF secondary to Dannon disease with associated hypertrophic cardiomyopathy  She had RCH done on 7/31 and was found to have an \"'ambulatory cardiogenic shock\", so she was admitted for medications optimization.  No signs of organ dysfunction and or hypoperfusion. Making adequate UO. S/p IV nitroprusside for afterload reduction and IV diuretics. BW at admission 170 lbs, discharge weight 157 lbs. GDMT optimization and start advance therapies evaluation.     Hemodynamics     CVP PA - Mean  PCWP CO/CI PVR SVR MAP   07/31 20 51/29 - 36   3.13/1.68 1.9 1762      08/01  12  35/24/27 18 4/2.1   2.6 1194  77     08/02 5   23/16 13  5.7/3    960 79                 LVAD/Transplant Evaluation Checklist   [x] labs -   [x] CPX -   [x] RHC  [x] CVTS consult   [x] Social work consult  [x ] Palliative care " "consult   [  ] Neuropsych consult - outpatient referral  [x] Nutrition consult   [x] Dental - clear  [x] Abd US   [x] extremity US and JUANA - Normal   [x] carotid US - Doesn't qualify   [ ] PFTs - outpatient referral  [ ] 6 minute walk - outpatient referral  [x] CT head  [x] CT c/a/p   [x] colonoscopy - Doesn't qualify   [x] PSA  [x] Utox/nicotine and cotinine/PeTH      Advance Therapies: Undergoing evaluation as above  GDMT:   - BB: New Carvedilol 3.125 mg BiD    - ACEI/ARB/ARNI: Entresto 97/103 BID  - SGLT2i: Jardiance 10mg  - MRA: Spirinolactone 25mg   Diuretics : Furosemide 20 mg daily     - CORE clinic referral at [facility] on 8/22  - Recheck BMP at 1 wk  - Continue outpatient LVAD/transplant evaluation : PFT, 6 mins walk test, neuropsych consult  - Discuss contraception - she will contact her OB for getting an IUD    #History of SVT with preexitation   #S/P ICD sudden cardiac death prevention.    Brief episodes of sypmtomatic SVT while in the hospital, responsive to vagal maneuvers. 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 EP was consulted and recommended starting oral digoxin and low-dose beta-blocker.  Per EP note, \"if SVT increases in frequency or duration she may require another EP study in the future to further characterize the arrhythmia and determine whether it would be amenable to an ablation\". She continued to have PVCs on the day of discharge..     - Digoxin 250 mcg daily   - Beta blocker as abov.     #Hx of syphilis  Early, treated in Oct 2021 with Positive RPR and Treponema Ab who had an RPR Titer 1:32. Tested 8/2024 and found to have RPR titer 1:4. ID  was consulted during this admission, She was treated with PCN G x1 for early syphilis in 2021 and her titer dropped from 1:32 to 1:4. As such the treatment was effective. There is no new treatment needed.      #MDD - PTA Sertraline      #History of asthma - PTA albuterol     PHYSICAL EXAM:  Blood pressure 104/69, pulse 87, temperature 97  F (36.1  C), temperature " "source Axillary, resp. rate 16, height 1.626 m (5' 4\"), weight 71.3 kg (157 lb 1.6 oz), SpO2 98%, not currently breastfeeding.  General appearance - Lying in bed; appears in no acute distress.  Head: Normocephalic and atraumatic.   Eyes: Pupils are equal, round, and reactive to light. Extraocular movement intact. No conjunctival pallor. No scleral icterus.  Neck: No JVD  Cardiovascular: Regular rhythm. S1 and S2 auscultated. No murmurs  Pulmonary/Chest: Normal resp effort on RA, speaking in full sentences. Clear breath sounds to auscultation bilaterally. No wheezes, crackles, or rhonchi. No chest tenderness.   Abdominal: Bowel sounds present. Soft. No distension.   Extremities: Warm, No edema.   Skin: Skin is warm and not diaphoretic. No rash, bruising, or erythema.  Neuro: Alert and oriented to person, place, and time. No focal neurological deficit.    LABS:   Last CBC:   Recent Labs   Lab Test 08/05/24  0556   WBC 5.3   RBC 5.53*   HGB 14.5   HCT 46.7   MCV 84   MCH 26.2*   MCHC 31.0*   RDW 15.3*          Last CMP:  Recent Labs   Lab Test 08/05/24  0556      POTASSIUM 4.1   CHLORIDE 103   WHITNEY 9.7   CO2 23   BUN 11.6   CR 0.51   GLC 93   AST 46*   ALT 24   BILITOTAL 0.6   ALBUMIN 4.1   PROTTOTAL 7.4   ALKPHOS 72       PROCEDURES: RHC, Echocardiogram    CONSULTATIONS: CORE, EP, CVTS, Dental, ID, Neurology, Palliative, OB, nutrition, PT, OT, SW    PENDING RESULTS: None    IMAGING:  Results for orders placed or performed during the hospital encounter of 07/31/24   XR Chest Port 1 View    Narrative    Exam: XR CHEST PORT 1 VIEW, 7/31/2024 2:23 PM    Comparison: None    History: [place] gerry    Findings:  Portable AP view of the chest. Right IJ [place]-Gerry catheter tip  projects over the distal right pulmonary artery. Left implantable  cardiac defibrillator with leads projecting over the right atrium and  right ventricle. Cardiomediastinal silhouette is enlarged. Mild  streaky left greater than right basilar " opacities. No significant  pleural effusion. No pneumothorax.      Impression    Impression:   1. Right IJ [place]-Gerry catheter tip projects over the distal right  pulmonary artery.  2. Cardiomegaly with mild left greater than right streaky basilar  opacities, likely atelectasis and/or edema.    I have personally reviewed the examination and initial interpretation  and I agree with the findings.    YVETTE ABERNATHY MD         SYSTEM ID:  [id number]   X-ray Chest 2 vws*    Narrative    Exam: XR CHEST 2 VIEWS, 8/1/2024 2:29 PM    Comparison: Chest radiograph 7/31/2024    History: Heart Transplant Candidate Evaluation    Findings:  PA and lateral views of the chest. The pacemaker projected over the  left chest, with defibrillator leads projected over the right atrium  and ventricle. Right pulmonary catheter tip terminates at the right  pulmonary artery. Trachea is approximately midline. Stable cardiac  silhouette. Trace left pleural effusion. Stable streaky bibasilar  pulmonary opacities, left greater than right. No pneumothorax or focal  consolidation.       Impression    Impression:   1. Persistent bibasilar opacities which likely represent atelectasis  and/or edema.  2. Stable support devices.    I have personally reviewed the examination and initial interpretation  and I agree with the findings.    JOSUE WHITE MD         SYSTEM ID:  [id number]   US Lower Extremity Arterial Duplex Bilateral    Narrative    ULTRASOUND LOWER EXTREMITY ARTERIAL DUPLEX ARTERIAL 8/1/2024 6:13 PM    CLINICAL HISTORY: Heart Transplant Candidate Evaluation. Assess  vasculopathy.     COMPARISONS: None available.    REFERRING PROVIDER: CARMEN HDEZ    TECHNIQUE: Bilateral leg arteries evaluated with grayscale, color  Doppler, and spectral pulsed wave Doppler ultrasound.    FINDINGS:   RIGHT:  Common femoral artery: 150/0 cm/s, triphasic  Profundus femoral artery: not evaluated.    Superficial femoral artery, origin: 158/0 cm/s,  "triphasic  Superficial femoral artery, mid thigh: 161/0 cm/s, triphasic  Superficial femoral artery, mid thigh: 124/0 cm/s, triphasic  Superficial femoral artery, distal thigh: 113/0 cm/s, triphasic    Popliteal artery: 112/0 cm/s, triphasic    \"PER A\": 69/0 cm/s, triphasic    Posterior tibial artery, ankle: 18/0 cm/s, triphasic  Anterior tibial artery, ankle: 31/0 cm/s, triphasic    \"R PFA Origin\" - but the probe gate is on the superficial femoral  artery: 137/0 cm/s, triphasic    LEFT:  Common femoral artery: 127/0 cm/s, triphasic    Superficial femoral artery, origin: 166/0 cm/s, triphasic  Superficial femoral artery, proximal: 135/0 cm/s, triphasic  Superficial femoral artery, mid thigh: 120/0 cm/s, triphasic  Superficial femoral artery, distal thigh: 122/0 cm/s, triphasic    Popliteal artery: 71/0 cm/s, triphasic    \"LEFT POP PER A\": 69/0 cm/s, triphasic    Anterior tibial artery, ankle: 39/0 cm/s, triphasic  Posterior tibial artery, ankle: 37/0 cm/s, triphasic      Impression    IMPRESSION: Right posterior tibial artery disease. Otherwise negative  study.    I have personally reviewed the examination and initial interpretation  and I agree with the findings.    LATASHA VALDEZ MD         SYSTEM ID:  [id number]   US Lower Extremity Venous Duplex Bilateral    Narrative    EXAMINATION: US LOWER EXTREMITY VENOUS DUPLEX BILATERAL  8/1/2024 6:13  PM      CLINICAL HISTORY: History of DVT    COMPARISON: None        PROCEDURE COMMENTS: Ultrasound was performed of the deep venous system  of the right and left lower extremity using grayscale, color, and  spectral Doppler.    FINDINGS:  The common femoral, greater saphenous origin, femoral, popliteal, and  peroneal veins are visualized and are patent. Venous waveforms are  normal. There is normal response to compression.      Impression    IMPRESSION:.  No deep vein thrombosis in the right or left lower extremity.    I have personally reviewed the examination and initial " interpretation  and I agree with the findings.    KIRSTEN AUSTIN MD         SYSTEM ID:  [id number]   US JUANA Doppler No Exercise    Narrative    JUANA 8/2/2024 8:33 AM    CLINICAL HISTORY: Heart transplant evaluation and/or Ventricular  Assist Device (VAD) planning.     COMPARISONS: None available.    REFERRING PROVIDER: LESLEE RAZO    TECHNIQUE: Bilateral JUANA obtained.    FINDINGS:  RIGHT:       Brachial: 110 mmHg       Ankle (PT): 125 mmHg       Ankle (DP): 99 mmHg         JUANA: 1.14    LEFT:       Brachial: 99 mmHg       Ankle (PT): 129 mmHg       Ankle (DP): 128 mmHg         JUANA: 1.17      Impression    IMPRESSION:  1. RIGHT:       A. Resting JUANA is normal, 1.14.    2. LEFT:       A. Left brachial pressure is 11 mmHg less than the right which  may suggest a stenosis.       B. Resting JUANA is normal, 1.17.    Guidelines:    JUANA Diagnostic Criteria (Based on criteria published in Circulation  2011; 124: 9333-8441):    > 1.4: Non compressible    1.00 - 1.40: Normal    0.91 - 0.99: Borderline    At or below 0.90: Abnormal    I have personally reviewed the examination and initial interpretation  and I agree with the findings.    LATASHA VALDEZ MD         SYSTEM ID:  [id number]   CT Head w/o Contrast    Narrative    CT HEAD W/O CONTRAST 8/1/2024 2:45 PM    Provided History: Heart faillure, LVAD/OHT work up    Comparison: None.    Technique: Using multidetector thin collimation helical acquisition  technique, axial, coronal and sagittal CT images from the skull base  to the vertex were obtained without intravenous contrast.     Findings:    No intracranial hemorrhage. No mass effect. No midline shift. No  extra-axial fluid collection. The gray to white matter differentiation  of the cerebral hemispheres is preserved. Ventricles are proportionate  to the sulci. No sulcal effacement. The basal cisterns are patent.    The visualized paranasal sinuses are clear. The mastoid air cells are  clear. Orbits appear unremarkable. No  acute fracture.      Impression    Impression: No acute intracranial pathology.    I have personally reviewed the examination and initial interpretation  and I agree with the findings.    ESAU ACUÑA MD         SYSTEM ID:  [id number]   CT Chest Abdomen Pelvis w/o Contrast    Narrative    EXAMINATION: CT CHEST ABDOMEN PELVIS W/O CONTRAST, 8/1/2024 2:45 PM    INDICATION: Heart failure, LVAD/OHT work u    COMPARISON STUDY: None available    TECHNIQUE: CT scan of the chest, abdomen and pelvis was performed on  multidetector CT scanner using volumetric acquisition technique and  images were reconstructed in multiple planes with variable thickness  and reviewed on dedicated workstations.     CONTRAST: Without contrast    CT scan radiation dose is optimized to minimum requisite dose using  automated dose modulation techniques.    FINDINGS:    Support devices: [place]-Gerry catheter with tip in the right pulmonary  artery. Left chest wall cardiac device with leads in the right atrium  and right ventricle.    Chest:   Mediastinum: Visualized thyroid gland is within normal limits.  Cardiomegaly. Small pericardial effusion.    Pleura: No pleural effusion or pneumothorax.     Lungs: Central tracheobronchial tree is patent. Streaky opacities  within the bilateral posterior lower lobes, likely representing  atelectasis.    Abdomen and Pelvis:  Liver: Unremarkable noncontrast appearance of the liver.    Biliary System: Surgically resected gallbladder. No extrahepatic  biliary ductal dilation.    Pancreas: No mass or pancreatic ductal dilation.    Adrenal glands: No mass or nodules    Spleen: Multiple splenules.    Kidneys: No suspicious mass, obstructing calculus or hydronephrosis.    Gastrointestinal tract: Normal caliber small bowel. Appendix is not  identified, however no inflammatory change in the right lower quadrant  to suggest appendicitis.    Pelvis: Urinary bladder is normal. Small volume free fluid within the  pelvis,  likely physiologic.     Mesentery/peritoneum/retroperitoneum: No mass. No free air.    Lymph nodes: No significant lymphadenopathy.    Vasculature: No aneurysm of the abdominal aorta.     Soft tissues: Scarring within the soft tissue along the anterior  abdominal wall, likely postsurgical.    Osseous structures: No aggressive or acute osseous lesion.      Impression    IMPRESSION:   1.  Cardiomegaly. [place]-Gerry catheter in the right pulmonary artery and  pacemaker leads within the right atrium and right ventricle.  2.  Small pericardial effusion.  3.   Streaky opacities within the dependent portions of the bilateral  lower lobes likely represent atelectasis.    I have personally reviewed the examination and initial interpretation  and I agree with the findings.    IGGY SEWELL DO         SYSTEM ID:  [id number]   CT Dental wo Contrast    Narrative    EXAM: CT DENTAL WO CONTRAST 8/1/2024 2:45 PM    HISTORY:  Heart failure, LCAD/OHT work up    TECHNIQUE: 3-D reconstruction by the technologists, with curved  multiplanar reformat of thin section imaging through the mandible and  maxilla obtained without intravenous contrast.    FINDINGS:  No significant soft tissue swelling or mass. Normal facial bone  alignment. No bony erosion. No periapical dental lucency.     Visualized portions of the paranasal sinuses are clear. Right frontal  sinus is hypoplastic. Mild leftward deviation of the nasal septum.  Normal temporomandibular joints.      Impression    IMPRESSION: Normal mandible and maxilla on dental CT evaluation.    I have personally reviewed the examination and initial interpretation  and I agree with the findings.    ESAU ACUÑA MD         SYSTEM ID:  [id number]   US Upper Ext Arterial Duplex Bilateral    Narrative    ULTRASOUND UPPER EXTREMITY ARTERIAL DUPLEX BILATERAL 8/1/2024 6:13 PM    CLINICAL HISTORY: Assess vascular access for possible subclavian IABP.      COMPARISONS: None available.    REFERRING PROVIDER:  CARMEN MALIHA [facility]    TECHNIQUE: Bilateral common carotid and central arm arteries evaluated  with grayscale, color Doppler, and spectral pulsed wave Doppler  ultrasound.    FINDINGS:   RIGHT:  Common carotid artery: 6.7 mm, 6.9 mm, 106/20 cm/s    Innominate artery: not evaluated    Subclavian artery, proximal: 5.2 mm, 135/16 cm/s, triphasic  Subclavian artery, mid: 5.6 mm, 125/45 cm/s, triphasic  Subclavian artery, distal: 4.4 mm, 25/6 cm/s, triphasic    Axillary artery: 4.7 mm, 107/91 cm/s, triphasic    LEFT:  Common carotid artery: 6.4 mm, 128/20 cm/s, triphasic  Common carotid artery: 5.3 mm, 120/21 cm/s, triphasic    Subclavian artery, proximal: 5.2 mm, 131/42 cm/s, triphasic  Subclavian artery, mid: 6.0 mm, 130/24 cm/s, triphasic  Subclavian artery, distal: 5.8 mm, 182/33 cm/s, triphasic    Axillary artery: 6.5 mm, 84/26 cm/s, triphasic      Impression    IMPRESSION:  1. Slow flow in the distal right subclavian artery, etiology not  demonstrated.    2. Otherwise negative study.    I have personally reviewed the examination and initial interpretation  and I agree with the findings.    LATASHA VALDEZ MD         SYSTEM ID:  [id number]   US Upper Extremity Venous Duplex Bilat    Narrative    EXAMINATION: US UPPER EXTREMITY VENOUS DUPLEX BILATERAL  8/1/2024 6:13  PM      CLINICAL HISTORY: Heart transplant evaluation and/or Ventricular  Assist Device (VAD) planning. Assess vascular access.    COMPARISON: None        PROCEDURE COMMENTS: Ultrasound was performed of the deep venous system  of the right and left central vasculature axillary through subclavian  using grayscale, color, and spectral Doppler.    FINDINGS:  The internal jugular, subclavian, and axillary veins are visualized  and are patent. Venous waveforms are normal. There is normal response  to compression.      Impression    IMPRESSION:  No deep venous thrombosis in the right or left axillary, subclavian,  internal jugular veins.      I have personally  reviewed the examination and initial interpretation  and I agree with the findings.    KIRSTEN AUSTIN MD         SYSTEM ID:  [id number]   US Abdomen Complete    Narrative    EXAMINATION: US ABDOMEN COMPLETE,  8/2/2024 9:12 AM     COMPARISON: None.    HISTORY: 23-year-old female. Heart transplant evaluation and/or  ventricular cyst device planning    TECHNIQUE: The abdomen was scanned in standard fashion with  specialized ultrasound transducer(s) using both gray-scale and limited  color Doppler techniques.    FINDINGS:  Liver: Normal homogeneous echotexture. The liver is isoechoic relative  to the right renal cortex. No mass. The main portal vein is patent  with normal antegrade flow, measuring 8 mm. The right lobe measures  14.7 cm obliquely.     Gallbladder: Surgically absent gallbladder.    Bile Ducts: Both the intra- and extrahepatic biliary system are of  normal caliber.  The common bile duct measures 4 mm, which is normal.    Pancreas: Unremarkable pancreatic head and neck. Remainder obscured.    Kidneys: Both kidneys are of normal echotexture  The right kidney measures 11.7 cm in the craniocaudal dimension.  Normal cortical thickness. No hydronephrosis or shadowing stone.     The left kidney measures 11.8 cm in the craniocaudal dimension. Normal  cortical thickness. No hydronephrosis or shadowing stone.    Spleen: The spleen is normal in size,  measuring 8.8 cm in sagittal  dimension. Small splenule noted inferiorly.     Aorta and IVC: Not imaged.     Fluid: No evidence of ascites or pleural effusions.      Impression    IMPRESSION:   Normal abdominal ultrasound.    I have personally reviewed the examination and initial interpretation  and I agree with the findings.    KARTHIK HUFF DO         SYSTEM ID:  [id number]   XR Chest Port 1 View    Narrative    Exam: XR CHEST PORT 1 VIEW, 8/2/2024 10:05 AM    Comparison: Chest x-ray 8/1/2024    History: [place] tadeo    Findings:  Single view of the chest. Right IJ  [place]-Gerry catheter tip projects  over the right pulmonary artery, stable. Left chest implantable  cardiac defibrillator is stable. Trachea is midline. Cardiomediastinal  silhouette is stable. Stable bibasilar opacities. No pneumothorax or  pleural effusion. The visualized upper abdomen is unremarkable. No  acute osseous abnormalities.      Impression    Impression: Stable support devices and bibasilar atelectasis.    I have personally reviewed the examination and initial interpretation  and I agree with the findings.    JOSUE WHITE MD         SYSTEM ID:  [id number]   Echo Complete     Value    LVEF  20-25% (severely reduced)    Narrative    [id number]  [id number]  [id number]  [id number]^THOR COOK^CARMEN^D     [facility],[place]  Echocardiography Laboratory  [address] [place] [address]     Name: MS. JE PARK  MRN: [id number]  : 2000  Study Date: 2024 02:23 PM  Age: 23 yrs  Gender: Female  Patient Location: [facility]  Reason For Study: Heart Failure  Ordering Physician: CARMEN HDEZ  Referring Physician: LESLEE RAZO  Performed By: Royce Donato RDCS     BSA: 1.9 m2  Height: 66 in  Weight: 170 lb  HR: 65  BP: 118/89 mmHg  ______________________________________________________________________________  Procedure  Echocardiogram with two-dimensional, color and spectral Doppler performed.  Contrast Optison. Optison (NDC #3986-9286-76) given intravenously. Patient was  given 6 ml mixture of 3 ml Optison and 6 ml saline. 3 ml wasted.  ______________________________________________________________________________  Interpretation Summary  Left ventricular function is decreased. The ejection fraction is 20-25%  (severely reduced).  Global right ventricular function is moderately reduced.  Moderate biatrial enlargement is present.  IVC diameter >2.1 cm collapsing <50% with sniff suggests a high RA pressure  estimated at 15 mmHg or greater.  Trivial  pericardial effusion is present.  There is no prior study for direct comparison.  ______________________________________________________________________________  Left Ventricle  Left ventricular wall thickness is normal. Borderline left ventricular  dilation is present. Left ventricular function is decreased. The ejection  fraction is 20-25% (severely reduced). Left ventricular diastolic function is  indeterminate. Severe diffuse hypokinesis is present.     Right Ventricle  The right ventricle is normal size. Global right ventricular function is  moderately reduced. A pacemaker lead is noted in the right ventricle.     Atria  Moderate biatrial enlargement is present.     Mitral Valve  The mitral valve is normal.     Aortic Valve  Aortic valve is normal in structure and function.     Tricuspid Valve  The tricuspid valve is normal. Mild tricuspid insufficiency is present. The  right ventricular systolic pressure is approximated at 29.2 mmHg plus the  right atrial pressure.     Pulmonic Valve  The pulmonic valve is normal.     Vessels  The thoracic aorta is normal. The aorta root is normal. The pulmonary artery  is normal. IVC diameter >2.1 cm collapsing <50% with sniff suggests a high RA  pressure estimated at 15 mmHg or greater.     Pericardium  Trivial pericardial effusion is present.     Compared to Previous Study  There is no prior study for direct comparison.  ______________________________________________________________________________  MMode/2D Measurements & Calculations  IVSd: 1.2 cm     LVIDd: 5.7 cm  LVIDs: 5.4 cm  LVPWd: 0.94 cm  FS: 6.6 %  LV mass(C)d: 245.4 grams  LV mass(C)dI: 131.5 grams/m2  EPSS: 2.3 cm  asc Aorta Diam: 2.6 cm  LVOT diam: 2.2 cm  LVOT area: 3.6 cm2  Asc Ao diam index BSA (cm/m2): 1.4  Asc Ao diam index Ht(cm/m): 1.5  LA Volume Index (BP): 43.5 ml/m2  RWT: 0.33     TAPSE: 1.2 cm     Doppler Measurements & Calculations  MV E max lisandro: 120.0 cm/sec  MV A max lisandro: 25.1 cm/sec  MV E/A:  4.8  MV dec slope: 1445 cm/sec2  MV dec time: 0.08 sec  PA acc time: 0.06 sec  TR max lisandro: 270.1 cm/sec  TR max P.2 mmHg  E/E' av.9  Lateral E/e': 17.9  Medial E/e': 34.0     ______________________________________________________________________________  Report approved by: Pedro Cassidy 2024 03:49 PM         Cardiac Catheterization    Narrative      Right sided filling pressures are severely elevated.    Left sided filling pressures are severely elevated.    Moderately elevated pulmonary artery hypertension.    Reduced cardiac output level.    Severely elevated biventricular filling pressures with ambulatory   cardiogenic shock      Cardiac Device Check - Inpatient     Value    Date Time Interrogation Session [id number]    Implantable Pulse Generator  [employer]    Implantable Pulse Generator Model D533 RESONATE HF ICD    Implantable Pulse Generator Serial Number [id number]    Type Interrogation Session In Clinic    Clinic Name [facility]    Implantable Pulse Generator Type Defibrillator    Implantable Pulse Generator Implant Date 2024    Implantable Lead  [employer]    Implantable Lead Model 0672 Letha 4-Front S    Implantable Lead Serial Number [id number]    Implantable Lead Implant Date 2024    Implantable Lead Polarity Type Bipolar Lead    Implantable Lead Location Detail 1 UNKNOWN    Implantable Lead Location Right Ventricle    Implantable Lead Connection Status Connected    Implantable Lead  [employer]    Implantable Lead Model 7840 Ingevity+ MRI    Implantable Lead Serial Number [id number]    Implantable Lead Implant Date 2024    Implantable Lead Polarity Type Bipolar Lead    Implantable Lead Location Detail 1 UNKNOWN    Implantable Lead Location Right Atrium    Implantable Lead Connection Status Connected    Ralph Setting Mode (NBG Code) DDI    Ralph Setting Lower Rate Limit 40    Lead Channel Setting  Sensing Polarity Bipolar    Lead Channel Setting Sensing Sensitivity 0.25    Lead Channel Setting Sensing Adaptation Mode Adaptive    Lead Channel Setting Sensing Polarity Bipolar    Lead Channel Setting Sensing Sensitivity 0.6    Lead Channel Setting Sensing Adaptation Mode Adaptive    Lead Channel Setting Pacing Polarity Bipolar    Lead Channel Setting Pacing Pulse Width 0.4    Lead Channel Setting Pacing Amplitude 3.5    Lead Channel Setting Pacing Capture Mode Monitor    Lead Channel Setting Pacing Polarity Bipolar    Lead Channel Setting Pacing Pulse Width 0.4    Lead Channel Setting Pacing Amplitude 3.5    Lead Channel Setting Pacing Capture Mode Monitor    Zone Setting Type Category VF    Zone Setting Vendor Type Category VF    Zone Setting Status Active    Zone Setting Detection Interval 240    Zone Setting Type Category VT    Zone Setting Vendor Type Category VT    Zone Setting Status Active    Zone Setting Detection Interval 300    Zone Setting Type Category VT    Zone Setting Vendor Type Category VT-1    Zone Setting Status Monitor    Zone Setting Detection Interval 333    Lead Channel Impedance Value 642    Lead Channel Pacing Threshold Amplitude 0.6    Lead Channel Pacing Threshold Pulse Width 0.4    Lead Channel Impedance Value 393    Lead Channel Pacing Threshold Amplitude 0.7    Lead Channel Pacing Threshold Pulse Width 0.4    Battery Date Time of Measurements [id number]    Battery Status Middle of Service    Battery Remaining Longevity 150    Battery Remaining Percentage 100    Ralph Statistic Date Time Start 20240628000000    Ralph Statistic Date Time End 20240731000000    Ralph Statistic RA Percent Paced 0    Ralph Statistic RV Percent Paced 0    Therapy Statistic Recent Shocks Delivered 0    Therapy Statistic Recent Shocks Aborted 0    Therapy Statistic Recent ATP Delivered 0    Therapy Statistic Recent Date Time Start 20240628000000    Therapy Statistic Recent Date Time End 20240731000000     Therapy Statistic Total Shocks Delivered 0    Therapy Statistic Total Shocks Aborted 0    Therapy Statistic Total ATP Delivered 0    Therapy Statistic Total  Date Time Start 20240620000000    Therapy Statistic Total  Date Time End 20240731000000    Episode Statistic Recent Count 0    Episode Statistic Type Category Other    Episode Statistic Recent Count 0    Episode Statistic Type Category VT    Episode Statistic Vendor Type Category NSVT    Episode Statistic Recent Count 0    Episode Statistic Type Category VT    Episode Statistic Vendor Type Category VT    Episode Statistic Recent Count 0    Episode Statistic Type Category VT    Episode Statistic Vendor Type Category VT-1    Episode Statistic Recent Date Time Start 20240628000000    Episode Statistic Recent Date Time End 20240731000000    Episode Statistic Recent Date Time Start 20240628000000    Episode Statistic Recent Date Time End 20240731000000    Episode Statistic Recent Date Time Start 20240628000000    Episode Statistic Recent Date Time End 20240731000000    Episode Statistic Recent Date Time Start 20240628000000    Episode Statistic Recent Date Time End 20240731000000    Narrative    Patient seen in [address] for evaluation and iterative programming of their   ICD per MD orders.     Device: [employer] D533 RESONATE HF ICD  Normal device function.  Mode: DDI 40 bpm  AP: <1%  : <1%  Intrinsic rhythm: SB 51 bpm w/ frequent PVC's and PVC runs of 4-6 beats  Patient has had 98,600 PVC's over the last 33 days.  Lead Trends Appear Stable.  Estimated battery longevity to RRT = 13 years.  Atrial Arrhythmia: None.  Ventricular Arrhythmia: None.  Setting Changes: None.    ERIKA Brock RN    Dual lead ICD    I have reviewed and interpreted the device interrogation, settings,   programming and nurse's summary. The device is functioning within normal   device parameters. I agree with the current findings, assessment and plan.         DISCHARGE  MEDICATIONS:  Current Discharge Medication List        START taking these medications    Details   carvedilol (COREG) 3.125 MG tablet Take 1 tablet (3.125 mg) by mouth 2 times daily (with meals) for 60 days  Qty: 60 tablet, Refills: 1    Associated Diagnoses: Acute on chronic systolic heart failure (H)      digoxin (LANOXIN) 250 MCG tablet Take 1 tablet (250 mcg) by mouth daily for 60 days  Qty: 30 tablet, Refills: 1    Associated Diagnoses: Paroxysmal SVT (supraventricular tachycardia) (H24)      empagliflozin (JARDIANCE) 10 MG TABS tablet Take 1 tablet (10 mg) by mouth daily  Qty: 90 tablet, Refills: 1    Associated Diagnoses: Acute on chronic systolic heart failure (H)      furosemide (LASIX) 20 MG tablet Take 1 tablet (20 mg) by mouth daily for 60 days  Qty: 30 tablet, Refills: 1    Associated Diagnoses: Acute on chronic systolic heart failure (H)      nitroGLYcerin (NITROSTAT) 0.4 MG sublingual tablet For chest pain place 1 tablet under the tongue every 5 minutes for 3 doses. If symptoms persist 5 minutes after 1st dose call 911.  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)      sacubitril-valsartan (ENTRESTO)  MG per tablet Take 1 tablet by mouth 2 times daily for 63 days  Qty: 60 tablet, Refills: 1    Associated Diagnoses: Acute on chronic systolic heart failure (H)           CONTINUE these medications which have CHANGED    Details   spironolactone (ALDACTONE) 25 MG tablet Take 1 tablet (25 mg) by mouth daily for 60 days  Qty: 30 tablet, Refills: 1    Associated Diagnoses: Acute on chronic systolic heart failure (H)           CONTINUE these medications which have NOT CHANGED    Details   acetaminophen (TYLENOL) 325 MG tablet Take 650 mg by mouth      albuterol (PROAIR HFA/PROVENTIL HFA/VENTOLIN HFA) 108 (90 Base) MCG/ACT inhaler Inhale 1-2 puffs into the lungs      melatonin 3 MG tablet Take 3 mg by mouth nightly as needed      ondansetron (ZOFRAN ODT) 4  MG ODT tab Place 4 mg under the tongue as needed      oxyCODONE (ROXICODONE) 5 MG tablet Take 5 mg by mouth as needed      sertraline (ZOLOFT) 50 MG tablet Take 50 mg by mouth daily      HYDROmorphone (DILAUDID) 2 MG tablet Take 2 mg by mouth every 6 hours as needed for pain      loperamide (IMODIUM) 2 MG capsule Take 4 mg by mouth as needed for diarrhea      norgestimate-ethinyl estradiol (ESTARYLLA) 0.25-35 MG-MCG tablet Take 1 tablet by mouth at bedtime      omeprazole (PRILOSEC) 20 MG DR capsule Take 20 mg by mouth daily      polyethylene glycol (MIRALAX) 17 GM/Dose powder Take 17 g by mouth daily           STOP taking these medications       bisoprolol (ZEBETA) 5 MG tablet Comments:   Reason for Stopping:         losartan (COZAAR) 25 MG tablet Comments:   Reason for Stopping:         losartan (COZAAR) 50 MG tablet Comments:   Reason for Stopping:               DISCHARGE DISPOSITION: Mary Shaikh will discharge to home in stable condition.     DISCHARGE INSTRUCTIONS:  Discharge Procedure Orders   Dental Referral   Standing Status: Future   Referral Priority: Priority: 1-2 Weeks Referral Type: Consultation   Number of Visits Requested: 1     Adult Neuropsychology  Referral   Standing Status: Future   Referral Priority: Routine: Next available opening Referral Type: Mental Health Outpatient   Requested Specialty: Neuropsychology   Number of Visits Requested: 1     Dental Referral   Standing Status: Future   Referral Priority: Priority: 1-2 Weeks Referral Type: Consultation   Number of Visits Requested: 1     Reason for your hospital stay   Order Comments: You are here because of cardiogenic shock. We optimized your heart medications. You are being evaluated for the heart transplant. Most of the investigations are done but a few things still need to be evaluated. You will need an outpatient evaluation of dental issues, pulmonary function test, 6 minutes walk test, neuro psychiatric evaluation. With your  heart condition, it is not safe for you to get pregnant and we would like you to discuss with your OB about a more efficient way of contraception. We would strongly recommend that you get intrauteral device as it has superior efficacy to oral contraceptives.   We will continue to work with you through cardiology clinic.     Activity   Order Comments: Your activity upon discharge: activity as tolerated     Order Specific Question Answer Comments   Is discharge order? Yes      Follow Up and recommended labs and tests   Order Comments: Follow up with primary care provider, [facility], within 7 days for hospital follow- up.  The following labs/tests are recommended: CMP.     Resume Home Care Services     Pulmonary Function Test   Standing Status: Future Standing Exp. Date: 08/05/25   Order Comments: .     Order Specific Question Answer Comments   Procedure performed at Clinic performed    Reason for procedure LVAD/heart transplant evaluation    Methacholine Bronchial Challenge No      6 minute walk test   Standing Status: Future Standing Exp. Date: 08/05/25   Order Comments: LVAD/heart transplant evaluation     Order Specific Question Answer Comments   Procedure performed at [facility]     Reason for test: 6MWT      Diet   Order Comments: Follow this diet upon discharge: Orders Placed This Encounter      Regular Diet Adult     Order Specific Question Answer Comments   Is discharge order? Yes        It was our pleasure to care for Mary Shaikh during this hospitalization. Please do not hesitate to contact me should there be questions regarding the hospital course or discharge plan.    Thank you for allowing me to care for this patient, please don't hesitate to contact me with any questions regarding this plan.   Patient was discussed and evaluated with Frances Quintana MD, attending physician, who agrees with the assessment and plan above.    Estefania Acevedo,  MD  Internal Medicine, PGY-2  [phone]  August 5, 2024 12:11 PM

## 2024-08-05 NOTE — PROGRESS NOTES
"August 5, 2024 1:21 PM     Patient YOB: 2000     Patient Age: 23 year old     Patient Weight: 157 lbs 1.6 oz     Patient Height:   Ht Readings from Last 2 Encounters:   08/01/24 1.626 m (5' 4\")   05/15/24 1.676 m (5' 5.98\")        Respiratory completed bedside PFT per order. Done with patient sitting up in bed. Overall patient effort was good.        Best Blow of 3 Results:     FEV1: (insert actual value) (insert % predicted) Pred 3.34 and actual value 1.16 with 35%  FVC: Predicted 4.22  and actual 3.02 with 72%.  FEV1/FVC:   PEF: 2.90      RT Irvin on 8/5/2024 at 1:21 PM     "

## 2024-08-05 NOTE — CONSULTS
"Dental Hygiene Consult Service        Mary Shaikh MRN# [id number]   YOB: 2000 Age: 23 year old     Date of Admission: 7/31/2024  PCP is [facility]  Date of Service: 8/5/2024           Reason for Consult:   Referring MD & Reason for Visit: I was asked by Mell Monte MD, to see Mary Shaikh for a COMPREHENSIVE oral assessment regarding Heart transplant evaluation and/or Ventricular Assist Device (VAD) evaluation     *Please note: dental hygiene services, including oral assessment, are not a replacement for examination by a licensed dentist. The patient has been informed of the oral assessment procedures and has provided verbal consent.       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  Dental and oropharyngeal history is pertinent for undergoing LVAD/heart txp eval, requiring dental clearance; dental CT completed 8/1; pt has established dental home, but infrequent care - pt reports last dental visit was \"a few years ago\". Insignificant social hx.  The patient reports no oral/dental pain or concerns at this time.      EXAM: CT DENTAL WO CONTRAST 8/1/2024 2:45 PM     HISTORY:  Heart failure, LCAD/OHT work up     TECHNIQUE: 3-D reconstruction by the technologists, with curved  multiplanar reformat of thin section imaging through the mandible and  maxilla obtained without intravenous contrast.     FINDINGS:  No significant soft tissue swelling or mass. Normal facial bone  alignment. No bony erosion. No periapical dental lucency.      Visualized portions of the paranasal sinuses are clear. Right frontal  sinus is hypoplastic. Mild leftward deviation of the nasal " septum.  Normal temporomandibular joints.                                                                      IMPRESSION: Normal mandible and maxilla on dental CT evaluation.     General Observations   Level of support Patient is fully independent   Patient currently in pain No   Patient wears dentures No   Current oral care routine Toothbrushing with manual toothbrush and fluoridated toothpaste once daily, most days (pt reports typically brushing twice daily at home; no interdental cleaning or mouth rinses)   Patient has oral care products Toothbrush and Toothpaste   Extraoral assessment   General appearance Symmetrical and Normal TMJ function   Lymph nodes Symmetrical, no abnormalities, non-tender   Oral Health Assessment Tool (OHAT)   Lips 0=Healthy: smooth, pink, moist   Tongue 0=Healthy: normal, moist, roughness, pink   Gums and Tissues 0=Healthy: pink, moist, smooth, no bleeding   Saliva 0=Healthy: moist tissues, watery and free flowing saliva   Natural Teeth Yes/No 0=Healthy: no decayed or broken teeth/roots   Dentures Yes/No Patient does not wear dentures   Oral Cleanliness 0=Healthy: clean and no food particles or tartar in mouth or dentures   Dental Pain 0=Healthy: no behavioural, verbal, or physical signs of dental pain   OHAT Total Score (0-16) 0   Other Dental Concerns Infrequent professional dental care   Care provided during assessment Oral Assessment and Patient education   Follow up DH assessments required? No   Connect with [facility] or [place] care? Yes: [facility] dental team will review   For Dental Team Service Requesting Consult: CARDS 2 - HF  Clearance/Reason:  heart txp/LVAD eval  Dental CT status:  completed 8/1  Upcoming Sx date(s), if known:  TBD  Expected discharge date, if known:  TBD  Ability to transfer to [facility]:  yes  Pain reported, by pt:  none  Swelling present:  none  Current dental Rx regimen: none    Oral Care Plan To optimize oral health during hospitalization and reduce risk of  HAIs:  Brushing oral hard and soft tissue with soft-bristled manual toothbrush and fluoridated toothpaste ideally BID  Interdental cleaning daily. Provided pt with soft picks during today's assessment  Alcohol-free antiseptic mouthrinse BID to reduce oral bacterial load    Resume comprehensive dental care at established dental home upon discharge and when medically cleared to do so.             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    Plan:   [facility] will review DH note and dental CT results to provide further recommendations for patient's dental needs and dental clearance status.   Implement oral care plan as described above. Pt is currently independent in oral cares.  Pt to continue comprehensive dental care at established dental home under medical advisement.     DOT [place]  Message on [employer]  Pager: [phone]      Past Medical History   I have reviewed this patient's medical history and updated it with pertinent information if needed.  No past medical history on file.    Past Surgical History   I have reviewed this patient's surgical history and updated it with pertinent information if needed.  Past Surgical History:   Procedure Laterality Date    CV RIGHT HEART CATH MEASUREMENTS RECORDED N/A 7/31/2024    Procedure: Heart Cath Right Heart Cath;  Surgeon: Tanmay Brice MD;  Location:  HEART CARDIAC CATH LAB       Social History   I have reviewed this patient's social history and updated it with pertinent  information if needed.  Social History     Tobacco Use    Smoking status: Never    Smokeless tobacco: Never   Substance Use Topics    Alcohol use: Never    Drug use: Never     Prior to Admission Medications   Prior to Admission Medications   Prescriptions Last Dose Informant Patient Reported? Taking?   HYDROmorphone (DILAUDID) 2 MG tablet   Yes No   Sig: Take 2 mg by mouth every 6 hours as needed for pain   acetaminophen (TYLENOL) 325 MG tablet Unknown EMS/Transport Team Yes Yes   Sig: Take 650 mg by mouth   albuterol (PROAIR HFA/PROVENTIL HFA/VENTOLIN HFA) 108 (90 Base) MCG/ACT inhaler 7/30/2024 at 2000 EMS/Transport Team Yes Yes   Sig: Inhale 1-2 puffs into the lungs   bisoprolol (ZEBETA) 5 MG tablet 7/30/2024 at 2000 EMS/Transport Team Yes Yes   Sig: Take 5 mg by mouth at bedtime   loperamide (IMODIUM) 2 MG capsule   Yes No   Sig: Take 4 mg by mouth as needed for diarrhea   losartan (COZAAR) 25 MG tablet   Yes No   Sig: Take 1 tablet by mouth daily at 2 pm   losartan (COZAAR) 50 MG tablet 7/30/2024 at 2000 EMS/Transport Team Yes Yes   Sig: Take 50 mg by mouth at bedtime   melatonin 3 MG tablet 7/30/2024 at 2000 EMS/Transport Team Yes Yes   Sig: Take 3 mg by mouth nightly as needed   norgestimate-ethinyl estradiol (ESTARYLLA) 0.25-35 MG-MCG tablet   Yes No   Sig: Take 1 tablet by mouth at bedtime   norgestimate-ethinyl estradiol (ORTHO-CYCLEN) 0.25-35 MG-MCG tablet 7/31/2024 at 0700 EMS/Transport Team Yes Yes   Sig: Take 1 tablet by mouth daily   omeprazole (PRILOSEC) 20 MG DR capsule   Yes No   Sig: Take 20 mg by mouth daily   ondansetron (ZOFRAN ODT) 4 MG ODT tab Past Week at prn EMS/Transport Team Yes Yes   Sig: Place 4 mg under the tongue as needed   oxyCODONE (ROXICODONE) 5 MG tablet Past Month at prn EMS/Transport Team Yes Yes   Sig: Take 5 mg by mouth as needed   polyethylene glycol (MIRALAX) 17 GM/Dose powder   Yes No   Sig: Take 17 g by mouth daily   sertraline (ZOLOFT) 50 MG tablet Past Week  EMS/Transport Team Yes Yes   Sig: Take 50 mg by mouth daily   sertraline (ZOLOFT) 50 MG tablet   Yes No   Sig: Take 50 mg by mouth daily   spironolactone (ALDACTONE) 25 MG tablet 7/30/2024 at 2000 EMS/Transport Team No Yes   Sig: Take 0.5 tablets (12.5 mg) by mouth daily      Facility-Administered Medications: None     Allergies   No Known Allergies  Physical Exam

## 2024-08-05 NOTE — PLAN OF CARE
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      I: Monitored vitals and assessed pt status.     Changed: Transferred from 4C to 6C  Tele: Sinus rhythm w/ frequent ectopy  O2: Room air  Mobility: Independent    A: Neuro: A/O x4.  Call light appropriate.  Able to make needs known.  Respiratory:  On room air.  Lung sounds clear.  Denies shortness of breath at rest.  Cardiac: VSS.  SR.  ICD.  DDI 40.  Receives enoxaparin daily.  No s/s of bleeding  GI: Last BM 8/4.  No report of nausea or vomiting.  : Urinating adequate amounts of clear, yellow urine  Skin:  See PCS for assessment and treatment of wounds and surgical incisions.  LDA:  Lt PIV   Electrolytes: RN managed K.  AM labs pending.  Pain: Denies  Isolation:  Standard Precautions  Tests/procedures: None performed overnight.  Diet: Regular diet  Sleep:  No sleep disturbances noted or reported.    P: Continue to monitor Pt status and report changes to Cards 2.  Continue with advanced heart failure therapies evaluation.  Plan for discharge home Monday or Tuesday.

## 2024-08-05 NOTE — PROGRESS NOTES
Patient transferred via wheelchair to . Report given to Mulu. Sent with all belongings (phone, phone , clothing, shoes, roku). No acute changes from 1900 - 0000. 1 episode of SVT which patient was able to convert out of on own.

## 2024-08-05 NOTE — PLAN OF CARE
Discharged to: Home   Via: Automobile  Accompanied by: Mother    Prior to discharge today, pt was seen by Device Nurse and ICD/pacer settings were updated per EP orders.    Post-hospital discharge instructions reviewed with pt and her mother including diet, follow up appointments, medication instructions and reasons to contact MD/provider team (new onset shortness of breath, dizziness, lightheadedness, and/or weight gain or loss of 2 lbs/day or 5 lbs/wk) and when to call 911 (ex. New onset chest pain). Pt verbalized understanding.    Prescriptions: Rx's were filled at [facility], per pt's request; medication list reviewed with patient and pt's mother.     Follow Up Appointments: Patient aware of the additional tests and procedures ordered for heart transplant/LVAD work up.      Belongings: All sent home with pt  IV: removed  Telemetry: taken off    Pt and mother verbalized understanding of discharge instructions and all questions were answered, discharge paperwork sent home with pt and mother.    Frances Overton, RN   Cardiology

## 2024-08-06 ENCOUNTER — TELEPHONE (OUTPATIENT)
Dept: NEUROPSYCHOLOGY | Facility: CLINIC | Age: 24
End: 2024-08-06
Payer: COMMERCIAL

## 2024-08-06 LAB
A*: NORMAL
A*LOCUS SEROLOGIC EQUIVALENT: 2
A*LOCUS: NORMAL
A*SEROLOGIC EQUIVALENT: 30
ABTEST METHOD: NORMAL
B*: NORMAL
B*LOCUS SEROLOGIC EQUIVALENT: 42
B*LOCUS: NORMAL
B*SEROLOGIC EQUIVALENT: 53
BW-1: NORMAL
BW-2: NORMAL
C*: NORMAL
C*LOCUS SEROLOGIC EQUIVALENT: 4
C*LOCUS: NORMAL
C*SEROLOGIC EQUIVALENT: 17
DPA1*: NORMAL
DPA1*LOCUS: NORMAL
DPB1*: NORMAL
DPB1*LOCUS: NORMAL
DQA1*: NORMAL
DQA1*LOCUS: NORMAL
DQB1*: NORMAL
DQB1*LOCUS SEROLOGIC EQUIVALENT: 7
DQB1*LOCUS: NORMAL
DQB1*SEROLOGIC EQUIVALENT: 4
DRB1*: NORMAL
DRB1*LOCUS SEROLOGIC EQUIVALENT: 18
DRB1*LOCUS: NORMAL
DRB1*SEROLOGIC EQUIVALENT: 11
DRB3*: NORMAL
DRB3*LOCUS NMDP: NORMAL
DRB3*LOCUS SEROLOGIC EQUIVALENT: 52
DRB3*LOCUS: NORMAL
DRB3*NMDP: NORMAL
DRB3*SEROLOGIC EQUIVALENT: 52
DRSSO TEST METHOD: NORMAL

## 2024-08-06 NOTE — PLAN OF CARE
Occupational Therapy Discharge Summary    Reason for therapy discharge:    Discharged to home with assist    Progress towards therapy goal(s). See goals on Care Plan in [facility] electronic health record for goal details.  Goals partially met.  Barriers to achieving goals:   discharge from facility.    Therapy recommendation(s):    No further therapy is recommended.

## 2024-08-06 NOTE — TELEPHONE ENCOUNTER
[facility] Call Center    Phone Message    May a detailed message be left on voicemail: yes     Reason for Call: Appointment Intake    Referring Provider Name: Dr. Estefania Munguia  Diagnosis and/or Symptoms: Transplant/LVAD Recipient     Please review referral as all transplant patients are to be review per scheduling protocols.    Please contact patient with appointment options    Action Taken: Message routed to:  [facility] Neuropsychology    Travel Screening: Not Applicable     Date of Service:

## 2024-08-06 NOTE — CONFIDENTIAL NOTE
Schedule per protocol    Timeline: First Available  Location: [facility] [place] NEUROPSYCHOLOGY or  NEUROPSYCHOLOGY  Preferred Provider: RON Camacho, RON Aguirre, or RON Whitmore  Visit Type: Pre-Tx Psych Eval Liya  Special Instructions:  LVAD transplant eval     Madiha Johnson   Psychometrist

## 2024-08-07 NOTE — TELEPHONE ENCOUNTER
Patient confirmed scheduled appointment:  Date: 8/20  Time: 10 am  Visit type: Pre-tx neuropsych   Provider: RON ERNANDEZ   Location: virtual- pt aware   Testing/imaging: n/a  Additional notes: in jae Wells on 8/7/2024 at 11:09 AM

## 2024-08-12 ENCOUNTER — CARE COORDINATION (OUTPATIENT)
Dept: TRANSPLANT | Facility: CLINIC | Age: 24
End: 2024-08-12

## 2024-08-12 ENCOUNTER — LAB (OUTPATIENT)
Dept: LAB | Facility: CLINIC | Age: 24
End: 2024-08-12
Payer: COMMERCIAL

## 2024-08-12 DIAGNOSIS — I50.23 ACUTE ON CHRONIC SYSTOLIC HEART FAILURE (H): Primary | ICD-10-CM

## 2024-08-12 DIAGNOSIS — E74.05 DANON DISEASE IN HETEROZYGOUS FEMALE (H): ICD-10-CM

## 2024-08-12 DIAGNOSIS — I42.8 NONISCHEMIC CARDIOMYOPATHY (H): ICD-10-CM

## 2024-08-12 DIAGNOSIS — I50.23 ACUTE ON CHRONIC SYSTOLIC HEART FAILURE (H): ICD-10-CM

## 2024-08-12 DIAGNOSIS — I50.20 HEART FAILURE WITH REDUCED EJECTION FRACTION (H): ICD-10-CM

## 2024-08-12 PROCEDURE — 36415 COLL VENOUS BLD VENIPUNCTURE: CPT

## 2024-08-12 PROCEDURE — 80053 COMPREHEN METABOLIC PANEL: CPT

## 2024-08-13 LAB
ALBUMIN SERPL BCG-MCNC: 4.5 G/DL (ref 3.5–5.2)
ALP SERPL-CCNC: 75 U/L (ref 40–150)
ALT SERPL W P-5'-P-CCNC: 72 U/L (ref 0–50)
ANION GAP SERPL CALCULATED.3IONS-SCNC: 11 MMOL/L (ref 7–15)
AST SERPL W P-5'-P-CCNC: 98 U/L (ref 0–45)
BILIRUB SERPL-MCNC: 0.3 MG/DL
BUN SERPL-MCNC: 4.1 MG/DL (ref 6–20)
CALCIUM SERPL-MCNC: 9.7 MG/DL (ref 8.8–10.4)
CHLORIDE SERPL-SCNC: 103 MMOL/L (ref 98–107)
CREAT SERPL-MCNC: 0.55 MG/DL (ref 0.51–0.95)
EGFRCR SERPLBLD CKD-EPI 2021: >90 ML/MIN/1.73M2
GLUCOSE SERPL-MCNC: 104 MG/DL (ref 70–99)
HCO3 SERPL-SCNC: 25 MMOL/L (ref 22–29)
POTASSIUM SERPL-SCNC: 4.4 MMOL/L (ref 3.4–5.3)
PROT SERPL-MCNC: 7.7 G/DL (ref 6.4–8.3)
SODIUM SERPL-SCNC: 139 MMOL/L (ref 135–145)

## 2024-08-13 NOTE — PROGRESS NOTES
Heart Transplant Intake Call  Date:  08/13/2024    Pt referred for outpatient Heart Transplant evaluation. Called patient and introduced self as pre-heart transplant coordinator and discussed the evaluation process for transplant.    Discussed need for caregiver support during evaluation process, and post transplant and/or VAD implant. Pt's 30-day caregiver will be Dad and Mom.     Discussed importance of avoiding nicotine, cannabis, illegal drugs, and using alcohol only minimally or none at all as decided upon between patient and advanced heart failure cardiologist. Discussed age and BMI requirements for transplant listing.    Patient was encouraged to review and sign the Heart Transplant Packet prior to the Heart Transplant Education class. They were encouraged to bring a caregiver to this appointment.    Plan: Patient verbalized understanding and in agreement to move forward with evaluation. Was engaged and forthcoming with information. Asked appropriate questions in regard to this process. Patient denied any further transplant related questions and/or concerns.  Patient given the contact information to the transplant office and understands to contact coordinator with any further questions or concerns.     Went over upcoming appts and will plan to add teaching on 8/22/24 virtual at 11am.

## 2024-08-14 NOTE — PROGRESS NOTES
[facility] Cardiology   Cardiology Clinic Note      HPI:   Ms. 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 She presents to cardiology clinic for CORE enrollment.    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 Per chart review from recent visit with Dr. Fuentes, patient has had a signficant reduction in her LVEF to 26% on CMR with extensive LGE that is consistent with Danon disease. 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 She has a normal CK but has not yet seen neuromuscular neurology regarding the skeletal myopathy. She had an ICD as secondary SCD prevention after syncope and SVT. Her exercise tolerance has decreased. Her CPEX demonstrates an exercise duration of 8 min and 17 seconds. She achieved an RER of 1.09 and had a peak V02 of 18.7 which is a reduction from past CPEX, and a VE/VC02 slope of 32. Her HR and BP responses were normal.    Patient underwent RHC 7/31/24 which revealed patient in ambulatory cardiogenic shock. She was therefore admitted for GDMT optimization and to start advance therapies evaluation. Her body weight at admission 170 lbs, discharge weight 157 lbs.     Since discharge, patient reports overall feeling stable. She reports that she is experiencing some BUI, but is able to walk for about 30min at a slow pace without limitation. She has not yet enrolled in cardiac rehab, but is interested in starting once the amount of appointments slow down.   She does not currently check her blood pressure at  home (114/68 in clinic today), nor does she weigh herself daily.    Today in clinic, she denies chest pain, palpitations, dizziness, syncope, or lower extremity edema.     PAST MEDICAL HISTORY:  No past medical history on file.    FAMILY HISTORY:  No family history on file.    SOCIAL HISTORY:  Social History     Socioeconomic History    Marital status: Single   Tobacco Use    Smoking status: Never    Smokeless tobacco: Never   Substance and Sexual Activity    Alcohol use: Never    Drug use: Never     Social Determinants of Health     Financial Resource Strain: Low Risk  (7/10/2024)    Received from [facility]    Financial Resource Strain     Difficulty of Paying Living Expenses: 3   Food Insecurity: No Food Insecurity (7/10/2024)    Received from [facility]    Food Insecurity     Worried About Running Out of Food in the Last Year: 1   Transportation Needs: No Transportation Needs (7/10/2024)    Received from [facility]    Transportation Needs     Lack of Transportation (Medical): 1   Social Connections: Socially Integrated (7/10/2024)    [facility]    Social Connections     Frequency of Communication with Friends and Family: 0   Interpersonal Safety: Not At Risk (6/20/2024)    Received from [facility]    Humiliation, Afraid, Rape, and Kick questionnaire     Fear of Current or Ex-Partner: No     Emotionally Abused: No     Physically Abused: No     Sexually Abused: No   Housing Stability: Low Risk  (7/10/2024)    Received from [facility]    Housing Stability     Unable to Pay for Housing in the Last Year: 1       CURRENT MEDICATIONS:  Current Outpatient Medications   Medication Sig Dispense Refill    acetaminophen (TYLENOL) 325 MG tablet Take 650 mg by mouth      albuterol (PROAIR HFA/PROVENTIL HFA/VENTOLIN HFA) 108 (90 Base)  MCG/ACT inhaler Inhale 1-2 puffs into the lungs      carvedilol (COREG) 3.125 MG tablet Take 1 tablet (3.125 mg) by mouth 2 times daily (with meals) for 60 days 60 tablet 1    digoxin (LANOXIN) 250 MCG tablet Take 1 tablet (250 mcg) by mouth daily for 60 days 30 tablet 1    empagliflozin (JARDIANCE) 10 MG TABS tablet Take 1 tablet (10 mg) by mouth daily 90 tablet 1    furosemide (LASIX) 20 MG tablet Take 1 tablet (20 mg) by mouth daily for 60 days 30 tablet 1    HYDROmorphone (DILAUDID) 2 MG tablet Take 2 mg by mouth every 6 hours as needed for pain      loperamide (IMODIUM) 2 MG capsule Take 4 mg by mouth as needed for diarrhea      melatonin 3 MG tablet Take 3 mg by mouth nightly as needed      nitroGLYcerin (NITROSTAT) 0.4 MG sublingual tablet For chest pain place 1 tablet under the tongue every 5 minutes for 3 doses. If symptoms persist 5 minutes after 1st dose call 911. 10 tablet 1    norgestimate-ethinyl estradiol (ESTARYLLA) 0.25-35 MG-MCG tablet Take 1 tablet by mouth at bedtime      omeprazole (PRILOSEC) 20 MG DR capsule Take 20 mg by mouth daily      ondansetron (ZOFRAN ODT) 4 MG ODT tab Place 4 mg under the tongue as needed      oxyCODONE (ROXICODONE) 5 MG tablet Take 5 mg by mouth as needed      polyethylene glycol (MIRALAX) 17 GM/Dose powder Take 17 g by mouth daily      sacubitril-valsartan (ENTRESTO)  MG per tablet Take 1 tablet by mouth 2 times daily for 63 days 60 tablet 1    sertraline (ZOLOFT) 50 MG tablet Take 50 mg by mouth daily      spironolactone (ALDACTONE) 25 MG tablet Take 1 tablet (25 mg) by mouth daily for 60 days 30 tablet 1     No current facility-administered medications for this visit.       ROS:   Refer to HPI    EXAM:  /68 (BP Location: Right arm, Patient Position: Chair, Cuff Size: Adult Regular)   Pulse 61   Wt 73.9 kg (162 lb 14.4 oz)   SpO2 100%   BMI 27.96 kg/m      GENERAL: Appears comfortable, in no acute distress.   HEENT: Eye symmetrical, no discharge or  icterus bilaterally.   CV: RRR, +S1S2, no murmur, rub, or gallop.  RESPIRATORY: Respirations regular, even, and unlabored. Lungs CTA throughout.   EXTREMITIES: no peripheral edema.   NEUROLOGIC: Alert and oriented x 3. No focal deficits.   MUSCULOSKELETAL: No joint swelling or tenderness.   SKIN: No jaundice. No rashes or lesions.     Labs, reviewed with patient in clinic today:  CBC RESULTS:  Lab Results   Component Value Date    WBC 5.3 08/05/2024    RBC 5.53 (H) 08/05/2024    HGB 14.5 08/05/2024    HCT 46.7 08/05/2024    MCV 84 08/05/2024    MCH 26.2 (L) 08/05/2024    MCHC 31.0 (L) 08/05/2024    RDW 15.3 (H) 08/05/2024     08/05/2024       CMP RESULTS:  Lab Results   Component Value Date     08/22/2024    POTASSIUM 4.1 08/22/2024    POTASSIUM 4.0 05/09/2022    CHLORIDE 102 08/22/2024    CHLORIDE 107 05/09/2022    CO2 27 08/22/2024    CO2 29 05/09/2022    ANIONGAP 10 08/22/2024    ANIONGAP 5 05/09/2022    GLC 87 08/22/2024    GLC 96 08/01/2024    GLC 86 05/09/2022    BUN 7.8 08/22/2024    BUN 8 05/09/2022    CR 0.79 08/22/2024    GFRESTIMATED >90 08/22/2024    WHITNEY 9.8 08/22/2024    BILITOTAL 0.3 08/12/2024    ALBUMIN 4.5 08/12/2024    ALBUMIN 3.9 05/09/2022    ALKPHOS 75 08/12/2024    ALT 72 (H) 08/12/2024    AST 98 (H) 08/12/2024        INR RESULTS:  Lab Results   Component Value Date    INR 1.25 (H) 08/02/2024       Lab Results   Component Value Date    MAG 2.0 08/05/2024     Lab Results   Component Value Date    NTBNPI 1,569 (H) 08/02/2024     Lab Results   Component Value Date    NTBNP 2,958 (H) 07/22/2024       LIPIDS:  Lab Results   Component Value Date    CHOL 139 08/02/2024     Lab Results   Component Value Date    HDL 50 08/02/2024     Lab Results   Component Value Date    LDL 78 08/02/2024     Lab Results   Component Value Date    TRIG 57 08/02/2024       Echocardiogram:  Recent Results (from the past 4320 hour(s))   Echo Complete   Result Value    LVEF  20-25% (severely reduced)     [facility]    [id number]  [id number]  [id number]  [id number]^THOR COOK^CARMEN^D     [facility]  Echocardiography Laboratory  [address] [place] [id number]     Name: MS. JE PARK  MRN: [id number]  : 2000  Study Date: 2024 02:23 PM  Age: 23 yrs  Gender: Female  [place] [facility] [place]  Reason For Study: Heart Failure  Ordering Physician: CARMEN HDEZ  Referring Physician: LESLEE RAZO  Performed By: Royce Donato RDCS     BSA: 1.9 m2  Height: 66 in  Weight: 170 lb  HR: 65  BP: 118/89 mmHg  ______________________________________________________________________________  Procedure  Echocardiogram with two-dimensional, color and spectral Doppler performed.  Contrast Optison. Optison (NDC #7523-9406-25) given intravenously. Patient was  given 6 ml mixture of 3 ml Optison and 6 ml saline. 3 ml wasted.  ______________________________________________________________________________  Interpretation Summary  Left ventricular function is decreased. The ejection fraction is 20-25%  (severely reduced).  Global right ventricular function is moderately reduced.  Moderate biatrial enlargement is present.  IVC diameter >2.1 cm collapsing <50% with sniff suggests a high RA pressure  estimated at 15 mmHg or greater.  Trivial pericardial effusion is present.  There is no prior study for direct comparison.  ______________________________________________________________________________  Left Ventricle  Left ventricular wall thickness is normal. Borderline left ventricular  dilation is present. Left ventricular function is decreased. The ejection  fraction is 20-25% (severely reduced). Left ventricular diastolic function is  indeterminate. Severe diffuse hypokinesis is present.     Right Ventricle  The right ventricle is normal size. Global right ventricular function is  moderately reduced. A pacemaker lead is noted in the right ventricle.     Atria  Moderate  biatrial enlargement is present.     Mitral Valve  The mitral valve is normal.     Aortic Valve  Aortic valve is normal in structure and function.     Tricuspid Valve  The tricuspid valve is normal. Mild tricuspid insufficiency is present. The  right ventricular systolic pressure is approximated at 29.2 mmHg plus the  right atrial pressure.     Pulmonic Valve  The pulmonic valve is normal.     Vessels  The thoracic aorta is normal. The aorta root is normal. The pulmonary artery  is normal. IVC diameter >2.1 cm collapsing <50% with sniff suggests a high RA  pressure estimated at 15 mmHg or greater.     Pericardium  Trivial pericardial effusion is present.     Compared to Previous Study  There is no prior study for direct comparison.  ______________________________________________________________________________  MMode/2D Measurements & Calculations  IVSd: 1.2 cm     LVIDd: 5.7 cm  LVIDs: 5.4 cm  LVPWd: 0.94 cm  FS: 6.6 %  LV mass(C)d: 245.4 grams  LV mass(C)dI: 131.5 grams/m2  EPSS: 2.3 cm  asc Aorta Diam: 2.6 cm  LVOT diam: 2.2 cm  LVOT area: 3.6 cm2  Asc Ao diam index BSA (cm/m2): 1.4  Asc Ao diam index Ht(cm/m): 1.5  LA Volume Index (BP): 43.5 ml/m2  RWT: 0.33     TAPSE: 1.2 cm     Doppler Measurements & Calculations  MV E max lisandro: 120.0 cm/sec  MV A max lisandro: 25.1 cm/sec  MV E/A: 4.8  MV dec slope: 1445 cm/sec2  MV dec time: 0.08 sec  PA acc time: 0.06 sec  TR max lisandro: 270.1 cm/sec  TR max P.2 mmHg  E/E' av.9  Lateral E/e': 17.9  Medial E/e': 34.0     ______________________________________________________________________________  Report approved by: Pedro Cassidy 2024 03:49 PM           Assessment and Plan:   Ms. 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      #Cardiogenic Shock SCAI B  #HFrEF secondary to Danon disease with  "associated hypertrophic cardiomyopathy  She had RCH done on 7/31 and was found to have an \"'ambulatory cardiogenic shock\", so she was admitted for medications optimization.  No signs of organ dysfunction and or hypoperfusion. Making adequate UO. S/p IV nitroprusside for afterload reduction and IV diuretics. BW at admission 170 lbs, discharge weight 157 lbs. GDMT optimization and start advance therapies evaluation.  GDMT:   - BB: Carvedilol 3.125 mg BiD    - ACEI/ARB/ARNI: Entresto 97/103 BID  - SGLT2i: Jardiance 10mg  - MRA: Spirolactone 25mg   - Diuretics : Furosemide 20 mg daily   - BMP WNL today, will repeat prior to next CORE appt (prefers [facility])  - Continue outpatient LVAD/transplant evaluation: neuropsych consult (8/27)  - Contraception: on OCP     #History of SVT with preexitation   #S/P ICD sudden cardiac death prevention  Brief episodes of sypmtomatic SVT while in the hospital, responsive to vagal maneuvers. 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 EP was consulted and recommended starting oral digoxin and low-dose beta-blocker.  Per EP note, \"if SVT increases in frequency or duration she may require another EP study in the future to further characterize the arrhythmia and determine whether it would be amenable to an ablation\".   - Digoxin 250 mcg daily   - Beta blocker as above      Follow up:  CORE in 1 month per Dr. Fuentes  Chart review time today: 15 minutes  Visit time today: 15 minutes  Total time spent today: 30 minutes        Johanna Wu PA-C  General Cardiology   08/22/24      "

## 2024-08-19 ENCOUNTER — OFFICE VISIT (OUTPATIENT)
Dept: NURSING | Facility: CLINIC | Age: 24
End: 2024-08-19
Payer: COMMERCIAL

## 2024-08-19 ENCOUNTER — TELEPHONE (OUTPATIENT)
Dept: CARDIOLOGY | Facility: CLINIC | Age: 24
End: 2024-08-19

## 2024-08-19 ENCOUNTER — CARE COORDINATION (OUTPATIENT)
Dept: CARDIOLOGY | Facility: CLINIC | Age: 24
End: 2024-08-19

## 2024-08-19 ENCOUNTER — TELEPHONE (OUTPATIENT)
Dept: NEUROPSYCHOLOGY | Facility: CLINIC | Age: 24
End: 2024-08-19

## 2024-08-19 VITALS — BODY MASS INDEX: 27.65 KG/M2 | WEIGHT: 161.1 LBS

## 2024-08-19 DIAGNOSIS — I50.20 HEART FAILURE WITH REDUCED EJECTION FRACTION (H): Primary | ICD-10-CM

## 2024-08-19 DIAGNOSIS — I47.10 PAROXYSMAL SVT (SUPRAVENTRICULAR TACHYCARDIA) (H): ICD-10-CM

## 2024-08-19 DIAGNOSIS — I50.23 ACUTE ON CHRONIC SYSTOLIC HEART FAILURE (H): ICD-10-CM

## 2024-08-19 DIAGNOSIS — Z95.810 ICD (IMPLANTABLE CARDIOVERTER-DEFIBRILLATOR) IN PLACE: ICD-10-CM

## 2024-08-19 LAB
6 MIN WALK (FT): 1300 FT
6 MIN WALK (M): 396 M

## 2024-08-19 PROCEDURE — 94729 DIFFUSING CAPACITY: CPT | Performed by: INTERNAL MEDICINE

## 2024-08-19 PROCEDURE — 94618 PULMONARY STRESS TESTING: CPT | Performed by: INTERNAL MEDICINE

## 2024-08-19 PROCEDURE — 94375 RESPIRATORY FLOW VOLUME LOOP: CPT | Performed by: INTERNAL MEDICINE

## 2024-08-19 PROCEDURE — 94726 PLETHYSMOGRAPHY LUNG VOLUMES: CPT | Performed by: INTERNAL MEDICINE

## 2024-08-19 NOTE — TELEPHONE ENCOUNTER
Patient confirmed scheduled appointment:  Date: 8/27/2024  Time: 10:00 am  Visit type: Pre-TXNeuropsych 120  Provider: RON Whitmore  Location: virtual  Testing/imaging: NA  Additional notes: 8/20 R/S as provider out    Sydnie Carrillo on 8/19/2024 at 6:08 PM

## 2024-08-19 NOTE — TELEPHONE ENCOUNTER
8/19 Patient confirmed scheduled appointment:  Date: 10/28/2024  Time: 1:30 pm  Visit type: return heart failure   Provider: Gina  [place] [facility] [place]   Testing/imaging: labs and device check prior  Additional notes: N/A

## 2024-08-20 ENCOUNTER — MYC MEDICAL ADVICE (OUTPATIENT)
Dept: CARDIOLOGY | Facility: CLINIC | Age: 24
End: 2024-08-20
Payer: COMMERCIAL

## 2024-08-20 ENCOUNTER — TELEPHONE (OUTPATIENT)
Dept: CARDIOLOGY | Facility: CLINIC | Age: 24
End: 2024-08-20
Payer: COMMERCIAL

## 2024-08-20 NOTE — TELEPHONE ENCOUNTER
11-Jan-2021 Central Prior Authorization Team   Phone: [phone]    PA Initiation    Medication: Jardiance 10mg   Insurance Company: [employer] Non-Specialty PA's - Phone [phone] Fax [phone]  Pharmacy Filling the Rx: [facility] - [place], [place] - [address]  Filling Pharmacy Phone: [phone]  Filling Pharmacy Fax:    Start Date: 8/20/2024       18-Jan-2021 18:00 18-Jan-2021 20:35 01-Jan-2021 16:53

## 2024-08-20 NOTE — TELEPHONE ENCOUNTER
Prior Authorization Approval    Authorization Effective Date: 7/21/2024  Authorization Expiration Date: 8/20/2025  Medication: Jardiance 10mg   Approved Dose/Quantity:   Reference #:     Insurance Company: [employer] Non-Specialty PA's - Phone [phone] Fax [phone]  Expected CoPay:       CoPay Card Available:      Foundation Assistance Needed:    Which Pharmacy is filling the prescription (Not needed for infusion/clinic administered): [facility] - [place], [place] - [address] [place] [address]  Pharmacy Notified:  yes  Patient Notified:  yes- Pharmacy will contact patient when ready to /ship    Pharmacy now has paid claim, $0 copay

## 2024-08-22 ENCOUNTER — ANCILLARY PROCEDURE (OUTPATIENT)
Dept: ULTRASOUND IMAGING | Facility: CLINIC | Age: 24
End: 2024-08-22
Attending: INTERNAL MEDICINE
Payer: COMMERCIAL

## 2024-08-22 ENCOUNTER — LAB (OUTPATIENT)
Dept: LAB | Facility: CLINIC | Age: 24
End: 2024-08-22
Payer: COMMERCIAL

## 2024-08-22 ENCOUNTER — VIRTUAL VISIT (OUTPATIENT)
Dept: TRANSPLANT | Facility: CLINIC | Age: 24
End: 2024-08-22
Attending: INTERNAL MEDICINE

## 2024-08-22 ENCOUNTER — OFFICE VISIT (OUTPATIENT)
Dept: CARDIOLOGY | Facility: CLINIC | Age: 24
End: 2024-08-22
Payer: COMMERCIAL

## 2024-08-22 VITALS
HEART RATE: 61 BPM | SYSTOLIC BLOOD PRESSURE: 114 MMHG | DIASTOLIC BLOOD PRESSURE: 68 MMHG | WEIGHT: 162.9 LBS | BODY MASS INDEX: 27.96 KG/M2 | OXYGEN SATURATION: 100 %

## 2024-08-22 DIAGNOSIS — I50.23 ACUTE ON CHRONIC SYSTOLIC HEART FAILURE (H): Primary | ICD-10-CM

## 2024-08-22 DIAGNOSIS — I47.10 PAROXYSMAL SVT (SUPRAVENTRICULAR TACHYCARDIA) (H): ICD-10-CM

## 2024-08-22 DIAGNOSIS — I50.23 ACUTE ON CHRONIC SYSTOLIC HEART FAILURE (H): ICD-10-CM

## 2024-08-22 DIAGNOSIS — E74.05 DANON DISEASE IN HETEROZYGOUS FEMALE (H): ICD-10-CM

## 2024-08-22 DIAGNOSIS — I42.8 NONISCHEMIC CARDIOMYOPATHY (H): ICD-10-CM

## 2024-08-22 DIAGNOSIS — I50.20 HEART FAILURE WITH REDUCED EJECTION FRACTION (H): ICD-10-CM

## 2024-08-22 PROBLEM — E11.9 DIABETES MELLITUS, TYPE 2 (H): Status: ACTIVE | Noted: 2024-08-22

## 2024-08-22 LAB
ANION GAP SERPL CALCULATED.3IONS-SCNC: 10 MMOL/L (ref 7–15)
BUN SERPL-MCNC: 7.8 MG/DL (ref 6–20)
CALCIUM SERPL-MCNC: 9.8 MG/DL (ref 8.8–10.4)
CHLORIDE SERPL-SCNC: 102 MMOL/L (ref 98–107)
CREAT SERPL-MCNC: 0.79 MG/DL (ref 0.51–0.95)
DLCOCOR-%PRED-PRE: 71 %
DLCOCOR-PRE: 15.92 ML/MIN/MMHG
DLCOUNC-%PRED-PRE: 73 %
DLCOUNC-PRE: 16.44 ML/MIN/MMHG
DLCOUNC-PRED: 22.36 ML/MIN/MMHG
EGFRCR SERPLBLD CKD-EPI 2021: >90 ML/MIN/1.73M2
ERV-%PRED-PRE: 55 %
ERV-PRE: 0.7 L
ERV-PRED: 1.27 L
EXPTIME-PRE: 6.85 SEC
FEF2575-%PRED-PRE: 38 %
FEF2575-PRE: 1.41 L/SEC
FEF2575-PRED: 3.7 L/SEC
FEFMAX-%PRED-PRE: 50 %
FEFMAX-PRE: 3.49 L/SEC
FEFMAX-PRED: 6.97 L/SEC
FEV1-%PRED-PRE: 54 %
FEV1-PRE: 1.7 L
FEV1FEV6-PRE: 76 %
FEV1FEV6-PRED: 87 %
FEV1FVC-PRE: 76 %
FEV1FVC-PRED: 88 %
FEV1SVC-PRE: 70 %
FEV1SVC-PRED: 82 %
FIFMAX-PRE: 2.09 L/SEC
FRCPLETH-%PRED-PRE: 72 %
FRCPLETH-PRE: 1.96 L
FRCPLETH-PRED: 2.69 L
FVC-%PRED-PRE: 62 %
FVC-PRE: 2.25 L
FVC-PRED: 3.57 L
GLUCOSE SERPL-MCNC: 87 MG/DL (ref 70–99)
HCO3 SERPL-SCNC: 27 MMOL/L (ref 22–29)
IC-%PRED-PRE: 67 %
IC-PRE: 1.73 L
IC-PRED: 2.54 L
POTASSIUM SERPL-SCNC: 4.1 MMOL/L (ref 3.4–5.3)
RVPLETH-%PRED-PRE: 92 %
RVPLETH-PRE: 1.26 L
RVPLETH-PRED: 1.37 L
SODIUM SERPL-SCNC: 139 MMOL/L (ref 135–145)
TLCPLETH-%PRED-PRE: 73 %
TLCPLETH-PRE: 3.69 L
TLCPLETH-PRED: 5.02 L
VA-%PRED-PRE: 68 %
VA-PRE: 3.27 L
VC-%PRED-PRE: 63 %
VC-PRE: 2.43 L
VC-PRED: 3.81 L

## 2024-08-22 PROCEDURE — 99214 OFFICE O/P EST MOD 30 MIN: CPT

## 2024-08-22 PROCEDURE — 86833 HLA CLASS II HIGH DEFIN QUAL: CPT | Performed by: INTERNAL MEDICINE

## 2024-08-22 PROCEDURE — 36415 COLL VENOUS BLD VENIPUNCTURE: CPT | Performed by: PATHOLOGY

## 2024-08-22 PROCEDURE — 99000 SPECIMEN HANDLING OFFICE-LAB: CPT | Performed by: PATHOLOGY

## 2024-08-22 PROCEDURE — 93880 EXTRACRANIAL BILAT STUDY: CPT | Performed by: RADIOLOGY

## 2024-08-22 PROCEDURE — 80048 BASIC METABOLIC PNL TOTAL CA: CPT | Performed by: PATHOLOGY

## 2024-08-22 PROCEDURE — 86832 HLA CLASS I HIGH DEFIN QUAL: CPT | Performed by: INTERNAL MEDICINE

## 2024-08-22 PROCEDURE — 99213 OFFICE O/P EST LOW 20 MIN: CPT

## 2024-08-22 RX ORDER — FUROSEMIDE 20 MG
20 TABLET ORAL DAILY
Qty: 30 TABLET | Refills: 1 | Status: SHIPPED | OUTPATIENT
Start: 2024-08-22 | End: 2024-09-18

## 2024-08-22 RX ORDER — CARVEDILOL 3.12 MG/1
3.12 TABLET ORAL 2 TIMES DAILY WITH MEALS
Qty: 60 TABLET | Refills: 1 | Status: SHIPPED | OUTPATIENT
Start: 2024-08-22 | End: 2024-10-04

## 2024-08-22 RX ORDER — DIGOXIN 250 MCG
250 TABLET ORAL DAILY
Qty: 30 TABLET | Refills: 1 | Status: SHIPPED | OUTPATIENT
Start: 2024-08-22

## 2024-08-22 RX ORDER — SPIRONOLACTONE 25 MG/1
25 TABLET ORAL DAILY
Qty: 30 TABLET | Refills: 1 | Status: SHIPPED | OUTPATIENT
Start: 2024-08-22

## 2024-08-22 ASSESSMENT — PAIN SCALES - GENERAL: PAINLEVEL: NO PAIN (0)

## 2024-08-22 NOTE — LETTER
Mary Shaikh  [address]  [place] [address]                August 22, 2024    Hello Fashay,    It was so nice meeting you during your transplant evaluation.  Below you will find the follow-up needed to finish your evaluation for heart transplant.     Complete visits scheduled today, 8/22  Complete neuropsychology eval on 8/27     In addition, please review the following:      GUIDELINES FOR BEING ON THE HEART TRANSPLANT WAITLIST    Review your transplant information ( Health, Heart Transplant Booklet, Transplant Handbook). Become familiar with the medications you will be taking after your transplant. Share this information with your support system so they also are familiar with the transplant process.      You are encouraged to have regular visits with their Primary Care Providers (PCP). With their help, you are responsible for the following items:  Vaccinations (as recommended by the transplant team)  Female Patients:  PAP smear and Mammogram screenings according to the [place]ICK (American Cancer Association)  Male Patients:  Prostate Exams according to the [employer] (American Cancer Association)  Colonoscopy screenings for people over 45 years old (or as recommended by your Primary Care Provider)  For Diabetic Patients:  Ophthalmology screenings as recommended by your Primary Care Provider     Here is a list of recommended vaccinations for Pre Transplant patients.  The transplant team will review your vaccination history and collaborate with your PCP to assist in updating the vaccinations that are recommended by your transplant team.    COVID-19 Vaccination Series (REQUIRED prior to listing for transplant)  Inactivated Influenza Vaccine (IIV)-yearly  Tetanus, diphtheria, Pertussis (Dtap) if none in 10 years and Tdap booster every 5-10 years - you are due for a Tdap booster  Hepatitis B vaccine series   Pneumococcal vaccine  Shinrix    A letter of dental clearance is required prior to listing on the Heart  Transplant Waitlist.  Patients are required to have a dental exam and cleaning every 6-12 months while on the transplant list.  Please fax a letter of dental clearance to the transplant office at [phone] if this has not already been done prior to your evaluation    Patients must refrain from the use of all Nicotine products (cigarettes, electronic cigarettes, vaping, cigars, pipes, chewing tobacco, snuff, nicotine patches and nicotine gum).  You must also refrain from any type of drug use, including cannabis - unless by prescription. You have been instructed to have no alcohol use. Periodic screening may occur to assure your compliance with this.    Changes in weight that result in BMI greater than 38 or less than 18 may impact your status on the waitlist or extend the time of your evaluation.    Your current weight:    Wt Readings from Last 2 Encounters:   08/19/24 73.1 kg (161 lb 1.6 oz)   08/05/24 71.3 kg (157 lb 1.6 oz)      Your current BMI:  There is no height or weight on file to calculate BMI.   Goal weight (if BMI is >38) NA  Referral to Transplant Dietician if BMI >35  Referral to Weight Management Clinic if continued support is needed after meeting with Transplant Dietician    Patients are required to have a support person who can be with you at the time of transplant and for the first thirty days after transplant (24 hours per day). Caregiver(s) are expected to attend classes with the patient prior to hospital discharge. They should also come to all follow-up appointments for at least 3 months after transplant.     Patients are encouraged to attend the Heart Support Group (online instructions provided with Social Work appointment.  Please inform your coordinator if you need further information).     Patients are encouraged to visit the [employer] Transplant Education YouTube page and utilize this online resource for ongoing education for pre-transplant and post-transplant information.  Website:   [email or web address]  or [email or web address]     Patients are encouraged to sign up for a [employer] account to communicate with their transplant team.  Please notify your coordinator if you have further questions about [employer].    10.  Once you are on the heart transplant waitlist, you are required to see a transplant provider (Cardiologist or Advanced Practice Provider) in clinic every 3 months.     It was a pleasure to see you during your evaluation. Please do not hesitate to call me if you have any questions or concerns!      Thank You,    Chula Mackey RN, BSN  Heart Transplant Coordinator  [facility]    Contact Info:  Phone:  [phone] (transplant office main number)  Fax:  [phone]

## 2024-08-22 NOTE — PROGRESS NOTES
Transplant Teaching 8/22/2024    Writer met with patient via zoom,  to complete heart transplant teaching. We reviewed the candidate selection process, insurance prior authorization, UNOS priority statuses, the waiting period, donor issues, and the pre-, seun-, and post-op periods. We also reviewed the major risks of transplantation including rejection, infection and transplant vasculopathy. We  discussed patient and caregiver responsibilities before and after transplant including the need for patient to identify a caregiver to be present for education and to assist patient post discharge. The patient was informed of the weight and chemical cessation policy and agrees to these requirements.     Discussed DCD/OCS with patient, including risks and benefits of receiving DCD donor organs, and option to decline such offers. Will send DCD information sheet with post teaching packet. Pt verbalized understanding. Pt is pending on whether to accept DCD donor organ offers.      Discussed the possibility of accepting a donor heart that has known Hepatitis C infection (past or current).  The risks of receiving a donor organ which may be infected with hepatitis C have been discussed with patient.  The risk in accepting this organ (including the possibility of sheeba an infection with hepatitis C) have been discussed. Treatment options have also been discussed with this patient and he/she has been informed that there is no guarantee for a cure with these treatments. The option of declining this organ has also been discussed. Patient was given the opportunity to ask questions and understands the risks and/or benefits associated with accepting this type of organ.  Patient was encouraged to discussed the option for these donors with their cardiologist. Will send Hep C information with post teaching packet.    Throughout the session, the patient was attentive, eager to learn, and asked appropriate questions.  Patient  was given  a copy of the UNOS brochure on Multiple Waitlisting, the  Heart Transplant brochure and current program statistics.  Patient was also encouraged to visit the informational transplant education website for further information and video classes ([email or web address]).      Gave the patient the transplant office number and encouraged to call with future questions or concerns.    In follow up, the patient was instructed on completing the following items for his/her transplant evaluation:    Complete outpatient eval

## 2024-08-22 NOTE — NURSING NOTE
Chief Complaint   Patient presents with    Follow Up    New Patient     Return muscular dystrophy, 24 yo female with Danon disease presents for follow up with labs prior.     Vitals were taken and medications reconciled.    Johnson Duenas, EMT  3:57 PM

## 2024-08-22 NOTE — LETTER
8/22/2024      RE: Mary Shaikh  [address]  [place] [address]       Dear Colleague,    Thank you for the opportunity to participate in the care of your patient, Mary Shaikh, at the [facility] [place] at [facility]. Please see a copy of my visit note below.      [facility] Cardiology   Cardiology Clinic Note      HPI:   Ms. 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 She presents to cardiology clinic for CORE enrollment.    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 Per chart review from recent visit with Dr. Fuentes, patient has had a signficant reduction in her LVEF to 26% on CMR with extensive LGE that is consistent with Danon disease. 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 She has a normal CK but has not yet seen neuromuscular neurology regarding the skeletal myopathy. She had an ICD as secondary SCD prevention after syncope and SVT. Her exercise tolerance has decreased. Her CPEX demonstrates an exercise duration of 8 min and 17 seconds. She achieved an RER of 1.09 and had a peak V02 of 18.7 which is a reduction from past CPEX, and a VE/VC02 slope of 32. Her HR and BP responses were normal.    Patient underwent RHC 7/31/24 which revealed patient in ambulatory cardiogenic shock. She was therefore admitted for GDMT optimization and to start advance therapies evaluation. Her body weight at admission 170 lbs, discharge weight 157 lbs.      Since discharge, patient reports overall feeling stable. She reports that she is experiencing some BUI, but is able to walk for about 30min at a slow pace without limitation. She has not yet enrolled in cardiac rehab, but is interested in starting once the amount of appointments slow down.   She does not currently check her blood pressure at home (114/68 in clinic today), nor does she weigh herself daily.    Today in clinic, she denies chest pain, palpitations, dizziness, syncope, or lower extremity edema.     PAST MEDICAL HISTORY:  No past medical history on file.    FAMILY HISTORY:  No family history on file.    SOCIAL HISTORY:  Social History     Socioeconomic History     Marital status: Single   Tobacco Use     Smoking status: Never     Smokeless tobacco: Never   Substance and Sexual Activity     Alcohol use: Never     Drug use: Never     Social Determinants of Health     Financial Resource Strain: Low Risk  (7/10/2024)    Received from [facility]    Financial Resource Strain      Difficulty of Paying Living Expenses: 3   Food Insecurity: No Food Insecurity (7/10/2024)    Received from [facility]    Food Insecurity      Worried About Running Out of Food in the Last Year: 1   Transportation Needs: No Transportation Needs (7/10/2024)    Received from [facility]    Transportation Needs      Lack of Transportation (Medical): 1   Social Connections: Socially Integrated (7/10/2024)    Received from [facility]    Social Connections      Frequency of Communication with Friends and Family: 0   Interpersonal Safety: Not At Risk (6/20/2024)    Received from [facility]    Humiliation, Afraid, Rape, and Kick questionnaire      Fear of Current or Ex-Partner: No      Emotionally Abused: No      Physically Abused: No      Sexually Abused: No   Housing Stability: Low Risk   (7/10/2024)    Received from [facility]    Housing Stability      Unable to Pay for Housing in the Last Year: 1       CURRENT MEDICATIONS:  Current Outpatient Medications   Medication Sig Dispense Refill     acetaminophen (TYLENOL) 325 MG tablet Take 650 mg by mouth       albuterol (PROAIR HFA/PROVENTIL HFA/VENTOLIN HFA) 108 (90 Base) MCG/ACT inhaler Inhale 1-2 puffs into the lungs       carvedilol (COREG) 3.125 MG tablet Take 1 tablet (3.125 mg) by mouth 2 times daily (with meals) for 60 days 60 tablet 1     digoxin (LANOXIN) 250 MCG tablet Take 1 tablet (250 mcg) by mouth daily for 60 days 30 tablet 1     empagliflozin (JARDIANCE) 10 MG TABS tablet Take 1 tablet (10 mg) by mouth daily 90 tablet 1     furosemide (LASIX) 20 MG tablet Take 1 tablet (20 mg) by mouth daily for 60 days 30 tablet 1     HYDROmorphone (DILAUDID) 2 MG tablet Take 2 mg by mouth every 6 hours as needed for pain       loperamide (IMODIUM) 2 MG capsule Take 4 mg by mouth as needed for diarrhea       melatonin 3 MG tablet Take 3 mg by mouth nightly as needed       nitroGLYcerin (NITROSTAT) 0.4 MG sublingual tablet For chest pain place 1 tablet under the tongue every 5 minutes for 3 doses. If symptoms persist 5 minutes after 1st dose call 911. 10 tablet 1     norgestimate-ethinyl estradiol (ESTARYLLA) 0.25-35 MG-MCG tablet Take 1 tablet by mouth at bedtime       omeprazole (PRILOSEC) 20 MG DR capsule Take 20 mg by mouth daily       ondansetron (ZOFRAN ODT) 4 MG ODT tab Place 4 mg under the tongue as needed       oxyCODONE (ROXICODONE) 5 MG tablet Take 5 mg by mouth as needed       polyethylene glycol (MIRALAX) 17 GM/Dose powder Take 17 g by mouth daily       sacubitril-valsartan (ENTRESTO)  MG per tablet Take 1 tablet by mouth 2 times daily for 63 days 60 tablet 1     sertraline (ZOLOFT) 50 MG tablet Take 50 mg by mouth daily       spironolactone (ALDACTONE) 25 MG tablet Take 1 tablet (25 mg) by mouth daily  for 60 days 30 tablet 1     No current facility-administered medications for this visit.       ROS:   Refer to HPI    EXAM:  /68 (BP Location: Right arm, Patient Position: Chair, Cuff Size: Adult Regular)   Pulse 61   Wt 73.9 kg (162 lb 14.4 oz)   SpO2 100%   BMI 27.96 kg/m      GENERAL: Appears comfortable, in no acute distress.   HEENT: Eye symmetrical, no discharge or icterus bilaterally.   CV: RRR, +S1S2, no murmur, rub, or gallop.  RESPIRATORY: Respirations regular, even, and unlabored. Lungs CTA throughout.   EXTREMITIES: no peripheral edema.   NEUROLOGIC: Alert and oriented x 3. No focal deficits.   MUSCULOSKELETAL: No joint swelling or tenderness.   SKIN: No jaundice. No rashes or lesions.     Labs, reviewed with patient in clinic today:  CBC RESULTS:  Lab Results   Component Value Date    WBC 5.3 08/05/2024    RBC 5.53 (H) 08/05/2024    HGB 14.5 08/05/2024    HCT 46.7 08/05/2024    MCV 84 08/05/2024    MCH 26.2 (L) 08/05/2024    MCHC 31.0 (L) 08/05/2024    RDW 15.3 (H) 08/05/2024     08/05/2024       CMP RESULTS:  Lab Results   Component Value Date     08/22/2024    POTASSIUM 4.1 08/22/2024    POTASSIUM 4.0 05/09/2022    CHLORIDE 102 08/22/2024    CHLORIDE 107 05/09/2022    CO2 27 08/22/2024    CO2 29 05/09/2022    ANIONGAP 10 08/22/2024    ANIONGAP 5 05/09/2022    GLC 87 08/22/2024    GLC 96 08/01/2024    GLC 86 05/09/2022    BUN 7.8 08/22/2024    BUN 8 05/09/2022    CR 0.79 08/22/2024    GFRESTIMATED >90 08/22/2024    WHITNEY 9.8 08/22/2024    BILITOTAL 0.3 08/12/2024    ALBUMIN 4.5 08/12/2024    ALBUMIN 3.9 05/09/2022    ALKPHOS 75 08/12/2024    ALT 72 (H) 08/12/2024    AST 98 (H) 08/12/2024        INR RESULTS:  Lab Results   Component Value Date    INR 1.25 (H) 08/02/2024       Lab Results   Component Value Date    MAG 2.0 08/05/2024     Lab Results   Component Value Date    NTBNPI 1,569 (H) 08/02/2024     Lab Results   Component Value Date    NTBNP 2,958 (H) 07/22/2024        LIPIDS:  Lab Results   Component Value Date    CHOL 139 2024     Lab Results   Component Value Date    HDL 50 2024     Lab Results   Component Value Date    LDL 78 2024     Lab Results   Component Value Date    TRIG 57 2024       Echocardiogram:  Recent Results (from the past 4320 hour(s))   Echo Complete   Result Value    LVEF  20-25% (severely reduced)    [facility]    [id number]  [id number]  [id number]  [id number]^THOR COOK^CARMEN^MALIHA     [facility],[place]  Echocardiography Laboratory  [address] [place] [id number]     Name: MS. JE PARK  MRN: [id number]  : 2000  Study Date: 2024 02:23 PM  Age: 23 yrs  Gender: Female  Patient Location: [facility]  Reason For Study: Heart Failure  Ordering Physician: CARMEN HDEZ  Referring Physician: LESLEE RAZO  Performed By: Royce Donato RDCS     BSA: 1.9 m2  Height: 66 in  Weight: 170 lb  HR: 65  BP: 118/89 mmHg  ______________________________________________________________________________  Procedure  Echocardiogram with two-dimensional, color and spectral Doppler performed.  Contrast Optison. Optison (NDC #1947-7451-12) given intravenously. Patient was  given 6 ml mixture of 3 ml Optison and 6 ml saline. 3 ml wasted.  ______________________________________________________________________________  Interpretation Summary  Left ventricular function is decreased. The ejection fraction is 20-25%  (severely reduced).  Global right ventricular function is moderately reduced.  Moderate biatrial enlargement is present.  IVC diameter >2.1 cm collapsing <50% with sniff suggests a high RA pressure  estimated at 15 mmHg or greater.  Trivial pericardial effusion is present.  There is no prior study for direct comparison.  ______________________________________________________________________________  Left Ventricle  Left ventricular wall thickness is normal. Borderline left  ventricular  dilation is present. Left ventricular function is decreased. The ejection  fraction is 20-25% (severely reduced). Left ventricular diastolic function is  indeterminate. Severe diffuse hypokinesis is present.     Right Ventricle  The right ventricle is normal size. Global right ventricular function is  moderately reduced. A pacemaker lead is noted in the right ventricle.     Atria  Moderate biatrial enlargement is present.     Mitral Valve  The mitral valve is normal.     Aortic Valve  Aortic valve is normal in structure and function.     Tricuspid Valve  The tricuspid valve is normal. Mild tricuspid insufficiency is present. The  right ventricular systolic pressure is approximated at 29.2 mmHg plus the  right atrial pressure.     Pulmonic Valve  The pulmonic valve is normal.     Vessels  The thoracic aorta is normal. The aorta root is normal. The pulmonary artery  is normal. IVC diameter >2.1 cm collapsing <50% with sniff suggests a high RA  pressure estimated at 15 mmHg or greater.     Pericardium  Trivial pericardial effusion is present.     Compared to Previous Study  There is no prior study for direct comparison.  ______________________________________________________________________________  MMode/2D Measurements & Calculations  IVSd: 1.2 cm     LVIDd: 5.7 cm  LVIDs: 5.4 cm  LVPWd: 0.94 cm  FS: 6.6 %  LV mass(C)d: 245.4 grams  LV mass(C)dI: 131.5 grams/m2  EPSS: 2.3 cm  asc Aorta Diam: 2.6 cm  LVOT diam: 2.2 cm  LVOT area: 3.6 cm2  Asc Ao diam index BSA (cm/m2): 1.4  Asc Ao diam index Ht(cm/m): 1.5  LA Volume Index (BP): 43.5 ml/m2  RWT: 0.33     TAPSE: 1.2 cm     Doppler Measurements & Calculations  MV E max lisandro: 120.0 cm/sec  MV A max lisandro: 25.1 cm/sec  MV E/A: 4.8  MV dec slope: 1445 cm/sec2  MV dec time: 0.08 sec  PA acc time: 0.06 sec  TR max lisandro: 270.1 cm/sec  TR max P.2 mmHg  E/E' av.9  Lateral E/e': 17.9  Medial E/e': 34.0    "  ______________________________________________________________________________  Report approved by: Pedro Cassidy 07/31/2024 03:49 PM           Assessment and Plan:   Ms. 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  No signs of organ dysfunction and or hypoperfusion. Making adequate UO. S/p IV nitroprusside for afterload reduction and IV diuretics. BW at admission 170 lbs, discharge weight 157 lbs. GDMT optimization and start advance therapies evaluation.  GDMT:   - BB: Carvedilol 3.125 mg BiD    - ACEI/ARB/ARNI: Entresto 97/103 BID  - SGLT2i: Jardiance 10mg  - MRA: Spirolactone 25mg   - Diuretics : Furosemide 20 mg daily   - BMP WNL today, will repeat prior to next CORE appt (prefers [place] location)  - Continue outpatient LVAD/transplant evaluation: neuropsych consult (8/27)  - Contraception: on OCP     #History of SVT with preexitation   #S/P ICD sudden cardiac death prevention  Brief episodes of sypmtomatic SVT while in the hospital, responsive to vagal maneuvers. 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 EP was consulted and recommended starting oral digoxin and low-dose beta-blocker.  Per EP note, \"if SVT increases in frequency or duration she may require another EP study in the future to further characterize the arrhythmia and determine whether it would be amenable to an ablation\".   - Digoxin 250 mcg daily   - Beta blocker as above      Follow up:  CORE in 1 month per Dr. Fuentes  Chart review time today: 15 minutes  Visit time today: 15 minutes  Total time spent today: 30 minutes        Johanna Wu, " TIARA  General Cardiology   08/22/24        Please do not hesitate to contact me if you have any questions/concerns.     Sincerely,     Johanna Wu PA-C

## 2024-08-22 NOTE — PATIENT INSTRUCTIONS
Take your medicines every day, as directed     Changes made today:    None    Monitor Your Weight and Symptoms     Contact us if you:     Gain 2 pounds in one day or 5 pounds in one week  Feel more short of breath  Notice more leg swelling  Feel lightheadeded    Change your lifestyle     Limit Salt or Sodium:  2000 mg  Limit Fluids:  2000 mL or approximately 64 ounces  Eat a Heart Healthy Diet  Low in saturated fats  Stay Active:  Aim to move at least 150 minutes every  week            To Contact us     During Business Hours:  [phone], option # 1      After hours, weekends or holidays:   [phone], Option #4  Ask to speak to the On-Call Cardiologist. Inform them you are a CORE/heart failure patient at the [facility].       Use Urvew allows you to communicate directly with your heart team through secure messaging.  [employer] can be accessed any time on your phone, computer, or tablet.  If you need assistance, we'd be happy to help!             Keep your Heart Appointments:     Return CORE appointment in one Month with labs prior.       Please consider attending our virtual support group which is held monthly. Please reach out to Oni at [phone] for more information if you are interested in attending.      2024 dates:    Monday, September 9th, 1-2pm      Monday, October 7th, 1-2pm      Monday, November 4th, 1-2pm      Monday, December 2nd, 1-2pm

## 2024-08-27 ENCOUNTER — VIRTUAL VISIT (OUTPATIENT)
Dept: NEUROPSYCHOLOGY | Facility: CLINIC | Age: 24
End: 2024-08-27
Payer: COMMERCIAL

## 2024-08-27 DIAGNOSIS — F41.9 ANXIETY: ICD-10-CM

## 2024-08-27 DIAGNOSIS — Z01.818 PRE-TRANSPLANT EVALUATION FOR HEART TRANSPLANT: Primary | ICD-10-CM

## 2024-08-27 PROCEDURE — 90791 PSYCH DIAGNOSTIC EVALUATION: CPT | Mod: 95

## 2024-08-27 PROCEDURE — 96130 PSYCL TST EVAL PHYS/QHP 1ST: CPT | Mod: 95

## 2024-08-27 PROCEDURE — 96138 PSYCL/NRPSYC TECH 1ST: CPT | Mod: 95

## 2024-08-27 NOTE — LETTER
2024       RE: Mary Shaikh  [address]  [place] [address]     Dear Colleague,    Thank you for referring your patient, Mary Shaikh, to the [facility]OLOG[facility] [place] at [facility]. Please see a copy of my visit note below.    Pt was seen for neuropsychological evaluation at the request of Dr. Estefania Acevedo for the purposes of diagnostic clarification and treatment planning. 19 minutes of video test administration and scoring were provided by this writer. Please see Dr. Ame Whitmore's report for a full interpretation of the findings.    Bin Ramírez MA, CSP              NEUROPSYCHOLOGICAL EVALUATION  **CONFIDENTIAL**    **This is a medical document intended for communication with the referring provider. It is written in medical language and may contain abbreviations or verbiage that are unfamiliar. It may appear blunt or direct. This report and the results within are not intended to be interpreted in isolation without consultation with your medical provider. **    Name:  Mary Shaikh Education: 12 years    (age):  2000 (23 years) BUI:  2024   Referral: Estefania Acevedo MD  Department of Cardiology Handedness:  MRN (Epic): Right  [id number]     IDENTIFYING INFORMATION AND REASON FOR REFERRAL   Ms. 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 This evaluation was requested to provide an assessment of her current psychological status as part of a comprehensive workup for advanced heart therapies, including transplant and LVAD.    IMPRESSION  See below for relevant background information and  detailed test results. See separate abstract for supporting documentation including a list of psychological measures and test scores.    Ms. Shaikh demonstrated an adequate understanding of her cardiac illness and prior treatments. She was not able to independently identify major risks of transplantation, though with prompting she confirmed discussion of rejection, infection, and immunosuppressant medication compliance with her treatment team. She was able to identify lifestyle changes associated with LVAD implantation. She reported that her parents have attended her medical appointments with her and are supportive of her decision to pursue advanced heart therapies. She appears to have strong psychosocial supports in place. She stated that her parents, with whom she lives, will be available for assistance during the post-operative period. Regarding substance use, she denied a history of problematic substance use or history of chemical dependency treatment. She reported a history of infrequent cannabis use in the past, but she quit smoking cannabis 3-4 years ago. With respect to her mental health history, Ms. Shaikh reported an increase in depression and anxiety symptoms since she was diagnosed with Danon disease 3 years ago. She also reported a history of 2 suicide attempts with brief psychiatric hospitalizations approximately 8 years ago. She reported no current suicidal ideation, plans, or intentions and no self-harm behaviors. She is not currently taking any psychotropic medications or working with a psychotherapist. On a self-report measure, she endorsed severe current symptoms of depression. In contrast, during the clinical interview she stated her mood is good recently and she denied anhedonia, though she acknowledged some social withdrawal/isolation. She also reported severe symptoms of anxiety on a self-report measure, consistent with her report of feeling very anxious and worried. A longer objective measure  of personality and emotional functioning was not administered given Ms. 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 Despite these ongoing mental health symptoms, she does not appear to be experiencing emotional problems that might interfere with her judgment or ability to follow through with treatment recommendations at this time; however, greater management of her mental health symptoms is recommended prior to heart transplantation/LVAD implantation. Finally, she described subjective difficulties with memory, attention, processing speed, and word-finding over the past year, in the context of her worsening health problems and mood symptoms. There was no obvious cognitive impairment noted on interview and she remains functionally independent. Formal cognitive testing was not completed; however, if the treatment team develops concerns, cognitive testing can be arranged.     RECOMMENDATIONS  Ms. Shaikh reported severe symptoms of anxiety and depression during the current evaluation. She also described a remote history of suicidal ideation and 2 remote suicide attempts. It is strongly recommended that she establish care with a psychotherapist or counselor, ideally with a health psychologist, for management of her mental health symptoms. She may benefit from a cognitive-behavioral approach that focuses on mood and anxiety management techniques. One option for this service is through the [facility] department of Health Psychology at [email or web address] or [phone]. Alternatively, a number of psychotherapists can be found in the local community through [email or web address].   Additionally, Ms. Shaikh self-discontinued sertraline due to a lack of reported benefit. It is recommended that she consult with her prescribing provider to discuss alternative pharmaceutical  interventions for her mood and anxiety symptoms. It is strongly recommended that she discuss changes to her medications with her prescribing physicians before self-discontinuing her prescriptions. She may consider re-establishing care with a psychiatrist for medication management of her mental health symptoms. One option for this treatment is through the [facility] department of Psychiatry at [email or web address] or [phone].  She is encouraged to continue abstinence from cannabis.   Given her reading level at the 3rd grade level, her treatment team should provide her with written materials that are appropriate for her reading level (e.g., should avoid use of materials with overly complex language).   She would likely benefit from review of the risks of transplantation with her treatment team. Additionally, she stated wanting more information about the requirement that her heart must be weaker prior to being placed on the transplant waiting list, and she was encouraged to discuss this further with her medical providers.     Thank you for the opportunity to participate in Ms. Shaikh's care. Preliminary results and recommendations were shared with Ms. Shaikh at the end of the clinical interview. If you have any questions regarding this evaluation, please do not hesitate to contact us.         Suzy Muñoz, Ph.D.  Neuropsychology Fellow      Ame Whitmore, Ph.D.,   Clinical Neuropsychologist    RELEVANT BACKGROUND INFORMATION AND SUPPORTING DOCUMENTATION  Gathered from a clinical interview with the patient and reviews of electronic medical record.     History of Presenting Problem(s)  Ms. 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 She reported that she was diagnosed with Danon disease 3 years ago. She described Danon disease as a condition that causes weakness of her heart muscle. She also reported having episodes of tachycardia and arrhythmia. She described frequently feeling short of breath and fatigued. She noted that she must be cautious to not exert herself. She shared that she has been hospitalized 3 times in the past 2 months. Prior to her first hospitalization, she indicated that she passed out and was subsequently admitted to the hospital. She described having a very fast heart rate at that time. She reported that her defibrillator was placed a couple days later. She reported a second hospitalization, unrelated to her heart issues, during which her gallbladder was removed. She noted that she met with cardiology during her hospital admission and underwent a right heart catheterization a couple weeks later. She reported that her heart was significantly weaker and so work-up for advanced heart therapies was initiated during her third, week-long hospitalization. She stated that she met with a number of specialists during hospitalization. She completed the transplant class last week.     At present, she reported that she is in a  waiting pattern  because her heart is reportedly not weak enough for transplant yet. Her goals for transplantation include returning to her normal life without having to fear what might happen if her heart fails when she is driving or outside her home. She stated that she could not recall the risks of transplantation. However, with prompting, she confirmed that she had discussed the risks of rejection and infection with her treatment team. She reported that with an LVAD, which is her second treatment option, she would have to avoid water. She lives with her parents. She stated that her father initially wanted to her to pursue LVAD over heart transplantation but that he is  now supportive of her decision to pursue transplantation first if possible. Her parents plan to provide post-operative assistance as needed.     Regarding cognition, she reported problems with memory, attention, processing speed, and word-finding. Specifically, she described forgetting intended actions and mixing up details (e.g., told her father that she was on the first floor of a medical building when she was on the second floor). She reported some difficulties with paying attention and distractibility during conversations. She noted that it takes her longer to process information during conversations and that she has learned to ask people to repeat what they have said or to simplify the information. She described more mild difficulties with word-finding. She reported that these cognitive changes began within the last year and have remained relatively stable over time in the context of declining health and fatigue. She has compensated for these difficulties by encouraging herself to slow down instead of being impatient with herself. She denied problems with multitasking, problem-solving, decision-making, or visual processing. She performs personal cares independently. She reported no difficulties with meal preparation, financial management, medication management, or driving.     With respect to her current mental health, she described her mood as  good right now.  However, she also reported moments in which she prefers to be alone. She indicated continued enjoyment in her hobbies, including walking and hair styling. She reported that she uses coping strategies, such as meditation, to manage her symptoms. Additionally, she described feeling very anxious and worried. She reported frequently becoming emotional and tearful during her transplant work-up appointments. She reported increased irritability and a tendency to self-isolate but denied other behavioral or personality changes.     Physically, she reported  frequent shortness of breath, fatigue, and generalized weakness (worse on dominant [right] side). She reported occasional numbness/tingling in her fingers. She described experiencing migraines approximately twice per month as well as longstanding back pain. She denied other physical complaints or sensorimotor disturbances.     Neurodiagnostic Findings   Head CT w/o contrast (8/1/2024): IMPRESSION: No acute intracranial findings.     Medical History  Anxiety  Moderate episode of recurrent major depressive disorder  Diabetes mellitus, type 2  Acute on chronic systolic heart failure  Chronic cholecystitis  ICD (implantable cardioverter-defibrillator) in place  Biliary dyskinesia  Heart failure with reduced ejection fraction  Nonischemic cardiomyopathy  Hypertrophic nonobstructive cardiomyopathy  NSVT (nonsustained ventricular tachycardia)  Paroxysmal SVT (supraventricular tachycardia)  Josselyn Parkinson White pattern seen on electrocardiogram  Moderate asthma without complication  She denied history of head injury with loss of consciousness, seizure, and stroke.    Health Behaviors  Sleep: She described her sleep as  okay.  She reported sleeping 6 hours on average per night. She described problems with sleep initiation due to anxiety. She stated that she tries to meditate and quiet her mind to help with sleep initiation. She infrequently takes melatonin. She has reportedly been told that she snores. She denied having undergone a sleep study or a diagnosis of sleep apnea. She indicated that she experiences sleep paralysis approximately once per week. She denied dream enactment behaviors.   Energy: She reported good energy upon waking in the morning, noting that she has an enjoyable morning routine. She takes an afternoon nap once or twice per week.   Exercise: She reported that she is unable to work out to the extent that she did in the past, due to shortness of breath. She continues to walk regularly.   Pain: She  described experiencing migraines approximately twice per month as well as longstanding back pain. She recently established care with a chiropractor and an acupuncturist for management of her back pain.     Psychiatric History  She described her current mood as  good right now.  However, she also reported moments in which she prefers to be alone. She reported increased depressive and anxiety symptoms over the past 3 years since her Danon disease diagnosis. She indicated continued enjoyment in her hobbies, including walking and hair styling. She reported use of coping mechanisms, including meditation. Additionally, she described feeling very anxious and worried. She reported frequently becoming emotional and tearful during her transplant work-up appointments.   She stated that her sister has told her that she is  losing herself  because she is engaging in more self-isolative behaviors. She also reported an increase in irritability. She denied other behavioral or personality changes.    She denied a history of trauma, abuse, alteration of sensory perceptual processes, paranoia, or delusions.   Suicidality/ Self-harm: She reported a history of suicidal ideation and 2 suicide attempts via cutting in the year prior to entering high school. She was briefly hospitalized following her suicide attempts. She denied current suicidal ideation, plans, or intentions.   Psychiatric treatment: She was previously prescribed sertraline, but she recently self-discontinued the medication because she felt that it was not helpful. She stated that she met with a psychotherapist as a freshman in high school but noted that she did not like talking with a stranger about her life. She is not currently meeting with a psychiatrist or psychotherapist.     Substance Use History  Alcohol: She denied current or historical alcohol use.   Nicotine: She denied current or historical tobacco use.  Cannabis: She reported that she used to smoke cannabis  socially with friends. She stated that she quit smoking cannabis 3-4 years ago after her Danon disease diagnosis.   Problematic Substance Use:  She denied a history of illicit substance use. She reported no history of problematic substance use or a history of chemical dependency treatment.     Current Medications (per EMR)  acetaminophen (TYLENOL) 325 MG tablet  albuterol (PROAIR HFA/PROVENTIL HFA/VENTOLIN HFA) 1-8 (90 Base) MCG/ACT inhaler  carvedilol (COREG) 3.125 MG tablet  digoxin (LANOXIN) 250 MCG tablet  empagliflozin (JARDIANCE) 10 MG TABS tablet  furosemide (LASIX) 20 MG tablet  hydromorphone (DILAUDID) 2 MG tablet  loperamide (IMODIUM) 2 MG capsule  melatonin 3 MG tablet  nitroglycerin (NITROSTAT) 0.4 MG sublingual tablet  norgestimate-ethinyl estradiol (ESTARYLLA) 0.25-35 MG-MCG tablet  omeprazole (PRILOSEC) 20 MG DR capsule  ondansetron (ZOFRAN ODT) 4 MG ODT tab  oxycodone (ROXICODONE) 5 MG tablet  polyethylene glycol (MIRALAX) 17 GM/Dose powder  sacubitril-valsartan (ENTRESTO)  MG per tablet  sertraline (ZOLOFT) 50 MG tablet (She reported not taking this)  spironolactone (ALDACTONE) 25 MG tablet    Developmental, Educational, & Occupational History  Gestational: Full-term   complications: She reported being sick as an infant and spending time in the NICU but she did not have any additional information.   Developmental milestones: Met at expected ages  Childhood behavioral / emotional / academic problems: She reported behavioral problems in elementary school and indicated that she often went to the principal's office. She described difficulties with reading and mathematics in elementary school. She reportedly had an IEP and received pull-out services for reading and math throughout elementary school. She denied knowledge of a formal learning disorder diagnosis or attention problems. She reported no history of repetition of a grade. She attended summer school once in elementary school.    Native Language: English  Education: She graduated high school and reportedly earned good grades. She completed [employer] in 2022.   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 She previously worked in customer service at [employer].      Psychosocial History  Born and raised in [place], [place]  Marital: Never ; single  Children: None  Housing: Lives with parents  Psychosocial support: Parents    Family History  She denied a known family history of neurologic conditions. Records indicate a family history of stroke (maternal grandfather).      RESULTS  See separate abstract for list of psychological measures and test scores.    PRE-MORBID ABILITY: Premorbid abilities were estimated within the below average range based on single word reading ability. Given her history of challenges with reading, this likely under-represents her true premorbid level of cognitive functioning. Her reading ability was estimated to be at the 3.9 grade level.  PSYCHOLOGICAL AND BEHAVIORAL: On self-report measures, she reported severe symptoms of depression and anxiety. We were unable to administer an objective measure of personality and emotional functioning given Ms. 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    BEHAVIORAL OBSERVATIONS  Generally alert, attentive, and focused while undergoing interview and questionnaires  Appearance: Adequately groomed, casually dressed, wore glasses  Gait/Posture: No abnormalities noted  Behavior: Cooperative, pleasant; no behavioral abnormalities noted  Speech: Fluent, articulate, and normal rate, prosody, and volume; no conversational word finding difficulty  Thought process: Logical, linear, and goal-directed  Thought content: Logical, appropriate   Affect: Broad, responsive, consistent with reported  mood  Mood: Dysthymic and anxious  Insight and Judgment: Intact  Rapport: Easily established and maintained      The clinical interview (64662) and questionnaires were completed virtually on 8/27/2024.  Video-Visit Details   Type of service:  Video Visit   Video End Time: 11:00 AM  Originating Location (pt. Location): Home  Distant Location (provider locations):  [facility] and [facility]   Platform used for Video Visit: Pagido    All billing noted here is for professional services provided by the psychologist and trained psychometrist.  Activities included in this evaluation: CPT Code #Units Time (min)   Psychiatric diagnostic interview 92778 1 --   Test evaluation services by professional; first hour. 06942 1 70   Test administration and scoring by technician, first 30 mins 26599 1 19   Dx: Z01.818, F41.9     Again, thank you for allowing me to participate in the care of your patient.      Sincerely,    RON ERNANDEZ

## 2024-08-27 NOTE — PROGRESS NOTES
Pt was seen for neuropsychological evaluation at the request of Dr. Estefania Acevedo for the purposes of diagnostic clarification and treatment planning. 19 minutes of video test administration and scoring were provided by this writer. Please see Dr. Ame Whitmore's report for a full interpretation of the findings.    Bin Ramírez MA, CSP

## 2024-08-30 ENCOUNTER — TELEPHONE (OUTPATIENT)
Dept: CARDIOLOGY | Facility: CLINIC | Age: 24
End: 2024-08-30
Payer: COMMERCIAL

## 2024-08-30 DIAGNOSIS — I50.20 HEART FAILURE WITH REDUCED EJECTION FRACTION (H): Primary | ICD-10-CM

## 2024-08-30 NOTE — TELEPHONE ENCOUNTER
[facility] Call Center    Phone Message    May a detailed message be left on voicemail: yes     Reason for Call: Medication Refill Request    Has the patient contacted the pharmacy for the refill? Yes   Name of medication being requested:   digoxin (LANOXIN) 250 MCG tablet [6620] (Order [id number])   carvedilol (COREG) 3.125 MG tablet [13384] (Order [id number])     Provider who prescribed the medication: mark magaña     Pharmacy: [id number], [place] - [address] [facility]     Date medication is needed: drug interaction between the two medications. Carvedilol is going to increase the effects of the digoxin and with digoxin being on the hire end of the dosage chart pharmacy wants to make sure this is okay with provider. Please reach back out to pharmacy to clarify dosages.        Action Taken: Other: cardiology     Travel Screening: Not Applicable      Thank you!  [facility]

## 2024-08-30 NOTE — PROGRESS NOTES
NEUROPSYCHOLOGICAL EVALUATION  **CONFIDENTIAL**    **This is a medical document intended for communication with the referring provider. It is written in medical language and may contain abbreviations or verbiage that are unfamiliar. It may appear blunt or direct. This report and the results within are not intended to be interpreted in isolation without consultation with your medical provider. **    Name:  Mary Shaikh Education: 12 years    (age):  2000 (23 years) BUI:  2024   Referral: Estefania Acevedo MD  Department of Cardiology Handedness:  MRN (Epic): Right  [id number]     IDENTIFYING INFORMATION AND REASON FOR REFERRAL   Ms. 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 This evaluation was requested to provide an assessment of her current psychological status as part of a comprehensive workup for advanced heart therapies, including transplant and LVAD.    IMPRESSION  See below for relevant background information and detailed test results. See separate abstract for supporting documentation including a list of psychological measures and test scores.    Ms. Shaikh demonstrated an adequate understanding of her cardiac illness and prior treatments. She was not able to independently identify major risks of transplantation, though with prompting she confirmed discussion of rejection, infection, and immunosuppressant medication compliance with her treatment team. She was able to identify lifestyle changes associated with LVAD implantation. She reported that her parents have attended her medical appointments with her and are supportive of her decision to pursue advanced heart therapies. She appears to have  strong psychosocial supports in place. She stated that her parents, with whom she lives, will be available for assistance during the post-operative period. Regarding substance use, she denied a history of problematic substance use or history of chemical dependency treatment. She reported a history of infrequent cannabis use in the past, but she quit smoking cannabis 3-4 years ago. With respect to her mental health history, Ms. Shaikh reported an increase in depression and anxiety symptoms since she was diagnosed with Danon disease 3 years ago. She also reported a history of 2 suicide attempts with brief psychiatric hospitalizations approximately 8 years ago. She reported no current suicidal ideation, plans, or intentions and no self-harm behaviors. She is not currently taking any psychotropic medications or working with a psychotherapist. On a self-report measure, she endorsed severe current symptoms of depression. In contrast, during the clinical interview she stated her mood is good recently and she denied anhedonia, though she acknowledged some social withdrawal/isolation. She also reported severe symptoms of anxiety on a self-report measure, consistent with her report of feeling very anxious and worried. A longer objective measure of personality and emotional functioning was not administered given Ms. 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 Despite these ongoing mental health symptoms, she does not appear to be experiencing emotional problems that might interfere with her judgment or ability to follow through with treatment recommendations at this time; however, greater management of her mental health symptoms is recommended prior to heart transplantation/LVAD implantation. Finally, she described subjective difficulties with memory, attention, processing speed, and word-finding over the past year, in the context of her worsening  health problems and mood symptoms. There was no obvious cognitive impairment noted on interview and she remains functionally independent. Formal cognitive testing was not completed; however, if the treatment team develops concerns, cognitive testing can be arranged.     RECOMMENDATIONS  Ms. Shaikh reported severe symptoms of anxiety and depression during the current evaluation. She also described a remote history of suicidal ideation and 2 remote suicide attempts. It is strongly recommended that she establish care with a psychotherapist or counselor, ideally with a health psychologist, for management of her mental health symptoms. She may benefit from a cognitive-behavioral approach that focuses on mood and anxiety management techniques. One option for this service is through the [facility] department of Health Psychology at [email or web address] or [phone]. Alternatively, a number of psychotherapists can be found in the local community through [email or web address].   Additionally, Ms. Shaikh self-discontinued sertraline due to a lack of reported benefit. It is recommended that she consult with her prescribing provider to discuss alternative pharmaceutical interventions for her mood and anxiety symptoms. It is strongly recommended that she discuss changes to her medications with her prescribing physicians before self-discontinuing her prescriptions. She may consider re-establishing care with a psychiatrist for medication management of her mental health symptoms. One option for this treatment is through the [facility] department of Psychiatry at [email or web address] or [phone].  She is encouraged to continue abstinence from cannabis.   Given her reading level at the 3rd grade level, her treatment team should provide her with written materials that are appropriate for her  reading level (e.g., should avoid use of materials with overly complex language).   She would likely benefit from review of the risks of transplantation with her treatment team. Additionally, she stated wanting more information about the requirement that her heart must be weaker prior to being placed on the transplant waiting list, and she was encouraged to discuss this further with her medical providers.     Thank you for the opportunity to participate in Ms. Shaikh's care. Preliminary results and recommendations were shared with Ms. Shaikh at the end of the clinical interview. If you have any questions regarding this evaluation, please do not hesitate to contact us.         Suzy Muñoz, Ph.D.  Neuropsychology Fellow      Ame Whitmore, Ph.D.,   Clinical Neuropsychologist    RELEVANT BACKGROUND INFORMATION AND SUPPORTING DOCUMENTATION  Gathered from a clinical interview with the patient and reviews of electronic medical record.     History of Presenting Problem(s)  Ms. 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 She reported that she was diagnosed with Danon disease 3 years ago. She described Danon disease as a condition that causes weakness of her heart muscle. She also reported having episodes of tachycardia and arrhythmia. She described frequently feeling short of breath and fatigued. She noted that she must be cautious to not exert herself. She shared that she has been hospitalized 3 times in the past 2 months. Prior to her first hospitalization, she indicated that she passed out and was subsequently admitted to the hospital. She described having a very fast heart rate at that time. She reported that her defibrillator was placed a couple days later.  She reported a second hospitalization, unrelated to her heart issues, during which her gallbladder was removed. She noted that she met with cardiology during her hospital admission and underwent a right heart catheterization a couple weeks later. She reported that her heart was significantly weaker and so work-up for advanced heart therapies was initiated during her third, week-long hospitalization. She stated that she met with a number of specialists during hospitalization. She completed the transplant class last week.     At present, she reported that she is in a  waiting pattern  because her heart is reportedly not weak enough for transplant yet. Her goals for transplantation include returning to her normal life without having to fear what might happen if her heart fails when she is driving or outside her home. She stated that she could not recall the risks of transplantation. However, with prompting, she confirmed that she had discussed the risks of rejection and infection with her treatment team. She reported that with an LVAD, which is her second treatment option, she would have to avoid water. She lives with her parents. She stated that her father initially wanted to her to pursue LVAD over heart transplantation but that he is now supportive of her decision to pursue transplantation first if possible. Her parents plan to provide post-operative assistance as needed.     Regarding cognition, she reported problems with memory, attention, processing speed, and word-finding. Specifically, she described forgetting intended actions and mixing up details (e.g., told her father that she was on the first floor of a medical building when she was on the second floor). She reported some difficulties with paying attention and distractibility during conversations. She noted that it takes her longer to process information during conversations and that she has learned to ask people to repeat what they have said or to simplify  the information. She described more mild difficulties with word-finding. She reported that these cognitive changes began within the last year and have remained relatively stable over time in the context of declining health and fatigue. She has compensated for these difficulties by encouraging herself to slow down instead of being impatient with herself. She denied problems with multitasking, problem-solving, decision-making, or visual processing. She performs personal cares independently. She reported no difficulties with meal preparation, financial management, medication management, or driving.     With respect to her current mental health, she described her mood as  good right now.  However, she also reported moments in which she prefers to be alone. She indicated continued enjoyment in her hobbies, including walking and hair styling. She reported that she uses coping strategies, such as meditation, to manage her symptoms. Additionally, she described feeling very anxious and worried. She reported frequently becoming emotional and tearful during her transplant work-up appointments. She reported increased irritability and a tendency to self-isolate but denied other behavioral or personality changes.     Physically, she reported frequent shortness of breath, fatigue, and generalized weakness (worse on dominant [right] side). She reported occasional numbness/tingling in her fingers. She described experiencing migraines approximately twice per month as well as longstanding back pain. She denied other physical complaints or sensorimotor disturbances.     Neurodiagnostic Findings   Head CT w/o contrast (8/1/2024): IMPRESSION: No acute intracranial findings.     Medical History  Anxiety  Moderate episode of recurrent major depressive disorder  Diabetes mellitus, type 2  Acute on chronic systolic heart failure  Chronic cholecystitis  ICD (implantable cardioverter-defibrillator) in place  Biliary dyskinesia  Heart failure  with reduced ejection fraction  Nonischemic cardiomyopathy  Hypertrophic nonobstructive cardiomyopathy  NSVT (nonsustained ventricular tachycardia)  Paroxysmal SVT (supraventricular tachycardia)  Josselyn Parkinson White pattern seen on electrocardiogram  Moderate asthma without complication  She denied history of head injury with loss of consciousness, seizure, and stroke.    Health Behaviors  Sleep: She described her sleep as  okay.  She reported sleeping 6 hours on average per night. She described problems with sleep initiation due to anxiety. She stated that she tries to meditate and quiet her mind to help with sleep initiation. She infrequently takes melatonin. She has reportedly been told that she snores. She denied having undergone a sleep study or a diagnosis of sleep apnea. She indicated that she experiences sleep paralysis approximately once per week. She denied dream enactment behaviors.   Energy: She reported good energy upon waking in the morning, noting that she has an enjoyable morning routine. She takes an afternoon nap once or twice per week.   Exercise: She reported that she is unable to work out to the extent that she did in the past, due to shortness of breath. She continues to walk regularly.   Pain: She described experiencing migraines approximately twice per month as well as longstanding back pain. She recently established care with a chiropractor and an acupuncturist for management of her back pain.     Psychiatric History  She described her current mood as  good right now.  However, she also reported moments in which she prefers to be alone. She reported increased depressive and anxiety symptoms over the past 3 years since her Danon disease diagnosis. She indicated continued enjoyment in her hobbies, including walking and hair styling. She reported use of coping mechanisms, including meditation. Additionally, she described feeling very anxious and worried. She reported frequently becoming  emotional and tearful during her transplant work-up appointments.   She stated that her sister has told her that she is  losing herself  because she is engaging in more self-isolative behaviors. She also reported an increase in irritability. She denied other behavioral or personality changes.    She denied a history of trauma, abuse, alteration of sensory perceptual processes, paranoia, or delusions.   Suicidality/ Self-harm: She reported a history of suicidal ideation and 2 suicide attempts via cutting in the year prior to entering high school. She was briefly hospitalized following her suicide attempts. She denied current suicidal ideation, plans, or intentions.   Psychiatric treatment: She was previously prescribed sertraline, but she recently self-discontinued the medication because she felt that it was not helpful. She stated that she met with a psychotherapist as a freshman in high school but noted that she did not like talking with a stranger about her life. She is not currently meeting with a psychiatrist or psychotherapist.     Substance Use History  Alcohol: She denied current or historical alcohol use.   Nicotine: She denied current or historical tobacco use.  Cannabis: She reported that she used to smoke cannabis socially with friends. She stated that she quit smoking cannabis 3-4 years ago after her Danon disease diagnosis.   Problematic Substance Use:  She denied a history of illicit substance use. She reported no history of problematic substance use or a history of chemical dependency treatment.     Current Medications (per EMR)  acetaminophen (TYLENOL) 325 MG tablet  albuterol (PROAIR HFA/PROVENTIL HFA/VENTOLIN HFA) 1-8 (90 Base) MCG/ACT inhaler  carvedilol (COREG) 3.125 MG tablet  digoxin (LANOXIN) 250 MCG tablet  empagliflozin (JARDIANCE) 10 MG TABS tablet  furosemide (LASIX) 20 MG tablet  hydromorphone (DILAUDID) 2 MG tablet  loperamide (IMODIUM) 2 MG capsule  melatonin 3 MG tablet  nitroglycerin  (NITROSTAT) 0.4 MG sublingual tablet  norgestimate-ethinyl estradiol (ESTARYLLA) 0.25-35 MG-MCG tablet  omeprazole (PRILOSEC) 20 MG DR capsule  ondansetron (ZOFRAN ODT) 4 MG ODT tab  oxycodone (ROXICODONE) 5 MG tablet  polyethylene glycol (MIRALAX) 17 GM/Dose powder  sacubitril-valsartan (ENTRESTO)  MG per tablet  sertraline (ZOLOFT) 50 MG tablet (She reported not taking this)  spironolactone (ALDACTONE) 25 MG tablet    Developmental, Educational, & Occupational History  Gestational: Full-term   complications: She reported being sick as an infant and spending time in the NICU but she did not have any additional information.   Developmental milestones: Met at expected ages  Childhood behavioral / emotional / academic problems: She reported behavioral problems in elementary school and indicated that she often went to the principal's office. She described difficulties with reading and mathematics in elementary school. She reportedly had an IEP and received pull-out services for reading and math throughout elementary school. She denied knowledge of a formal learning disorder diagnosis or attention problems. She reported no history of repetition of a grade. She attended summer school once in elementary school.   Native Language: English  Education: She graduated high school and reportedly earned good grades. She completed cosmetology school in .   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      Psychosocial History  Born and raised in [place], [place]  Marital: Never ; single  Children: None  Housing: Lives with parents  Psychosocial support: Parents    Family History  She denied a known family history of neurologic conditions. Records indicate a family history of stroke (maternal grandfather).      RESULTS  See separate abstract for list  of psychological measures and test scores.    PRE-MORBID ABILITY: Premorbid abilities were estimated within the below average range based on single word reading ability. Given her history of challenges with reading, this likely under-represents her true premorbid level of cognitive functioning. Her reading ability was estimated to be at the 3.9 grade level.  PSYCHOLOGICAL AND BEHAVIORAL: On self-report measures, she reported severe symptoms of depression and anxiety. We were unable to administer an objective measure of personality and emotional functioning given Ms. 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    BEHAVIORAL OBSERVATIONS  Generally alert, attentive, and focused while undergoing interview and questionnaires  Appearance: Adequately groomed, casually dressed, wore glasses  Gait/Posture: No abnormalities noted  Behavior: Cooperative, pleasant; no behavioral abnormalities noted  Speech: Fluent, articulate, and normal rate, prosody, and volume; no conversational word finding difficulty  Thought process: Logical, linear, and goal-directed  Thought content: Logical, appropriate   Affect: Broad, responsive, consistent with reported mood  Mood: Dysthymic and anxious  Insight and Judgment: Intact  Rapport: Easily established and maintained      The clinical interview (57527) and questionnaires were completed virtually on 8/27/2024.  Video-Visit Details   Type of service:  Video Visit   Video End Time: 11:00 AM  Originating Location (pt. Location): Home  Distant Location (provider locations):  [facility] and [facility]   Platform used for Video Visit: [employer]Corner    All billing noted here is for professional services provided by the psychologist and trained psychometrist.  Activities included in this evaluation: CPT Code #Units Time (min)    Psychiatric diagnostic interview 69593 1 --   Test evaluation services by professional; first hour. 39625 1 70   Test administration and scoring by technician, first 30 mins 68581 1 19   Dx: Z01.818, F41.9

## 2024-08-31 LAB
SA 1  COMMENTS: NORMAL
SA 1 CELL: NORMAL
SA 1 TEST METHOD: NORMAL
SA 2 CELL: NORMAL
SA 2 COMMENTS: NORMAL
SA 2 TEST METHOD: NORMAL
SA1 HI RISK ABY: NORMAL
SA1 MOD RISK ABY: NORMAL
SA2 HI RISK ABY: NORMAL
SA2 MOD RISK ABY: NORMAL
UNACCEPTABLE ANTIGENS: NORMAL
UNOS CPRA: 0

## 2024-09-03 NOTE — TELEPHONE ENCOUNTER
Provider notified. It is ok for patient to be on both Coreg and Digoxin at current levels until she is reassessed at upcomming appointment on 9/18/24.  Pharmacy called and notified.

## 2024-09-05 ENCOUNTER — MYC MEDICAL ADVICE (OUTPATIENT)
Dept: CARDIOLOGY | Facility: CLINIC | Age: 24
End: 2024-09-05
Payer: COMMERCIAL

## 2024-09-05 NOTE — TELEPHONE ENCOUNTER
Date: 9/5/2024    Time of Call: 8:35 AM     Diagnosis:  digoxin      [ VORB ] Ordering provider: Johanna Wu NP   Order: digoxin level      Order received by: Stella Soares RN       Follow-up/additional notes:         Also called Mary to confirm that she would like her device care moved to [place].

## 2024-09-06 ENCOUNTER — TELEPHONE (OUTPATIENT)
Dept: CARDIOLOGY | Facility: CLINIC | Age: 24
End: 2024-09-06
Payer: COMMERCIAL

## 2024-09-06 NOTE — TELEPHONE ENCOUNTER
[facility] Call Center    Phone Message    May a detailed message be left on voicemail: yes     Reason for Call: Other: Mary would like a call back to discuss having Dr. Fuentes fill out disability forms.  Please call her back to further discuss and arrange sending the forms.     Action Taken: Other: Cardiology    Travel Screening: Not Applicable     Thank you!  [facility]

## 2024-09-09 NOTE — TELEPHONE ENCOUNTER
[facility] Call Center    Phone Message    May a detailed message be left on voicemail: yes     Reason for Call: Other: Pt is calling to check on the status of the return call.  She stated she needs the forms completed urgently.     Action Taken: Other: cardio    Travel Screening: Not Applicable    Thank you!  [facility]       Date of Service:

## 2024-09-18 ENCOUNTER — OFFICE VISIT (OUTPATIENT)
Dept: CARDIOLOGY | Facility: CLINIC | Age: 24
End: 2024-09-18
Payer: COMMERCIAL

## 2024-09-18 ENCOUNTER — LAB (OUTPATIENT)
Dept: LAB | Facility: CLINIC | Age: 24
End: 2024-09-18
Payer: COMMERCIAL

## 2024-09-18 VITALS
HEART RATE: 61 BPM | WEIGHT: 164 LBS | DIASTOLIC BLOOD PRESSURE: 63 MMHG | OXYGEN SATURATION: 100 % | SYSTOLIC BLOOD PRESSURE: 116 MMHG | BODY MASS INDEX: 28.15 KG/M2

## 2024-09-18 DIAGNOSIS — I50.20 HEART FAILURE WITH REDUCED EJECTION FRACTION (H): ICD-10-CM

## 2024-09-18 DIAGNOSIS — I50.23 ACUTE ON CHRONIC SYSTOLIC HEART FAILURE (H): ICD-10-CM

## 2024-09-18 LAB
ANION GAP SERPL CALCULATED.3IONS-SCNC: 8 MMOL/L (ref 7–15)
BUN SERPL-MCNC: 6.5 MG/DL (ref 6–20)
CALCIUM SERPL-MCNC: 9.8 MG/DL (ref 8.8–10.4)
CHLORIDE SERPL-SCNC: 105 MMOL/L (ref 98–107)
CREAT SERPL-MCNC: 0.66 MG/DL (ref 0.51–0.95)
DIGOXIN SERPL-MCNC: 0.8 NG/ML (ref 0.6–2)
EGFRCR SERPLBLD CKD-EPI 2021: >90 ML/MIN/1.73M2
GLUCOSE SERPL-MCNC: 81 MG/DL (ref 70–99)
HCO3 SERPL-SCNC: 27 MMOL/L (ref 22–29)
NT-PROBNP SERPL-MCNC: 1126 PG/ML (ref 0–450)
POTASSIUM SERPL-SCNC: 4.1 MMOL/L (ref 3.4–5.3)
SODIUM SERPL-SCNC: 140 MMOL/L (ref 135–145)

## 2024-09-18 PROCEDURE — 99000 SPECIMEN HANDLING OFFICE-LAB: CPT | Performed by: PATHOLOGY

## 2024-09-18 PROCEDURE — 80162 ASSAY OF DIGOXIN TOTAL: CPT

## 2024-09-18 PROCEDURE — 36415 COLL VENOUS BLD VENIPUNCTURE: CPT | Performed by: PATHOLOGY

## 2024-09-18 PROCEDURE — 99213 OFFICE O/P EST LOW 20 MIN: CPT | Performed by: NURSE PRACTITIONER

## 2024-09-18 PROCEDURE — 99214 OFFICE O/P EST MOD 30 MIN: CPT | Performed by: NURSE PRACTITIONER

## 2024-09-18 PROCEDURE — 80048 BASIC METABOLIC PNL TOTAL CA: CPT | Performed by: PATHOLOGY

## 2024-09-18 PROCEDURE — 83880 ASSAY OF NATRIURETIC PEPTIDE: CPT

## 2024-09-18 RX ORDER — FUROSEMIDE 20 MG
20 TABLET ORAL DAILY
Qty: 30 TABLET | Refills: 1 | Status: SHIPPED | OUTPATIENT
Start: 2024-09-18

## 2024-09-18 RX ORDER — FUROSEMIDE 20 MG
20 TABLET ORAL DAILY PRN
Qty: 30 TABLET | Refills: 1 | Status: SHIPPED | OUTPATIENT
Start: 2024-09-18 | End: 2024-09-18

## 2024-09-18 ASSESSMENT — PAIN SCALES - GENERAL: PAINLEVEL: NO PAIN (0)

## 2024-09-18 NOTE — LETTER
9/18/2024      RE: Mary Shaikh  [address]  [place] [address]       Dear Colleague,    Thank you for the opportunity to participate in the care of your patient, Mary Shaikh, at the [facility] [place] at [facility]. Please see a copy of my visit note below.      [facility] Cardiology   CORE Clinic      HPI:   Ms. 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 She presents to cardiology clinic for CORE follow up.      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 Per chart review from recent visit with Dr. Fuentes, patient has had a signficant reduction in her LVEF to 26% on CMR with extensive LGE that is consistent with Danon disease. 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 She has a normal CK but has not yet seen neuromuscular neurology regarding the skeletal myopathy. She had an ICD as secondary SCD prevention after syncope and SVT. Her exercise tolerance has decreased. Her CPEX demonstrates an exercise duration of 8 min and 17 seconds. She achieved an RER of 1.09 and had a peak V02 of 18.7 which is a reduction from past CPEX, and a VE/VC02 slope of 32. Her HR and BP responses were normal.     Patient underwent RHC 7/31/24 which revealed patient in ambulatory cardiogenic shock. She was therefore admitted for GDMT optimization and to start advance therapies evaluation. Her body weight at admission 170 lbs, discharge weight 157 lbs.      Since  discharge, patient reports overall feeling stable. She reports that she is experiencing some BUI, but is able to walk for about 30min at a slow pace without limitation. She has not yet enrolled in cardiac rehab, but is interested in starting once the amount of appointments slow down.   She does not currently check her blood pressure at home (114/68 in clinic today), nor does she weigh herself daily.     Today in clinic, she denies chest pain, palpitations, dizziness, syncope, or lower extremity edema. No orthopnea or PND. She reports dyspnea even with minimal exertion, progressive since July. Fatigue, sometimes lightheaded with position changes. Sometimes will feel a rapid heartbeat, Apple Watch with rates  during these episodes. Appetite fair. Weights stable. Taking all medications as prescribed.     Cardiac Medications  - Coreg 3.125 mg BID  - Digoxin 250 mcg daily   - Jardiance 10 mg daily   - Lasix 20 mg daily   - Entresto  mg BID  - Spironolactone 25 mg daily       PAST MEDICAL HISTORY:  No past medical history on file.    FAMILY HISTORY:  No family history on file.    SOCIAL HISTORY:  Social History     Socioeconomic History     Marital status: Single   Tobacco Use     Smoking status: Never     Smokeless tobacco: Never   Substance and Sexual Activity     Alcohol use: Never     Drug use: Never     Social Determinants of Health     Financial Resource Strain: Low Risk  (7/10/2024)    [facility]    Financial Resource Strain      Difficulty of Paying Living Expenses: 3   Food Insecurity: No Food Insecurity (7/10/2024)    Received from [facility]    Food Insecurity      Worried About Running Out of Food in the Last Year: 1   Transportation Needs: No Transportation Needs (7/10/2024)    [facility]    Transportation Needs      Lack of Transportation (Medical): 1   Social  Connections: Socially Integrated (7/10/2024)    Received from [facility]    Social Connections      Frequency of Communication with Friends and Family: 0   Interpersonal Safety: Not At Risk (6/20/2024)    Received from [facility]    Humiliation, Afraid, Rape, and Kick questionnaire      Fear of Current or Ex-Partner: No      Emotionally Abused: No      Physically Abused: No      Sexually Abused: No   Housing Stability: Low Risk  (7/10/2024)    Received from [facility]    Housing Stability      Unable to Pay for Housing in the Last Year: 1       CURRENT MEDICATIONS:  Current Outpatient Medications   Medication Sig Dispense Refill     acetaminophen (TYLENOL) 325 MG tablet Take 650 mg by mouth       albuterol (PROAIR HFA/PROVENTIL HFA/VENTOLIN HFA) 108 (90 Base) MCG/ACT inhaler Inhale 1-2 puffs into the lungs       carvedilol (COREG) 3.125 MG tablet Take 1 tablet (3.125 mg) by mouth 2 times daily (with meals). 60 tablet 1     digoxin (LANOXIN) 250 MCG tablet Take 1 tablet (250 mcg) by mouth daily. 30 tablet 1     empagliflozin (JARDIANCE) 10 MG TABS tablet Take 1 tablet (10 mg) by mouth daily. 90 tablet 1     furosemide (LASIX) 20 MG tablet Take 1 tablet (20 mg) by mouth daily. 30 tablet 1     HYDROmorphone (DILAUDID) 2 MG tablet Take 2 mg by mouth every 6 hours as needed for pain       loperamide (IMODIUM) 2 MG capsule Take 4 mg by mouth as needed for diarrhea       melatonin 3 MG tablet Take 3 mg by mouth nightly as needed       nitroGLYcerin (NITROSTAT) 0.4 MG sublingual tablet For chest pain place 1 tablet under the tongue every 5 minutes for 3 doses. If symptoms persist 5 minutes after 1st dose call 911. 10 tablet 1     norgestimate-ethinyl estradiol (ESTARYLLA) 0.25-35 MG-MCG tablet Take 1 tablet by mouth at bedtime       omeprazole (PRILOSEC) 20 MG DR capsule Take 20 mg by mouth daily       ondansetron (ZOFRAN ODT) 4 MG ODT tab Place 4  mg under the tongue as needed       oxyCODONE (ROXICODONE) 5 MG tablet Take 5 mg by mouth as needed       polyethylene glycol (MIRALAX) 17 GM/Dose powder Take 17 g by mouth daily       sacubitril-valsartan (ENTRESTO)  MG per tablet Take 1 tablet by mouth 2 times daily. 60 tablet 1     sertraline (ZOLOFT) 50 MG tablet Take 50 mg by mouth daily       spironolactone (ALDACTONE) 25 MG tablet Take 1 tablet (25 mg) by mouth daily. 30 tablet 1     No current facility-administered medications for this visit.       ROS:   Refer to HPI    EXAM:  /63 (BP Location: Right arm, Patient Position: Chair, Cuff Size: Adult Regular)   Pulse 61   Wt 74.4 kg (164 lb)   SpO2 100%   BMI 28.15 kg/m     GENERAL: Appears comfortable, in no acute distress.   HEENT: Eye symmetrical, no discharge or icterus bilaterally. Mucous membranes moist and without lesions.  CV: RRR, +S1S2, no murmur, rub, or gallop. JVP < 6 cm at 90 degrees.   RESPIRATORY: Respirations regular, even, and unlabored. Lungs CTA throughout  GI: Soft and non distended with normoactive bowel sounds present in all quadrants. No tenderness, rebound, guarding. No hepatomegaly.   EXTREMITIES: no peripheral edema. 2+ bilateral pedal pulses.   NEUROLOGIC: Alert and oriented x 3. No focal deficits.   MUSCULOSKELETAL: No joint swelling or tenderness.   SKIN: No jaundice. No rashes or lesions.     Labs, reviewed with patient in clinic today:  CBC RESULTS:  Lab Results   Component Value Date    WBC 5.3 08/05/2024    RBC 5.53 (H) 08/05/2024    HGB 14.5 08/05/2024    HCT 46.7 08/05/2024    MCV 84 08/05/2024    MCH 26.2 (L) 08/05/2024    MCHC 31.0 (L) 08/05/2024    RDW 15.3 (H) 08/05/2024     08/05/2024       CMP RESULTS:  Lab Results   Component Value Date     09/18/2024    POTASSIUM 4.1 09/18/2024    POTASSIUM 4.0 05/09/2022    CHLORIDE 105 09/18/2024    CHLORIDE 107 05/09/2022    CO2 27 09/18/2024    CO2 29 05/09/2022    ANIONGAP 8 09/18/2024    ANIONGAP 5  05/09/2022    GLC 81 09/18/2024    GLC 96 08/01/2024    GLC 86 05/09/2022    BUN 6.5 09/18/2024    BUN 8 05/09/2022    CR 0.66 09/18/2024    GFRESTIMATED >90 09/18/2024    WHITNEY 9.8 09/18/2024    BILITOTAL 0.3 08/12/2024    ALBUMIN 4.5 08/12/2024    ALBUMIN 3.9 05/09/2022    ALKPHOS 75 08/12/2024    ALT 72 (H) 08/12/2024    AST 98 (H) 08/12/2024        INR RESULTS:  Lab Results   Component Value Date    INR 1.25 (H) 08/02/2024       Lab Results   Component Value Date    MAG 2.0 08/05/2024     Lab Results   Component Value Date    NTBNPI 1,569 (H) 08/02/2024     Lab Results   Component Value Date    NTBNP 1,126 (H) 09/18/2024       Cardiac Diagnostics:    7/31/2024 Echo  Interpretation Summary  Left ventricular function is decreased. The ejection fraction is 20-25%  (severely reduced).  Global right ventricular function is moderately reduced.  Moderate biatrial enlargement is present.  IVC diameter >2.1 cm collapsing <50% with sniff suggests a high RA pressure  estimated at 15 mmHg or greater.  Trivial pericardial effusion is present.  There is no prior study for direct comparison.  _____________________________________    7/31/2024 RHC  RIGHT HEART CATHETERIZATION:    === Baseline ====    BSA 1.87 m2  /74/89 mmHg  RA 21/25/20 mmHg  RV 51/20mmHg  PA 51/29/36 mmHg  PCWP 30/40/29 mmHg  TD CO/CI 2.9/1.55   Juan CO/CI 3.13/1.68   PVR 1.9   SVR 1762  PA sat 43.5%   PCW Oximeter sat 98% Right sided filling pressures are severely elevated. Left sided filling pressures are severely elevated. Moderately elevated pulmonary artery hypertension. Reduced cardiac output level.       5/15/2024 CPX     A cardiopulmonary stress test was performed following a Vivek ramp protocol with the patient exercising for 8 minutes and 17 seconds under the supervision of Dr. SURAJ Martinez.  Heart rate demonstrated a normal response to exercise.  Blood pressure demonstrated a blunted response to exercise. The patient's exercise capacity is severely  "impaired. The test was stopped due to 8/10 dyspnea. The patient reported dyspnea during the stress test. Functional capacity response is Class II: 50-74%. The patient experienced no angina during the test.     Prior Study: A prior study was conducted on 7/24/2023. This study has changes noted with the prior study. The patients peak VO2 decreased by 13% compared to previous test.      Assessment and Plan:   Ms. 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 She presents to cardiology clinic for CORE follow up.     Appears grossly euvolemic by exam. Warm and compensated. She is reporting progressive BUI. RHC from end of July demonstrated reduced CO with high RA and wedge pressures. She is currently well optimized on GDMT. BPs are controlled. Review of labs with normal lytes and stable renal function. She has completed transplant evaluation, need to discuss timing of possible listing as well as repeat RHC with Dr. Fuentes.     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  No signs of organ dysfunction and or hypoperfusion. Making adequate UO. S/p IV nitroprusside for afterload reduction and IV diuretics. BW at admission 170 lbs, discharge weight 157 lbs. GDMT optimization and start advance therapies evaluation.  GDMT:   - BB: Carvedilol 3.125 mg BiD    - ACEI/ARB/ARNI: Entresto 97/103 BID  - SGLT2i: Jardiance 10mg  - MRA: Spirolactone 25mg   - Diuretics : Furosemide 20 mg daily   - BMP WNL today, will repeat prior to next CORE appt (prefers [facility])  - Continue outpatient LVAD/transplant evaluation: neuropsych consult " "(8/27)  - Contraception: on OCP     #History of SVT with preexitation   #S/P ICD sudden cardiac death prevention  Brief episodes of sypmtomatic SVT while in the hospital, responsive to vagal maneuvers. 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 EP was consulted and recommended starting oral digoxin and low-dose beta-blocker.  Per EP note, \"if SVT increases in frequency or duration she may require another EP study in the future to further characterize the arrhythmia and determine whether it would be amenable to an ablation\".   - Digoxin 250 mcg daily   - Beta blocker as above       Follow up:  - Dr. Fuentes 10/28/24  - CORE 3 months      A total of 35 minutes was spent on the day of the visit, which includes preparation for the visit (reviewing previous medical records, laboratories and investigations), in conjunction with the actual clinic visit with the patient, which includes obtaining a history and physical exam, creating and reviewing the care plan, patient education (and family if present), counseling, documenting clinical information in the electronic health record and care coordination.       Jocelyn Pichardo DNP, NP-C  Advance Heart Failure  09/18/24      Please do not hesitate to contact me if you have any questions/concerns.     Sincerely,     ZITA Ramos CNP  "

## 2024-09-18 NOTE — NURSING NOTE
Chief Complaint   Patient presents with    Follow Up     9/18/2024 visit with Jocelyn Pichardo APRN CNP for RETURN CORE - 1 month follow up Called pt, gave number to reschedule 09/16/24 -JJ       Vitals were taken and medications reconciled.    Arjun Grider, EMT  3:23 PM

## 2024-09-18 NOTE — PROGRESS NOTES
[facility] Cardiology   CORE Clinic      HPI:   Ms. 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 She presents to cardiology clinic for CORE follow up.      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 Per chart review from recent visit with Dr. Fuentes, patient has had a signficant reduction in her LVEF to 26% on CMR with extensive LGE that is consistent with Danon disease. 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 She has a normal CK but has not yet seen neuromuscular neurology regarding the skeletal myopathy. She had an ICD as secondary SCD prevention after syncope and SVT. Her exercise tolerance has decreased. Her CPEX demonstrates an exercise duration of 8 min and 17 seconds. She achieved an RER of 1.09 and had a peak V02 of 18.7 which is a reduction from past CPEX, and a VE/VC02 slope of 32. Her HR and BP responses were normal.     Patient underwent RHC 7/31/24 which revealed patient in ambulatory cardiogenic shock. She was therefore admitted for GDMT optimization and to start advance therapies evaluation. Her body weight at admission 170 lbs, discharge weight 157 lbs.      Since discharge, patient reports overall feeling stable. She reports that she is experiencing some BUI, but is able to walk for about 30min at a slow pace without limitation. She has not yet enrolled in cardiac rehab, but is interested in starting once the amount of appointments slow down.   She does not currently check her blood pressure at home  (114/68 in clinic today), nor does she weigh herself daily.     Today in clinic, she denies chest pain, palpitations, dizziness, syncope, or lower extremity edema. No orthopnea or PND. She reports dyspnea even with minimal exertion, progressive since July. Fatigue, sometimes lightheaded with position changes. Sometimes will feel a rapid heartbeat, Apple Watch with rates  during these episodes. Appetite fair. Weights stable. Taking all medications as prescribed.     Cardiac Medications  - Coreg 3.125 mg BID  - Digoxin 250 mcg daily   - Jardiance 10 mg daily   - Lasix 20 mg daily   - Entresto  mg BID  - Spironolactone 25 mg daily       PAST MEDICAL HISTORY:  No past medical history on file.    FAMILY HISTORY:  No family history on file.    SOCIAL HISTORY:  Social History     Socioeconomic History    Marital status: Single   Tobacco Use    Smoking status: Never    Smokeless tobacco: Never   Substance and Sexual Activity    Alcohol use: Never    Drug use: Never     Social Determinants of Health     Financial Resource Strain: Low Risk  (7/10/2024)    Received from [facility]    Financial Resource Strain     Difficulty of Paying Living Expenses: 3   Food Insecurity: No Food Insecurity (7/10/2024)    Received from [facility]    Food Insecurity     Worried About Running Out of Food in the Last Year: 1   Transportation Needs: No Transportation Needs (7/10/2024)    Received from [facility]    Transportation Needs     Lack of Transportation (Medical): 1   Social Connections: Socially Integrated (7/10/2024)    [facility]    Social Connections     Frequency of Communication with Friends and Family: 0   Interpersonal Safety: Not At Risk (6/20/2024)    Received from [facility]    Humiliation, Afraid, Rape, and Kick questionnaire     Fear of Current or Ex-Partner:  No     Emotionally Abused: No     Physically Abused: No     Sexually Abused: No   Housing Stability: Low Risk  (7/10/2024)    Received from [facility]    Housing Stability     Unable to Pay for Housing in the Last Year: 1       CURRENT MEDICATIONS:  Current Outpatient Medications   Medication Sig Dispense Refill    acetaminophen (TYLENOL) 325 MG tablet Take 650 mg by mouth      albuterol (PROAIR HFA/PROVENTIL HFA/VENTOLIN HFA) 108 (90 Base) MCG/ACT inhaler Inhale 1-2 puffs into the lungs      carvedilol (COREG) 3.125 MG tablet Take 1 tablet (3.125 mg) by mouth 2 times daily (with meals). 60 tablet 1    digoxin (LANOXIN) 250 MCG tablet Take 1 tablet (250 mcg) by mouth daily. 30 tablet 1    empagliflozin (JARDIANCE) 10 MG TABS tablet Take 1 tablet (10 mg) by mouth daily. 90 tablet 1    furosemide (LASIX) 20 MG tablet Take 1 tablet (20 mg) by mouth daily. 30 tablet 1    HYDROmorphone (DILAUDID) 2 MG tablet Take 2 mg by mouth every 6 hours as needed for pain      loperamide (IMODIUM) 2 MG capsule Take 4 mg by mouth as needed for diarrhea      melatonin 3 MG tablet Take 3 mg by mouth nightly as needed      nitroGLYcerin (NITROSTAT) 0.4 MG sublingual tablet For chest pain place 1 tablet under the tongue every 5 minutes for 3 doses. If symptoms persist 5 minutes after 1st dose call 911. 10 tablet 1    norgestimate-ethinyl estradiol (ESTARYLLA) 0.25-35 MG-MCG tablet Take 1 tablet by mouth at bedtime      omeprazole (PRILOSEC) 20 MG DR capsule Take 20 mg by mouth daily      ondansetron (ZOFRAN ODT) 4 MG ODT tab Place 4 mg under the tongue as needed      oxyCODONE (ROXICODONE) 5 MG tablet Take 5 mg by mouth as needed      polyethylene glycol (MIRALAX) 17 GM/Dose powder Take 17 g by mouth daily      sacubitril-valsartan (ENTRESTO)  MG per tablet Take 1 tablet by mouth 2 times daily. 60 tablet 1    sertraline (ZOLOFT) 50 MG tablet Take 50 mg by mouth daily      spironolactone (ALDACTONE)  25 MG tablet Take 1 tablet (25 mg) by mouth daily. 30 tablet 1     No current facility-administered medications for this visit.       ROS:   Refer to HPI    EXAM:  /63 (BP Location: Right arm, Patient Position: Chair, Cuff Size: Adult Regular)   Pulse 61   Wt 74.4 kg (164 lb)   SpO2 100%   BMI 28.15 kg/m     GENERAL: Appears comfortable, in no acute distress.   HEENT: Eye symmetrical, no discharge or icterus bilaterally. Mucous membranes moist and without lesions.  CV: RRR, +S1S2, no murmur, rub, or gallop. JVP < 6 cm at 90 degrees.   RESPIRATORY: Respirations regular, even, and unlabored. Lungs CTA throughout  GI: Soft and non distended with normoactive bowel sounds present in all quadrants. No tenderness, rebound, guarding. No hepatomegaly.   EXTREMITIES: no peripheral edema. 2+ bilateral pedal pulses.   NEUROLOGIC: Alert and oriented x 3. No focal deficits.   MUSCULOSKELETAL: No joint swelling or tenderness.   SKIN: No jaundice. No rashes or lesions.     Labs, reviewed with patient in clinic today:  CBC RESULTS:  Lab Results   Component Value Date    WBC 5.3 08/05/2024    RBC 5.53 (H) 08/05/2024    HGB 14.5 08/05/2024    HCT 46.7 08/05/2024    MCV 84 08/05/2024    MCH 26.2 (L) 08/05/2024    MCHC 31.0 (L) 08/05/2024    RDW 15.3 (H) 08/05/2024     08/05/2024       CMP RESULTS:  Lab Results   Component Value Date     09/18/2024    POTASSIUM 4.1 09/18/2024    POTASSIUM 4.0 05/09/2022    CHLORIDE 105 09/18/2024    CHLORIDE 107 05/09/2022    CO2 27 09/18/2024    CO2 29 05/09/2022    ANIONGAP 8 09/18/2024    ANIONGAP 5 05/09/2022    GLC 81 09/18/2024    GLC 96 08/01/2024    GLC 86 05/09/2022    BUN 6.5 09/18/2024    BUN 8 05/09/2022    CR 0.66 09/18/2024    GFRESTIMATED >90 09/18/2024    WHITNEY 9.8 09/18/2024    BILITOTAL 0.3 08/12/2024    ALBUMIN 4.5 08/12/2024    ALBUMIN 3.9 05/09/2022    ALKPHOS 75 08/12/2024    ALT 72 (H) 08/12/2024    AST 98 (H) 08/12/2024        INR RESULTS:  Lab Results    Component Value Date    INR 1.25 (H) 08/02/2024       Lab Results   Component Value Date    MAG 2.0 08/05/2024     Lab Results   Component Value Date    NTBNPI 1,569 (H) 08/02/2024     Lab Results   Component Value Date    NTBNP 1,126 (H) 09/18/2024       Cardiac Diagnostics:    7/31/2024 Echo  Interpretation Summary  Left ventricular function is decreased. The ejection fraction is 20-25%  (severely reduced).  Global right ventricular function is moderately reduced.  Moderate biatrial enlargement is present.  IVC diameter >2.1 cm collapsing <50% with sniff suggests a high RA pressure  estimated at 15 mmHg or greater.  Trivial pericardial effusion is present.  There is no prior study for direct comparison.  _____________________________________    7/31/2024 RHC  RIGHT HEART CATHETERIZATION:    === Baseline ====    BSA 1.87 m2  /74/89 mmHg  RA 21/25/20 mmHg  RV 51/20mmHg  PA 51/29/36 mmHg  PCWP 30/40/29 mmHg  TD CO/CI 2.9/1.55   Juan CO/CI 3.13/1.68   PVR 1.9   SVR 1762  PA sat 43.5%   PCW Oximeter sat 98% Right sided filling pressures are severely elevated. Left sided filling pressures are severely elevated. Moderately elevated pulmonary artery hypertension. Reduced cardiac output level.       5/15/2024 CPX    A cardiopulmonary stress test was performed following a Vivek ramp protocol with the patient exercising for 8 minutes and 17 seconds under the supervision of Dr. SURAJ Martinez.  Heart rate demonstrated a normal response to exercise.  Blood pressure demonstrated a blunted response to exercise. The patient's exercise capacity is severely impaired. The test was stopped due to 8/10 dyspnea. The patient reported dyspnea during the stress test. Functional capacity response is Class II: 50-74%. The patient experienced no angina during the test.    Prior Study: A prior study was conducted on 7/24/2023. This study has changes noted with the prior study. The patients peak VO2 decreased by 13% compared to previous  "test.      Assessment and Plan:   Ms. 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 She presents to cardiology clinic for CORE follow up.     Appears grossly euvolemic by exam. Warm and compensated. She is reporting progressive BUI. RHC from end of July demonstrated reduced CO with high RA and wedge pressures. She is currently well optimized on GDMT. BPs are controlled. Review of labs with normal lytes and stable renal function. She has completed transplant evaluation, need to discuss timing of possible listing as well as repeat RHC with Dr. Fuentes.     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  No signs of organ dysfunction and or hypoperfusion. Making adequate UO. S/p IV nitroprusside for afterload reduction and IV diuretics. BW at admission 170 lbs, discharge weight 157 lbs. GDMT optimization and start advance therapies evaluation.  GDMT:   - BB: Carvedilol 3.125 mg BiD    - ACEI/ARB/ARNI: Entresto 97/103 BID  - SGLT2i: Jardiance 10mg  - MRA: Spirolactone 25mg   - Diuretics : Furosemide 20 mg daily   - BMP WNL today, will repeat prior to next CORE appt (prefers [place] location)  - Continue outpatient LVAD/transplant evaluation: neuropsych consult (8/27)  - Contraception: on OCP     #History of SVT with preexitation   #S/P ICD sudden cardiac death prevention  Brief episodes of sypmtomatic SVT while in the hospital, responsive to vagal maneuvers. 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 EP was consulted and recommended starting oral digoxin and low-dose beta-blocker.  Per EP note, \"if SVT increases in frequency or duration she " "may require another EP study in the future to further characterize the arrhythmia and determine whether it would be amenable to an ablation\".   - Digoxin 250 mcg daily   - Beta blocker as above       Follow up:  - Dr. Fuentes 10/28/24  - CORE 3 months      A total of 35 minutes was spent on the day of the visit, which includes preparation for the visit (reviewing previous medical records, laboratories and investigations), in conjunction with the actual clinic visit with the patient, which includes obtaining a history and physical exam, creating and reviewing the care plan, patient education (and family if present), counseling, documenting clinical information in the electronic health record and care coordination.       Jocelyn Pichardo DNP, NP-C  Advance Heart Failure  09/18/24    "

## 2024-10-01 ENCOUNTER — MYC MEDICAL ADVICE (OUTPATIENT)
Dept: CARDIOLOGY | Facility: CLINIC | Age: 24
End: 2024-10-01
Payer: COMMERCIAL

## 2024-10-02 NOTE — TELEPHONE ENCOUNTER
[facility] Call [facility]    Phone Message    May a detailed message be left on voicemail: yes     Reason for Call: Other: Patient called about the forms she needs filled out. She stated that she needs them by today. Please call patient with an update. Thank you!     Action Taken: Other: Cardiology    Travel Screening: Not Applicable     Thank you!  [facility]

## 2024-10-03 ENCOUNTER — ANCILLARY PROCEDURE (OUTPATIENT)
Dept: CARDIOLOGY | Facility: CLINIC | Age: 24
End: 2024-10-03
Attending: INTERNAL MEDICINE
Payer: COMMERCIAL

## 2024-10-03 DIAGNOSIS — Z45.02 FITTING AND ADJUSTMENT OF AUTOMATIC IMPLANTABLE CARDIOVERTER-DEFIBRILLATOR: Primary | ICD-10-CM

## 2024-10-03 DIAGNOSIS — Z95.810 ICD (IMPLANTABLE CARDIOVERTER-DEFIBRILLATOR) IN PLACE: ICD-10-CM

## 2024-10-03 PROCEDURE — 93295 DEV INTERROG REMOTE 1/2/MLT: CPT | Performed by: INTERNAL MEDICINE

## 2024-10-03 PROCEDURE — 93296 REM INTERROG EVL PM/IDS: CPT

## 2024-10-04 DIAGNOSIS — E74.05 DANON DISEASE (H): ICD-10-CM

## 2024-10-04 DIAGNOSIS — I50.20 HEART FAILURE WITH REDUCED EJECTION FRACTION (H): Primary | ICD-10-CM

## 2024-10-05 LAB
MDC_IDC_EPISODE_DTM: NORMAL
MDC_IDC_EPISODE_DURATION: 11 S
MDC_IDC_EPISODE_DURATION: 11 S
MDC_IDC_EPISODE_DURATION: 12 S
MDC_IDC_EPISODE_DURATION: 128 S
MDC_IDC_EPISODE_DURATION: 13 S
MDC_IDC_EPISODE_DURATION: 14 S
MDC_IDC_EPISODE_DURATION: 15 S
MDC_IDC_EPISODE_DURATION: 15 S
MDC_IDC_EPISODE_DURATION: 18 S
MDC_IDC_EPISODE_DURATION: 19 S
MDC_IDC_EPISODE_DURATION: 20 S
MDC_IDC_EPISODE_DURATION: 23 S
MDC_IDC_EPISODE_DURATION: 28 S
MDC_IDC_EPISODE_DURATION: 78 S
MDC_IDC_EPISODE_ID: NORMAL
MDC_IDC_EPISODE_TYPE: NORMAL
MDC_IDC_EPISODE_TYPE_INDUCED: NO
MDC_IDC_EPISODE_VENDOR_TYPE: NORMAL
MDC_IDC_EPISODE_VENDOR_TYPE: NORMAL
MDC_IDC_LEAD_CONNECTION_STATUS: NORMAL
MDC_IDC_LEAD_CONNECTION_STATUS: NORMAL
MDC_IDC_LEAD_IMPLANT_DT: NORMAL
MDC_IDC_LEAD_IMPLANT_DT: NORMAL
MDC_IDC_LEAD_LOCATION: NORMAL
MDC_IDC_LEAD_LOCATION: NORMAL
MDC_IDC_LEAD_LOCATION_DETAIL_1: NORMAL
MDC_IDC_LEAD_LOCATION_DETAIL_1: NORMAL
MDC_IDC_LEAD_MFG: NORMAL
MDC_IDC_LEAD_MFG: NORMAL
MDC_IDC_LEAD_MODEL: NORMAL
MDC_IDC_LEAD_MODEL: NORMAL
MDC_IDC_LEAD_POLARITY_TYPE: NORMAL
MDC_IDC_LEAD_POLARITY_TYPE: NORMAL
MDC_IDC_LEAD_SERIAL: NORMAL
MDC_IDC_LEAD_SERIAL: NORMAL
MDC_IDC_MSMT_BATTERY_DTM: NORMAL
MDC_IDC_MSMT_BATTERY_REMAINING_LONGEVITY: 156 MO
MDC_IDC_MSMT_BATTERY_REMAINING_PERCENTAGE: 100 %
MDC_IDC_MSMT_BATTERY_STATUS: NORMAL
MDC_IDC_MSMT_CAP_CHARGE_DTM: NORMAL
MDC_IDC_MSMT_CAP_CHARGE_DTM: NORMAL
MDC_IDC_MSMT_CAP_CHARGE_ENERGY: 41 J
MDC_IDC_MSMT_CAP_CHARGE_TIME: 10.3 S
MDC_IDC_MSMT_CAP_CHARGE_TIME: 7.9 S
MDC_IDC_MSMT_CAP_CHARGE_TYPE: NORMAL
MDC_IDC_MSMT_CAP_CHARGE_TYPE: NORMAL
MDC_IDC_MSMT_LEADCHNL_RA_IMPEDANCE_VALUE: 801 OHM
MDC_IDC_MSMT_LEADCHNL_RA_PACING_THRESHOLD_AMPLITUDE: 0.6 V
MDC_IDC_MSMT_LEADCHNL_RA_PACING_THRESHOLD_PULSEWIDTH: 0.4 MS
MDC_IDC_MSMT_LEADCHNL_RV_IMPEDANCE_VALUE: 361 OHM
MDC_IDC_MSMT_LEADCHNL_RV_PACING_THRESHOLD_AMPLITUDE: 1 V
MDC_IDC_MSMT_LEADCHNL_RV_PACING_THRESHOLD_PULSEWIDTH: 0.4 MS
MDC_IDC_PG_IMPLANT_DTM: NORMAL
MDC_IDC_PG_MFG: NORMAL
MDC_IDC_PG_MODEL: NORMAL
MDC_IDC_PG_SERIAL: NORMAL
MDC_IDC_PG_TYPE: NORMAL
MDC_IDC_SESS_CLINIC_NAME: NORMAL
MDC_IDC_SESS_DTM: NORMAL
MDC_IDC_SESS_TYPE: NORMAL
MDC_IDC_SET_BRADY_AT_MODE_SWITCH_RATE: 140 {BEATS}/MIN
MDC_IDC_SET_BRADY_LOWRATE: 40 {BEATS}/MIN
MDC_IDC_SET_BRADY_MODE: NORMAL
MDC_IDC_SET_LEADCHNL_RA_PACING_AMPLITUDE: 3.5 V
MDC_IDC_SET_LEADCHNL_RA_PACING_CAPTURE_MODE: NORMAL
MDC_IDC_SET_LEADCHNL_RA_PACING_POLARITY: NORMAL
MDC_IDC_SET_LEADCHNL_RA_PACING_PULSEWIDTH: 0.4 MS
MDC_IDC_SET_LEADCHNL_RA_SENSING_ADAPTATION_MODE: NORMAL
MDC_IDC_SET_LEADCHNL_RA_SENSING_POLARITY: NORMAL
MDC_IDC_SET_LEADCHNL_RA_SENSING_SENSITIVITY: 0.25 MV
MDC_IDC_SET_LEADCHNL_RV_PACING_AMPLITUDE: 3.5 V
MDC_IDC_SET_LEADCHNL_RV_PACING_CAPTURE_MODE: NORMAL
MDC_IDC_SET_LEADCHNL_RV_PACING_POLARITY: NORMAL
MDC_IDC_SET_LEADCHNL_RV_PACING_PULSEWIDTH: 0.4 MS
MDC_IDC_SET_LEADCHNL_RV_SENSING_ADAPTATION_MODE: NORMAL
MDC_IDC_SET_LEADCHNL_RV_SENSING_POLARITY: NORMAL
MDC_IDC_SET_LEADCHNL_RV_SENSING_SENSITIVITY: 0.6 MV
MDC_IDC_SET_ZONE_DETECTION_INTERVAL: 240 MS
MDC_IDC_SET_ZONE_DETECTION_INTERVAL: 300 MS
MDC_IDC_SET_ZONE_DETECTION_INTERVAL: 333 MS
MDC_IDC_SET_ZONE_STATUS: NORMAL
MDC_IDC_SET_ZONE_TYPE: NORMAL
MDC_IDC_SET_ZONE_VENDOR_TYPE: NORMAL
MDC_IDC_STAT_BRADY_DTM_END: NORMAL
MDC_IDC_STAT_BRADY_DTM_START: NORMAL
MDC_IDC_STAT_BRADY_RA_PERCENT_PACED: 0 %
MDC_IDC_STAT_BRADY_RV_PERCENT_PACED: 0 %
MDC_IDC_STAT_EPISODE_RECENT_COUNT: 0
MDC_IDC_STAT_EPISODE_RECENT_COUNT: 1
MDC_IDC_STAT_EPISODE_RECENT_COUNT: 2
MDC_IDC_STAT_EPISODE_RECENT_COUNT: 97
MDC_IDC_STAT_EPISODE_RECENT_COUNT_DTM_END: NORMAL
MDC_IDC_STAT_EPISODE_RECENT_COUNT_DTM_START: NORMAL
MDC_IDC_STAT_EPISODE_TYPE: NORMAL
MDC_IDC_STAT_EPISODE_VENDOR_TYPE: NORMAL
MDC_IDC_STAT_TACHYTHERAPY_ATP_DELIVERED_RECENT: 2
MDC_IDC_STAT_TACHYTHERAPY_ATP_DELIVERED_TOTAL: 2
MDC_IDC_STAT_TACHYTHERAPY_RECENT_DTM_END: NORMAL
MDC_IDC_STAT_TACHYTHERAPY_RECENT_DTM_START: NORMAL
MDC_IDC_STAT_TACHYTHERAPY_SHOCKS_ABORTED_RECENT: 2
MDC_IDC_STAT_TACHYTHERAPY_SHOCKS_ABORTED_TOTAL: 2
MDC_IDC_STAT_TACHYTHERAPY_SHOCKS_DELIVERED_RECENT: 1
MDC_IDC_STAT_TACHYTHERAPY_SHOCKS_DELIVERED_TOTAL: 1
MDC_IDC_STAT_TACHYTHERAPY_TOTAL_DTM_END: NORMAL
MDC_IDC_STAT_TACHYTHERAPY_TOTAL_DTM_START: NORMAL

## 2024-10-11 ENCOUNTER — TELEPHONE (OUTPATIENT)
Dept: CARDIOLOGY | Facility: CLINIC | Age: 24
End: 2024-10-11
Payer: COMMERCIAL

## 2024-10-11 ENCOUNTER — MYC MEDICAL ADVICE (OUTPATIENT)
Dept: CARDIOLOGY | Facility: CLINIC | Age: 24
End: 2024-10-11
Payer: COMMERCIAL

## 2024-10-11 NOTE — TELEPHONE ENCOUNTER
[facility] Call Center    Phone Message    May a detailed message be left on voicemail: yes     Reason for Call: Form or Letter   Type or form/letter needing completion: Stating the pt is not able to return to work or search for work for 45 days.  Provider: Dr. Fuentes  Date form needed: today - must be faxed back today!! 10.11.24  Once completed: Fax form to: the fax number on the form    Action Taken: Message routed to:  [facility] cardio    Travel Screening: Not Applicable    Thank you!  [facility]     Date of Service:

## 2024-10-11 NOTE — TELEPHONE ENCOUNTER
Patient Contacted for the patient to call back and schedule the following:    Appointment type: RETURN CORE  Provider: FAITH PADRON  Return date: 01/06/2025  Specialty phone number: [phone] OPT 1  Additional appointment(s) needed: LABS PRIOR  Additonal Notes: N/A

## 2024-10-28 ENCOUNTER — LAB (OUTPATIENT)
Dept: LAB | Facility: CLINIC | Age: 24
End: 2024-10-28
Attending: INTERNAL MEDICINE
Payer: COMMERCIAL

## 2024-10-28 ENCOUNTER — ANCILLARY PROCEDURE (OUTPATIENT)
Dept: CARDIOLOGY | Facility: CLINIC | Age: 24
End: 2024-10-28
Attending: INTERNAL MEDICINE
Payer: COMMERCIAL

## 2024-10-28 VITALS
SYSTOLIC BLOOD PRESSURE: 86 MMHG | BODY MASS INDEX: 27.67 KG/M2 | DIASTOLIC BLOOD PRESSURE: 61 MMHG | HEART RATE: 62 BPM | OXYGEN SATURATION: 100 % | WEIGHT: 161.2 LBS

## 2024-10-28 DIAGNOSIS — E74.05 DANON DISEASE (H): ICD-10-CM

## 2024-10-28 DIAGNOSIS — I50.20 HEART FAILURE WITH REDUCED EJECTION FRACTION (H): ICD-10-CM

## 2024-10-28 DIAGNOSIS — I50.23 ACUTE ON CHRONIC SYSTOLIC HEART FAILURE (H): Primary | ICD-10-CM

## 2024-10-28 DIAGNOSIS — F41.9 ANXIETY: ICD-10-CM

## 2024-10-28 DIAGNOSIS — E74.05 DANON DISEASE IN HETEROZYGOUS FEMALE (H): ICD-10-CM

## 2024-10-28 DIAGNOSIS — Z95.810 ICD (IMPLANTABLE CARDIOVERTER-DEFIBRILLATOR) IN PLACE: ICD-10-CM

## 2024-10-28 DIAGNOSIS — I47.10 PAROXYSMAL SVT (SUPRAVENTRICULAR TACHYCARDIA) (H): ICD-10-CM

## 2024-10-28 LAB
ALBUMIN SERPL BCG-MCNC: 4.6 G/DL (ref 3.5–5.2)
ALP SERPL-CCNC: 87 U/L (ref 40–150)
ALT SERPL W P-5'-P-CCNC: 20 U/L (ref 0–50)
ANION GAP SERPL CALCULATED.3IONS-SCNC: 10 MMOL/L (ref 7–15)
AST SERPL W P-5'-P-CCNC: 48 U/L (ref 0–45)
BILIRUB SERPL-MCNC: 0.7 MG/DL
BUN SERPL-MCNC: 9.2 MG/DL (ref 6–20)
CALCIUM SERPL-MCNC: 9.9 MG/DL (ref 8.8–10.4)
CHLORIDE SERPL-SCNC: 101 MMOL/L (ref 98–107)
CK SERPL-CCNC: 83 U/L (ref 26–192)
CREAT SERPL-MCNC: 0.71 MG/DL (ref 0.51–0.95)
EGFRCR SERPLBLD CKD-EPI 2021: >90 ML/MIN/1.73M2
ERYTHROCYTE [DISTWIDTH] IN BLOOD BY AUTOMATED COUNT: 15.8 % (ref 10–15)
GLUCOSE SERPL-MCNC: 63 MG/DL (ref 70–99)
HCO3 SERPL-SCNC: 27 MMOL/L (ref 22–29)
HCT VFR BLD AUTO: 47.1 % (ref 35–47)
HGB BLD-MCNC: 14.5 G/DL (ref 11.7–15.7)
MAGNESIUM SERPL-MCNC: 2.3 MG/DL (ref 1.7–2.3)
MCH RBC QN AUTO: 25.6 PG (ref 26.5–33)
MCHC RBC AUTO-ENTMCNC: 30.8 G/DL (ref 31.5–36.5)
MCV RBC AUTO: 83 FL (ref 78–100)
NT-PROBNP SERPL-MCNC: 693 PG/ML (ref 0–450)
PLATELET # BLD AUTO: 279 10E3/UL (ref 150–450)
POTASSIUM SERPL-SCNC: 4.2 MMOL/L (ref 3.4–5.3)
PROT SERPL-MCNC: 7.9 G/DL (ref 6.4–8.3)
RBC # BLD AUTO: 5.67 10E6/UL (ref 3.8–5.2)
SODIUM SERPL-SCNC: 138 MMOL/L (ref 135–145)
TROPONIN T SERPL HS-MCNC: 39 NG/L
WBC # BLD AUTO: 4.6 10E3/UL (ref 4–11)

## 2024-10-28 PROCEDURE — 82550 ASSAY OF CK (CPK): CPT | Performed by: PATHOLOGY

## 2024-10-28 PROCEDURE — 80053 COMPREHEN METABOLIC PANEL: CPT | Performed by: PATHOLOGY

## 2024-10-28 PROCEDURE — 85027 COMPLETE CBC AUTOMATED: CPT | Performed by: PATHOLOGY

## 2024-10-28 PROCEDURE — G2211 COMPLEX E/M VISIT ADD ON: HCPCS | Performed by: INTERNAL MEDICINE

## 2024-10-28 PROCEDURE — 93283 PRGRMG EVAL IMPLANTABLE DFB: CPT | Performed by: INTERNAL MEDICINE

## 2024-10-28 PROCEDURE — 99213 OFFICE O/P EST LOW 20 MIN: CPT | Performed by: INTERNAL MEDICINE

## 2024-10-28 PROCEDURE — 99417 PROLNG OP E/M EACH 15 MIN: CPT | Performed by: INTERNAL MEDICINE

## 2024-10-28 PROCEDURE — 36415 COLL VENOUS BLD VENIPUNCTURE: CPT | Performed by: PATHOLOGY

## 2024-10-28 PROCEDURE — 83880 ASSAY OF NATRIURETIC PEPTIDE: CPT | Performed by: PATHOLOGY

## 2024-10-28 PROCEDURE — 84484 ASSAY OF TROPONIN QUANT: CPT | Performed by: PATHOLOGY

## 2024-10-28 PROCEDURE — 83735 ASSAY OF MAGNESIUM: CPT | Performed by: PATHOLOGY

## 2024-10-28 PROCEDURE — 99215 OFFICE O/P EST HI 40 MIN: CPT | Performed by: INTERNAL MEDICINE

## 2024-10-28 RX ORDER — FUROSEMIDE 20 MG/1
20 TABLET ORAL DAILY
Qty: 90 TABLET | Refills: 3 | Status: SHIPPED | OUTPATIENT
Start: 2024-10-28

## 2024-10-28 RX ORDER — DIGOXIN 250 MCG
250 TABLET ORAL DAILY
Qty: 90 TABLET | Refills: 3 | Status: SHIPPED | OUTPATIENT
Start: 2024-10-28

## 2024-10-28 RX ORDER — LIDOCAINE 40 MG/G
CREAM TOPICAL
Status: CANCELLED | OUTPATIENT
Start: 2024-10-28

## 2024-10-28 RX ORDER — SPIRONOLACTONE 25 MG/1
25 TABLET ORAL DAILY
Qty: 90 TABLET | Refills: 3 | Status: SHIPPED | OUTPATIENT
Start: 2024-10-28 | End: 2024-11-01

## 2024-10-28 ASSESSMENT — PAIN SCALES - GENERAL: PAINLEVEL_OUTOF10: NO PAIN (0)

## 2024-10-28 NOTE — Clinical Note
10/28/2024      RE: Mary Shaikh  [address]  [place] [address]       Dear Colleague,    Thank you for the opportunity to participate in the care of your patient, Mary Shaikh, at the [facility] HEART CLINIC [place] at [facility]. Please see a copy of my visit note below.    Neurocardiomyopathy Clinic Progress Note    Name: Mary Shaikh  : 2000  MRN: [id number]    2024    Dear Dr. Swift,    I had the pleasure of seeing Mary Shaikh, a 24 year old woman today in the [facility] Neuromuscular Cardiomyopathy Clinic for a virtual follow up evaluation of Danon disease. She is accompanied by her mother in person and her father via video.     As you know, Ms. 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 She was seen in the emergency department at [facility] and was found to have prexcitation on her ECG in , and this lead to an exercise ECG test which she achieved 10.8 METs, exercise duration of 9 min and 16 seconds, had no exercised induced arrhythmias, she did have a delta wave and repolarization abnormalities. Subsequently, she had an echocardiogram which showed an LVEF of 51% with asymetric LVH (septum 1.5cm). She has been following with Dr. Swift in the [facility] cardiology clinic and she obtained a CMR which showed and LVEF of 43%, LVEDD 4.9cm, IV septum 1.7cm,  normal RV function but RVH noted, no LOU, harman LGE with subendocardial, mid myocardial, and subendocardial enhancement not associated with a vascular territory.     I saw her in clinic  2024. She was admitted after her RHC from -. Her RHC showed an RA of 20, PA 51/23, 36, PCWP 29, Juan CO/CI 3.13/1.68, Thermo CO/CI 2.9/1.55 and a PVR of 1.9. She was vasodilated with nitroprusside and transitioned to entresto. Her weight on discharge was 157 lbs. The advanced heart failures therapies  evaluation was initiated in the hospital and she has continued it as an outpatient.  She has been seen in [facility] clinic by MsJuan Marino most recently on 9/18/2024.     Overall, she is still having dyspnea after walking about 15 min which is worse with heat or walking faster. She has not had chest pain. She is not weighing herself daily. She denies edema, orthopnea, or PND. She is adherent to her medication regimen and diet. Has lightheadedness daily especially when standing up. She has palpitations several times per week which is unchanged. She had an ICD shock on 10/3 for VT with rate of 237 bpm. She also had 105 NSVT episodes. Her carvedilol was increased to 6.25mg BID. Her appetite is ok and her energy level is reduced. She has anxiety associated with her medical condition and she is adjusting to not being able to work. She is not seeing anyone regarding her anxiety.  Her mom and dad are working with her towards [employer]. She has back pain and she is seeing PT, chiropractor, and getting accupuncture. She specifically denies tobacco, alcohol, or illicit substance use.     REVIEW OF SYSTEMS: 10 point ROS neg other than the symptoms noted above in the HPI.    PAST MEDICAL HISTORY:   1. 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 NSVT s/p ICD 6/2024  3. WPW   4. CTI ablation 6/2024  5. Biliary dyskinesia s/p cholecystectomy 7/2024    ALLERGIES:  No Known Allergies    MEDICATIONS:   Losartan 25m daily  Current Outpatient Medications   Medication Sig Dispense Refill    acetaminophen (TYLENOL) 325 MG tablet Take 650 mg by mouth      albuterol (PROAIR HFA/PROVENTIL HFA/VENTOLIN HFA) 108 (90 Base) MCG/ACT inhaler Inhale 1-2 puffs into the lungs      bisoprolol (ZEBETA) 5 MG tablet Take 5 mg by mouth at bedtime      losartan (COZAAR) 50 MG tablet Take 50 mg by mouth at bedtime      melatonin 3 MG tablet Take 3 mg by mouth nightly as needed      metoprolol succinate ER (TOPROL XL) 25 MG 24 hr tablet Take 37.5 mg by mouth daily       norgestimate-ethinyl estradiol (ORTHO-CYCLEN) 0.25-35 MG-MCG tablet Take 1 tablet by mouth daily      ondansetron (ZOFRAN ODT) 4 MG ODT tab Place 4 mg under the tongue as needed      oxyCODONE (ROXICODONE) 5 MG tablet Take 5 mg by mouth as needed      triamcinolone (KENALOG) 0.1 % paste APPLY TO THE AFFECTED AREA DAILY FOR 10 DAYS (Patient not taking: Reported on 7/22/2024)       No current facility-administered medications for this visit.     SOCIAL HISTORY: She lives in [place], with her family   Tobacco: Never smoker  Alcohol: rare  Illicit: prior marijuana use     FAMILY HISTORY: . Her maternal grandfather had a stroke in 2017, but otherwise she has no significant family history of past cardiovascular history. There is no past family history of sudden cardiac death.    PHYSICAL EXAM:   BP (!) 86/61 (BP Location: Right arm, Patient Position: Chair, Cuff Size: Adult Regular)   Pulse 62   Wt 73.1 kg (161 lb 3.2 oz)   SpO2 100%   BMI 27.67 kg/m    General: comfortable, conversant, NAD  HEENT: normocephalic, atraumatic, anicteric  Neck: Estimate JVP <8  CV: RRR, nl s1 and s2, no murmurs, gallops, or rubs   Lungs: CTAB, no crackles or wheezes, normal work of breathing  Abdomen: BS+, soft, non tender, non distended  Extremities: warm and well perfused, no edema    DIAGNOSTIC TESTING: personally reviewed     Latest Reference Range & Units 10/28/24 12:33   Sodium 135 - 145 mmol/L 138   Potassium 3.4 - 5.3 mmol/L 4.2   Chloride 98 - 107 mmol/L 101   Carbon Dioxide (CO2) 22 - 29 mmol/L 27   Urea Nitrogen 6.0 - 20.0 mg/dL 9.2   Creatinine 0.51 - 0.95 mg/dL 0.71   GFR Estimate >60 mL/min/1.73m2 >90   Calcium 8.8 - 10.4 mg/dL 9.9   Anion Gap 7 - 15 mmol/L 10   Magnesium 1.7 - 2.3 mg/dL 2.3   Albumin 3.5 - 5.2 g/dL 4.6   Protein Total 6.4 - 8.3 g/dL 7.9   Alkaline Phosphatase 40 - 150 U/L 87   ALT 0 - 50 U/L 20   AST 0 - 45 U/L 48 (H)   Bilirubin Total <=1.2 mg/dL 0.7   CK Total 26 - 192 U/L 83   Glucose 70 - 99  mg/dL 63 (L)   N-Terminal Pro Bnp 0 - 450 pg/mL 693 (H)   Troponin T, High Sensitivity <=14 ng/L 39 (H)   WBC 4.0 - 11.0 10e3/uL 4.6   Hemoglobin 11.7 - 15.7 g/dL 14.5   Hematocrit 35.0 - 47.0 % 47.1 (H)   Platelet Count 150 - 450 10e3/uL 279   RBC Count 3.80 - 5.20 10e6/uL 5.67 (H)   MCV 78 - 100 fL 83   MCH 26.5 - 33.0 pg 25.6 (L)   MCHC 31.5 - 36.5 g/dL 30.8 (L)   RDW 10.0 - 15.0 % 15.8 (H)   (H): Data is abnormally high  (L): Data is abnormally low      ICD check: Device: [employer] D533 RESONATE HF ICD  Normal device function  Mode: DDI 40 bpm  AP: <1%  : <1%  Intrinsic rhythm: NSR 62 bpm  Lead Trends Appear Stable: Yes  Estimated battery longevity to RRT = 13 years.   Atrial Arrhythmia: 0  Ventricular Arrhythmia: 2 episodes recorded as NSVT - 212-218 bpm, 15-30 sec. Stored EGMs reveal what appears to be atrial in origination and not true ventricular arrhythmias.   Setting Changes: None  Patient has an appointment to see Dr. Fuentes today.   Plan: Device follow-up every 3 months.      [facility] 7/2024:   BSA 1.87 m2  /74/89 mmHg  RA 21/25/20 mmHg  RV 51/20mmHg  PA 51/29/36 mmHg  PCWP 30/40/29 mmHg  TD CO/CI 2.9/1.55   Juan CO/CI 3.13/1.68   PVR 1.9   SVR 1762  PA sat 43.5%     Cardiopulmonary exercise stress test 7/24/23  Peak VO2 (ml/kg/min) VE/VCO2  [place] RER   18.70       32.13       1.09           Predicted VO2 (ml/kg/min) Predicted VO2 %   35.50       53           Resting Supine BP (mmHg) Resting Standing BP (mmHg) Final Stress BP (mmHg)   109/85       114/83       166/62         Resting Supine HR (bpm) Resting Standing HR (bpm)    59       59            Max HR (bpm) Max Predicted HR (bpm) Max Perdicted HR %   136       198       69           Exercise time (min) Exercise time (sec) Estimated workload (METS)   8       17       5.3             Echo [facility] 11/2022: Decreased left ventricular systolic performance-mild.   The estimated left ventricular ejection fraction is 40-45%.   Left ventricular  diastolic pattern suggest elevated filling pressure.   Normal right ventricular size and function.   Right ventricular hypertrophy, probable.   TR jet is not adequate to assess PA pressure.   Based on IVC geometry, the RA pressure is probably normal.       I have personally reviewed all of the cardiovascular testing reports performed at [facility]    exercise ECG test 10/2021:  10.8 METs, exercise duration of 9 min and 16 seconds, had no exercised induced arrhythmias, she did have a delta wave and repolarization abnormalities.    Ziopatch monitor 10/2021: 11 VT episodes 68 SVT episodes    Echocardiogram 11/2021:  LVEF of 51% with asymettric LVH (septum 1.5cm).     CMR 11/2021:  LVEF of 43%, LVEDD 4.9cm, IV septum 1.7cm,  normal RV function but RVH noted, no LOU, harman LGE with subendocardial, mid myocardial, and subendocardial enhancement not associated with a vascular territory.     CMR 2024: LVEF 26%, asymmetrical septal hypertrophy, multiple regional WMA, LGE in the mid-apical anterior wall, mid-apical inferior an near transmural involvement in the apical anterior and apex.     ASSESSMENT AND PLAN: Ms. 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     She has had a signficant reduction in her LVEF to 26% on CMR with extensive LGE that is consistent with Dannon disease. 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 She has a normal CK but has not yet seen neuromuscular neurology regarding the skeletal myopathy. She had an ICD as  secondary SCD prevention after syncope and SVT. She is on losartan and bisoprolol and today I will add spironolactone. She has no evidence of obstruction and an SGLT2i should be considered. She would benefit from close follow up in C[facility] for titration of her heart failure medications.     Her exercise tolerance has decreased. Her CPEX demonstrates an exercise duration of 8 min and 17 seconds. She achieved an RER of 1.09 and had a peak V02 of 18.7 which is a reduction from past CPEX, and a VE/VC02 slope of 32. Her HR and BP responses were normal. Her peak V02 is 53% of predicted and consistent with severely impaired exercise tolerance. She is on the borderline of the 50% peak V02 that can be used in younger patients. I would like to get a RHC to assess invasive hemodynamics. We discussed that if her hemodynamics reflect elevated filling pressures or a reduce cardiac output that she may be admitted for an advanced therapies evaluation, which I have discussed with her what that entails. She has a caregiver and no substance use.     Her exercise tolerance has not changed overall. Her CPEX shows that she exercised for 6 min and 54 seconds and stopped due to dysnea. She did not achieve RER. Her METs were 6.1 and on prior CPEX where she did achieve RER she had 6.3 METs. Her peak V02 while not diagnostic remains unchanged and shehad a normal blood pressure and heart rate response.     She has had NSVT and wearing a Lifevest. She is feeling more comfortable about an ICD implantation but would like to meet with Dr. Swift to get additional information.     I encouraged her to get her labs at [facility] or the [place], as previously she had an elevated AST, NT pro BNP, and troponin. She will also check her blood pressure when she goes out.     PLAN:   -referral to neuromuscular neurology   -neuromuscular PFTs  -RHC next 1-2 weeks  -CORE visit in January   -MICHAEL Boone visit   -3-4 months with me       57 minutes on DOS for chart  review, patient history and exam, counseling, review of diagnostic testing, coordination of care and documentation.     Thank you for allowing me to participate in the care of your patient. Please do not hesitate to contact me if you have any questions.     Sincerely,   Hipolito Fuentes MD, PhD, [facility]  Advanced Heart Failure/Transplantation/MCS  [facility]      CC: Ameena Swift MD [facility] Cardiology      Please do not hesitate to contact me if you have any questions/concerns.     Sincerely,     Hipolito Fuentes MD

## 2024-10-28 NOTE — PROGRESS NOTES
Neurocardiomyopathy Clinic Progress Note    Name: Mary Shaikh  : 2000  MRN: [id number]    2024    Dear Dr. Swift,    I had the pleasure of seeing Mary Shaikh, a 24 year old woman today in the [facility] Neuromuscular Cardiomyopathy Clinic for a virtual follow up evaluation of Danon disease. She is accompanied by her mother in person and her father via video.     As you know, Ms. 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 She was seen in the emergency department at [facility] and was found to have prexcitation on her ECG in , and this lead to an exercise ECG test which she achieved 10.8 METs, exercise duration of 9 min and 16 seconds, had no exercised induced arrhythmias, she did have a delta wave and repolarization abnormalities. Subsequently, she had an echocardiogram which showed an LVEF of 51% with asymetric LVH (septum 1.5cm). She has been following with Dr. Swift in the [facility] cardiology clinic and she obtained a CMR which showed and LVEF of 43%, LVEDD 4.9cm, IV septum 1.7cm,  normal RV function but RVH noted, no LOU, harman LGE with subendocardial, mid myocardial, and subendocardial enhancement not associated with a vascular territory.     I saw her in clinic  2024. She was admitted after her RHC from -. Her RHC showed an RA of 20, PA 51/23, 36, PCWP 29, Juan CO/CI 3.13/1.68, Thermo CO/CI 2.9/1.55 and a PVR of 1.9. She was vasodilated with nitroprusside and transitioned to entresto. Her weight on discharge was 157 lbs. The advanced heart failures therapies evaluation was initiated in the hospital and she has continued it as an outpatient.  She has been seen in [facility] clinic by Ms. Pichardo most recently on 2024.     Overall, she is still having dyspnea after walking about 15 min which is worse with heat or walking faster. She has not had chest pain. She is not weighing herself daily. She denies edema,  orthopnea, or PND. She is adherent to her medication regimen and diet. Has lightheadedness daily especially when standing up. She has palpitations several times per week which is unchanged. She had an ICD shock on 10/3 for VT with rate of 237 bpm. She also had 105 NSVT episodes. Her carvedilol was increased to 6.25mg BID. Her appetite is ok and her energy level is reduced. She has anxiety associated with her medical condition and she is adjusting to not being able to work. She is not seeing anyone regarding her anxiety.  Her mom and dad are working with her towards [employer]. She has back pain and she is seeing PT, chiropractor, and getting accupuncture. She specifically denies tobacco, alcohol, or illicit substance use.     REVIEW OF SYSTEMS: 10 point ROS neg other than the symptoms noted above in the HPI.    PAST MEDICAL HISTORY:   1. 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 NSVT s/p ICD 6/2024  3. WPW   4. CTI ablation 6/2024  5. Biliary dyskinesia s/p cholecystectomy 7/2024    ALLERGIES:  No Known Allergies    MEDICATIONS:   Losartan 25m daily  Current Outpatient Medications   Medication Sig Dispense Refill    acetaminophen (TYLENOL) 325 MG tablet Take 650 mg by mouth      albuterol (PROAIR HFA/PROVENTIL HFA/VENTOLIN HFA) 108 (90 Base) MCG/ACT inhaler Inhale 1-2 puffs into the lungs      bisoprolol (ZEBETA) 5 MG tablet Take 5 mg by mouth at bedtime      losartan (COZAAR) 50 MG tablet Take 50 mg by mouth at bedtime      melatonin 3 MG tablet Take 3 mg by mouth nightly as needed      metoprolol succinate ER (TOPROL XL) 25 MG 24 hr tablet Take 37.5 mg by mouth daily      norgestimate-ethinyl estradiol (ORTHO-CYCLEN) 0.25-35 MG-MCG tablet Take 1 tablet by mouth daily      ondansetron (ZOFRAN ODT) 4 MG ODT tab Place 4 mg under the tongue as needed      oxyCODONE (ROXICODONE) 5 MG tablet Take 5 mg by mouth as needed      triamcinolone (KENALOG) 0.1 % paste APPLY TO THE AFFECTED AREA DAILY FOR 10 DAYS (Patient not taking:  Reported on 7/22/2024)       No current facility-administered medications for this visit.     SOCIAL HISTORY: She lives in [place], with her family   Tobacco: Never smoker  Alcohol: rare  Illicit: prior marijuana use     FAMILY HISTORY: . Her maternal grandfather had a stroke in 2017, but otherwise she has no significant family history of past cardiovascular history. There is no past family history of sudden cardiac death.    PHYSICAL EXAM:   BP (!) 86/61 (BP Location: Right arm, Patient Position: Chair, Cuff Size: Adult Regular)   Pulse 62   Wt 73.1 kg (161 lb 3.2 oz)   SpO2 100%   BMI 27.67 kg/m    General: comfortable, conversant, NAD  HEENT: normocephalic, atraumatic, anicteric  Neck: Estimate JVP <8  CV: RRR, nl s1 and s2, no murmurs, gallops, or rubs   Lungs: CTAB, no crackles or wheezes, normal work of breathing  Abdomen: BS+, soft, non tender, non distended  Extremities: warm and well perfused, no edema    DIAGNOSTIC TESTING: personally reviewed     Latest Reference Range & Units 10/28/24 12:33   Sodium 135 - 145 mmol/L 138   Potassium 3.4 - 5.3 mmol/L 4.2   Chloride 98 - 107 mmol/L 101   Carbon Dioxide (CO2) 22 - 29 mmol/L 27   Urea Nitrogen 6.0 - 20.0 mg/dL 9.2   Creatinine 0.51 - 0.95 mg/dL 0.71   GFR Estimate >60 mL/min/1.73m2 >90   Calcium 8.8 - 10.4 mg/dL 9.9   Anion Gap 7 - 15 mmol/L 10   Magnesium 1.7 - 2.3 mg/dL 2.3   Albumin 3.5 - 5.2 g/dL 4.6   Protein Total 6.4 - 8.3 g/dL 7.9   Alkaline Phosphatase 40 - 150 U/L 87   ALT 0 - 50 U/L 20   AST 0 - 45 U/L 48 (H)   Bilirubin Total <=1.2 mg/dL 0.7   CK Total 26 - 192 U/L 83   Glucose 70 - 99 mg/dL 63 (L)   N-Terminal Pro Bnp 0 - 450 pg/mL 693 (H)   Troponin T, High Sensitivity <=14 ng/L 39 (H)   WBC 4.0 - 11.0 10e3/uL 4.6   Hemoglobin 11.7 - 15.7 g/dL 14.5   Hematocrit 35.0 - 47.0 % 47.1 (H)   Platelet Count 150 - 450 10e3/uL 279   RBC Count 3.80 - 5.20 10e6/uL 5.67 (H)   MCV 78 - 100 fL 83   MCH 26.5 - 33.0 pg 25.6 (L)   MCHC 31.5 - 36.5 g/dL  30.8 (L)   RDW 10.0 - 15.0 % 15.8 (H)   (H): Data is abnormally high  (L): Data is abnormally low      ICD check: Device: [employer] D533 RESONATE HF ICD  Normal device function  Mode: DDI 40 bpm  AP: <1%  : <1%  Intrinsic rhythm: NSR 62 bpm  Lead Trends Appear Stable: Yes  Estimated battery longevity to RRT = 13 years.   Atrial Arrhythmia: 0  Ventricular Arrhythmia: 2 episodes recorded as NSVT - 212-218 bpm, 15-30 sec. Stored EGMs reveal what appears to be atrial in origination and not true ventricular arrhythmias.   Setting Changes: None  Patient has an appointment to see Dr. Fuentes today.   Plan: Device follow-up every 3 months.      [facility] 7/2024:   BSA 1.87 m2  /74/89 mmHg  RA 21/25/20 mmHg  RV 51/20mmHg  PA 51/29/36 mmHg  PCWP 30/40/29 mmHg  TD CO/CI 2.9/1.55   Juan CO/CI 3.13/1.68   PVR 1.9   SVR 1762  PA sat 43.5%     Cardiopulmonary exercise stress test 7/24/23  Peak VO2 (ml/kg/min) VE/VCO2  [place] RER   18.70       32.13       1.09           Predicted VO2 (ml/kg/min) Predicted VO2 %   35.50       53           Resting Supine BP (mmHg) Resting Standing BP (mmHg) Final Stress BP (mmHg)   109/85       114/83       166/62         Resting Supine HR (bpm) Resting Standing HR (bpm)    59       59            Max HR (bpm) Max Predicted HR (bpm) Max Perdicted HR %   136       198       69           Exercise time (min) Exercise time (sec) Estimated workload (METS)   8       17       5.3             Echo [facility] 11/2022: Decreased left ventricular systolic performance-mild.   The estimated left ventricular ejection fraction is 40-45%.   Left ventricular diastolic pattern suggest elevated filling pressure.   Normal right ventricular size and function.   Right ventricular hypertrophy, probable.   TR jet is not adequate to assess PA pressure.   Based on IVC geometry, the RA pressure is probably normal.       I have personally reviewed all of the cardiovascular testing reports performed at [facility]    exercise ECG test  10/2021:  10.8 METs, exercise duration of 9 min and 16 seconds, had no exercised induced arrhythmias, she did have a delta wave and repolarization abnormalities.    Ziopatch monitor 10/2021: 11 VT episodes 68 SVT episodes    Echocardiogram 11/2021:  LVEF of 51% with asymettric LVH (septum 1.5cm).     CMR 11/2021:  LVEF of 43%, LVEDD 4.9cm, IV septum 1.7cm,  normal RV function but RVH noted, no LOU, harman LGE with subendocardial, mid myocardial, and subendocardial enhancement not associated with a vascular territory.     CMR 2024: LVEF 26%, asymmetrical septal hypertrophy, multiple regional WMA, LGE in the mid-apical anterior wall, mid-apical inferior an near transmural involvement in the apical anterior and apex.     ASSESSMENT AND PLAN: Ms. 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     She has had a signficant reduction in her LVEF to 26% on CMR with extensive LGE that is consistent with Dannon disease. 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 She has a normal CK but has not yet seen neuromuscular neurology regarding the skeletal myopathy. She had an ICD as secondary SCD prevention after syncope and SVT.     She was admitted after her right heart catheterization showed high biventricular filling pressures and ambulatory cardiogenic shock. She is tolerating carvedilol, sacubitril-valsartan, SGLT2i, and spironolactone. While her blood pressure is lower, she has normal renal function and her AST has improved. She is  euvolemic. She continues to be symptomatic and I would like to repeat her RHC to reassess her hemodynamics. We discussed optimizating her nutrition. She has a caregiver and no substance use. She should see neuromuscular neurology and have neuromuscular PFTs as part of her candidacy evaluation.     She had had a shock for VT. Her carvediolol was increased and she had 2 NSVTs on her ICD interrogation. She has a visit scheduled with Dr. Levi.     She also is adjusting to her functional limitations and diagnosis and has anxiety. She is open to seeing health psychology.     PLAN:   -referral to neuromuscular neurology   -neuromuscular PFTs  -health psychology  -RHC next 1-2 weeks  -CORE visit in January   -EP Paolo visit   -3-4 months with me       57 minutes on DOS for chart review, patient history and exam, counseling, review of diagnostic testing, coordination of care and documentation.     The longitudinal plan of care for the diagnosis(es)/condition(s) as documented were addressed during this visit. Due to the added complexity in care, I will continue to support Mary in the subsequent management and with ongoing continuity of care.    Thank you for allowing me to participate in the care of your patient. Please do not hesitate to contact me if you have any questions.     Sincerely,   Forum     Forum MD Gina, PhD, FACC  Advanced Heart Failure/Transplantation/MCS  [facility]      CC: Ameena Swift MD [facility] Cardiology

## 2024-10-28 NOTE — NURSING NOTE
Chief Complaint   Patient presents with    Follow Up     Return Muscular Dystrophy-  Return muscular dystrophy, 24 yo female with Danon disease presents for follow up with labs and device check prior.     Vitals were taken and medications reconciled.    Devante Crow, RYNE  1:35 PM

## 2024-10-28 NOTE — PATIENT INSTRUCTIONS
"You were seen today in the Cardiovascular Clinic at the [facility].      Cardiology Providers you saw during your visit:  Dr. Hipolito Fuentes     Recommendations:   Referral to neuromuscular neurology - they will call you to schedule.  Neuromuscular PFTs - schedule today.  RHC in the next 1-2 weeks - schedule today.  Health psychology referral - they will call you to schedule.  CORE with Jocelyn Pichardo NP on 1/6 with labs prior.  Follow up with Dr. Fuentes in 3-4 months with labs prior - schedule today.       Thank you for your visit today!   Please MyChart message or call if you have any questions or concerns.      During Business Hours:  [phone], option # 1 \"To leave a message for your care team\"     After hours, weekends or holidays:   [phone], Option #4  Ask to speak to the On-Call Cardiologist. Inform them you are a heart failure patient at the [place].      Martha Lawton RN BSN   Cardiology Nurse Coordinator - Heart Failure/C.O.R.E. [facility]  [phone] option 1 to schedule an appointment or leave a message for your care team      Pre-procedure instructions - Right Heart Cath and/or Biopsy and/or Pulmonary Angiogram  Patient Education    Your arrival time is _________.  Location is [address] Waiting Room  Please plan on being at the hospital all day.  At any time, emergencies and/or urgent cases may come up which could delay the start of your procedure.    Pre-procedure instructions - Right heart catheterization  No solid food for 8 hours prior  Nothing to drink 2 hours prior to arrival time  You can take your morning medications (except diabetic and blood thinners) with sips of water  We recommend you arrange for a ride to drop you off and pick you up, in the instance, you are unable to drive home, however you should be able to function as you normally would after " the procedure     Diabetic Medication Instructions  Hold oral diabetic medication in morning of your procedure and for 48 hours after IV contrast is given  Typical instructions for insulin diabetic medication holding are below. However, please reach out to your Primary Care Provider or Endocrinologist for specific instructions  DO NOT take any oral diabetic medication, short-acting diabetes medications/insulin, humalog or regular insulin the morning of your test  Take   dose of long-acting insulin (Lantus, Levemir) the day of your test  Remember to bring your glucometer and insulin with you to take after your test if needed  GLP-1 Agonists Instructions  DO NOT take injectable GLP-1 agonists semaglutide (Ozempic, Wegovy), dulaglutide (Trulicity), exenatide ER (Bydureon), tirzepatide (Mounjaro), or oral semaglutide (Rybelsus) for 7 days prior your procedure  Hold once daily injectable GLP-1 agonists exenatide (Byetta), liraglutide (Saxenda, Victoza), lixisenatide (Soligua) the day before and day of your procedure              Anticoagulation Medication Instructions   NA  Nurse to write N/A if not currently taking    You will need to follow up with one of our cardiology APPs 1-2 weeks after your procedure. If you need help scheduling or rescheduling your appointment, please call [phone]

## 2024-10-31 ENCOUNTER — TELEPHONE (OUTPATIENT)
Dept: CARDIOLOGY | Facility: CLINIC | Age: 24
End: 2024-10-31
Payer: COMMERCIAL

## 2024-10-31 NOTE — TELEPHONE ENCOUNTER
Cath Lab Case Request/Order    Location: [address] [address] [facility] Waiting Room    Procedure: Right Heart Cath (RHC)    Procedure Date: 11/15    Patient Arrival Time: 11:30 AM    Procedure Time: 5th case to follow    Ordering Provider: Dr. Hipolito Fuentes    Performing Cardiologist: Dr. Tanmay Brice    Inpatient Bed Needed: No    Post-  Procedure ELISABETH appointment scheduled (1 - 2 weeks): N/A, RHC      Communicated Patient Instructions:  NURSE TO COMMUNICATE    Appointment was scheduled: Over the phone    Patient expressed understanding of above instructions and denied further questions at this time.    karthik Roy

## 2024-11-01 ENCOUNTER — MYC MEDICAL ADVICE (OUTPATIENT)
Dept: CARDIOLOGY | Facility: CLINIC | Age: 24
End: 2024-11-01
Payer: COMMERCIAL

## 2024-11-01 ENCOUNTER — HOSPITAL ENCOUNTER (EMERGENCY)
Facility: CLINIC | Age: 24
Discharge: HOME | End: 2024-11-02
Attending: EMERGENCY MEDICINE | Admitting: EMERGENCY MEDICINE
Payer: COMMERCIAL

## 2024-11-01 ENCOUNTER — TELEPHONE (OUTPATIENT)
Dept: CARDIOLOGY | Facility: CLINIC | Age: 24
End: 2024-11-01
Payer: COMMERCIAL

## 2024-11-01 ENCOUNTER — ANCILLARY PROCEDURE (OUTPATIENT)
Dept: CARDIOLOGY | Facility: CLINIC | Age: 24
End: 2024-11-01
Attending: INTERNAL MEDICINE
Payer: COMMERCIAL

## 2024-11-01 DIAGNOSIS — R00.2 PALPITATIONS: ICD-10-CM

## 2024-11-01 DIAGNOSIS — Z45.02 FITTING AND ADJUSTMENT OF AUTOMATIC IMPLANTABLE CARDIOVERTER-DEFIBRILLATOR: ICD-10-CM

## 2024-11-01 LAB
ALBUMIN SERPL BCG-MCNC: 4.6 G/DL (ref 3.5–5.2)
ALP SERPL-CCNC: 79 U/L (ref 40–150)
ALT SERPL W P-5'-P-CCNC: 19 U/L (ref 0–50)
ANION GAP SERPL CALCULATED.3IONS-SCNC: 15 MMOL/L (ref 7–15)
AST SERPL W P-5'-P-CCNC: ABNORMAL U/L
BASOPHILS # BLD AUTO: 0 10E3/UL (ref 0–0.2)
BASOPHILS NFR BLD AUTO: 1 %
BILIRUB SERPL-MCNC: 0.4 MG/DL
BUN SERPL-MCNC: 6.9 MG/DL (ref 6–20)
CALCIUM SERPL-MCNC: 9.9 MG/DL (ref 8.8–10.4)
CHLORIDE SERPL-SCNC: 104 MMOL/L (ref 98–107)
CREAT SERPL-MCNC: 0.64 MG/DL (ref 0.51–0.95)
EGFRCR SERPLBLD CKD-EPI 2021: >90 ML/MIN/1.73M2
EOSINOPHIL # BLD AUTO: 0 10E3/UL (ref 0–0.7)
EOSINOPHIL NFR BLD AUTO: 0 %
ERYTHROCYTE [DISTWIDTH] IN BLOOD BY AUTOMATED COUNT: 16.1 % (ref 10–15)
GLUCOSE SERPL-MCNC: 103 MG/DL (ref 70–99)
HCO3 SERPL-SCNC: 21 MMOL/L (ref 22–29)
HCT VFR BLD AUTO: 47.9 % (ref 35–47)
HGB BLD-MCNC: 14.8 G/DL (ref 11.7–15.7)
HOLD SPECIMEN: NORMAL
HOLD SPECIMEN: NORMAL
IMM GRANULOCYTES # BLD: 0 10E3/UL
IMM GRANULOCYTES NFR BLD: 0 %
LYMPHOCYTES # BLD AUTO: 2.7 10E3/UL (ref 0.8–5.3)
LYMPHOCYTES NFR BLD AUTO: 48 %
MCH RBC QN AUTO: 25.9 PG (ref 26.5–33)
MCHC RBC AUTO-ENTMCNC: 30.9 G/DL (ref 31.5–36.5)
MCV RBC AUTO: 84 FL (ref 78–100)
MONOCYTES # BLD AUTO: 0.3 10E3/UL (ref 0–1.3)
MONOCYTES NFR BLD AUTO: 6 %
NEUTROPHILS # BLD AUTO: 2.5 10E3/UL (ref 1.6–8.3)
NEUTROPHILS NFR BLD AUTO: 45 %
NRBC # BLD AUTO: 0 10E3/UL
NRBC BLD AUTO-RTO: 0 /100
NT-PROBNP SERPL-MCNC: 1532 PG/ML (ref 0–450)
PLATELET # BLD AUTO: 287 10E3/UL (ref 150–450)
POTASSIUM SERPL-SCNC: 4.4 MMOL/L (ref 3.4–5.3)
PROT SERPL-MCNC: 8.1 G/DL (ref 6.4–8.3)
RBC # BLD AUTO: 5.71 10E6/UL (ref 3.8–5.2)
SODIUM SERPL-SCNC: 140 MMOL/L (ref 135–145)
TROPONIN T SERPL HS-MCNC: NORMAL NG/L
TSH SERPL DL<=0.005 MIU/L-ACNC: 5.16 UIU/ML (ref 0.3–4.2)
WBC # BLD AUTO: 5.6 10E3/UL (ref 4–11)

## 2024-11-01 PROCEDURE — 250N000011 HC RX IP 250 OP 636: Performed by: EMERGENCY MEDICINE

## 2024-11-01 PROCEDURE — 85004 AUTOMATED DIFF WBC COUNT: CPT | Performed by: EMERGENCY MEDICINE

## 2024-11-01 PROCEDURE — 84439 ASSAY OF FREE THYROXINE: CPT | Performed by: EMERGENCY MEDICINE

## 2024-11-01 PROCEDURE — 84155 ASSAY OF PROTEIN SERUM: CPT | Performed by: EMERGENCY MEDICINE

## 2024-11-01 PROCEDURE — 84484 ASSAY OF TROPONIN QUANT: CPT | Performed by: EMERGENCY MEDICINE

## 2024-11-01 PROCEDURE — 36415 COLL VENOUS BLD VENIPUNCTURE: CPT | Performed by: EMERGENCY MEDICINE

## 2024-11-01 PROCEDURE — 99284 EMERGENCY DEPT VISIT MOD MDM: CPT | Performed by: EMERGENCY MEDICINE

## 2024-11-01 PROCEDURE — 93010 ELECTROCARDIOGRAM REPORT: CPT | Performed by: EMERGENCY MEDICINE

## 2024-11-01 PROCEDURE — 84443 ASSAY THYROID STIM HORMONE: CPT | Performed by: EMERGENCY MEDICINE

## 2024-11-01 PROCEDURE — 99207 CARDIAC DEVICE CHECK - REMOTE: CPT | Performed by: INTERNAL MEDICINE

## 2024-11-01 PROCEDURE — 83880 ASSAY OF NATRIURETIC PEPTIDE: CPT | Performed by: EMERGENCY MEDICINE

## 2024-11-01 PROCEDURE — 96374 THER/PROPH/DIAG INJ IV PUSH: CPT | Performed by: EMERGENCY MEDICINE

## 2024-11-01 PROCEDURE — 93005 ELECTROCARDIOGRAM TRACING: CPT | Performed by: EMERGENCY MEDICINE

## 2024-11-01 PROCEDURE — 82947 ASSAY GLUCOSE BLOOD QUANT: CPT | Performed by: EMERGENCY MEDICINE

## 2024-11-01 PROCEDURE — 99284 EMERGENCY DEPT VISIT MOD MDM: CPT | Mod: 25 | Performed by: EMERGENCY MEDICINE

## 2024-11-01 RX ORDER — ONDANSETRON 2 MG/ML
4 INJECTION INTRAMUSCULAR; INTRAVENOUS EVERY 30 MIN PRN
Status: DISCONTINUED | OUTPATIENT
Start: 2024-11-01 | End: 2024-11-02 | Stop reason: HOSPADM

## 2024-11-01 RX ORDER — SPIRONOLACTONE 25 MG/1
12.5 TABLET ORAL DAILY
Qty: 90 TABLET | Refills: 3 | Status: SHIPPED | OUTPATIENT
Start: 2024-11-01

## 2024-11-01 RX ADMIN — ONDANSETRON 4 MG: 2 INJECTION INTRAMUSCULAR; INTRAVENOUS at 22:56

## 2024-11-01 ASSESSMENT — COLUMBIA-SUICIDE SEVERITY RATING SCALE - C-SSRS
5. HAVE YOU STARTED TO WORK OUT OR WORKED OUT THE DETAILS OF HOW TO KILL YOURSELF? DO YOU INTEND TO CARRY OUT THIS PLAN?: NO
2. HAVE YOU ACTUALLY HAD ANY THOUGHTS OF KILLING YOURSELF IN THE PAST MONTH?: YES
6. HAVE YOU EVER DONE ANYTHING, STARTED TO DO ANYTHING, OR PREPARED TO DO ANYTHING TO END YOUR LIFE?: YES
BASED ON RESPONSES TO C-SSRS QS 1-6, WHAT IS THE PATIENT'S OVERALL RISK RATING FOR SUICIDE: HIGH RISK
1. IN THE PAST MONTH, HAVE YOU WISHED YOU WERE DEAD OR WISHED YOU COULD GO TO SLEEP AND NOT WAKE UP?: YES
4. HAVE YOU HAD THESE THOUGHTS AND HAD SOME INTENTION OF ACTING ON THEM?: YES
3. HAVE YOU BEEN THINKING ABOUT HOW YOU MIGHT KILL YOURSELF?: YES

## 2024-11-01 ASSESSMENT — ACTIVITIES OF DAILY LIVING (ADL): ADLS_ACUITY_SCORE: 0

## 2024-11-01 NOTE — TELEPHONE ENCOUNTER
[facility] Call Center    Phone Message    May a detailed message be left on voicemail: yes     Reason for Call: Form or Letter   Type or form/letter needing completion: Disability Forms and Financial Assistance Forms  Provider: Dr. Fuentes  Date form needed: as soon as possible as it is impeding pt's finances.  The forms were left w/Dr. Fuentes at a recent appt.  Once completed: Fax form to: the number on each form.      NOTE:  please call pt to advise of the progress of each form.  Action Taken: Message routed to:  [facility] cardio    Travel Screening: Not Applicable    Thank you!  [facility]       Date of Service:

## 2024-11-02 VITALS
WEIGHT: 161 LBS | HEART RATE: 69 BPM | SYSTOLIC BLOOD PRESSURE: 101 MMHG | BODY MASS INDEX: 27.49 KG/M2 | TEMPERATURE: 99.2 F | RESPIRATION RATE: 16 BRPM | HEIGHT: 64 IN | OXYGEN SATURATION: 100 % | DIASTOLIC BLOOD PRESSURE: 66 MMHG

## 2024-11-02 PROBLEM — F43.23 ADJUSTMENT DISORDER WITH MIXED ANXIETY AND DEPRESSED MOOD: Status: ACTIVE | Noted: 2024-11-02

## 2024-11-02 LAB
ATRIAL RATE - MUSE: 76 BPM
DIASTOLIC BLOOD PRESSURE - MUSE: NORMAL MMHG
INTERPRETATION ECG - MUSE: NORMAL
P AXIS - MUSE: 51 DEGREES
PR INTERVAL - MUSE: 144 MS
QRS DURATION - MUSE: 126 MS
QT - MUSE: 406 MS
QTC - MUSE: 456 MS
R AXIS - MUSE: 54 DEGREES
SYSTOLIC BLOOD PRESSURE - MUSE: NORMAL MMHG
T AXIS - MUSE: 263 DEGREES
T4 FREE SERPL-MCNC: 1.34 NG/DL (ref 0.9–1.7)
TROPONIN T SERPL HS-MCNC: 37 NG/L
TROPONIN T SERPL HS-MCNC: 39 NG/L
VENTRICULAR RATE- MUSE: 76 BPM

## 2024-11-02 PROCEDURE — 36415 COLL VENOUS BLD VENIPUNCTURE: CPT | Performed by: EMERGENCY MEDICINE

## 2024-11-02 PROCEDURE — 84484 ASSAY OF TROPONIN QUANT: CPT | Performed by: EMERGENCY MEDICINE

## 2024-11-02 RX ORDER — ALBUTEROL SULFATE 0.83 MG/ML
SOLUTION RESPIRATORY (INHALATION)
Status: DISCONTINUED
Start: 2024-11-02 | End: 2024-11-02 | Stop reason: HOSPADM

## 2024-11-02 RX ORDER — ALBUTEROL SULFATE 0.83 MG/ML
2.5 SOLUTION RESPIRATORY (INHALATION) ONCE
Status: COMPLETED | OUTPATIENT
Start: 2024-11-02 | End: 2024-11-02

## 2024-11-02 RX ORDER — IPRATROPIUM BROMIDE AND ALBUTEROL SULFATE 2.5; .5 MG/3ML; MG/3ML
SOLUTION RESPIRATORY (INHALATION)
Status: DISCONTINUED
Start: 2024-11-02 | End: 2024-11-02 | Stop reason: HOSPADM

## 2024-11-02 ASSESSMENT — ACTIVITIES OF DAILY LIVING (ADL)
ADLS_ACUITY_SCORE: 0

## 2024-11-02 NOTE — DISCHARGE INSTRUCTIONS
Appointment    You have been referred to the [facility]. This outpatient service provides short-term psychotherapy and/or medication management until you begin scheduled services with long-term care providers. You have been scheduled with the [facility] for an intake appointment on Tuesday, November 05, 2024  1:30 PM. This appointment is Virtual. The duration of this appointment is one hour. If you are scheduled for therapy, you will receive forms to fill out ahead of your scheduled appointment via Gist if it is active, or by email. The Transition Clinic is located at [facility]. [address] [place] [address]. If you need to contact the Transition Clinic, please call [phone].

## 2024-11-02 NOTE — ED TRIAGE NOTES
Patient presents with abnormal heart rate/rhythm that started about 2100 today. She reports that she got a call from the cardiology device clinic to come to the ER. She has a defibrillator, it has no gone off today.      Triage Assessment (Adult)       Row Name 11/01/24 2200          Triage Assessment    Airway WDL WDL        Respiratory WDL    Respiratory WDL WDL        Skin Circulation/Temperature WDL    Skin Circulation/Temperature WDL WDL        Cardiac WDL    Cardiac WDL WDL        Peripheral/Neurovascular WDL    Peripheral Neurovascular WDL WDL        Cognitive/Neuro/Behavioral WDL    Cognitive/Neuro/Behavioral WDL WDL        Windy Coma Scale    Best Eye Response 4-->(E4) spontaneous     Best Motor Response 6-->(M6) obeys commands     Best Verbal Response 5-->(V5) oriented     Franklin Coma Scale Score 15

## 2024-11-02 NOTE — ED PROVIDER NOTES
Pt accepted at shift change sign out pending troponin and DEC assessment. Per , pt reports passive SI due to feeling overwhelmed about medical condition, but no plan or intent. The  is planning to set her up with a therapy appointment within the next few days.     Her initial Trope was 37, which is fairly stable compared with previous.  Delta troponin is 39.  She reports she is feeling okay at this point, is interested in discharge.  She is encouraged to follow-up, return with any worsening or concerns.  She verbalizes understanding and is agreeable to the plan.    Dictation Disclaimer: Some of this Note has been completed with voice-recognition dictation software. Although errors are generally corrected real-time, there is the potential for a rare error to be present in the completed chart.       Becca Larkin MD  11/02/24 6970

## 2024-11-02 NOTE — ED PROVIDER NOTES
ED Provider Note  [facility]      History     Chief Complaint   Patient presents with    Palpitations     HPI  Mary Shaikh is a 24 year old female with a history nonischemic cardiomyopathy, heart failure with reduced ejection fraction, hypertrophic nonobstructive cardiomyopathy, NSVT, paroxysmally SVT and type II diabetes who presents to the emergency department for palpitations.  This started this evening with no known precipitating factors.  Patient was called by device coordinator told to go to the Emergency department, there was concern for atrial fibrillation.  Symptoms have since resolved.  No recent illness or medication changes.  Patient feels improved.  Patient does note suicidal ideation.  No other symptoms noted.    Past Medical History  No past medical history on file.  Past Surgical History:   Procedure Laterality Date    CV RIGHT HEART CATH MEASUREMENTS RECORDED N/A 7/31/2024    Procedure: Heart Cath Right Heart Cath;  Surgeon: Tanmay Brice MD;  Location:  HEART CARDIAC CATH LAB     acetaminophen (TYLENOL) 325 MG tablet  albuterol (PROAIR HFA/PROVENTIL HFA/VENTOLIN HFA) 108 (90 Base) MCG/ACT inhaler  carvedilol (COREG) 6.25 MG tablet  digoxin (LANOXIN) 250 MCG tablet  empagliflozin (JARDIANCE) 10 MG TABS tablet  furosemide (LASIX) 20 MG tablet  HYDROmorphone (DILAUDID) 2 MG tablet  loperamide (IMODIUM) 2 MG capsule  melatonin 3 MG tablet  nitroGLYcerin (NITROSTAT) 0.4 MG sublingual tablet  norgestimate-ethinyl estradiol (ESTARYLLA) 0.25-35 MG-MCG tablet  omeprazole (PRILOSEC) 20 MG DR capsule  ondansetron (ZOFRAN ODT) 4 MG ODT tab  oxyCODONE (ROXICODONE) 5 MG tablet  polyethylene glycol (MIRALAX) 17 GM/Dose powder  sacubitril-valsartan (ENTRESTO)  MG per tablet  sertraline (ZOLOFT) 50 MG tablet  spironolactone (ALDACTONE) 25 MG tablet      No Known Allergies  Family History  No family history on file.  Social History   Social History     Tobacco Use    Smoking  "status: Never    Smokeless tobacco: Never   Substance Use Topics    Alcohol use: Never    Drug use: Never      Past medical history, past surgical history, medications, allergies, family history, and social history were reviewed with the patient. No additional pertinent items.   A medically appropriate review of systems was performed with pertinent positives and negatives noted in the HPI, and all other systems negative.    Physical Exam   BP: 91/58  Pulse: 67  Temp: 99.2  F (37.3  C)  Resp: 16  Height: 162.6 cm (5' 4\")  Weight: 73 kg (161 lb)  SpO2: 99 %  Physical Exam  Vitals and nursing note reviewed.   Constitutional:       General: She is not in acute distress.     Appearance: She is well-developed. She is not diaphoretic.   HENT:      Head: Normocephalic and atraumatic.      Mouth/Throat:      Pharynx: No oropharyngeal exudate.   Eyes:      General: No scleral icterus.        Right eye: No discharge.         Left eye: No discharge.      Pupils: Pupils are equal, round, and reactive to light.   Cardiovascular:      Rate and Rhythm: Normal rate and regular rhythm.      Heart sounds: Normal heart sounds. No murmur heard.     No friction rub. No gallop.   Pulmonary:      Effort: Pulmonary effort is normal. No respiratory distress.      Breath sounds: Normal breath sounds. No wheezing.   Chest:      Chest wall: No tenderness.   Abdominal:      General: Bowel sounds are normal. There is no distension.      Palpations: Abdomen is soft.      Tenderness: There is no abdominal tenderness.   Musculoskeletal:         General: No tenderness or deformity. Normal range of motion.      Cervical back: Normal range of motion and neck supple.   Skin:     General: Skin is warm and dry.      Coloration: Skin is not pale.      Findings: No erythema or rash.   Neurological:      Mental Status: She is alert and oriented to person, place, and time.      Cranial Nerves: No cranial nerve deficit.           ED Course, Procedures, & Data "      Procedures            EKG Interpretation:      Interpreted by Jose R Merino DO  Time reviewed: 2220  Symptoms at time of EKG: None   Rhythm: normal sinus   Rate: 76  Axis: Normal  Ectopy: none  Conduction: nonspecific interventricular conduction block  ST Segments/ T Waves: T wave inversion II, III, aVF, V3, V4, V5, and V6  Q Waves: none  Comparison to prior: Unchanged from 8/1/2024    Clinical Impression: no acute changes                 Results for orders placed or performed in visit on 11/01/24   Cardiac Device Check - Remote     Status: None (In process)   Result Value Ref Range    Date Time Interrogation Session [id number]     Implantable Pulse Generator  [employer]     Implantable Pulse Generator Model D533 RESONATE HF ICD     Implantable Pulse Generator Serial Number [id number]     Type Interrogation Session Remote Patient Initiated     Clinic Name [facility] Heart [facility] [place]     Implantable Pulse Generator Type Defibrillator     Implantable Pulse Generator Implant Date 20240620     Implantable Lead  [employer]     Implantable Lead Model 0672 [place]urel 4-Front S     Implantable Lead Serial Number [id number]     Implantable Lead Implant Date 20240620     Implantable Lead Polarity Type Bipolar Lead     Implantable Lead Location Detail 1 UNKNOWN     Implantable Lead Location Right Ventricle     Implantable Lead Connection Status Connected     Implantable Lead  [employer]     Implantable Lead Model 7840 Ingevity+ MRI     Implantable Lead Serial Number [id number]     Implantable Lead Implant Date 20240620     Implantable Lead Polarity Type Bipolar Lead     Implantable Lead Location Detail 1 UNKNOWN     Implantable Lead Location Right Atrium     Implantable Lead Connection Status Connected     Ralph Setting Mode (NBG Code) DDI     Ralph Setting Lower Rate Limit 40 [beats]/min    Ralph Setting AT Mode Switch Rate 140 [beats]/min    Lead Channel Setting  Sensing Polarity Bipolar     Lead Channel Setting Sensing Sensitivity 0.25 mV    Lead Channel Setting Sensing Adaptation Mode Adaptive     Lead Channel Setting Sensing Polarity Bipolar     Lead Channel Setting Sensing Sensitivity 0.6 mV    Lead Channel Setting Sensing Adaptation Mode Adaptive     Lead Channel Setting Pacing Polarity Bipolar     Lead Channel Setting Pacing Pulse Width 0.4 ms    Lead Channel Setting Pacing Amplitude 3.5 V    Lead Channel Setting Pacing Capture Mode Monitor     Lead Channel Setting Pacing Polarity Bipolar     Lead Channel Setting Pacing Pulse Width 0.4 ms    Lead Channel Setting Pacing Amplitude 3.5 V    Lead Channel Setting Pacing Capture Mode Monitor     Zone Setting Type Category VF     Zone Setting Vendor Type Category VF     Zone Setting Status Active     Zone Setting Detection Interval 240 ms    Zone Setting Type Category VT     Zone Setting Vendor Type Category VT     Zone Setting Status Active     Zone Setting Detection Interval 300 ms    Zone Setting Type Category VT     Zone Setting Vendor Type Category VT-1     Zone Setting Status Monitor     Zone Setting Detection Interval 333 ms    Lead Channel Impedance Value 775 ohm    Lead Channel Pacing Threshold Amplitude 0.6 V    Lead Channel Pacing Threshold Pulse Width 0.4 ms    Lead Channel Impedance Value 351 ohm    Lead Channel Pacing Threshold Amplitude 1.1 V    Lead Channel Pacing Threshold Pulse Width 0.4 ms    Battery Date Time of Measurements [id number]     Battery Status Beginning of Service     Battery Remaining Longevity 132 mo    Battery Remaining Percentage 100 %    Capacitor Charge Type Reformation     Capacitor Last Charge Date Time [id number]     Capacitor Charge Time 10.3 s    Capacitor Charge Type Shock     Capacitor Last Charge Date Time 99821557354045     Capacitor Charge Time 7.9 s    Capacitor Charge Energy 41 J    Ralph Statistic Date Time Start 68803327388446     Ralph Statistic Date Time End  80597182433258     Ralph Statistic RA Percent Paced 0 %    Ralph Statistic RV Percent Paced 0 %    Therapy Statistic Recent Shocks Delivered 0     Therapy Statistic Recent Shocks Aborted 0     Therapy Statistic Recent ATP Delivered 0     Therapy Statistic Recent Date Time Start 55012561619253     Therapy Statistic Recent Date Time End 58430038619839     Therapy Statistic Total Shocks Delivered 1     Therapy Statistic Total Shocks Aborted 2     Therapy Statistic Total ATP Delivered 2     Therapy Statistic Total  Date Time Start 50338128468457     Therapy Statistic Total  Date Time End 39155110074114     Episode Statistic Recent Count 0     Episode Statistic Type Category Other     Episode Statistic Recent Count 2     Episode Statistic Type Category VT     Episode Statistic Vendor Type Category NSVT     Episode Statistic Recent Count 0     Episode Statistic Type Category VT     Episode Statistic Vendor Type Category VT     Episode Statistic Recent Count 0     Episode Statistic Type Category VT     Episode Statistic Vendor Type Category VT-1     Episode Statistic Recent Date Time Start 32340021209440     Episode Statistic Recent Date Time End 23770338434368     Episode Statistic Recent Date Time Start 10056024692860     Episode Statistic Recent Date Time End 46294717829279     Episode Statistic Recent Date Time Start 33863077160179     Episode Statistic Recent Date Time End 95824606884920     Episode Statistic Recent Date Time Start 25502055435742     Episode Statistic Recent Date Time End 36843087696361     Episode Identifier [email or web address]     Episode Type Category Periodic EGM     Episode Date Time [id number]     Episode Identifier V-[id number]     Episode Type Category VT     Episode Vendor Type Category VT-1     Episode Date Time 20241101202400     Episode Duration 56 s    Episode Type Induced Flag NO     Episode Identifier V-118     Episode Type Category VT     Episode Date Time 20241101202400     Episode Duration 13 s     Episode Type Induced Flag NO     Episode Identifier V-117     Episode Type Category VT     Episode Vendor Type Category VT-1     Episode Date Time 20241101202300     Episode Duration 37 s    Episode Type Induced Flag NO     Episode Identifier V-116     Episode Type Category VT     Episode Date Time 20241101202200     Episode Duration 13 s    Episode Type Induced Flag NO     Episode Identifier [id number]     Episode Type Category Other     Episode Date Time 20241031235100     Episode Identifier [id number]     Episode Type Category Other     Episode Date Time 20241031234900     Episode Identifier APM-23     Episode Type Category Periodic EGM     Episode Date Time 20241029050000     Narrative    Remote ICD transmission received and reviewed. Device transmission sent per MD orders.     Device: [employer] D533 RESONATE HF ICD  Normal Device Function  Mode: DDI 40 bpm  AP: <0.1%  : <0.1%  Presenting EGM: Afib w/ -220 bpm  Lead Trends Appear Stable.  Estimated battery longevity to RRT = 11 years.   Atrial Arrhythmia: Current Afib w/ RVR, episode started around 8:20 PM per device clock.  Anticoagulant: None  Ventricular Arrhythmia: 4 Monitored VT episodes, stored EGM's show AF with rapid V rates.   Pt Notified of Transmission Results: Patients Mom called tonight to report that Laverne was just getting ready to take shower and heart rate spiked up, she reports not feeling well and she is very scared that her ICD is going to go off.  Patients parents are going to bring her to the ED, they declined an ambulance, and report was called to the ED charge nurse.     Plan: Device follow-up every 3 months.  Nubia Lee RN    Remote ICD Transmission    I have reviewed and interpreted the device interrogation, settings, programming and nurse's summary. The device is functioning within normal device parameters. I agree with the current findings, assessment and plan.     Medications - No data to display  Labs Ordered and  Resulted from Time of ED Arrival to Time of ED Departure - No data to display  No orders to display              Assessment & Plan    Is a 24-year-old female with a history of nonischemic cardiomyopathy with AICD who presents with palpitations.  Patient began having these before bed.  No known precipitating factors.  Patient was called Cardiology.  Here she is in normal sinus rhythm.  ECG is unchanged from previous.  Patient feels back to baseline.  Lab work shows no acute abnormalities.  Troponin is pending at this time.  I discussed the case with Cardiology who notes that patient can be discharged if this is normal.  Patient also notes suicidal ideation.  DEC assessment has been ordered and is pending at this time.  Patient was signed out to incoming physician pending results of troponin as well as DEC assessment.    I have reviewed the nursing notes. I have reviewed the findings, diagnosis, plan and need for follow up with the patient.    New Prescriptions    No medications on file       Final diagnoses:   None       Jose R Merino DO  [facility] EMERGENCY DEPARTMENT  11/1/2024     Jose R Merino DO  11/02/24 0130

## 2024-11-02 NOTE — CONSULTS
"Diagnostic Evaluation Consultation  Crisis Assessment    Patient Name: Mary Shaikh  Age:  24 year old  Legal Sex: female  Gender Identity: female  Pronouns:   Race: Black or   Ethnicity: Choose not to answer  Language: English      Patient was assessed: Virtual: [email or web address]   Crisis Assessment Start Date: 11/02/24  Crisis Assessment Start Time: 0149  Crisis Assessment Stop Time: 0201  Patient location: [facility] EMERGENCY DEPARTMENT                             ED02    Referral Data and Chief Complaint  Mary Shaikh presents to the ED with family/friends. Patient is presenting to the ED for the following concerns: Suicidal ideation.   Factors that make the mental health crisis life threatening or complex are:  pt presents to emergency dept with mother and grandfather for cardiac concerns. Upon being seen by provider, pt endorsed SI. Pt reports to this writer that after having her defibrillator placed her medical condition \"became really real\" and as a result has struggled with anxiety and thoughts of wanting to \" just go now\" Pt denies plan or intent and states that she is \" feeling much better\" now that her heart rate feels under control. Pt did state that she had scheduled a counseling appt to help with her anixiety and the soonest she could be seen is December. Counseling appt for Tuesday November 4 at 1:30 was secured and confirmed for pt while in the emergency dept. Pt expressing grattitude and a willingness to speak to a counselor. Pt denies an SIB, HI, hallucincations or delusions..      Informed Consent and Assessment Methods  Explained the crisis assessment process, including applicable information disclosures and limits to confidentiality, assessed understanding of the process, and obtained consent to proceed with the assessment.  Assessment methods included conducting a formal interview with patient, review of medical records, collaboration with medical staff, and obtaining " relevant collateral information from family and community providers when available.  : done     Patient response to interventions: eager to participate  Coping skills were attempted to reduce the crisis:  pt denies hx with this type of concern and states that it is new for her since a recent medical procedure. Pt has supportive family and good engagement in hobbies and interests     History of the Crisis   Pt is a 23 y/o with a  history nonischemic cardiomyopathy, heart failure with reduced ejection fraction, hypertrophic nonobstructive cardiomyopathy, NSVT, paroxysmally SVT and type II diabetes who presents to the emergency department for palpitations. Pt denies any mental health hx and states that her medical condition has been a difficlut adjustment and she reports having anxiety and passive thoughts or wanting to die as a result. Pt lives with her parents, and reports a supportive family, safe home, and positive social [place]. Pt expressed a desire to beging seeing a counselor and as been set up with an appt for 11/4/24 at 1:30pm Pt doesn't take any medications for mental health, and reports she historically looks forwad to travel, listening to music, and being with friends, which she continues to do.    Brief Psychosocial History  Family:  Single, Children no  Support System:     Employment Status:  unemployed  Source of Income:  other (see comments) (family support)  Financial Environmental Concerns:  none  Current Hobbies:  music, group/social activities, outdoor activities, travel, writing/journaling/blogging, family functions  Barriers in Personal Life:  medical conditions/precautions    Significant Clinical History  Current Anxiety Symptoms:  anxious  Current Depression/Trauma:  sadness  Current Somatic Symptoms:  anxious  Current Psychosis/Thought Disturbance:   (none reported)  Current Eating Symptoms:   (none reported)  Chemical Use History:  Alcohol: None  Benzodiazepines: None  Opiates: None  Cocaine:  None  Marijuana: None  Other Use: None  Withdrawal Symptoms:  (none reported)  Addictions:  (none reported)   Past diagnosis:  No known past diagnosis  Family history:  No known history of mental health or chemical health concerns  Past treatment:  No known formal treatment attempts  Details of most recent treatment:  pt denies any mental health hx and reports starting to experince s/s of anxiety secondary to complex medical condition  Other relevant history:          Collateral Information  Is there collateral information: No     Collateral information name, relationship, phone number:       What happened today:       What is different about patient's functioning:       Concern about alcohol/drug use:      What do you think the patient needs:      Has patient made comments about wanting to kill themselves/others:      If d/c is recommended, can they take part in safety/aftercare planning:       Additional collateral information:        Risk Assessment  [place] Suicide Severity Rating Scale Full Clinical Version:  Suicidal Ideation  Q1 Wish to be Dead (Lifetime): Yes  Q2 Non-Specific Active Suicidal Thoughts (Lifetime): Yes  3. Active Suicidal Ideation with any Methods (Not Plan) Without Intent to Act (Lifetime): No  Q4 Active Suicidal Ideation with Some Intent to Act, Without Specific Plan (Lifetime): No  Q5 Active Suicidal Ideation with Specific Plan and Intent (Lifetime): No  Q6 Suicide Behavior (Lifetime): no  Intensity of Ideation (Lifetime)  Most Severe Ideation Rating (Lifetime): 1  Description of Most Severe Ideation (Lifetime): pt reports she began having passive suicidal thoughts after her defibrillator was placed and she became overwhelmed with medical concerns. Pt denies any plan or intention  Frequency (Lifetime): Less than once a week  Duration (Lifetime): Fleeting, few seconds or minutes  Controllability (Lifetime): Easily able to control thoughts  Deterrents (Lifetime): Does not apply  Reasons for  Ideation (Lifetime): Does not apply  Suicidal Behavior (Lifetime)  Actual Attempt (Lifetime): No  Has subject engaged in non-suicidal self-injurious behavior? (Lifetime): No  Interrupted Attempts (Lifetime): No  Aborted or Self-Interrupted Attempt (Lifetime): No  Preparatory Acts or Behavior (Lifetime): No    [place] Suicide Severity Rating Scale Recent:   Suicidal Ideation (Recent)  Q1 Wished to be Dead (Past Month): yes  Q2 Suicidal Thoughts (Past Month): yes  Q3 Suicidal Thought Method: no  Q4 Suicidal Intent without Specific Plan: no  Q5 Suicide Intent with Specific Plan: no  If yes to Q6, within past 3 months?: no  Level of Risk per Screen: moderate risk  Intensity of Ideation (Recent)  Most Severe Ideation Rating (Past 1 Month): 1  Description of Most Severe Ideation (Past 1 Month): pt reports she began having passive SI after her defribillator was placed, pt denies plan/intent to act  Frequency (Past 1 Month): Less than once a week  Duration (Past 1 Month): Fleeting, few seconds or minutes  Controllability (Past 1 Month): Easily able to control thoughts  Deterrents (Past 1 Month): Does not apply  Reasons for Ideation (Past 1 Month): Does not apply  Suicidal Behavior (Recent)  Actual Attempt (Past 3 Months): No  Total Number of Actual Attempts (Past 3 Months): 0  Has subject engaged in non-suicidal self-injurious behavior? (Past 3 Months): No  Interrupted Attempts (Past 3 Months): No  Total Number of Interrupted Attempts (Past 3 Months): 0  Aborted or Self-Interrupted Attempt (Past 3 Months): No  Total Number of Aborted or Self-Interrupted Attempts (Past 3 Months): 0  Preparatory Acts or Behavior (Past 3 Months): No  Total Number of Preparatory Acts (Past 3 Months): 0    Environmental or Psychosocial Events: other life stressors, new diagnosis of major illness, worsening chronic illness  Protective Factors: Protective Factors: strong bond to family unit, community support, or employment, lives in a  responsibly safe and stable environment, help seeking, sense of belonging, constructive use of leisure time, enjoyable activities, resilience    Does the patient have thoughts of harming others? Feels Like Hurting Others: no  Previous Attempt to Hurt Others: no  Current presentation:  (calm and cooperative)  Is the patient engaging in sexually inappropriate behavior?: no    Is the patient engaging in sexually inappropriate behavior?  no        Mental Status Exam   Affect: Appropriate  Appearance: Appropriate  Attention Span/Concentration: Attentive  Eye Contact: Engaged    Fund of Knowledge: Appropriate   Language /Speech Content: Fluent  Language /Speech Volume: Normal  Language /Speech Rate/Productions: Normal  Recent Memory: Intact  Remote Memory: Intact  Mood: Apathetic  Orientation to Person: Yes   Orientation to Place: Yes  Orientation to Time of Day: Yes  Orientation to Date: Yes     Situation (Do they understand why they are here?): Yes  Psychomotor Behavior: Normal  Thought Content: Clear  Thought Form:  (none reported)     Mini-Cog Assessment  Number of Words Recalled:    Clock-Drawing Test:     Three Item Recall:    Mini-Cog Total Score:       Medication  Psychotropic medications:   Medication Orders - Psychiatric (From admission, onward)      None             Current Care Team  Patient Care Team:  No Ref-Primary, Physician as PCP - General  Hipolito Fuentes MD as Assigned Heart and Vascular Provider  Martha Lawton, RN as Specialty Care Coordinator (Cardiology)  Margoth Ford, RN as VAD Coordinator  Edwin Herrera RN as Specialty Care Coordinator (Cardiology)  Jocelyn Pichardo APRN CNP as Assigned Surgical Provider  Edwin Herrera RN as Specialty Care Coordinator (Cardiology)  Phillip Levi MD as MD (Cardiovascular Disease)  Rahul Aguilar MD as Physician (Neurology)    Diagnosis  Patient Active Problem List   Diagnosis Code    Acute on chronic systolic heart failure (H) I50.23     Nonischemic cardiomyopathy (H) I42.8    Anxiety F41.9    Biliary dyskinesia K82.8    Chronic cholecystitis K81.1    Heart failure with reduced ejection fraction (H) I50.20    Hypertrophic nonobstructive cardiomyopathy (H) I42.2    ICD (implantable cardioverter-defibrillator) in place Z95.810    Moderate asthma without complication J45.909    Moderate episode of recurrent major depressive disorder (H) F33.1    NSVT (nonsustained ventricular tachycardia) (H) I47.29    Paroxysmal SVT (supraventricular tachycardia) (H) I47.10    Josselyn Parkinson White pattern seen on electrocardiogram I45.6    Diabetes mellitus, type 2 (H) E11.9    Adjustment disorder with mixed anxiety and depressed mood F43.23       Primary Problem This Admission  Active Hospital Problems    *Adjustment disorder with mixed anxiety and depressed mood        Clinical Summary and Substantiation of Recommendations   After therapeutic assessment, intervention and aftercare planning by ED care team and LM and in consultation with attending provider, the patient's circumstances and mental state were appropriate for outpatient management. It is the recommendation of this clinician that pt discharge with OP MH support. A this time the pt is not presenting as an acute risk to self or others due to the following factors: verbalizes safety, no SIB, HI, hallucinations or delusions. Pt reports passive thoughts of SI secondary to complex medical condition and also expresses a desire to begin talking to a counselor. Pt has appt scheduled for 11/4/24 at 1:30 pm.                          Patient coping skills attempted to reduce the crisis:  pt denies hx with this type of concern and states that it is new for her since a recent medical procedure. Pt has supportive family and good engagement in hobbies and interests    Disposition  Recommended disposition: Individual Therapy        Reviewed case and recommendations with attending provider. Attending Name: Dr. Merino        Attending concurs with disposition: yes       Patient and/or validated legal guardian concurs with disposition:   yes       Final disposition:  discharge    Legal status on admission: Voluntary/Patient has signed consent for treatment    Assessment Details   Total duration spent with the patient: 12 min     CPT code(s) utilized: Non-Billable    Nubia Perales Psychotherapist  [facility] - Triage & Transition Services  Callback: [phone]

## 2024-11-02 NOTE — PLAN OF CARE
Mary Puentesam  November 2, 2024  Plan of Care Hand-off Note     Patient Care Path: discharge    Plan for Care:   After therapeutic assessment, intervention and aftercare planning by ED care team and LMHP and in consultation with attending provider, the patient's circumstances and mental state were appropriate for outpatient management. It is the recommendation of this clinician that pt discharge with OP MH support. A this time the pt is not presenting as an acute risk to self or others due to the following factors: verbalizes safety, no SIB, HI, hallucinations or delusions. Pt reports passive thoughts of SI secondary to complex medical condition and also expresses a desire to begin talking to a counselor. Pt has appt scheduled for 11/4/24 at 1:30 pm.      ou have been referred to the [facility]. This outpatient service provides short-term psychotherapy and/or medication management until you begin scheduled services with long-term care providers. You have been scheduled with the [facility] for an intake appointment on Tuesday, November 05, 2024  1:30 PM. This appointment is Virtual. The duration of this appointment is one hour. If you are scheduled for therapy, you will receive forms to fill out ahead of your scheduled appointment via [employer] if it is active, or by email. The Transition Clinic is located at [facility]. [address] [place] [address], [facility] [address], [place] [address]. If you need to contact the Transition Clinic, please call [phone].     Legal Status: Legal Status at Admission: Voluntary/Patient has signed consent for treatment    Psychiatry Consult: no       Updated   regarding plan of care.  Dr. Wilber Perales

## 2024-11-02 NOTE — DISCHARGE SUMMARY
You have been referred to the [facility]. This outpatient service provides short-term psychotherapy and/or medication management until you begin scheduled services with long-term care providers. You have been scheduled with the [facility] for an intake appointment on Tuesday, November 05, 2024  1:30 PM. This appointment is Virtual. The duration of this appointment is one hour. If you are scheduled for therapy, you will receive forms to fill out ahead of your scheduled appointment via iiyuma if it is active, or by email. The Transition Clinic is located at [address] [place] [address] [address], [place] [address]. If you need to contact the Transition Clinic, please call [phone].       Progressively relax your muscles   Starting with your hands, moving to your forearms, upper arms, shoulders, neck, forehead, eyes, cheeks and lips, tongue and teeth, chest, upper back, stomach, buttocks, thighs, calves, ankles, feet   Tense (10 seconds,   of the way), then relax each muscle (all the way)   Notice the tension   Notice the difference when relaxed (by tensing first, and then relaxing, you are able to get a more thorough relaxation than by simply relaxing)      P: Paced breathing to relax   The standard technique is to begin with counting the number of steps one takes for a typical inhale, then counting the steps one takes for a typical exhale, and then lengthening the amount of steps for the exhalation by one or two steps.  OR  Repeat this pattern for 1-2 minutes  Inhale for four (4) seconds   Exhale for six (6) to eight (8) seconds   Research demonstrated that one can change one's overall level of anxiety by doing this exercise for even a few minutes per day

## 2024-11-02 NOTE — ED NOTES
Assumed care of patient at this time. Introduced myself to patient and verified patient identity using two unique patient identifiers. Bed in low, locked position and call bell within patient reach. Appropriate monitoring equipment placed on patient given patient chief complaint.   Pt currently NSR. Pt denies CP, SOB. Pt endorsing SI with no plan. Pt mother at bedside. 1:1 monitor at bedside.

## 2024-11-03 ENCOUNTER — TELEPHONE (OUTPATIENT)
Dept: BEHAVIORAL HEALTH | Facility: CLINIC | Age: 24
End: 2024-11-03
Payer: COMMERCIAL

## 2024-11-03 NOTE — TELEPHONE ENCOUNTER
Triage and Transition Services- Patient Follow Up Call  Service Line Making Phone Call: Extended Care    Who did Writer Talk to: Patient    Details of Call: Spoke with PT about follow-up care. Confirmed scheduled appts. Offered additional appts/resources and patient declined. No further follow-up needed.     Mena Call 11/3/2024 1:30 PM

## 2024-11-04 ENCOUNTER — TELEPHONE (OUTPATIENT)
Dept: BEHAVIORAL HEALTH | Facility: CLINIC | Age: 24
End: 2024-11-04
Payer: COMMERCIAL

## 2024-11-04 ASSESSMENT — ANXIETY QUESTIONNAIRES
4. TROUBLE RELAXING: NEARLY EVERY DAY
5. BEING SO RESTLESS THAT IT IS HARD TO SIT STILL: NEARLY EVERY DAY
GAD7 TOTAL SCORE: 21
7. FEELING AFRAID AS IF SOMETHING AWFUL MIGHT HAPPEN: NEARLY EVERY DAY
GAD7 TOTAL SCORE: 21
GAD7 TOTAL SCORE: 21
2. NOT BEING ABLE TO STOP OR CONTROL WORRYING: NEARLY EVERY DAY
8. IF YOU CHECKED OFF ANY PROBLEMS, HOW DIFFICULT HAVE THESE MADE IT FOR YOU TO DO YOUR WORK, TAKE CARE OF THINGS AT HOME, OR GET ALONG WITH OTHER PEOPLE?: VERY DIFFICULT
7. FEELING AFRAID AS IF SOMETHING AWFUL MIGHT HAPPEN: NEARLY EVERY DAY
1. FEELING NERVOUS, ANXIOUS, OR ON EDGE: NEARLY EVERY DAY
3. WORRYING TOO MUCH ABOUT DIFFERENT THINGS: NEARLY EVERY DAY
IF YOU CHECKED OFF ANY PROBLEMS ON THIS QUESTIONNAIRE, HOW DIFFICULT HAVE THESE PROBLEMS MADE IT FOR YOU TO DO YOUR WORK, TAKE CARE OF THINGS AT HOME, OR GET ALONG WITH OTHER PEOPLE: VERY DIFFICULT
6. BECOMING EASILY ANNOYED OR IRRITABLE: NEARLY EVERY DAY

## 2024-11-04 ASSESSMENT — PATIENT HEALTH QUESTIONNAIRE - PHQ9
SUM OF ALL RESPONSES TO PHQ QUESTIONS 1-9: 21
10. IF YOU CHECKED OFF ANY PROBLEMS, HOW DIFFICULT HAVE THESE PROBLEMS MADE IT FOR YOU TO DO YOUR WORK, TAKE CARE OF THINGS AT HOME, OR GET ALONG WITH OTHER PEOPLE: VERY DIFFICULT
SUM OF ALL RESPONSES TO PHQ QUESTIONS 1-9: 21

## 2024-11-04 NOTE — TELEPHONE ENCOUNTER
Appointment /screener reminder, patient confirmed.    Jojo Henry  Transition Clinic Coordinator  11/04/24 2:54 PM

## 2024-11-05 ENCOUNTER — VIRTUAL VISIT (OUTPATIENT)
Dept: BEHAVIORAL HEALTH | Facility: CLINIC | Age: 24
End: 2024-11-05
Payer: COMMERCIAL

## 2024-11-05 DIAGNOSIS — F43.23 ADJUSTMENT DISORDER WITH MIXED ANXIETY AND DEPRESSED MOOD: Primary | ICD-10-CM

## 2024-11-05 ASSESSMENT — COLUMBIA-SUICIDE SEVERITY RATING SCALE - C-SSRS
6. HAVE YOU EVER DONE ANYTHING, STARTED TO DO ANYTHING, OR PREPARED TO DO ANYTHING TO END YOUR LIFE?: NO
TOTAL  NUMBER OF ABORTED OR SELF INTERRUPTED ATTEMPTS SINCE LAST CONTACT: NO
1. SINCE LAST CONTACT, HAVE YOU WISHED YOU WERE DEAD OR WISHED YOU COULD GO TO SLEEP AND NOT WAKE UP?: NO
SUICIDE, SINCE LAST CONTACT: NO
TOTAL  NUMBER OF INTERRUPTED ATTEMPTS SINCE LAST CONTACT: NO
ATTEMPT SINCE LAST CONTACT: NO
2. HAVE YOU ACTUALLY HAD ANY THOUGHTS OF KILLING YOURSELF?: NO

## 2024-11-05 NOTE — PROGRESS NOTES
Transition Clinic - Initial Visit Progress Note    Patient  Name: Mary Shaikh, : 2000    Date:  2024       Service Type:  Mental Health Initial Visit       VISIT TIME START:   VISIT TIME END: 200     Session Length:   TC Session Length: 30 (16-37 Minutes)    Visit #: 1    Attendees:  TC Attendees: Client alone    Service Modality:  Service Modality: Video Visit:      Provider verified identity through the following two step process.  Patient provided:  Patient  and Patient address    Telemedicine Visit: The patient's condition can be safely assessed and treated via synchronous audio and visual telemedicine encounter.      Reason for Telemedicine Visit: Patient convenience (e.g. access to timely appointments / distance to available provider)    Originating Site (Patient Location): Patient's home    Distant Site (Provider Location): [facility]    Consent:  The patient/guardian has verbally consented to: the potential risks and benefits of telemedicine (video visit) versus in person care; bill my insurance or make self-payment for services provided; and responsibility for payment of non-covered services.     Patient would like the video invitation sent by:  My Chart    Mode of Communication:  [facility] Conference [facility]    Distant Location (Provider):  Off-site    As the provider I attest to compliance with applicable laws and regulations related to telemedicine.    Source of Referral:  Other - DEC:  Nubia Perales , for connection to long term therapy setup, per chart review.      Informed Consent and Assessment Methods    This provider and patient discussed HIPAA, and the limits of confidentiality; including mandated reporting, the possibility of collaborative discussions with patient's primary care provider and the multidisciplinary team in the  clinic during consultation.  Discussed the no show policy, and the benefits and  "possible unintended consequences of therapy.  Patient indicated understanding Transition Clinic services are short term, solution focused, until a referral can be made and patient can bridge to long term therapy.  Patient verbally indicated understanding the informed consent.         Presenting Concerns/  Current Stressors:  Mary Shaikh is a 24 year old who was referred to the Transition Clinic by  DEC:  NAVI Kern, for connection to long term therapy setup, per chart review.  Per chart review, pt seen by DEC  NAVI Lora 11/2/24 rec at that time for therapy, with information available in epic ehr at the time of this note.        Initial session:  Introduced transition clinic services as transitional, brief, short-term, solution-focused therapy until connected/transition to next LOC.    Per chart review, pt with a hx of anxiety, MDD, and adjustment, no notable psych hosp hx, with information available in epic ehr at the time of this note.    Mary Shaikh reports some improvement in sxs, reports anxiety sxs and depression, PHQ9 and GAD7 screeners appear congruent. Pt reports anxiety and staying home a lot\", reports sxs moving to panic at times.  Pt denies current SI, plan, intent, SIB, HI, AH/VH, and/or rosa sxs at this point in time.  CSSR completed.  Pt reports she is hopeful, looking forward to getting outside of the house this weekend with father\".  Pt reports parents, siblings, pets, part of consistent support network. Pt states \"I have great support\".  Pt reports adequate supports, and denies current substance use.  Pt reports the following protective factors:  willingness to engage in therapy, attached by fam/friends/pet, hopeful, future-oriented, access to variety of clinical interventions, strong bond to family unit, community support, or employment, lives in a responsibly safe and stable environment, help seeking, sense of belonging, constructive use of leisure time, " enjoyable activities, resilience.    Pt processed today regarding sxs, stressors, supports, coping, plan of service, questions/concerns.  Writer provided active listening, empathy and validation.  Resource facilitation for service supports.  Pt reports historical coping skills of meditation, journaling, walks with dog, cleaning up, positive affirmations.  Writer and pt explored additional coping skill psychoeducation on challenging negative thoughts, words of affirmations, self-care, self-compassion, relaxation strategies including mindfulness, meditation and grounding techniques including 5 senses.  Pt appeared engaged and receptive to the above interventions.     Pt requested long term therapy setup today via care connect, appt setup below as follows:  [facility]  Date: Monday, 11/11/2024  Time: 1:00 pm - 2:00 pm  Provider: Stephnaie Rowe MA  [facility]  Location: [facility] [employer], [address] [place] [address], [address] [address]  Phone: [phone]  Type: Teletherapy        Plan:  11/14/24 at 7:30am - [facility] - ensure successful connection.    ASSESSMENT:    Required Screenings: The following assessments were completed by patient for this visit:  PHQ9:       5/9/2022     3:05 PM 11/4/2024     6:56 PM   PHQ-9 SCORE   PHQ-9 Total Score MyChart  21 (Severe depression)   PHQ-9 Total Score 16 21        Patient-reported     GAD7:       11/4/2024     6:56 PM   JOSESITO-7 SCORE   Total Score 21 (severe anxiety)   Total Score 21        Patient-reported     CAGE-AID:       11/4/2024     7:18 PM   CAGE-AID Total Score   Total Score 0    Total Score MyChart 0 (A total score of 2 or greater is considered clinically significant)       Patient-reported     PROMIS 10-Global Health (all questions and answers displayed):       11/4/2024     7:17 PM   PROMIS 10   In general, would you say your health is: Poor   In general, would you say your quality of life is: Poor   In general, how would you  rate your physical health? Fair   In general, how would you rate your mental health, including your mood and your ability to think? Poor   In general, how would you rate your satisfaction with your social activities and relationships? Poor   In general, please rate how well you carry out your usual social activities and roles Fair   To what extent are you able to carry out your everyday physical activities such as walking, climbing stairs, carrying groceries, or moving a chair? Not at all   In the past 7 days, how often have you been bothered by emotional problems such as feeling anxious, depressed, or irritable? Always   In the past 7 days, how would you rate your fatigue on average? Very severe   In the past 7 days, how would you rate your pain on average, where 0 means no pain, and 10 means worst imaginable pain? 6   In general, would you say your health is: 1    In general, would you say your quality of life is: 1    In general, how would you rate your physical health? 2    In general, how would you rate your mental health, including your mood and your ability to think? 1    In general, how would you rate your satisfaction with your social activities and relationships? 1    In general, please rate how well you carry out your usual social activities and roles. (This includes activities at home, at work and in your community, and responsibilities as a parent, child, spouse, employee, friend, etc.) 2    To what extent are you able to carry out your everyday physical activities such as walking, climbing stairs, carrying groceries, or moving a chair? 1    In the past 7 days, how often have you been bothered by emotional problems such as feeling anxious, depressed, or irritable? 5    In the past 7 days, how would you rate your fatigue on average? 5    In the past 7 days, how would you rate your pain on average, where 0 means no pain, and 10 means worst imaginable pain? 6    Global Mental Health Score 4    Global  Physical Health Score 7    PROMIS TOTAL - SUBSCORES 11        Patient-reported     Storey Suicide Severity Rating Scale (Lifetime/Recent)      7/31/2024     3:00 PM 11/1/2024    10:01 PM 11/2/2024     2:08 AM 11/2/2024     2:10 AM   Storey Suicide Severity Rating (Lifetime/Recent)   Q1 Wish to be Dead (Lifetime)   Yes    Q2 Non-Specific Active Suicidal Thoughts (Lifetime)   Yes    Q1 Wished to be Dead (Past Month) 0-->no 1-->yes  1-->yes   Q2 Suicidal Thoughts (Past Month) 0-->no 1-->yes  1-->yes   Q3 Suicidal Thought Method  1-->yes  0-->no   Q4 Suicidal Intent without Specific Plan  1-->yes  0-->no   Q5 Suicide Intent with Specific Plan  0-->no  0-->no   Q6 Suicide Behavior (Lifetime) 1-->yes 1-->yes 0-->no    If yes to Q6, within past 3 months? 0-->no 0-->no  0-->no   Level of Risk per Screen moderate risk high risk  moderate risk   3. Active Suicidal Ideation with any Methods (Not Plan) Without Intent to Act (Lifetime)   N    Q4 Active Suicidal Ideation with Some Intent to Act, Without Specific Plan (Lifetime)   N    Q5 Active Suicidal Ideation with Specific Plan and Intent (Lifetime)   N    Most Severe Ideation Rating (Lifetime)   1    Description of Most Severe Ideation (Lifetime)   pt reports she began having passive suicidal thoughts after her defibrillator was placed and she became overwhelmed with medical concerns. Pt denies any plan or intention    Most Severe Ideation Rating (Past 1 Month)    1   Description of Most Severe Ideation (Past 1 Month)    pt reports she began having passive SI after her defribillator was placed, pt denies plan/intent to act   Frequency (Lifetime)   1    Frequency (Past 1 Month)    1   Duration (Lifetime)   1    Duration (Past 1 Month)    1   Controllability (Lifetime)   1    Controllability (Past 1 Month)    1   Deterrents (Lifetime)   0    Deterrents (Past 1 Month)    0   Reasons for Ideation (Lifetime)   0    Reasons for Ideation (Past 1 Month)    0   Actual Attempt  (Lifetime)   N    Actual Attempt (Past 3 Months)    N   Total Number of Actual Attempts (Past 3 Months)    0   Has subject engaged in non-suicidal self-injurious behavior? (Lifetime)   N    Has subject engaged in non-suicidal self-injurious behavior? (Past 3 Months)    N   Interrupted Attempts (Lifetime)   N    Interrupted Attempts (Past 3 Months)    N   Total Number of Interrupted Attempts (Past 3 Months)    0   Aborted or Self-Interrupted Attempt (Lifetime)   N    Aborted or Self-Interrupted Attempt (Past 3 Months)    N   Total Number of Aborted or Self-Interrupted Attempts (Past 3 Months)    0   Preparatory Acts or Behavior (Lifetime)   N    Preparatory Acts or Behavior (Past 3 Months)    N   Total Number of Preparatory Acts (Past 3 Months)    0   Calculated C-SSRS Risk Score (Lifetime/Recent)   No Risk Indicated No Risk Indicated     Hastings Suicide Severity Rating Scale (Short Version)      7/31/2024     3:00 PM 11/1/2024    10:01 PM 11/2/2024     2:08 AM 11/2/2024     2:10 AM 11/5/2024     2:00 PM   Hastings Suicide Severity Rating (Short Version)   Q1 Wished to be Dead (Past Month) 0-->no 1-->yes  1-->yes    Q2 Suicidal Thoughts (Past Month) 0-->no 1-->yes  1-->yes    Q3 Suicidal Thought Method  1-->yes  0-->no    Q4 Suicidal Intent without Specific Plan  1-->yes  0-->no    Q5 Suicide Intent with Specific Plan  0-->no  0-->no    Q6 Suicide Behavior (Lifetime) 1-->yes 1-->yes 0-->no     If yes to Q6, within past 3 months? 0-->no 0-->no  0-->no    Level of Risk per Screen moderate risk high risk  moderate risk    1. Wish to be Dead (Since Last Contact)     N   2. Non-Specific Active Suicidal Thoughts (Since Last Contact)     N   Actual Attempt (Since Last Contact)     N   Has subject engaged in non-suicidal self-injurious behavior? (Since Last Contact)     N   Interrupted Attempts (Since Last Contact)     N   Aborted or Self-Interrupted Attempt (Since Last Contact)     N   Preparatory Acts or Behavior (Since  Last Contact)     N   Suicide (Since Last Contact)     N   Calculated C-SSRS Risk Score (Since Last Contact)     No Risk Indicated       Mental Status Assessment:  Appearance:   Appropriate   Eye Contact:   Good   Psychomotor Behavior: Normal   Attitude:   Cooperative   Orientation:   All  Speech     Rate / Production:  Normal/ Responsive     Volume:   Normal   Mood:    Normal  Affect:    Appropriate   Thought Content:  Clear   Thought Form:  Coherent  Logical   Insight:    Good       Safety Issues and Plan for Safety and Risk Management:     Patient denies current fears or concerns for personal safety.  Patient denies current or recent suicidal ideation or behaviors.  Patient denies current or recent homicidal ideation or behaviors.  Patient denies current or recent self injurious behavior or ideation.  Patient denies other safety concerns.  A safety and risk management plan has been developed including: Patient consented to co-developed safety plan.  Safety and risk management plan was completed - see below.  Patient agreed to use safety plan should any safety concerns arise.  Safety plan in this note and in pts active mychart.     Diagnostic Criteria:  Adjustment Disorder  A. The development of emotional or behavioral symptoms in response to an identifiable stressor(s) occurring within 3 months of the onset of the stressor(s)  B. These symptoms or behaviors are clinically significant, as evidenced by one or both of the following:       - Significant impairment in social, occupational, or other important areas of functioning  C. The stress-related disturbance does not meet criteria for another disorder & is not not an exacerbation of another mental disorder  D. The symptoms do not represent normal bereavement  E. Once the stressor or its consequences have terminated, the symptoms do not persist for more than an additional 6 months       * Adjustment Disorder with Mixed Anxiety and Depressed Mood: The predominant  manifestation is a combination of depression and anxiety    DSM5 Diagnoses: (Sustained by DSM5 Criteria Listed Above)  Diagnoses: Adjustment Disorders  309.28 (F43.23) With mixed anxiety and depressed mood  Psychosocial & Contextual Factors: Pt reports recent medical dx.      Intervention:   Solution Focused Brief Therapy   Brief Psychotherapy - discussed and prioritizing the most efficient treatment., Cognitive Behavioral Therapy (CBT) - Discussed changing thoughts is the path to changing behaviors and feelings., Mindfulness Therapy - Cultivation of present-oriented, non-judgmental attitude. It helps nurtures great awareness, clarity, and acceptance of reality., Motivational Interviewing - helping to find the motivation to make positive behavior changes., Person-Centered Therapy - Actualizes potential and moves towards increased awareness, spontaneity, trust in self and inner directedness., and Problem-Solving Therapy (PST) - Identify specific problems; brainstorming, evaluation, implementing and reviewing solutions.    Collateral Reports Completed:  TC Collateral: [facility] electronic medical records reviewed.    DATA:  Interactive Complexity: No  Crisis: No    PLAN: (Homework, other):  Provider will continue Initial Treatment will focus on: Depressed Mood - anxiety .  2.   Pt requested long term therapy setup via [employer], appt setup as follows:    [facility]  Date: Monday, 11/11/2024  Time: 1:00 pm - 2:00 pm  Provider: Stephanie Rowe MA  [facility]  Location: [employer], [address] [place] [address], [address] [address]  Phone: [phone]  Type: Teletherapy     3.   Information in this assessment was obtained from the medical record and provided by patient who is a good historian.   4.   Follow up scheduled:  [facility] follow up appt- ensure successful connection:  11/14/24 at 7:30 am        Patient was given the following to do until next session:   encourage challenge negative thoughts, positive affirmations, journaling, meditation, self-care.  5.  Anticipated Discharge: Anticipated Discharge Time: < 1 Month   6. Is this the patient's last discharge: No      Procedure Code:  Psychotherapy (with patient) - 30  [CPT 77892]    Kelsie SALDAÑA Psychotherapist Trainee    Suicide Risk and Safety Concerns were assessed for. Patient meets the following risk assessment and triage: Patient has no change in safety concerns. Committed to safety and agreed to follow previously developed safety plan.    Safety Plan:  Reviewed safety plan with pt, pt agrees to follow, safety plan in this note and in pts active mychart.  If unable to follow safety plan call 911.    Tc Sewell Safety Plan    STEP 1: WARNING SIGNS  overwhlemed  2. stress    STEP 2: INTERNAL COPING STRATEGIES - THINGS I CAN DO TO TAKE MY MIND OFF   MY PROBLEMS WITHOUT CONTACTING ANOTHER PERSON  Mindfulness meditation  2.  Music and cleaning up  3. Journaling  4. Walks with dog    STEP 3: PEOPLE AND SOCIAL SETTINGS THAT PROVIDE DISTRACTION  STEP 4: PEOPLE WHOM I CAN ASK FOR HELP DURING A CRISIS    1. Name: Contact:  Emily Regan-mother- [phone]   3. Place: 4. Place: Outside/nature/walk with dog  1. Name: Contact: CRISIS:  [facility] Crisis line:  [phone]    STEP 5: PROFESSIONALS OR AGENCIES I CAN CONTACT DURING A CRISIS:  911  2. Emergency dept  3.  [facility] Crisis line:  [phone]    STEP 6: MAKING THE ENVIRONMENT SAFER (PLAN FOR LETHAL MEANS SAFETY)  1. Restrict access to means  2.  Stay with friends/family    Mica Safety Plan. Lilia Montez and Napoleon Sewell. Used with permission of the  authors.      Grounding Techniques:  Try to notice where you are, your surroundings including the people, the sounds like the TV or  radio.  Concentrate on your breathing. Take a deep cleansing breath from your diaphragm. Count the  breaths as you exhale. Make sure you breath  "slowly.  Hold something that you find comforting, for some it may be a stuffed animal or a blanket. Notice  how it feels in your hands. Is it hard or soft?  During a non-crisis time make a list of positive affirmations. Print them out and keep them handy  for times of intense anxiety. At those times, read them aloud.  Try the [employer] game:  Name 5 things you can see in the room with you  Name 4 things you can feel (\"chair on my back\" or \"feet on floor\")  Name 3 things you can hear right now (\"people talking\" or \"tv\")  Name 2 things you can smell right now (or, 2 things you like the smell of)  Name 1 good thing about yourself  Create A Safe Place  Image a safe place - it can be a real or imaginary place:  What do you see - especially colors?  What sounds do you hear?  What sensations do you feel?  What smells do you smell?  What people or animals would you want in your safe place?  Imagine a protective bubble, wall or boundary around your safe place.  Imagine a door or gate with a guard at your safe place.  Image a lock and key to your safe place and only you can unlock it.  You can draw or make a collage that represents your safe place.  Choose a souvenir of your safe place - a color, an object, a song.  Keep your image of your safe place so you can come back to it when you need to.  Reduce Extreme Emotion QUICKLY: Changing Your Body Chemistry  T: Change your body Temperature to change your autonomic nervous system  Use Ice Water to calm yourself down FAST  Put your face in a bowl of ice water (this is the best way; have the person keep his/her face in ice  water for 30-45 seconds - initial research is showing that the longer s/he can hold her/his face in  the water, the better the response), or  Splash ice water on your face, or hold an ice pack on your face  I: Intensely exercise to calm down a body revved up by emotion  Examples: running, walking fast, jumping, playing basketball, weight lifting, swimming, " "calisthenics,  etc.  Engage in exercises that DO NOT include violent behaviors. Exercises that utilize violent behaviors  tend to function as \"behavioral rehearsal,\" and rather than calming the person down, may actually  \"rev\" the person up more, increasing the likelihood of violence, and lessening the likelihood that  they will \"burn off\" energy  Tc-Donato Safety Plan (continued)  P: Progressively relax your muscles  Starting with your hands, moving to your forearms, upper arms, shoulders, neck, forehead, eyes,  cheeks and lips, tongue and teeth, chest, upper back, stomach, buttocks, thighs, calves, ankles,  feet  Tense (10 seconds,   of the way), then relax each muscle (all the way)  Notice the tension  Notice the difference when relaxed (by tensing first, and then relaxing, you are able to get a more  thorough relaxation than by simply relaxing)  P: Paced breathing to relax  The standard technique is to begin with counting the number of steps one takes for a typical inhale,  then counting the steps one takes for a typical exhale, and then lengthening the amount of steps  for the exhalation by one or two steps. OR  Repeat this pattern for 1-2 minutes  Inhale for four (4) seconds  Exhale for six (6) to eight (8) seconds  Research demonstrated that one can change one's overall level of anxiety by doing this exercise  for even a few minutes per day    Tc-Donato Safety Plan. Lilia Montez and Napoleon Sewell. Used with permission of the  authors.        "

## 2024-11-06 ENCOUNTER — OFFICE VISIT (OUTPATIENT)
Dept: PULMONOLOGY | Facility: CLINIC | Age: 24
End: 2024-11-06
Attending: INTERNAL MEDICINE
Payer: COMMERCIAL

## 2024-11-06 ENCOUNTER — MYC MEDICAL ADVICE (OUTPATIENT)
Dept: CARDIOLOGY | Facility: CLINIC | Age: 24
End: 2024-11-06

## 2024-11-06 DIAGNOSIS — E74.05 DANON DISEASE IN HETEROZYGOUS FEMALE (H): Primary | ICD-10-CM

## 2024-11-06 DIAGNOSIS — I50.23 ACUTE ON CHRONIC SYSTOLIC HEART FAILURE (H): ICD-10-CM

## 2024-11-06 PROCEDURE — 94799 UNLISTED PULMONARY SVC/PX: CPT | Performed by: INTERNAL MEDICINE

## 2024-11-06 PROCEDURE — 99207 PR NO CHARGE LOS: CPT

## 2024-11-06 PROCEDURE — 94375 RESPIRATORY FLOW VOLUME LOOP: CPT | Performed by: INTERNAL MEDICINE

## 2024-11-06 NOTE — TELEPHONE ENCOUNTER
Patient's requested paperwork sent to [facility] and e-mailed to patient. Document was sent to be scanned into her chart.    Martha Lawton RN

## 2024-11-07 LAB
EXPTIME-PRE: 4.9 SEC
FEF2575-%PRED-PRE: 55 %
FEF2575-PRE: 2.04 L/SEC
FEF2575-PRED: 3.69 L/SEC
FEFMAX-%PRED-PRE: 69 %
FEFMAX-PRE: 4.85 L/SEC
FEFMAX-PRED: 6.98 L/SEC
FEV1-%PRED-PRE: 64 %
FEV1-PRE: 2.03 L
FEV1FEV6-PRE: 84 %
FEV1FEV6-PRED: 86 %
FEV1FVC-PRE: 84 %
FEV1FVC-PRED: 88 %
FIFMAX-PRE: 3.51 L/SEC
FVC-%PRED-PRE: 67 %
FVC-PRE: 2.41 L
FVC-PRED: 3.57 L
MDC_IDC_LEAD_CONNECTION_STATUS: NORMAL
MDC_IDC_LEAD_CONNECTION_STATUS: NORMAL
MDC_IDC_LEAD_IMPLANT_DT: NORMAL
MDC_IDC_LEAD_IMPLANT_DT: NORMAL
MDC_IDC_LEAD_LOCATION: NORMAL
MDC_IDC_LEAD_LOCATION: NORMAL
MDC_IDC_LEAD_LOCATION_DETAIL_1: NORMAL
MDC_IDC_LEAD_LOCATION_DETAIL_1: NORMAL
MDC_IDC_LEAD_MFG: NORMAL
MDC_IDC_LEAD_MFG: NORMAL
MDC_IDC_LEAD_MODEL: NORMAL
MDC_IDC_LEAD_MODEL: NORMAL
MDC_IDC_LEAD_POLARITY_TYPE: NORMAL
MDC_IDC_LEAD_POLARITY_TYPE: NORMAL
MDC_IDC_LEAD_SERIAL: NORMAL
MDC_IDC_LEAD_SERIAL: NORMAL
MDC_IDC_MSMT_BATTERY_DTM: NORMAL
MDC_IDC_MSMT_BATTERY_REMAINING_LONGEVITY: 150 MO
MDC_IDC_MSMT_BATTERY_REMAINING_PERCENTAGE: 100 %
MDC_IDC_MSMT_BATTERY_STATUS: NORMAL
MDC_IDC_MSMT_CAP_CHARGE_DTM: NORMAL
MDC_IDC_MSMT_CAP_CHARGE_DTM: NORMAL
MDC_IDC_MSMT_CAP_CHARGE_ENERGY: 41 J
MDC_IDC_MSMT_CAP_CHARGE_TIME: 10.3 S
MDC_IDC_MSMT_CAP_CHARGE_TIME: 7.9 S
MDC_IDC_MSMT_CAP_CHARGE_TYPE: NORMAL
MDC_IDC_MSMT_CAP_CHARGE_TYPE: NORMAL
MDC_IDC_MSMT_LEADCHNL_RA_IMPEDANCE_VALUE: 853 OHM
MDC_IDC_MSMT_LEADCHNL_RA_PACING_THRESHOLD_AMPLITUDE: 0.9 V
MDC_IDC_MSMT_LEADCHNL_RA_PACING_THRESHOLD_PULSEWIDTH: 0.4 MS
MDC_IDC_MSMT_LEADCHNL_RV_IMPEDANCE_VALUE: 377 OHM
MDC_IDC_MSMT_LEADCHNL_RV_PACING_THRESHOLD_AMPLITUDE: 1 V
MDC_IDC_MSMT_LEADCHNL_RV_PACING_THRESHOLD_PULSEWIDTH: 0.4 MS
MDC_IDC_PG_IMPLANT_DTM: NORMAL
MDC_IDC_PG_MFG: NORMAL
MDC_IDC_PG_MODEL: NORMAL
MDC_IDC_PG_SERIAL: NORMAL
MDC_IDC_PG_TYPE: NORMAL
MDC_IDC_SESS_CLINIC_NAME: NORMAL
MDC_IDC_SESS_DTM: NORMAL
MDC_IDC_SESS_TYPE: NORMAL
MDC_IDC_SET_BRADY_LOWRATE: 40 {BEATS}/MIN
MDC_IDC_SET_BRADY_MODE: NORMAL
MDC_IDC_SET_LEADCHNL_RA_PACING_AMPLITUDE: 3.5 V
MDC_IDC_SET_LEADCHNL_RA_PACING_CAPTURE_MODE: NORMAL
MDC_IDC_SET_LEADCHNL_RA_PACING_POLARITY: NORMAL
MDC_IDC_SET_LEADCHNL_RA_PACING_PULSEWIDTH: 0.4 MS
MDC_IDC_SET_LEADCHNL_RA_SENSING_ADAPTATION_MODE: NORMAL
MDC_IDC_SET_LEADCHNL_RA_SENSING_POLARITY: NORMAL
MDC_IDC_SET_LEADCHNL_RA_SENSING_SENSITIVITY: 0.25 MV
MDC_IDC_SET_LEADCHNL_RV_PACING_AMPLITUDE: 3.5 V
MDC_IDC_SET_LEADCHNL_RV_PACING_CAPTURE_MODE: NORMAL
MDC_IDC_SET_LEADCHNL_RV_PACING_POLARITY: NORMAL
MDC_IDC_SET_LEADCHNL_RV_PACING_PULSEWIDTH: 0.4 MS
MDC_IDC_SET_LEADCHNL_RV_SENSING_ADAPTATION_MODE: NORMAL
MDC_IDC_SET_LEADCHNL_RV_SENSING_POLARITY: NORMAL
MDC_IDC_SET_LEADCHNL_RV_SENSING_SENSITIVITY: 0.6 MV
MDC_IDC_SET_ZONE_DETECTION_INTERVAL: 240 MS
MDC_IDC_SET_ZONE_DETECTION_INTERVAL: 300 MS
MDC_IDC_SET_ZONE_DETECTION_INTERVAL: 333 MS
MDC_IDC_SET_ZONE_STATUS: NORMAL
MDC_IDC_SET_ZONE_TYPE: NORMAL
MDC_IDC_SET_ZONE_VENDOR_TYPE: NORMAL
MDC_IDC_STAT_AT_BURDEN_PERCENT: 0 %
MDC_IDC_STAT_BRADY_DTM_END: NORMAL
MDC_IDC_STAT_BRADY_DTM_START: NORMAL
MDC_IDC_STAT_BRADY_RA_PERCENT_PACED: 1 %
MDC_IDC_STAT_BRADY_RV_PERCENT_PACED: 1 %
MDC_IDC_STAT_EPISODE_RECENT_COUNT: 0
MDC_IDC_STAT_EPISODE_RECENT_COUNT: 2
MDC_IDC_STAT_EPISODE_RECENT_COUNT: 2
MDC_IDC_STAT_EPISODE_RECENT_COUNT_DTM_END: NORMAL
MDC_IDC_STAT_EPISODE_RECENT_COUNT_DTM_START: NORMAL
MDC_IDC_STAT_EPISODE_TYPE: NORMAL
MDC_IDC_STAT_EPISODE_VENDOR_TYPE: NORMAL
MDC_IDC_STAT_TACHYTHERAPY_ATP_DELIVERED_RECENT: 2
MDC_IDC_STAT_TACHYTHERAPY_ATP_DELIVERED_TOTAL: 2
MDC_IDC_STAT_TACHYTHERAPY_RECENT_DTM_END: NORMAL
MDC_IDC_STAT_TACHYTHERAPY_RECENT_DTM_START: NORMAL
MDC_IDC_STAT_TACHYTHERAPY_SHOCKS_ABORTED_RECENT: 2
MDC_IDC_STAT_TACHYTHERAPY_SHOCKS_ABORTED_TOTAL: 2
MDC_IDC_STAT_TACHYTHERAPY_SHOCKS_DELIVERED_RECENT: 1
MDC_IDC_STAT_TACHYTHERAPY_SHOCKS_DELIVERED_TOTAL: 1
MDC_IDC_STAT_TACHYTHERAPY_TOTAL_DTM_END: NORMAL
MDC_IDC_STAT_TACHYTHERAPY_TOTAL_DTM_START: NORMAL
MEP-PRE: 57 CMH2O
MIP-PRE: -60 CMH2O

## 2024-11-12 NOTE — TELEPHONE ENCOUNTER
Called pt and left VM to call back clinic to go over pre-procedure instructions for RHC on 11/15. Also sent pt Transfer To message with detailed pre-procedure instructions.     Va Becerra RN

## 2024-11-13 ENCOUNTER — TELEPHONE (OUTPATIENT)
Dept: BEHAVIORAL HEALTH | Facility: CLINIC | Age: 24
End: 2024-11-13
Payer: COMMERCIAL

## 2024-11-13 NOTE — TELEPHONE ENCOUNTER
Writer called patient to remind them of appointment scheduled for  11/14/2024 . Spoke to patient regarding upcoming Transition Clinic appointment.  Appointment confirmed.        Shannen Herrera  Transition Clinic Coordinator  11/13/24 4:30 PM

## 2024-11-14 ENCOUNTER — VIRTUAL VISIT (OUTPATIENT)
Dept: BEHAVIORAL HEALTH | Facility: CLINIC | Age: 24
End: 2024-11-14
Payer: COMMERCIAL

## 2024-11-14 DIAGNOSIS — F43.23 ADJUSTMENT DISORDER WITH MIXED ANXIETY AND DEPRESSED MOOD: Primary | ICD-10-CM

## 2024-11-14 PROCEDURE — 99207 PR DROP WITH A PROCEDURE: CPT | Mod: 25

## 2024-11-14 ASSESSMENT — COLUMBIA-SUICIDE SEVERITY RATING SCALE - C-SSRS
1. SINCE LAST CONTACT, HAVE YOU WISHED YOU WERE DEAD OR WISHED YOU COULD GO TO SLEEP AND NOT WAKE UP?: NO
TOTAL  NUMBER OF ABORTED OR SELF INTERRUPTED ATTEMPTS SINCE LAST CONTACT: NO
6. HAVE YOU EVER DONE ANYTHING, STARTED TO DO ANYTHING, OR PREPARED TO DO ANYTHING TO END YOUR LIFE?: NO
SUICIDE, SINCE LAST CONTACT: NO
2. HAVE YOU ACTUALLY HAD ANY THOUGHTS OF KILLING YOURSELF?: NO
ATTEMPT SINCE LAST CONTACT: NO
TOTAL  NUMBER OF INTERRUPTED ATTEMPTS SINCE LAST CONTACT: NO

## 2024-11-15 ENCOUNTER — HOSPITAL ENCOUNTER (OUTPATIENT)
Facility: CLINIC | Age: 24
Discharge: HOME | End: 2024-11-15
Attending: INTERNAL MEDICINE | Admitting: INTERNAL MEDICINE
Payer: COMMERCIAL

## 2024-11-15 ENCOUNTER — APPOINTMENT (OUTPATIENT)
Dept: MEDSURG UNIT | Facility: CLINIC | Age: 24
End: 2024-11-15
Attending: INTERNAL MEDICINE
Payer: COMMERCIAL

## 2024-11-15 ENCOUNTER — APPOINTMENT (OUTPATIENT)
Dept: LAB | Facility: CLINIC | Age: 24
End: 2024-11-15
Attending: INTERNAL MEDICINE
Payer: COMMERCIAL

## 2024-11-15 VITALS
BODY MASS INDEX: 27.05 KG/M2 | OXYGEN SATURATION: 100 % | TEMPERATURE: 98.5 F | SYSTOLIC BLOOD PRESSURE: 118 MMHG | WEIGHT: 157.6 LBS | HEART RATE: 66 BPM | RESPIRATION RATE: 17 BRPM | DIASTOLIC BLOOD PRESSURE: 80 MMHG

## 2024-11-15 DIAGNOSIS — I50.23 ACUTE ON CHRONIC SYSTOLIC HEART FAILURE (H): ICD-10-CM

## 2024-11-15 LAB
ANION GAP SERPL CALCULATED.3IONS-SCNC: 13 MMOL/L (ref 7–15)
BASOPHILS # BLD AUTO: 0 10E3/UL (ref 0–0.2)
BASOPHILS NFR BLD AUTO: 1 %
BUN SERPL-MCNC: 6 MG/DL (ref 6–20)
CALCIUM SERPL-MCNC: 9.5 MG/DL (ref 8.8–10.4)
CHLORIDE SERPL-SCNC: 104 MMOL/L (ref 98–107)
CREAT SERPL-MCNC: 0.62 MG/DL (ref 0.51–0.95)
EGFRCR SERPLBLD CKD-EPI 2021: >90 ML/MIN/1.73M2
EOSINOPHIL # BLD AUTO: 0 10E3/UL (ref 0–0.7)
EOSINOPHIL NFR BLD AUTO: 0 %
ERYTHROCYTE [DISTWIDTH] IN BLOOD BY AUTOMATED COUNT: 16.1 % (ref 10–15)
GLUCOSE SERPL-MCNC: 83 MG/DL (ref 70–99)
HCG INTACT+B SERPL-ACNC: <1 MIU/ML
HCO3 SERPL-SCNC: 22 MMOL/L (ref 22–29)
HCT VFR BLD AUTO: 44.7 % (ref 35–47)
HGB BLD-MCNC: 14 G/DL (ref 11.7–15.7)
HGB BLD-MCNC: 14.3 G/DL (ref 11.7–15.7)
IMM GRANULOCYTES # BLD: 0 10E3/UL
IMM GRANULOCYTES NFR BLD: 0 %
INR PPP: 1.14 (ref 0.85–1.15)
LYMPHOCYTES # BLD AUTO: 2 10E3/UL (ref 0.8–5.3)
LYMPHOCYTES NFR BLD AUTO: 48 %
MCH RBC QN AUTO: 26.1 PG (ref 26.5–33)
MCHC RBC AUTO-ENTMCNC: 31.3 G/DL (ref 31.5–36.5)
MCV RBC AUTO: 83 FL (ref 78–100)
MDC_IDC_EPISODE_DTM: NORMAL
MDC_IDC_EPISODE_DURATION: 13 S
MDC_IDC_EPISODE_DURATION: 13 S
MDC_IDC_EPISODE_DURATION: 37 S
MDC_IDC_EPISODE_DURATION: 56 S
MDC_IDC_EPISODE_ID: NORMAL
MDC_IDC_EPISODE_TYPE: NORMAL
MDC_IDC_EPISODE_TYPE_INDUCED: NO
MDC_IDC_EPISODE_VENDOR_TYPE: NORMAL
MDC_IDC_EPISODE_VENDOR_TYPE: NORMAL
MDC_IDC_LEAD_CONNECTION_STATUS: NORMAL
MDC_IDC_LEAD_CONNECTION_STATUS: NORMAL
MDC_IDC_LEAD_IMPLANT_DT: NORMAL
MDC_IDC_LEAD_IMPLANT_DT: NORMAL
MDC_IDC_LEAD_LOCATION: NORMAL
MDC_IDC_LEAD_LOCATION: NORMAL
MDC_IDC_LEAD_LOCATION_DETAIL_1: NORMAL
MDC_IDC_LEAD_LOCATION_DETAIL_1: NORMAL
MDC_IDC_LEAD_MFG: NORMAL
MDC_IDC_LEAD_MFG: NORMAL
MDC_IDC_LEAD_MODEL: NORMAL
MDC_IDC_LEAD_MODEL: NORMAL
MDC_IDC_LEAD_POLARITY_TYPE: NORMAL
MDC_IDC_LEAD_POLARITY_TYPE: NORMAL
MDC_IDC_LEAD_SERIAL: NORMAL
MDC_IDC_LEAD_SERIAL: NORMAL
MDC_IDC_MSMT_BATTERY_DTM: NORMAL
MDC_IDC_MSMT_BATTERY_REMAINING_LONGEVITY: 132 MO
MDC_IDC_MSMT_BATTERY_REMAINING_PERCENTAGE: 100 %
MDC_IDC_MSMT_BATTERY_STATUS: NORMAL
MDC_IDC_MSMT_CAP_CHARGE_DTM: NORMAL
MDC_IDC_MSMT_CAP_CHARGE_DTM: NORMAL
MDC_IDC_MSMT_CAP_CHARGE_ENERGY: 41 J
MDC_IDC_MSMT_CAP_CHARGE_TIME: 10.3 S
MDC_IDC_MSMT_CAP_CHARGE_TIME: 7.9 S
MDC_IDC_MSMT_CAP_CHARGE_TYPE: NORMAL
MDC_IDC_MSMT_CAP_CHARGE_TYPE: NORMAL
MDC_IDC_MSMT_LEADCHNL_RA_IMPEDANCE_VALUE: 775 OHM
MDC_IDC_MSMT_LEADCHNL_RA_PACING_THRESHOLD_AMPLITUDE: 0.6 V
MDC_IDC_MSMT_LEADCHNL_RA_PACING_THRESHOLD_PULSEWIDTH: 0.4 MS
MDC_IDC_MSMT_LEADCHNL_RV_IMPEDANCE_VALUE: 351 OHM
MDC_IDC_MSMT_LEADCHNL_RV_PACING_THRESHOLD_AMPLITUDE: 1.1 V
MDC_IDC_MSMT_LEADCHNL_RV_PACING_THRESHOLD_PULSEWIDTH: 0.4 MS
MDC_IDC_PG_IMPLANT_DTM: NORMAL
MDC_IDC_PG_MFG: NORMAL
MDC_IDC_PG_MODEL: NORMAL
MDC_IDC_PG_SERIAL: NORMAL
MDC_IDC_PG_TYPE: NORMAL
MDC_IDC_SESS_CLINIC_NAME: NORMAL
MDC_IDC_SESS_DTM: NORMAL
MDC_IDC_SESS_TYPE: NORMAL
MDC_IDC_SET_BRADY_AT_MODE_SWITCH_RATE: 140 {BEATS}/MIN
MDC_IDC_SET_BRADY_LOWRATE: 40 {BEATS}/MIN
MDC_IDC_SET_BRADY_MODE: NORMAL
MDC_IDC_SET_LEADCHNL_RA_PACING_AMPLITUDE: 3.5 V
MDC_IDC_SET_LEADCHNL_RA_PACING_CAPTURE_MODE: NORMAL
MDC_IDC_SET_LEADCHNL_RA_PACING_POLARITY: NORMAL
MDC_IDC_SET_LEADCHNL_RA_PACING_PULSEWIDTH: 0.4 MS
MDC_IDC_SET_LEADCHNL_RA_SENSING_ADAPTATION_MODE: NORMAL
MDC_IDC_SET_LEADCHNL_RA_SENSING_POLARITY: NORMAL
MDC_IDC_SET_LEADCHNL_RA_SENSING_SENSITIVITY: 0.25 MV
MDC_IDC_SET_LEADCHNL_RV_PACING_AMPLITUDE: 3.5 V
MDC_IDC_SET_LEADCHNL_RV_PACING_CAPTURE_MODE: NORMAL
MDC_IDC_SET_LEADCHNL_RV_PACING_POLARITY: NORMAL
MDC_IDC_SET_LEADCHNL_RV_PACING_PULSEWIDTH: 0.4 MS
MDC_IDC_SET_LEADCHNL_RV_SENSING_ADAPTATION_MODE: NORMAL
MDC_IDC_SET_LEADCHNL_RV_SENSING_POLARITY: NORMAL
MDC_IDC_SET_LEADCHNL_RV_SENSING_SENSITIVITY: 0.6 MV
MDC_IDC_SET_ZONE_DETECTION_INTERVAL: 240 MS
MDC_IDC_SET_ZONE_DETECTION_INTERVAL: 300 MS
MDC_IDC_SET_ZONE_DETECTION_INTERVAL: 333 MS
MDC_IDC_SET_ZONE_STATUS: NORMAL
MDC_IDC_SET_ZONE_TYPE: NORMAL
MDC_IDC_SET_ZONE_VENDOR_TYPE: NORMAL
MDC_IDC_STAT_BRADY_DTM_END: NORMAL
MDC_IDC_STAT_BRADY_DTM_START: NORMAL
MDC_IDC_STAT_BRADY_RA_PERCENT_PACED: 0 %
MDC_IDC_STAT_BRADY_RV_PERCENT_PACED: 0 %
MDC_IDC_STAT_EPISODE_RECENT_COUNT: 0
MDC_IDC_STAT_EPISODE_RECENT_COUNT: 2
MDC_IDC_STAT_EPISODE_RECENT_COUNT_DTM_END: NORMAL
MDC_IDC_STAT_EPISODE_RECENT_COUNT_DTM_START: NORMAL
MDC_IDC_STAT_EPISODE_TYPE: NORMAL
MDC_IDC_STAT_EPISODE_VENDOR_TYPE: NORMAL
MDC_IDC_STAT_TACHYTHERAPY_ATP_DELIVERED_RECENT: 0
MDC_IDC_STAT_TACHYTHERAPY_ATP_DELIVERED_TOTAL: 2
MDC_IDC_STAT_TACHYTHERAPY_RECENT_DTM_END: NORMAL
MDC_IDC_STAT_TACHYTHERAPY_RECENT_DTM_START: NORMAL
MDC_IDC_STAT_TACHYTHERAPY_SHOCKS_ABORTED_RECENT: 0
MDC_IDC_STAT_TACHYTHERAPY_SHOCKS_ABORTED_TOTAL: 2
MDC_IDC_STAT_TACHYTHERAPY_SHOCKS_DELIVERED_RECENT: 0
MDC_IDC_STAT_TACHYTHERAPY_SHOCKS_DELIVERED_TOTAL: 1
MDC_IDC_STAT_TACHYTHERAPY_TOTAL_DTM_END: NORMAL
MDC_IDC_STAT_TACHYTHERAPY_TOTAL_DTM_START: NORMAL
MONOCYTES # BLD AUTO: 0.3 10E3/UL (ref 0–1.3)
MONOCYTES NFR BLD AUTO: 7 %
NEUTROPHILS # BLD AUTO: 1.8 10E3/UL (ref 1.6–8.3)
NEUTROPHILS NFR BLD AUTO: 44 %
NRBC # BLD AUTO: 0 10E3/UL
NRBC BLD AUTO-RTO: 0 /100
OXYHGB MFR BLDV: 66 % (ref 70–75)
PLATELET # BLD AUTO: 246 10E3/UL (ref 150–450)
POTASSIUM SERPL-SCNC: 5 MMOL/L (ref 3.4–5.3)
RBC # BLD AUTO: 5.37 10E6/UL (ref 3.8–5.2)
SODIUM SERPL-SCNC: 139 MMOL/L (ref 135–145)
WBC # BLD AUTO: 4.1 10E3/UL (ref 4–11)

## 2024-11-15 PROCEDURE — 93451 RIGHT HEART CATH: CPT | Performed by: INTERNAL MEDICINE

## 2024-11-15 PROCEDURE — C1894 INTRO/SHEATH, NON-LASER: HCPCS | Performed by: INTERNAL MEDICINE

## 2024-11-15 PROCEDURE — 82810 BLOOD GASES O2 SAT ONLY: CPT

## 2024-11-15 PROCEDURE — 85610 PROTHROMBIN TIME: CPT | Performed by: INTERNAL MEDICINE

## 2024-11-15 PROCEDURE — 272N000001 HC OR GENERAL SUPPLY STERILE: Performed by: INTERNAL MEDICINE

## 2024-11-15 PROCEDURE — 85041 AUTOMATED RBC COUNT: CPT | Performed by: INTERNAL MEDICINE

## 2024-11-15 PROCEDURE — 85018 HEMOGLOBIN: CPT

## 2024-11-15 PROCEDURE — 999N000132 HC STATISTIC PP CARE STAGE 1

## 2024-11-15 PROCEDURE — 36415 COLL VENOUS BLD VENIPUNCTURE: CPT | Performed by: INTERNAL MEDICINE

## 2024-11-15 PROCEDURE — C1751 CATH, INF, PER/CENT/MIDLINE: HCPCS | Performed by: INTERNAL MEDICINE

## 2024-11-15 PROCEDURE — 85014 HEMATOCRIT: CPT | Performed by: INTERNAL MEDICINE

## 2024-11-15 PROCEDURE — 93451 RIGHT HEART CATH: CPT | Mod: 26 | Performed by: INTERNAL MEDICINE

## 2024-11-15 PROCEDURE — 85004 AUTOMATED DIFF WBC COUNT: CPT | Performed by: INTERNAL MEDICINE

## 2024-11-15 PROCEDURE — 999N000142 HC STATISTIC PROCEDURE PREP ONLY

## 2024-11-15 PROCEDURE — 82565 ASSAY OF CREATININE: CPT | Performed by: INTERNAL MEDICINE

## 2024-11-15 PROCEDURE — 250N000009 HC RX 250: Performed by: INTERNAL MEDICINE

## 2024-11-15 PROCEDURE — 80048 BASIC METABOLIC PNL TOTAL CA: CPT | Performed by: INTERNAL MEDICINE

## 2024-11-15 PROCEDURE — 84702 CHORIONIC GONADOTROPIN TEST: CPT | Performed by: INTERNAL MEDICINE

## 2024-11-15 RX ORDER — LIDOCAINE 40 MG/G
CREAM TOPICAL
Status: COMPLETED | OUTPATIENT
Start: 2024-11-15 | End: 2024-11-15

## 2024-11-15 RX ORDER — LIDOCAINE HYDROCHLORIDE 20 MG/ML
INJECTION, SOLUTION INFILTRATION; PERINEURAL
Status: DISCONTINUED | OUTPATIENT
Start: 2024-11-15 | End: 2024-11-15 | Stop reason: HOSPADM

## 2024-11-15 RX ADMIN — LIDOCAINE: 40 CREAM TOPICAL at 13:32

## 2024-11-15 ASSESSMENT — ACTIVITIES OF DAILY LIVING (ADL)
ADLS_ACUITY_SCORE: 0

## 2024-11-15 NOTE — PROGRESS NOTES
Felt fine after eating.  Reviewed discharge instructions.  RIJ site clean, soft and intact.  Discharged to home with parents.

## 2024-11-15 NOTE — PRE-PROCEDURE
"Consenting/Education for Cardiology Procedure: Right heart catheterization     Patient understands we would like to perform the listed procedure(s) due to cardiomyopathy.    The patient understands the following:     The procedure was described to the patient in detail.    No sedation is planned for this procedure. Patient understands risks and complications of the procedure which include but are not limited to bruising/swelling around the incision site, infection, bleeding, allergic reaction to local anesthetic, air embolism, arterial puncture, stroke, heart attack, need for emergency heart surgery, death.       Patient verbalized understanding of risks and benefits and has elected to proceed with the procedure or procedures listed above.    Miquel Hannon, ZITA CNP  Cardiology    Clinically Significant Risk Factors Present on Admission                   # End stage heart failure: Ventricular assist device (VAD) present         # DMII: A1C = N/A within past 6 months    # Overweight: Estimated body mass index is 27.05 kg/m  as calculated from the following:    Height as of 11/1/24: 1.626 m (5' 4\").    Weight as of this encounter: 71.5 kg (157 lb 9.6 oz).       # Financial/Environmental Concerns:    # Asthma: noted on problem list  # ICD device present      Cardiovascular : Cardiomyopathy    Not present on admission                    Systemic : Not present on admission       "

## 2024-11-15 NOTE — PROGRESS NOTES
"Returned from [facility] s/p right heart catheterization.  VSS.  Upon arrival, Mary stated that she felt very \"tingly and weird\"; started to hyperventilate and sob loudly.  MD notified and saw patient at bedside.  Mary placed on Cardiac monitor showing NSR in 60\"s with other vitals signs staying WNL.  Mary slowly started to calm down and feel better after MD saw her at bedside.  Parents at bedside as well, and helped her to calm down.  Eating comfortably in bed.  Right neck site clean and intact.  "

## 2024-11-15 NOTE — Clinical Note
Report called to 2A Linda ZAZUETA. The procedure, findings, and post-status is reviewed. The pt is returned to 2A per stretcher in stable condition.

## 2024-11-15 NOTE — Clinical Note
dry, intact, no bleeding and no hematoma. The 7 FR right internal jugular vein sheath is removed to a manual hold, to hemostasis and a Primapore DSG.

## 2024-11-15 NOTE — DISCHARGE INSTRUCTIONS
[facility]                        Interventional Cardiology  Discharge Instructions   Post Right Heart Catheterization      AFTER YOU GO HOME:  DO drink plenty of fluids  DO resume your regular diet and medications unless otherwise instructed by your Primary Physician  Do Not scrub the procedure site vigorously  No lotion or powder to the puncture site for 3 days    CALL YOUR PRIMARY PHYSICIAN IF: You may resume all normal activity.  Monitor neck site for bleeding, swelling, or voice changes. If you notice bleeding or swelling immediately apply pressure to the site and call number below to speak with Cardiology Fellow.  If you experience any changes in your breathing you should call your doctor immediately or come to the closest Emergency Department.  Do not drive yourself.    ADDITIONAL INSTRUCTIONS: Medications: You are to resume all home medications including anticoagulation therapy unless otherwise advised by your primary cardiologist or nurse coordinator.    Follow Up: Per your primary cardiology team    If you have any questions or concerns regarding your procedure site please call [phone] at anytime and ask for Cardiology Fellow on call.  They are available 24 hours a day.  You may also contact the Cardiology Clinic after hours number at [phone].                                                       Telephone Numbers [phone] Monday-Friday 8:00 am to 4:30 pm    [phone] After 4:30 pm Monday-Friday, Weekends & Holidays  Ask for Interventional Cardiologist on call. Someone is on call 24 hours/day   [facility] toll free number [phone] Monday-Friday 8:00 am to 4:30 pm   [facility] Emergency Dept [phone]

## 2024-11-15 NOTE — PROGRESS NOTES
Arrived from home for a right heart catheterization.  VSS.  Denies pain.  H&P current.  Ready for procedure.

## 2024-11-18 ENCOUNTER — TELEPHONE (OUTPATIENT)
Dept: BEHAVIORAL HEALTH | Facility: CLINIC | Age: 24
End: 2024-11-18
Payer: COMMERCIAL

## 2024-11-18 NOTE — TELEPHONE ENCOUNTER
Appointment reminder, patient confirmed.     Jojo Henry  Transition Clinic Coordinator  11/18/24 2:18 PM

## 2024-11-19 ENCOUNTER — HOSPITAL ENCOUNTER (EMERGENCY)
Facility: CLINIC | Age: 24
Discharge: HOME | End: 2024-11-19
Attending: EMERGENCY MEDICINE
Payer: COMMERCIAL

## 2024-11-19 ENCOUNTER — APPOINTMENT (OUTPATIENT)
Dept: ULTRASOUND IMAGING | Facility: CLINIC | Age: 24
End: 2024-11-19
Attending: PHYSICIAN ASSISTANT
Payer: COMMERCIAL

## 2024-11-19 ENCOUNTER — VIRTUAL VISIT (OUTPATIENT)
Dept: BEHAVIORAL HEALTH | Facility: CLINIC | Age: 24
End: 2024-11-19
Payer: COMMERCIAL

## 2024-11-19 ENCOUNTER — CARE COORDINATION (OUTPATIENT)
Dept: CARDIOLOGY | Facility: CLINIC | Age: 24
End: 2024-11-19
Payer: COMMERCIAL

## 2024-11-19 ENCOUNTER — APPOINTMENT (OUTPATIENT)
Dept: GENERAL RADIOLOGY | Facility: CLINIC | Age: 24
End: 2024-11-19
Attending: EMERGENCY MEDICINE
Payer: COMMERCIAL

## 2024-11-19 VITALS
RESPIRATION RATE: 18 BRPM | DIASTOLIC BLOOD PRESSURE: 64 MMHG | WEIGHT: 159.1 LBS | SYSTOLIC BLOOD PRESSURE: 111 MMHG | HEART RATE: 57 BPM | BODY MASS INDEX: 27.16 KG/M2 | OXYGEN SATURATION: 98 % | TEMPERATURE: 98.7 F | HEIGHT: 64 IN

## 2024-11-19 DIAGNOSIS — M79.671 RIGHT FOOT PAIN: ICD-10-CM

## 2024-11-19 DIAGNOSIS — F43.23 ADJUSTMENT DISORDER WITH MIXED ANXIETY AND DEPRESSED MOOD: Primary | ICD-10-CM

## 2024-11-19 DIAGNOSIS — I42.1 HOCM (HYPERTROPHIC OBSTRUCTIVE CARDIOMYOPATHY) (H): ICD-10-CM

## 2024-11-19 LAB
ANION GAP SERPL CALCULATED.3IONS-SCNC: 12 MMOL/L (ref 7–15)
BASOPHILS # BLD AUTO: 0 10E3/UL (ref 0–0.2)
BASOPHILS NFR BLD AUTO: 0 %
BUN SERPL-MCNC: 6.1 MG/DL (ref 6–20)
CALCIUM SERPL-MCNC: 10 MG/DL (ref 8.8–10.4)
CHLORIDE SERPL-SCNC: 102 MMOL/L (ref 98–107)
CREAT SERPL-MCNC: 0.63 MG/DL (ref 0.51–0.95)
EGFRCR SERPLBLD CKD-EPI 2021: >90 ML/MIN/1.73M2
EOSINOPHIL # BLD AUTO: 0 10E3/UL (ref 0–0.7)
EOSINOPHIL NFR BLD AUTO: 0 %
ERYTHROCYTE [DISTWIDTH] IN BLOOD BY AUTOMATED COUNT: 16.1 % (ref 10–15)
GLUCOSE SERPL-MCNC: 87 MG/DL (ref 70–99)
HCO3 SERPL-SCNC: 23 MMOL/L (ref 22–29)
HCT VFR BLD AUTO: 45 % (ref 35–47)
HGB BLD-MCNC: 14.2 G/DL (ref 11.7–15.7)
IMM GRANULOCYTES # BLD: 0 10E3/UL
IMM GRANULOCYTES NFR BLD: 0 %
LYMPHOCYTES # BLD AUTO: 2.1 10E3/UL (ref 0.8–5.3)
LYMPHOCYTES NFR BLD AUTO: 42 %
MCH RBC QN AUTO: 26.3 PG (ref 26.5–33)
MCHC RBC AUTO-ENTMCNC: 31.6 G/DL (ref 31.5–36.5)
MCV RBC AUTO: 83 FL (ref 78–100)
MONOCYTES # BLD AUTO: 0.4 10E3/UL (ref 0–1.3)
MONOCYTES NFR BLD AUTO: 7 %
NEUTROPHILS # BLD AUTO: 2.4 10E3/UL (ref 1.6–8.3)
NEUTROPHILS NFR BLD AUTO: 50 %
NRBC # BLD AUTO: 0 10E3/UL
NRBC BLD AUTO-RTO: 0 /100
NT-PROBNP SERPL-MCNC: 1110 PG/ML (ref 0–450)
PLATELET # BLD AUTO: 266 10E3/UL (ref 150–450)
POTASSIUM SERPL-SCNC: 4 MMOL/L (ref 3.4–5.3)
RBC # BLD AUTO: 5.4 10E6/UL (ref 3.8–5.2)
SODIUM SERPL-SCNC: 137 MMOL/L (ref 135–145)
WBC # BLD AUTO: 4.9 10E3/UL (ref 4–11)

## 2024-11-19 PROCEDURE — 85004 AUTOMATED DIFF WBC COUNT: CPT | Performed by: PHYSICIAN ASSISTANT

## 2024-11-19 PROCEDURE — 36415 COLL VENOUS BLD VENIPUNCTURE: CPT | Performed by: PHYSICIAN ASSISTANT

## 2024-11-19 PROCEDURE — 99207 PR DROP WITH A PROCEDURE: CPT | Mod: 25

## 2024-11-19 PROCEDURE — 99284 EMERGENCY DEPT VISIT MOD MDM: CPT | Mod: 25 | Performed by: EMERGENCY MEDICINE

## 2024-11-19 PROCEDURE — 93971 EXTREMITY STUDY: CPT | Mod: RT

## 2024-11-19 PROCEDURE — 85014 HEMATOCRIT: CPT | Performed by: PHYSICIAN ASSISTANT

## 2024-11-19 PROCEDURE — 73630 X-RAY EXAM OF FOOT: CPT | Mod: 26 | Performed by: RADIOLOGY

## 2024-11-19 PROCEDURE — 83880 ASSAY OF NATRIURETIC PEPTIDE: CPT | Performed by: PHYSICIAN ASSISTANT

## 2024-11-19 PROCEDURE — 85041 AUTOMATED RBC COUNT: CPT | Performed by: PHYSICIAN ASSISTANT

## 2024-11-19 PROCEDURE — 80048 BASIC METABOLIC PNL TOTAL CA: CPT | Performed by: PHYSICIAN ASSISTANT

## 2024-11-19 PROCEDURE — 93971 EXTREMITY STUDY: CPT | Mod: 26 | Performed by: STUDENT IN AN ORGANIZED HEALTH CARE EDUCATION/TRAINING PROGRAM

## 2024-11-19 PROCEDURE — 73630 X-RAY EXAM OF FOOT: CPT | Mod: RT

## 2024-11-19 PROCEDURE — 99284 EMERGENCY DEPT VISIT MOD MDM: CPT | Mod: FS | Performed by: EMERGENCY MEDICINE

## 2024-11-19 PROCEDURE — 82565 ASSAY OF CREATININE: CPT | Performed by: PHYSICIAN ASSISTANT

## 2024-11-19 ASSESSMENT — COLUMBIA-SUICIDE SEVERITY RATING SCALE - C-SSRS
2. HAVE YOU ACTUALLY HAD ANY THOUGHTS OF KILLING YOURSELF IN THE PAST MONTH?: NO
TOTAL  NUMBER OF ABORTED OR SELF INTERRUPTED ATTEMPTS SINCE LAST CONTACT: NO
1. SINCE LAST CONTACT, HAVE YOU WISHED YOU WERE DEAD OR WISHED YOU COULD GO TO SLEEP AND NOT WAKE UP?: NO
6. HAVE YOU EVER DONE ANYTHING, STARTED TO DO ANYTHING, OR PREPARED TO DO ANYTHING TO END YOUR LIFE?: NO
SUICIDE, SINCE LAST CONTACT: NO
2. HAVE YOU ACTUALLY HAD ANY THOUGHTS OF KILLING YOURSELF?: NO
6. HAVE YOU EVER DONE ANYTHING, STARTED TO DO ANYTHING, OR PREPARED TO DO ANYTHING TO END YOUR LIFE?: NO
TOTAL  NUMBER OF INTERRUPTED ATTEMPTS SINCE LAST CONTACT: NO
ATTEMPT SINCE LAST CONTACT: NO
1. IN THE PAST MONTH, HAVE YOU WISHED YOU WERE DEAD OR WISHED YOU COULD GO TO SLEEP AND NOT WAKE UP?: NO

## 2024-11-19 ASSESSMENT — PATIENT HEALTH QUESTIONNAIRE - PHQ9
SUM OF ALL RESPONSES TO PHQ QUESTIONS 1-9: 18
10. IF YOU CHECKED OFF ANY PROBLEMS, HOW DIFFICULT HAVE THESE PROBLEMS MADE IT FOR YOU TO DO YOUR WORK, TAKE CARE OF THINGS AT HOME, OR GET ALONG WITH OTHER PEOPLE: VERY DIFFICULT
SUM OF ALL RESPONSES TO PHQ QUESTIONS 1-9: 18

## 2024-11-19 ASSESSMENT — ANXIETY QUESTIONNAIRES
3. WORRYING TOO MUCH ABOUT DIFFERENT THINGS: MORE THAN HALF THE DAYS
8. IF YOU CHECKED OFF ANY PROBLEMS, HOW DIFFICULT HAVE THESE MADE IT FOR YOU TO DO YOUR WORK, TAKE CARE OF THINGS AT HOME, OR GET ALONG WITH OTHER PEOPLE?: SOMEWHAT DIFFICULT
GAD7 TOTAL SCORE: 14
GAD7 TOTAL SCORE: 14
IF YOU CHECKED OFF ANY PROBLEMS ON THIS QUESTIONNAIRE, HOW DIFFICULT HAVE THESE PROBLEMS MADE IT FOR YOU TO DO YOUR WORK, TAKE CARE OF THINGS AT HOME, OR GET ALONG WITH OTHER PEOPLE: SOMEWHAT DIFFICULT
GAD7 TOTAL SCORE: 14
4. TROUBLE RELAXING: MORE THAN HALF THE DAYS
GAD7 TOTAL SCORE: 14
4. TROUBLE RELAXING: MORE THAN HALF THE DAYS
1. FEELING NERVOUS, ANXIOUS, OR ON EDGE: MORE THAN HALF THE DAYS
7. FEELING AFRAID AS IF SOMETHING AWFUL MIGHT HAPPEN: MORE THAN HALF THE DAYS
5. BEING SO RESTLESS THAT IT IS HARD TO SIT STILL: MORE THAN HALF THE DAYS
2. NOT BEING ABLE TO STOP OR CONTROL WORRYING: MORE THAN HALF THE DAYS
GAD7 TOTAL SCORE: 14
3. WORRYING TOO MUCH ABOUT DIFFERENT THINGS: MORE THAN HALF THE DAYS
7. FEELING AFRAID AS IF SOMETHING AWFUL MIGHT HAPPEN: MORE THAN HALF THE DAYS
5. BEING SO RESTLESS THAT IT IS HARD TO SIT STILL: MORE THAN HALF THE DAYS
8. IF YOU CHECKED OFF ANY PROBLEMS, HOW DIFFICULT HAVE THESE MADE IT FOR YOU TO DO YOUR WORK, TAKE CARE OF THINGS AT HOME, OR GET ALONG WITH OTHER PEOPLE?: SOMEWHAT DIFFICULT
7. FEELING AFRAID AS IF SOMETHING AWFUL MIGHT HAPPEN: MORE THAN HALF THE DAYS
6. BECOMING EASILY ANNOYED OR IRRITABLE: MORE THAN HALF THE DAYS
1. FEELING NERVOUS, ANXIOUS, OR ON EDGE: MORE THAN HALF THE DAYS
2. NOT BEING ABLE TO STOP OR CONTROL WORRYING: MORE THAN HALF THE DAYS
GAD7 TOTAL SCORE: 14
6. BECOMING EASILY ANNOYED OR IRRITABLE: MORE THAN HALF THE DAYS
IF YOU CHECKED OFF ANY PROBLEMS ON THIS QUESTIONNAIRE, HOW DIFFICULT HAVE THESE PROBLEMS MADE IT FOR YOU TO DO YOUR WORK, TAKE CARE OF THINGS AT HOME, OR GET ALONG WITH OTHER PEOPLE: SOMEWHAT DIFFICULT
7. FEELING AFRAID AS IF SOMETHING AWFUL MIGHT HAPPEN: MORE THAN HALF THE DAYS

## 2024-11-19 ASSESSMENT — ACTIVITIES OF DAILY LIVING (ADL)
ADLS_ACUITY_SCORE: 0

## 2024-11-19 NOTE — PROGRESS NOTES
Transition Clinic Progress Note    Patient Name:   Mary Shaikh Date: 2024          Service Type: Individual        VISIT TIME START: 930      VISIT TIME END: 1000      Session Length:  TC Session Length: 30 (16-37 Minutes)    Session #:  3    Attendees: TC Attendees: Client alone    Service Modality:  Service Modality: Video Visit:      Provider verified identity through the following two step process.  Patient provided:  Patient  and Patient address    Telemedicine Visit: The patient's condition can be safely assessed and treated via synchronous audio and visual telemedicine encounter.      Reason for Telemedicine Visit: Patient convenience (e.g. access to timely appointments / distance to available provider)    Originating Site (Patient Location): Patient's home    Distant Site (Provider Location): [facility]    Consent:  The patient/guardian has verbally consented to: the potential risks and benefits of telemedicine (video visit) versus in person care; bill my insurance or make self-payment for services provided; and responsibility for payment of non-covered services.     Patient would like the video invitation sent by:  My Chart    Mode of Communication:  Video Conference via [facility]    Distant Location (Provider):  Off-site    As the provider I attest to compliance with applicable laws and regulations related to telemedicine.    Informed Consent and Assessment Methods    This provider and patient discussed HIPAA, and the limits of confidentiality; including mandated reporting, the possibility of collaborative discussions with patient's primary care provider and the multidisciplinary team in the MH clinic during consultation.  Discussed the no show policy, and the benefits and possible unintended consequences of therapy.  Patient indicated understanding Transition Clinic services are short term, solution focused, until a referral can be  "made and patient can bridge to long term therapy.  Patient verbally indicated understanding the informed consent.        DATA  Interactive Complexity: No  Crisis: No        Progress Since Last Session (Related to Symptoms / Goals / Homework):   Symptoms: Improving    Homework: Completed in session      Episode of Care Goals: Satisfactory progress - ACTION (Actively working towards change); Intervened by reinforcing change plan / affirming steps taken     Current / Ongoing Stressors and Concerns:  Pt reports improving sxs overall, she states \"I am feeling pretty good, procedure went well, I was worried for nothing\".  Pt reports she [facility], reports they changed provider to a male which she wouldn't usually prefer, however she reports he was very engaging and she would like to try and continue with him.  She requests one more TC follow up session to ensure successful connection to next LOC.  Pt denies current SI, plan, intent, SIB, HI, AH/VH, and/or rosa sxs at this point in time.  CSSR completed.  Pt reports she is hopeful, looking forward to the weekend.  Pt reports parents, siblings, pets, part of consistent support network. Pt reports adequate supports, and denies current substance use.  Pt reports the following protective factors:  willingness to engage in therapy, attached by fam/friends/pet, hopeful, future-oriented, access to variety of clinical interventions, strong bond to family unit, community support, or employment, lives in a responsibly safe and stable environment, help seeking, sense of belonging, constructive use of leisure time, enjoyable activities, resilience.    Pt processed regarding improving sxs, medical procedure from last week-reports much better than expected and able to go home, new therapy connection, supports, coping.  Writer provided active listening, empathy and validation.  Writer and pt explored additional coping skills including " mindfulness, meditation, challenging negative thoughts, self-care and positive self-talk.  Pt appeared engaged and receptive to the above interventions.    Pt connected with [facility] counseling, pt requests and would like one more TC session before discharging transitions clinic to ensure rapport and successful connection to next LOC.    Plan:  12/5/24 at 9:30am      Treatment Objective(s) Addressed in This Session:   Encourage participate in at least 1 activities to improve mood  Encourage challenge negative thoughts and replace with alternatives  Encourage self-care and self-compassion     Intervention:   Solution Focused Brief Therapy   Brief Psychotherapy - discussed and prioritizing the most efficient treatment., Cognitive Behavioral Therapy (CBT) - Discussed changing thoughts is the path to changing behaviors and feelings., Mindfulness Therapy - Cultivation of present-oriented, non-judgmental attitude. It helps nurtures great awareness, clarity, and acceptance of reality., Motivational Interviewing - helping to find the motivation to make positive behavior changes., Person-Centered Therapy - Actualizes potential and moves towards increased awareness, spontaneity, trust in self and inner directedness., and Problem-Solving Therapy (PST) - Identify specific problems; brainstorming, evaluation, implementing and reviewing solutions.    Assessments completed prior to visit:  The following assessments were completed by patient for this visit:  PHQ9:       5/9/2022     3:05 PM 11/4/2024     6:56 PM 11/19/2024     9:12 AM   PHQ-9 SCORE   PHQ-9 Total Score MyChart  21 (Severe depression) 18 (Moderately severe depression)   PHQ-9 Total Score 16 21  18        Patient-reported     GAD7:       11/4/2024     6:56 PM 11/19/2024     9:12 AM   JOSESITO-7 SCORE   Total Score 21 (severe anxiety) 14 (moderate anxiety)   Total Score 21  14        Patient-reported     PROMIS 10-Global Health (only subscores and total score):        11/4/2024     7:17 PM   PROMIS-10 Scores Only   Global Mental Health Score 4    Global Physical Health Score 7    PROMIS TOTAL - SUBSCORES 11        Patient-reported      [place] Suicide Severity Rating Scale (Short Version)      7/31/2024     3:00 PM 11/1/2024    10:01 PM 11/2/2024     2:08 AM 11/2/2024     2:10 AM 11/5/2024     2:00 PM 11/14/2024     7:00 AM 11/19/2024    10:00 AM   [place] Suicide Severity Rating (Short Version)   Q1 Wished to be Dead (Past Month) 0-->no 1-->yes  1-->yes      Q2 Suicidal Thoughts (Past Month) 0-->no 1-->yes  1-->yes      Q3 Suicidal Thought Method  1-->yes  0-->no      Q4 Suicidal Intent without Specific Plan  1-->yes  0-->no      Q5 Suicide Intent with Specific Plan  0-->no  0-->no      Q6 Suicide Behavior (Lifetime) 1-->yes 1-->yes 0-->no       If yes to Q6, within past 3 months? 0-->no 0-->no  0-->no      Level of Risk per Screen moderate risk high risk  moderate risk      1. Wish to be Dead (Since Last Contact)     N N N   2. Non-Specific Active Suicidal Thoughts (Since Last Contact)     N N N   Actual Attempt (Since Last Contact)     N N N   Has subject engaged in non-suicidal self-injurious behavior? (Since Last Contact)     N N N   Interrupted Attempts (Since Last Contact)     N N N   Aborted or Self-Interrupted Attempt (Since Last Contact)     N N N   Preparatory Acts or Behavior (Since Last Contact)     N N N   Suicide (Since Last Contact)     N N N   Calculated C-SSRS Risk Score (Since Last Contact)     No Risk Indicated No Risk Indicated No Risk Indicated        ASSESSMENT: Current Emotional / Mental Status (status of significant symptoms):   Risk status (Self / Other harm or suicidal ideation)   Patient denies current fears or concerns for personal safety.   Patient denies current or recent suicidal ideation or behaviors.   Patient denies current or recent homicidal ideation or behaviors.   Patient denies current or recent self injurious behavior or  ideation.   Patient denies other safety concerns.   Patient reports there has been no change in risk factors since their last session.     Patient reports there has been no change in protective factors since their last session.     Recommended that patient call 911 or go to the local ED should there be a change in any of these risk factors     Appearance:   Appropriate    Eye Contact:   Good    Psychomotor Behavior: Normal    Attitude:   Cooperative  Friendly Pleasant   Orientation:   All   Speech    Rate / Production: Normal     Volume:  Normal    Mood:    Normal   Affect:    Appropriate  Bright    Thought Content:  Clear    Thought Form:  Coherent  Logical    Insight:    Good      Medication Review:   No current psychiatric medications prescribed     Medication Compliance:   NA     Changes in Health Issues:   None reported     Chemical Use Review:   Substance Use: Chemical use reviewed, no active concerns identified      Tobacco Use: No current tobacco use.      Diagnoses:   Adjustment Disorders  309.28 (F43.23) With mixed anxiety and depressed mood    Collateral Reports Completed:   TC Collateral: [facility] electronic medical records reviewed.    PLAN: (Patient Tasks / Therapist Tasks / Other)    Pt connected with [facility], pt requests and would like one more TC session before discharging transitions clinic to ensure rapport and successful connection to next LOC.    TC follow up session: 12/5/24 at 9:30 am    Is this the patient's last discharge?: No    Procedure Code: Psychotherapy (with patient) - 30  [CPT 70430]    ANTHONY FOLEY                                                         ______________________________________________________________________  Safety Plan: Reviewed safety plan with pt, pt agrees to follow, safety plan in this note and in pts active mychart.  If unable to follow safety plan call 911.     [place] Safety Plan     STEP 1: WARNING  "SIGNS  overwhlemed  2. stress     STEP 2: INTERNAL COPING STRATEGIES - THINGS I CAN DO TO TAKE MY MIND OFF   MY PROBLEMS WITHOUT CONTACTING ANOTHER PERSON  Mindfulness meditation  2.  Music and cleaning up  3. Journaling  4. Walks with dog     STEP 3: PEOPLE AND SOCIAL SETTINGS THAT PROVIDE DISTRACTION  STEP 4: PEOPLE WHOM I CAN ASK FOR HELP DURING A CRISIS     1. Name: Contact:       Emily Regan-- [phone]   3. Place: 4. Place: Outside/nature/walk with dog  1. Name: Contact: CRISIS:  [facility] Crisis line:  [phone]     STEP 5: PROFESSIONALS OR AGENCIES I CAN CONTACT DURING A CRISIS:  911  2. Emergency dept  3.  [facility] line:  [phone]     STEP 6: MAKING THE ENVIRONMENT SAFER (PLAN FOR LETHAL MEANS SAFETY)  1.Restrict access to means  2.  Stay with friends/family     [facility] Safety Plan. Lilia Montez and Napoleon Sewell. Used with permission of the  authors.        Grounding Techniques:  Try to notice where you are, your surroundings including the people, the sounds like the TV or  radio.  Concentrate on your breathing. Take a deep cleansing breath from your diaphragm. Count the  breaths as you exhale. Make sure you breath slowly.  Hold something that you find comforting, for some it may be a stuffed animal or a blanket. Notice  how it feels in your hands. Is it hard or soft?  During a non-crisis time make a list of positive affirmations. Print them out and keep them handy  for times of intense anxiety. At those times, read them aloud.  Try the [employer] game:  Name 5 things you can see in the room with you  Name 4 things you can feel (\"chair on my back\" or \"feet on floor\")  Name 3 things you can hear right now (\"people talking\" or \"tv\")  Name 2 things you can smell right now (or, 2 things you like the smell of)  Name 1 good thing about yourself  Create A Safe Place  Image a safe place - it can be a real or imaginary place:  What do you see - especially " "colors?  What sounds do you hear?  What sensations do you feel?  What smells do you smell?  What people or animals would you want in your safe place?  Imagine a protective bubble, wall or boundary around your safe place.  Imagine a door or gate with a guard at your safe place.  Image a lock and key to your safe place and only you can unlock it.  You can draw or make a collage that represents your safe place.  Choose a souvenir of your safe place - a color, an object, a song.  Keep your image of your safe place so you can come back to it when you need to.  Reduce Extreme Emotion QUICKLY: Changing Your Body Chemistry  T: Change your body Temperature to change your autonomic nervous system  Use Ice Water to calm yourself down FAST  Put your face in a bowl of ice water (this is the best way; have the person keep his/her face in ice  water for 30-45 seconds - initial research is showing that the longer s/he can hold her/his face in  the water, the better the response), or  Splash ice water on your face, or hold an ice pack on your face  I: Intensely exercise to calm down a body revved up by emotion  Examples: running, walking fast, jumping, playing basketball, weight lifting, swimming, calisthenics,  etc.  Engage in exercises that DO NOT include violent behaviors. Exercises that utilize violent behaviors  tend to function as \"behavioral rehearsal,\" and rather than calming the person down, may actually  \"rev\" the person up more, increasing the likelihood of violence, and lessening the likelihood that  they will \"burn off\" energy  [place] Safety Plan (continued)  P: Progressively relax your muscles  Starting with your hands, moving to your forearms, upper arms, shoulders, neck, forehead, eyes,  cheeks and lips, tongue and teeth, chest, upper back, stomach, buttocks, thighs, calves, ankles,  feet  Tense (10 seconds,   of the way), then relax each muscle (all the way)  Notice the tension  Notice the difference when " relaxed (by tensing first, and then relaxing, you are able to get a more  thorough relaxation than by simply relaxing)  P: Paced breathing to relax  The standard technique is to begin with counting the number of steps one takes for a typical inhale,  then counting the steps one takes for a typical exhale, and then lengthening the amount of steps  for the exhalation by one or two steps. OR  Repeat this pattern for 1-2 minutes  Inhale for four (4) seconds  Exhale for six (6) to eight (8) seconds  Research demonstrated that one can change one's overall level of anxiety by doing this exercise  for even a few minutes per day     [place] Safety Plan. Lilia Montez and Napoleon Sewell. Used with permission of the  authors.

## 2024-11-19 NOTE — ED PROVIDER NOTES
ED Provider Note  [facility]      History     Chief Complaint   Patient presents with    Leg Swelling     R ankle     HPI  23yo F pmhx HOCM, nonischemic cardiomyopathy, HFrEF (EF:20-25%), paroxysmal SVT with ICD p/w atraumatic right LE edema and pain x last night. Focal to ankle/proximal mid foot. No VTE history, recent surgery, exogenous hormone use. No CP, cough, f/c. Endorses baseline SOB and is compliant with diuretic.       Past Medical History  History reviewed. No pertinent past medical history.  Past Surgical History:   Procedure Laterality Date    CV RIGHT HEART CATH MEASUREMENTS RECORDED N/A 7/31/2024    Procedure: Heart Cath Right Heart Cath;  Surgeon: Tanmay Brice MD;  Location:  HEART CARDIAC CATH LAB    CV RIGHT HEART CATH MEASUREMENTS RECORDED N/A 11/15/2024    Procedure: Heart Cath Right Heart Cath;  Surgeon: Tanmay Brice MD;  Location:  HEART CARDIAC CATH LAB     acetaminophen (TYLENOL) 325 MG tablet  albuterol (PROAIR HFA/PROVENTIL HFA/VENTOLIN HFA) 108 (90 Base) MCG/ACT inhaler  carvedilol (COREG) 6.25 MG tablet  digoxin (LANOXIN) 250 MCG tablet  empagliflozin (JARDIANCE) 10 MG TABS tablet  furosemide (LASIX) 20 MG tablet  loperamide (IMODIUM) 2 MG capsule  melatonin 3 MG tablet  nitroGLYcerin (NITROSTAT) 0.4 MG sublingual tablet  norgestimate-ethinyl estradiol (ESTARYLLA) 0.25-35 MG-MCG tablet  omeprazole (PRILOSEC) 20 MG DR capsule  ondansetron (ZOFRAN ODT) 4 MG ODT tab  polyethylene glycol (MIRALAX) 17 GM/Dose powder  sacubitril-valsartan (ENTRESTO)  MG per tablet  sertraline (ZOLOFT) 50 MG tablet  spironolactone (ALDACTONE) 25 MG tablet      No Known Allergies  Family History  History reviewed. No pertinent family history.  Social History   Social History     Tobacco Use    Smoking status: Never    Smokeless tobacco: Never   Substance Use Topics    Alcohol use: Never    Drug use: Never      A medically appropriate review of systems was performed with  "pertinent positives and negatives noted in the HPI, and all other systems negative.    Physical Exam   BP: 107/67  Pulse: 56  Temp: 98.7  F (37.1  C)  Resp: 16  Height: 162.6 cm (5' 4\")  Weight: 72.2 kg (159 lb 1.6 oz)  SpO2: 100 %/64   Pulse 57   Temp 98.7  F (37.1  C) (Oral)   Resp 18   Ht 1.626 m (5' 4\")   Wt 72.2 kg (159 lb 1.6 oz)   LMP 10/16/2024 (Approximate)   SpO2 98%   BMI 27.31 kg/m    Physical Exam  Constitutional:       General: She is not in acute distress.     Appearance: Normal appearance. She is not diaphoretic.   HENT:      Head: Atraumatic.      Mouth/Throat:      Mouth: Mucous membranes are moist.   Eyes:      Conjunctiva/sclera: Conjunctivae normal.   Cardiovascular:      Rate and Rhythm: Normal rate.   Pulmonary:      Effort: No respiratory distress.   Abdominal:      General: Abdomen is flat.   Musculoskeletal:      Cervical back: Neck supple.        Legs:       Comments: RLE warm and well perfused with strong distal pulse. Sensation intact.  No extremity edema.  No calf TTP.  No overlying skin changes.   Skin:     General: Skin is warm.   Neurological:      Mental Status: She is alert.           ED Course, Procedures, & Data      Procedures                Results for orders placed or performed during the hospital encounter of 11/19/24   US Lower Extremity Venous Duplex Right     Status: None    Narrative    EXAMINATION: DOPPLER VENOUS ULTRASOUND OF THE RIGHT LOWER EXTREMITY,  11/19/2024 2:09 PM     COMPARISON: Vascular ultrasound 8/1/2024    HISTORY: RLE pain, swelling, r/o DVT    TECHNIQUE:  Gray-scale evaluation with compression, spectral flow, and  color Doppler assessment of the deep venous system of the right leg  from groin to knee, and then at the ankle.    FINDINGS:    In the right lower extremity, the common femoral, femoral, popliteal  and posterior tibial veins demonstrate normal compressibility and  blood flow.  Limited transcutaneous ultrasound of the area of " concern  along the lateral aspect of the right foot shows increased fluid in  the joint space likely tarsometatarsal joint.      Impression    IMPRESSION:     1. No evidence of right lower extremity deep venous thrombosis.  2. Limited transcutaneous ultrasound of the area of concern shows  increased nonspecific small joint effusion, likely involving  tarsometatarsal joint. Radiographs right foot may be considered for  further evaluation if clinically desired.    I have personally reviewed the examination and initial interpretation  and I agree with the findings.    DANNY PEREZ MD         SYSTEM ID:  [id number]   XR Foot Right G/E 3 Views     Status: None    Narrative    3 views right foot radiographs 11/19/2024 3:41 PM    History: R mid foot lateral tender/pain     Comparison: Ultrasound same day    Findings:    Nonweightbearing AP, oblique, and lateral  views of the right foot  were obtained.     No acute osseous abnormality.      Lisfranc articulation alignment is congruent on this non-weight  bearing study.    No substantial degenerative change.     Soft tissue is unremarkable.      Impression    Impression:  1. No acute osseous abnormality.  2. No substantial degenerative change.    FOREST JACOBS MD (Joe)         SYSTEM ID:  [id number]   Basic metabolic panel     Status: Normal   Result Value Ref Range    Sodium 137 135 - 145 mmol/L    Potassium 4.0 3.4 - 5.3 mmol/L    Chloride 102 98 - 107 mmol/L    Carbon Dioxide (CO2) 23 22 - 29 mmol/L    Anion Gap 12 7 - 15 mmol/L    Urea Nitrogen 6.1 6.0 - 20.0 mg/dL    Creatinine 0.63 0.51 - 0.95 mg/dL    GFR Estimate >90 >60 mL/min/1.73m2    Calcium 10.0 8.8 - 10.4 mg/dL    Glucose 87 70 - 99 mg/dL   BNP     Status: Abnormal   Result Value Ref Range    N terminal Pro BNP Inpatient 1,110 (H) 0 - 450 pg/mL   CBC with platelets and differential     Status: Abnormal   Result Value Ref Range    WBC Count 4.9 4.0 - 11.0 10e3/uL    RBC Count 5.40 (H) 3.80 - 5.20 10e6/uL     Hemoglobin 14.2 11.7 - 15.7 g/dL    Hematocrit 45.0 35.0 - 47.0 %    MCV 83 78 - 100 fL    MCH 26.3 (L) 26.5 - 33.0 pg    MCHC 31.6 31.5 - 36.5 g/dL    RDW 16.1 (H) 10.0 - 15.0 %    Platelet Count 266 150 - 450 10e3/uL    % Neutrophils 50 %    % Lymphocytes 42 %    % Monocytes 7 %    % Eosinophils 0 %    % Basophils 0 %    % Immature Granulocytes 0 %    NRBCs per 100 WBC 0 <1 /100    Absolute Neutrophils 2.4 1.6 - 8.3 10e3/uL    Absolute Lymphocytes 2.1 0.8 - 5.3 10e3/uL    Absolute Monocytes 0.4 0.0 - 1.3 10e3/uL    Absolute Eosinophils 0.0 0.0 - 0.7 10e3/uL    Absolute Basophils 0.0 0.0 - 0.2 10e3/uL    Absolute Immature Granulocytes 0.0 <=0.4 10e3/uL    Absolute NRBCs 0.0 10e3/uL   CBC with platelets differential     Status: Abnormal    Narrative    The following orders were created for panel order CBC with platelets differential.  Procedure                               Abnormality         Status                     ---------                               -----------         ------                     CBC with platelets and d...[[id number]]  Abnormal            Final result                 Please view results for these tests on the individual orders.     Medications - No data to display  Labs Ordered and Resulted from Time of ED Arrival to Time of ED Departure   NT PROBNP INPATIENT - Abnormal       Result Value    N terminal Pro BNP Inpatient 1,110 (*)    CBC WITH PLATELETS AND DIFFERENTIAL - Abnormal    WBC Count 4.9      RBC Count 5.40 (*)     Hemoglobin 14.2      Hematocrit 45.0      MCV 83      MCH 26.3 (*)     MCHC 31.6      RDW 16.1 (*)     Platelet Count 266      % Neutrophils 50      % Lymphocytes 42      % Monocytes 7      % Eosinophils 0      % Basophils 0      % Immature Granulocytes 0      NRBCs per 100 WBC 0      Absolute Neutrophils 2.4      Absolute Lymphocytes 2.1      Absolute Monocytes 0.4      Absolute Eosinophils 0.0      Absolute Basophils 0.0      Absolute Immature Granulocytes 0.0       Absolute NRBCs 0.0     BASIC METABOLIC PANEL - Normal    Sodium 137      Potassium 4.0      Chloride 102      Carbon Dioxide (CO2) 23      Anion Gap 12      Urea Nitrogen 6.1      Creatinine 0.63      GFR Estimate >90      Calcium 10.0      Glucose 87       XR Foot Right G/E 3 Views   Final Result   Impression:   1. No acute osseous abnormality.   2. No substantial degenerative change.      FOREST JACOBS MD (Joe)            SYSTEM ID:  [id number]      US Lower Extremity Venous Duplex Right   Final Result   IMPRESSION:       1. No evidence of right lower extremity deep venous thrombosis.   2. Limited transcutaneous ultrasound of the area of concern shows   increased nonspecific small joint effusion, likely involving   tarsometatarsal joint. Radiographs right foot may be considered for   further evaluation if clinically desired.      I have personally reviewed the examination and initial interpretation   and I agree with the findings.      DANNY PEREZ MD            SYSTEM ID:  [id number]             Critical care was not performed.     Medical Decision Making  The patient's presentation was of moderate complexity (an undiagnosed new problem with uncertain prognosis).    The patient's evaluation involved:  review of external note(s) from 3+ sources (see separate area of note for details)  review of 3+ test result(s) ordered prior to this encounter (see separate area of note for details)  ordering and/or review of 3+ test(s) in this encounter (see separate area of note for details)    The patient's management necessitated only low risk treatment.    Assessment & Plan    23yo F pmhx HOCM, nonischemic cardiomyopathy, HFrEF (EF:20-25%), paroxysmal SVT with ICD p/w atraumatic right LE edema and pain x last night.  No DVT risk factors.  No acute SOB, orthopnea, BUI to suggest heart failure exacerbation, new lateral HOB unlikely.  No overlying skin changes to suggest cellulitis.  Unlikely septic joint or gout.  Duplex  obtained to rule out DVT and negative. Plain film of foot negative. BNP elevated at 1100, but less than pt's previous BNPs. This couples with unilateral nature of symptoms and no respiratory symptoms, to bolster low concern for CHF exacerbation. BMP and CBC otherwise unremarkable.  Given unremarkable workup today, patiently discharged with PCP follow-up and ER return precautions worsening symptoms.    I have reviewed the nursing notes. I have reviewed the findings, diagnosis, plan and need for follow up with the patient.    Discharge Medication List as of 11/19/2024  3:59 PM          Final diagnoses:   Right foot pain   HOCM (hypertrophic obstructive cardiomyopathy) (H)         Prince Don PA-C  [facility] EMERGENCY DEPARTMENT  11/19/2024     Prince Don PA-C  11/19/24 0948

## 2024-11-19 NOTE — ED TRIAGE NOTES
"Triage Assessment & Note:    /67   Pulse 56   Temp 98.7  F (37.1  C) (Oral)   Resp 16   Ht 1.626 m (5' 4\")   Wt 72.2 kg (159 lb 1.6 oz)   LMP 10/16/2024 (Approximate)   SpO2 100%   BMI 27.31 kg/m        Patient presents with: Cardiology pt comes to triage with right ankle pain. PCP told pt to come to ER for evaluation. No reports of fever, cough, SOB, CP, or travel.     Home Treatments/Remedies: Home medications    Febrile / Afebrile: afebrile    Duration of C/o: < 24 hrs    Sita Bell RN  November 19, 2024            "

## 2024-11-19 NOTE — PROGRESS NOTES
[facility]: Cardiology Nurse Care Coordination   Post Discharge Phone Call Note    Mary Shaikh's most recent inpatient dates and diagnoses:   Admission Date: 11/15/2024   Admission Diagnosis: Acute on chronic systolic heart failure (H) - I50.23   Discharge Date: 11/15/2024   Discharge Diagnosis: Acute on chronic systolic heart failure (H) - I50.23     Per hospital discharge summary and inpatient provider notes:     Conclusion         Right sided filling pressures are normal.    Left sided filling pressures are normal.    Normal PA pressures.    Left ventricular filling pressures are normal.    Borderline cardiac output            Recommendations    General Recommendations:  - Patient given specific instructions regarding care of arteriotomy site, activity restrictions, signs and symptoms of cardiac or vascular complications and to seek immediate medical evaluation should they occur.        Post Discharge Assessment:  Patient's mom states the insertion site is good, no concerns. Of note, they are heading in to the ER now to assess her right foot which is swollen and painful.       Medication Review:  I have reviewed all Mary's medications per the discharge summary with particular attention to any medication changes, including discontinued and new medications.     Follow-Up Plan:  [facility] on 1/6/25.      Future Appointments   Date Time Provider Department Center   11/21/2024 11:00 AM Baldomero Torres, PhD [facility]   12/5/2024  9:30 AM Kelsie Michael LGSW [facility]   12/12/2024 12:00 AM  ICD REMOTE [facility]   12/14/2024  8:00 AM Phillip Levi MD [facility]   1/6/2025  8:00 AM  [facility]   1/6/2025  8:20 AM Jocelyn Pichardo APRN [facility]   1/22/2025 10:00 AM Rahul Aguilar MD [facility]   2/24/2025  2:30 PM Hipolito Fuentes MD [facility]   3/17/2025 12:00 AM  ICD REMOTE [facility]      I reviewed the above appointments with Mary and flii assisting in scheduling  additional appointments needed per the discharge summary above.       I reviewed the Cardiology clinic's phone number ([phone] Option #1 and after hours on-call number [phone] #4 and ask for the On-Call Cardiologist) and have advised Mary to contact us with changes or worsening of symptoms, additional questions about medications and appt scheduling needs.   Mary verbalizes understanding and agrees with plan of care.

## 2024-11-19 NOTE — DISCHARGE INSTRUCTIONS
For pain, please take 975-1000mg acetaminophen (tylenol) every 8 hours -- do not take more than 3000mg in a 24 hour period.    You can also take 600mg ibuprofen (motrin/advil) every 6 hours for pain  Keep off right foot when possible and keep elevated when possible.   Please call today to schedule a follow-up appointment with your primary medical doctor in 3-5 days for reevaluation, which is important after today's emergency department visit.   Please return to emergency department for fever>104F, persistent vomiting, worsening chest pain, shortness of breath or worsening foot pain/redness

## 2024-11-20 ASSESSMENT — PATIENT HEALTH QUESTIONNAIRE - PHQ9
SUM OF ALL RESPONSES TO PHQ QUESTIONS 1-9: 17
10. IF YOU CHECKED OFF ANY PROBLEMS, HOW DIFFICULT HAVE THESE PROBLEMS MADE IT FOR YOU TO DO YOUR WORK, TAKE CARE OF THINGS AT HOME, OR GET ALONG WITH OTHER PEOPLE: VERY DIFFICULT
SUM OF ALL RESPONSES TO PHQ QUESTIONS 1-9: 17

## 2024-11-21 ENCOUNTER — VIRTUAL VISIT (OUTPATIENT)
Dept: PSYCHOLOGY | Facility: CLINIC | Age: 24
End: 2024-11-21
Payer: COMMERCIAL

## 2024-11-21 DIAGNOSIS — F43.23 ADJUSTMENT DISORDER WITH MIXED ANXIETY AND DEPRESSED MOOD: Primary | ICD-10-CM

## 2024-11-21 NOTE — PROGRESS NOTES
Psychodiagnostic Evaluation  Health Psychology Clinic, Division of General Internal Medicine, Department of Medicine  [facility]    Patient name: Mary Shaikh  MRN: [id number]    Referral requested by: Hipolito Fuentes MD   Referral reason: Assistance coping with chronic illness    Patient Demographics (per chart):  Age: 24 year old  Biological Sex: female  Race: Black or   Ethnicity: Choose not to answer    History of Presenting Concerns:  Ms. Shaikh is a 24 year oldy/o, Black or , female who presents to the Health Psychology clinic with anxiety and depression sxs in the context of chronic medical illness. 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     Medical History (per chart):  Patient Active Problem List   Diagnosis    Acute on chronic systolic heart failure (H)    Nonischemic cardiomyopathy (H)    Anxiety    Biliary dyskinesia    Chronic cholecystitis    Heart failure with reduced ejection fraction (H)    Hypertrophic nonobstructive cardiomyopathy (H)    ICD (implantable cardioverter-defibrillator) in place    Moderate asthma without complication    Moderate episode of recurrent major depressive disorder (H)    NSVT (nonsustained ventricular tachycardia) (H)    Paroxysmal SVT (supraventricular tachycardia) (H)    Josselyn Parkinson White pattern seen on electrocardiogram    Diabetes mellitus, type 2 (H)    Adjustment disorder with mixed anxiety and depressed mood      No past medical history on file.  Past Surgical History:   Procedure Laterality Date    CV RIGHT HEART CATH MEASUREMENTS RECORDED N/A 7/31/2024    CV RIGHT HEART CATH MEASUREMENTS RECORDED N/A 11/15/2024     Current Outpatient Medications   Medication Sig Dispense Refill    acetaminophen (TYLENOL) 325 MG tablet Take  "650 mg by mouth      albuterol (PROAIR HFA/PROVENTIL HFA/VENTOLIN HFA) 108 (90 Base) MCG/ACT inhaler Inhale 1-2 puffs into the lungs      carvedilol (COREG) 6.25 MG tablet Take 1 tablet (6.25 mg) by mouth 2 times daily (with meals). 60 tablet 5    digoxin (LANOXIN) 250 MCG tablet Take 1 tablet (250 mcg) by mouth daily. 90 tablet 3    empagliflozin (JARDIANCE) 10 MG TABS tablet Take 1 tablet (10 mg) by mouth daily. 90 tablet 3    furosemide (LASIX) 20 MG tablet Take 1 tablet (20 mg) by mouth daily. 90 tablet 3    loperamide (IMODIUM) 2 MG capsule Take 4 mg by mouth as needed for diarrhea      melatonin 3 MG tablet Take 3 mg by mouth nightly as needed      nitroGLYcerin (NITROSTAT) 0.4 MG sublingual tablet For chest pain place 1 tablet under the tongue every 5 minutes for 3 doses. If symptoms persist 5 minutes after 1st dose call 911. 10 tablet 1    norgestimate-ethinyl estradiol (ESTARYLLA) 0.25-35 MG-MCG tablet Take 1 tablet by mouth at bedtime      omeprazole (PRILOSEC) 20 MG DR capsule Take 20 mg by mouth daily      ondansetron (ZOFRAN ODT) 4 MG ODT tab Place 4 mg under the tongue as needed      polyethylene glycol (MIRALAX) 17 GM/Dose powder Take 17 g by mouth daily      sacubitril-valsartan (ENTRESTO)  MG per tablet Take 1 tablet by mouth 2 times daily. 180 tablet 3    sertraline (ZOLOFT) 50 MG tablet Take 50 mg by mouth daily      spironolactone (ALDACTONE) 25 MG tablet Take 0.5 tablets (12.5 mg) by mouth daily. 90 tablet 3     No current facility-administered medications for this visit.     Wt Readings from Last 4 Encounters:   11/19/24 72.2 kg (159 lb 1.6 oz)   11/15/24 71.5 kg (157 lb 9.6 oz)   11/01/24 73 kg (161 lb)   10/28/24 73.1 kg (161 lb 3.2 oz)      Estimated body mass index is 27.31 kg/m  as calculated from the following:    Height as of 11/19/24: 1.626 m (5' 4\").    Weight as of 11/19/24: 72.2 kg (159 lb 1.6 oz).     Social History:   Pt reported that she currently lives in a single family " home with her mother and stepfather. Pt reported that she was raised by both her biological mother, biological father, and stepfather. She reported having 3 siblings whom she is close with but who live elsewhere. She denied a romantic relationship. She reported having a social support network including her parents and grandparents and denied any close friendships. She stated that she is [employer] in [place]. She reported that she works part time in [place] and holds a degree in [place]. Pt denied a hx of abuse or trauma.      Health Behaviors:  Caffeine: Pt denied caffeine use  Alcohol: Pt denied alcohol use   Tobacco: Pt denied tobacco use  Marijunia: Pt denied marijuana use   Illicit substances: Pt denied illicit substance use   Physical activity/exercise: Pt denied physical activity or exercise   Sleep: Pt reported difficulty falling and staying asleep.   Medication adherence: Pt denied medication adherence concerns     Psychiatric History:  Pt reported a hx of depression and anxiety sxs beginning at time of Danon's disease dx, and sxs appear to relate mostly to coping with chronic illness. Pt denied a hx of mood sxs prior to 2 years ago. Pt reported sxs of depression including feeling sad/down/hopeless and anhedonia. Pt reported sxs of anxiety (mostly related to illness) including worry/anxiety that is difficult to control or stop, a feeling that something awful might happen, restlessness, and difficulty with sleep. She did report a history of 2 suicide attempts with brief psychiatric hospitalizations approximately 8 years ago, which is inconsistent with reporting of minimal mood sxs prior to Danon's disease dx. She reported no current suicidal ideation, plans, or intentions and no self-harm behaviors. Previous neuropsychological testing indicated that pt meets criteria for specific learning disability related to Danon's disease and currently reads at a 3-grade level. Patient denied a history of, or  current, mental health care outside of brief hospitalization and prescription of antidepressant medication 8 years ago. Patient denied a history of, or current, substance use treatment.     Assessment:  Depression Symptoms (Patient Health Questionnaire 9; PHQ-9)  The PHQ-9 is a measure of depressive symptoms.  Scores on this measure range from 0 to 27 with higher scores reflecting greater levels and frequency of depressive symptoms. Typical symptom ranges include: 0-4 minimal, 5-9 mild, 10-14 moderate, 15-19 moderately severe, 20-27 severe.       11/4/2024     6:56 PM 11/19/2024     9:12 AM 11/20/2024     1:11 PM   PHQ-9 SCORE   PHQ-9 Total Score MyChart 21 (Severe depression) 18 (Moderately severe depression) 17 (Moderately severe depression)   PHQ-9 Total Score 21  18  17        Patient-reported     Anxiety Symptoms (Generalized Anxiety Disorder 7; JOSESITO-7)  The JOSESITO-7 is a measure of anxiety and panic symptoms.  Scores on this measure range from 0 to 21 with higher scores reflecting greater levels and frequency of anxiety symptoms. Typical symptom ranges include: 0-4 minimal, 5-9 mild, 10-14 moderate, 15-21 severe.       11/4/2024     6:56 PM 11/19/2024     9:12 AM   JOSESITO-7 SCORE   Total Score 21 (severe anxiety) 14 (moderate anxiety)   Total Score 21  14        Patient-reported     Alcohol and Drug Abuse (CAGE - Adapted to Include Drugs; CAGE-AID)  The CAGE-AID is a screening tool to assess for symptoms of alcohol or drug abuse or dependence. Scores on this measure range from 0 to 4, with higher scores reflecting experiences consistent with problem use.  CAGE-AID score  > 1 is a positive screen, suggesting further discussion is needed to determine if evaluation for alcohol or substance abuse is appropriate.  A score > 2 is considered clinically significant, suggesting further evaluation of alcohol or substance-related problems is indicated.      11/4/2024     7:18 PM   CAGE-AID Total Score   Total Score 0    Total  Score MyChart 0 (A total score of 2 or greater is considered clinically significant)       Patient-reported     Global Health (Patient-Reported Outcomes Measurement Information System; PROMIS-10)  The PROMIS-10 is a measure of global physical and mental health. Total scores on this measure range from 8 to 40, with higher scores reflecting greater levels of perceived health.       11/4/2024     7:17 PM 11/20/2024     1:11 PM   PROMIS-10 Scores Only   Global Mental Health Score 4  9    Global Physical Health Score 7  11    PROMIS TOTAL - SUBSCORES 11  20        Patient-reported     Mental Status/Interview:  Appearance:   Appropriate   Eye Contact:   Good   Psychomotor Behavior:  Normal   Attitude:   Cooperative   Orientation:   All  Speech    Normal       Normal   Mood:    Anxious  Depressed   Affect:    Appropriate   Thought Content:   Clear  Thought Form:   Coherent  Logical     Insight:    Did not formally assess at this time.       Suicidal ideation: Pt denied active suicidal ideation.   Homicidal ideation: Pt denied active homicidal ideation.    Impression:  Based on this interview and results from the assessments administered on this date, Ms. Shaikh appears to be experiencing symptoms of anxiety and depression that appear to be related to medical illness. Symptom type, frequency, and intensity indicate a diagnosis of adjustment disorder with mixed anxiety and depression.  This patient has the following strengths: help seeking. Patient engages in the following healthy behaviors: abstinence from substances, and adherence to medical advice. Patient would likely benefit from CBT for mood sxs.    Recommendations/Plan:  Return for therapy sessions.     Diagnosis:  Adjustment disorder with mixed anxiety and depressed mood (ICD-10: F43.20)  Specific learning disability     Date of Service:  11/21/24    Session Length:  Start Time: 11:00am  End Time: 11:40am    Modality: Telemedicine Information:  Type of service:    Telemedicine psychotherapy  Patient location:   Patient home in [place]    Patient telephone number: [phone]  Provider location:  At home office in [place]  Mode of Communication:   Video Conference via [employer]   Patient consent to telemedicine services? Yes  It has been determined that telemedicine service is appropriate and effective for this patient.   The patient has been notified that: Video visits will be conducted via a call with their psychologist to provide the care they need with a video conversation. Video visits may be billed at different rates depending on insurance coverage.  Patients are advised to contact their insurance provider with any questions about their health insurance coverage. If during the course of a call the psychologist feels a video visit is not appropriate, patients will not be charged for this service.    Confidential Summary of Health Psychology Evaluation:  The patient was provided information about Health Psychology Services, including billing and limits to confidentiality and documentation in electronic medical records. Provided opportunity to ask questions and obtained verbal consent for treatment.    Baldomero Torres, PhD,   Clinical Health Psychology Fellow    *This case is being supervised by Kale Phillips, Ph.D., A.B.P.P., L.P..  *This note was completed at least in part using Dragon voice recognition software. Although reviewed after completion, some word and grammatical errors may occur.  *In accordance with the Rules of the [place] Board [employer], it is noted that psychological descriptions and scientific procedures underlying psychological evaluations have limitations. Absolute predictions cannot be made based on information in this report.

## 2024-11-24 ENCOUNTER — HEALTH MAINTENANCE LETTER (OUTPATIENT)
Age: 24
End: 2024-11-24

## 2024-11-25 NOTE — TELEPHONE ENCOUNTER
RECORDS RECEIVED FROM: internal   REASON FOR VISIT: Danon disease    PROVIDER: Dr. Aguilar   DATE OF APPT: 1/22/25   NOTES (FOR ALL VISITS) STATUS DETAILS   OFFICE NOTE from referring provider Internal Dr Fuentes @ [facility]:  10/28/24  7/22/24  (Additional encounters)   MEDICATION LIST Internal    IMAGING  (FOR ALL VISITS)     CT (HEAD, NECK, SPINE) Internal [facility]:  CT Head 8/1/24

## 2024-11-25 NOTE — PLAN OF CARE
Major Shift Events:  Patient A&Ox4. Able to make needs known using call light. Independent, steady. No pain noted. Sinus rhythm 60s-90s with frequent ectopy. 3 runs of SVT 140s-160s. Self converts using valsalva maneuvers. Patient knows when she's in SVT when she feels heart racing or hears monitor alarming to begin maneuvers. Maintaining MAP <90 without interventions. On room air sating >95%, LS clear. Patient had one bowel movement and voids spontaneously without difficulty, currently menstruating.     Plan: Notify primary team of any changes, continue with plan of care.    For vital signs and complete assessments, please see documentation flowsheets.    Detail Level: Zone Anxiety (includes Panic, OCD) Dapsone Counseling: I discussed with the patient the risks of dapsone including but not limited to hemolytic anemia, agranulocytosis, rashes, methemoglobinemia, kidney failure, peripheral neuropathy, headaches, GI upset, and liver toxicity.  Patients who start dapsone require monitoring including baseline LFTs and weekly CBCs for the first month, then every month thereafter.  The patient verbalized understanding of the proper use and possible adverse effects of dapsone.  All of the patient's questions and concerns were addressed. Spironolactone Counseling: Patient advised regarding risks of diarrhea, abdominal pain, hyperkalemia, birth defects (for female patients), liver toxicity and renal toxicity. The patient may need blood work to monitor liver and kidney function and potassium levels while on therapy. The patient verbalized understanding of the proper use and possible adverse effects of spironolactone.  All of the patient's questions and concerns were addressed. Isotretinoin Pregnancy And Lactation Text: This medication is Pregnancy Category X and is considered extremely dangerous during pregnancy. It is unknown if it is excreted in breast milk. Azelaic Acid Pregnancy And Lactation Text: This medication is considered safe during pregnancy and breast feeding. Topical Clindamycin Counseling: Patient counseled that this medication may cause skin irritation or allergic reactions.  In the event of skin irritation, the patient was advised to reduce the amount of the drug applied or use it less frequently.   The patient verbalized understanding of the proper use and possible adverse effects of clindamycin.  All of the patient's questions and concerns were addressed. Use Enhanced Medication Counseling?: No Minocycline Counseling: Patient advised regarding possible photosensitivity and discoloration of the teeth, skin, lips, tongue and gums.  Patient instructed to avoid sunlight, if possible.  When exposed to sunlight, patients should wear protective clothing, sunglasses, and sunscreen.  The patient was instructed to call the office immediately if the following severe adverse effects occur:  hearing changes, easy bruising/bleeding, severe headache, or vision changes.  The patient verbalized understanding of the proper use and possible adverse effects of minocycline.  All of the patient's questions and concerns were addressed. Tetracycline Pregnancy And Lactation Text: This medication is Pregnancy Category D and not consider safe during pregnancy. It is also excreted in breast milk. Bactrim Counseling:  I discussed with the patient the risks of sulfa antibiotics including but not limited to GI upset, allergic reaction, drug rash, diarrhea, dizziness, photosensitivity, and yeast infections.  Rarely, more serious reactions can occur including but not limited to aplastic anemia, agranulocytosis, methemoglobinemia, blood dyscrasias, liver or kidney failure, lung infiltrates or desquamative/blistering drug rashes. Doxycycline Pregnancy And Lactation Text: This medication is Pregnancy Category D and not consider safe during pregnancy. It is also excreted in breast milk but is considered safe for shorter treatment courses. Topical Retinoid counseling:  Patient advised to apply a pea-sized amount only at bedtime and wait 30 minutes after washing their face before applying.  If too drying, patient may add a non-comedogenic moisturizer. The patient verbalized understanding of the proper use and possible adverse effects of retinoids.  All of the patient's questions and concerns were addressed. Topical Sulfur Applications Pregnancy And Lactation Text: This medication is Pregnancy Category C and has an unknown safety profile during pregnancy. It is unknown if this topical medication is excreted in breast milk. Winlevi Pregnancy And Lactation Text: This medication is considered safe during pregnancy and breastfeeding. Birth Control Pills Counseling: Birth Control Pill Counseling: I discussed with the patient the potential side effects of OCPs including but not limited to increased risk of stroke, heart attack, thrombophlebitis, deep venous thrombosis, hepatic adenomas, breast changes, GI upset, headaches, and depression.  The patient verbalized understanding of the proper use and possible adverse effects of OCPs. All of the patient's questions and concerns were addressed. Erythromycin Pregnancy And Lactation Text: This medication is Pregnancy Category B and is considered safe during pregnancy. It is also excreted in breast milk. Sarecycline Counseling: Patient advised regarding possible photosensitivity and discoloration of the teeth, skin, lips, tongue and gums.  Patient instructed to avoid sunlight, if possible.  When exposed to sunlight, patients should wear protective clothing, sunglasses, and sunscreen.  The patient was instructed to call the office immediately if the following severe adverse effects occur:  hearing changes, easy bruising/bleeding, severe headache, or vision changes.  The patient verbalized understanding of the proper use and possible adverse effects of sarecycline.  All of the patient's questions and concerns were addressed. Aklief Pregnancy And Lactation Text: It is unknown if this medication is safe to use during pregnancy.  It is unknown if this medication is excreted in breast milk.  Breastfeeding women should use the topical cream on the smallest area of the skin for the shortest time needed while breastfeeding.  Do not apply to nipple and areola. Tazorac Counseling:  Patient advised that medication is irritating and drying.  Patient may need to apply sparingly and wash off after an hour before eventually leaving it on overnight.  The patient verbalized understanding of the proper use and possible adverse effects of tazorac.  All of the patient's questions and concerns were addressed. Isotretinoin Counseling: Patient should get monthly blood tests, not donate blood, not drive at night if vision affected, not share medication, and not undergo elective surgery for 6 months after tx completed. Side effects reviewed, pt to contact office should one occur. Birth Control Pills Pregnancy And Lactation Text: This medication should be avoided if pregnant and for the first 30 days post-partum. Azelaic Acid Counseling: Patient counseled that medicine may cause skin irritation and to avoid applying near the eyes.  In the event of skin irritation, the patient was advised to reduce the amount of the drug applied or use it less frequently.   The patient verbalized understanding of the proper use and possible adverse effects of azelaic acid.  All of the patient's questions and concerns were addressed. Tazorac Pregnancy And Lactation Text: This medication is not safe during pregnancy. It is unknown if this medication is excreted in breast milk. Benzoyl Peroxide Counseling: Patient counseled that medicine may cause skin irritation and bleach clothing.  In the event of skin irritation, the patient was advised to reduce the amount of the drug applied or use it less frequently.   The patient verbalized understanding of the proper use and possible adverse effects of benzoyl peroxide.  All of the patient's questions and concerns were addressed. Topical Clindamycin Pregnancy And Lactation Text: This medication is Pregnancy Category B and is considered safe during pregnancy. It is unknown if it is excreted in breast milk. High Dose Vitamin A Counseling: Side effects reviewed, pt to contact office should one occur. Dapsone Pregnancy And Lactation Text: This medication is Pregnancy Category C and is not considered safe during pregnancy or breast feeding. Spironolactone Pregnancy And Lactation Text: This medication can cause feminization of the male fetus and should be avoided during pregnancy. The active metabolite is also found in breast milk. Azithromycin Counseling:  I discussed with the patient the risks of azithromycin including but not limited to GI upset, allergic reaction, drug rash, diarrhea, and yeast infections. Topical Retinoid Pregnancy And Lactation Text: This medication is Pregnancy Category C. It is unknown if this medication is excreted in breast milk. Winlevi Counseling:  I discussed with the patient the risks of topical clascoterone including but not limited to erythema, scaling, itching, and stinging. Patient voiced their understanding. Azithromycin Pregnancy And Lactation Text: This medication is considered safe during pregnancy and is also secreted in breast milk. Doxycycline Counseling:  Patient counseled regarding possible photosensitivity and increased risk for sunburn.  Patient instructed to avoid sunlight, if possible.  When exposed to sunlight, patients should wear protective clothing, sunglasses, and sunscreen.  The patient was instructed to call the office immediately if the following severe adverse effects occur:  hearing changes, easy bruising/bleeding, severe headache, or vision changes.  The patient verbalized understanding of the proper use and possible adverse effects of doxycycline.  All of the patient's questions and concerns were addressed. Tetracycline Counseling: Patient counseled regarding possible photosensitivity and increased risk for sunburn.  Patient instructed to avoid sunlight, if possible.  When exposed to sunlight, patients should wear protective clothing, sunglasses, and sunscreen.  The patient was instructed to call the office immediately if the following severe adverse effects occur:  hearing changes, easy bruising/bleeding, severe headache, or vision changes.  The patient verbalized understanding of the proper use and possible adverse effects of tetracycline.  All of the patient's questions and concerns were addressed. Patient understands to avoid pregnancy while on therapy due to potential birth defects. High Dose Vitamin A Pregnancy And Lactation Text: High dose vitamin A therapy is contraindicated during pregnancy and breast feeding. Benzoyl Peroxide Pregnancy And Lactation Text: This medication is Pregnancy Category C. It is unknown if benzoyl peroxide is excreted in breast milk. Topical Sulfur Applications Counseling: Topical Sulfur Counseling: Patient counseled that this medication may cause skin irritation or allergic reactions.  In the event of skin irritation, the patient was advised to reduce the amount of the drug applied or use it less frequently.   The patient verbalized understanding of the proper use and possible adverse effects of topical sulfur application.  All of the patient's questions and concerns were addressed. Detail Level: Simple Aklief counseling:  Patient advised to apply a pea-sized amount only at bedtime and wait 30 minutes after washing their face before applying.  If too drying, patient may add a non-comedogenic moisturizer.  The most commonly reported side effects including irritation, redness, scaling, dryness, stinging, burning, itching, and increased risk of sunburn.  The patient verbalized understanding of the proper use and possible adverse effects of retinoids.  All of the patient's questions and concerns were addressed. Bactrim Pregnancy And Lactation Text: This medication is Pregnancy Category D and is known to cause fetal risk.  It is also excreted in breast milk. Erythromycin Counseling:  I discussed with the patient the risks of erythromycin including but not limited to GI upset, allergic reaction, drug rash, diarrhea, increase in liver enzymes, and yeast infections.

## 2024-12-03 ENCOUNTER — TELEPHONE (OUTPATIENT)
Dept: CARDIOLOGY | Facility: CLINIC | Age: 24
End: 2024-12-03
Payer: COMMERCIAL

## 2024-12-03 NOTE — TELEPHONE ENCOUNTER
12/3/2024 3:57PM Dinorah Galeana  Patient confirmed rescheduled appointment:  Date: 12/4/2024  Time: 8:15AM  Visit type: Return EP (Video visit)  Provider: Dr. Levi  Location: Video Visit; [address] &[place] [address]  Testing/imaging: NA  Additional notes: 12/3 Rescheduled Return EP video visit w/ Dr. Levi 12/4. SEDA Galeana 12/3/2024 3:57PM

## 2024-12-04 ENCOUNTER — VIRTUAL VISIT (OUTPATIENT)
Dept: CARDIOLOGY | Facility: CLINIC | Age: 24
End: 2024-12-04
Attending: INTERNAL MEDICINE
Payer: COMMERCIAL

## 2024-12-04 VITALS — WEIGHT: 161 LBS | BODY MASS INDEX: 27.49 KG/M2 | HEIGHT: 64 IN

## 2024-12-04 DIAGNOSIS — Z95.810 ICD (IMPLANTABLE CARDIOVERTER-DEFIBRILLATOR) IN PLACE: ICD-10-CM

## 2024-12-04 DIAGNOSIS — I47.10 PAROXYSMAL SVT (SUPRAVENTRICULAR TACHYCARDIA) (H): Primary | ICD-10-CM

## 2024-12-04 PROCEDURE — 99207 CARDIAC DEVICE CHECK - REMOTE: CPT | Performed by: INTERNAL MEDICINE

## 2024-12-04 PROCEDURE — 99215 OFFICE O/P EST HI 40 MIN: CPT | Mod: GT | Performed by: INTERNAL MEDICINE

## 2024-12-04 ASSESSMENT — PAIN SCALES - GENERAL: PAINLEVEL_OUTOF10: NO PAIN (0)

## 2024-12-04 NOTE — PATIENT INSTRUCTIONS
Plan:    Follow up in 4-6 months.  No med changes.      Your Care Team:  EP Cardiology   Telephone Number     Shayan Burch RN [phone]    After business hours: [phone], ask for cardiologist on-call   Non-procedure scheduling:    Carlee CARRION   [phone]   Procedure scheduling:    Mariangel Glynn   [phone]   Device Clinic (Pacemakers, ICDs, Loop Recorders)    During business hours: [phone]  After business hours:   [phone]- select option 4 and ask for job code [id number].       Cardiovascular Clinic:   [address] [place] [address]. [place] [address]      As always, thank you for trusting us with your health care needs!

## 2024-12-04 NOTE — LETTER
12/4/2024      RE: Mary Shaikh  [address]  [place] [address]       Dear Colleague,    Thank you for the opportunity to participate in the care of your patient, Mary Shaikh, at the [facility] [place] at [facility]. Please see a copy of my visit note below.    Virtual Visit Details    Type of service:  Video Visit     Originating Location (pt. Location): Home    Distant Location (provider location):  On-site  Platform used for Video Visit: Auto I.D.            ELECTROPHYSIOLOGY VIDEO CLINIC VISIT    Assessment/Recommendations     Assessment/Plan:    Ms. 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     Patient developed brief episode of SVT while being in the hospital back in October. 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 She was started on digoxin. Hasn't had recurrence since then.     Today patient reports doing well.  She states having intermittent palpitations which are not so bothersome to her.  We reviewed device interrogation tracings which showed brief episodes of atrial fibrillation lasting less than 1 minute.  She appears to be doing well with digoxin.  We will not change medical therapy for now and will continue to observe.  Will see her again in 4 to 6 months      -follow up in 4 to 6 months     History of Present Illness/Subjective    Ms. 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        As you know, Ms. 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 She was seen in the emergency department at [facility] and was found to have prexcitation on her ECG in 2021, and this lead to an exercise ECG test which she achieved 10.8 METs, exercise duration of 9 min and 16 seconds, had no exercised induced arrhythmias, she did have a delta wave and repolarization abnormalities. Subsequently, she had an echocardiogram which showed an LVEF of 51% with asymetric LVH (septum 1.5cm). She has been following with Dr. Swift in the [facility] cardiology clinic and she obtained a CMR which showed and LVEF of 43%, LVEDD 4.9cm, IV septum 1.7cm,  normal RV function but RVH noted, no LOU, harman LGE with subendocardial, mid myocardial, and subendocardial enhancement not associated with a vascular territory.      She was seen by Dr. Fuentes on July 22, 2024. She was admitted after her RHC from 07/31-08/05. Her RHC showed an RA of 20, PA 51/23, 36, PCWP 29, Juan CO/CI 3.13/1.68, Thermo CO/CI 2.9/1.55 and a PVR of 1.9.     On 8/1 she was admitted for ambulatory cardiogenic shock with elevated filling pressures and severe biventricular dysfunction on for swan-guided therapies with IV afterload reduction and diuresis with a hospital course that has been complicated by paroxysmal SVT for which EP is consulted. Our colleague Dr. Will evaluated patient: \"It appears that patient has had brief episodes of symptomatic SVT while in the hospital, the most recent one was responsive to vagal maneuvers.  Etiology for the SVT is most likely secondary to AVNRT especially since the Para-Hisian RV pacing only showed retrograde conduction through the AV node with no evidence for accessory pathway conduction so it is " "less likely AVRT\" She was initiate on oral digoxin. She is also in a low dose of BB carvedilolg 6.25 mg BID.     Patient reports doing well this morning. She follows up with Dr. Fuentes. No chest pain, palpitations or SOB. Endorses ongoing compliance with her medications. Has intermittently palpitations.     I have reviewed and updated the patient's Past Medical History, Social History, Family History and Medication List.     Cardiographics (Personally Reviewed) :   Echo 7/2024:   Interpretation Summary  Left ventricular function is decreased. The ejection fraction is 20-25%  (severely reduced).  Global right ventricular function is moderately reduced.  Moderate biatrial enlargement is present.  IVC diameter >2.1 cm collapsing <50% with sniff suggests a high RA pressure  estimated at 15 mmHg or greater.  Trivial pericardial effusion is present.  There is no prior study for direct comparison.    Coronary Angiogram 7/2024:     Right sided filling pressures are severely elevated.     Left sided filling pressures are severely elevated.     Moderately elevated pulmonary artery hypertension.     Reduced cardiac output level.     Severely elevated biventricular filling pressures with ambulatory cardiogenic shock       CMR 4/2024:  1) Decreased left ventricular systolic function-severe; calculated ejection fraction 26%.   2) Asymmetric septal hypertrophy relative to the posterior wall.   3) Multiple regional wall motion abnormalities-mid to distal anterior wall akinetic, distal inferior wall akinetic, and apex akinetic.   4) Extensive late gadolinium hyperenhancement involving the mid-apical anterior wall, mid-apical inferior wall. The apical anterior wall and apex exhibit near transmural hyperenhancement.   5) Borderline elevated extracellular volume on parametric assessment (reported above).   6) Small, circumferential pericardial effusion.     Compared to prior study dated 11-, the left ventricular ejection " fraction has decreased; segmental regional dysfunction is now apparent and extensive late gadolinium hyperenhancement continues to be present. In this patient with known diagnosis of Danon's disease, the above findings may suggest disease progression.            Physical Examination   LMP 10/16/2024 (Approximate)   Wt Readings from Last 3 Encounters:   11/19/24 72.2 kg (159 lb 1.6 oz)   11/15/24 71.5 kg (157 lb 9.6 oz)   11/01/24 73 kg (161 lb)     VIDEO VISIT       Medications  Allergies   Current Outpatient Medications   Medication Sig Dispense Refill     acetaminophen (TYLENOL) 325 MG tablet Take 650 mg by mouth       albuterol (PROAIR HFA/PROVENTIL HFA/VENTOLIN HFA) 108 (90 Base) MCG/ACT inhaler Inhale 1-2 puffs into the lungs       carvedilol (COREG) 6.25 MG tablet Take 1 tablet (6.25 mg) by mouth 2 times daily (with meals). 60 tablet 5     digoxin (LANOXIN) 250 MCG tablet Take 1 tablet (250 mcg) by mouth daily. 90 tablet 3     empagliflozin (JARDIANCE) 10 MG TABS tablet Take 1 tablet (10 mg) by mouth daily. 90 tablet 3     furosemide (LASIX) 20 MG tablet Take 1 tablet (20 mg) by mouth daily. 90 tablet 3     loperamide (IMODIUM) 2 MG capsule Take 4 mg by mouth as needed for diarrhea       melatonin 3 MG tablet Take 3 mg by mouth nightly as needed       nitroGLYcerin (NITROSTAT) 0.4 MG sublingual tablet For chest pain place 1 tablet under the tongue every 5 minutes for 3 doses. If symptoms persist 5 minutes after 1st dose call 911. 10 tablet 1     norgestimate-ethinyl estradiol (ESTARYLLA) 0.25-35 MG-MCG tablet Take 1 tablet by mouth at bedtime       omeprazole (PRILOSEC) 20 MG DR capsule Take 20 mg by mouth daily       ondansetron (ZOFRAN ODT) 4 MG ODT tab Place 4 mg under the tongue as needed       polyethylene glycol (MIRALAX) 17 GM/Dose powder Take 17 g by mouth daily       sacubitril-valsartan (ENTRESTO)  MG per tablet Take 1 tablet by mouth 2 times daily. 180 tablet 3     sertraline (ZOLOFT) 50 MG  tablet Take 50 mg by mouth daily       spironolactone (ALDACTONE) 25 MG tablet Take 0.5 tablets (12.5 mg) by mouth daily. 90 tablet 3    No Known Allergies      Lab Results (Personally Reviewed)    Chemistry/lipid CBC Cardiac Enzymes/BNP/TSH/INR   Lab Results   Component Value Date    BUN 6.1 11/19/2024     11/19/2024    CO2 23 11/19/2024     Creatinine   Date Value Ref Range Status   11/19/2024 0.63 0.51 - 0.95 mg/dL Final       Lab Results   Component Value Date    CHOL 139 08/02/2024    HDL 50 08/02/2024    LDL 78 08/02/2024      Lab Results   Component Value Date    WBC 4.9 11/19/2024    HGB 14.2 11/19/2024    HCT 45.0 11/19/2024    MCV 83 11/19/2024     11/19/2024    Lab Results   Component Value Date    TSH 5.16 (H) 11/01/2024    INR 1.14 11/15/2024        Jorge Reyes Castro, MD, MS  Cardiac Electrophysiology Fellow     EP STAFF NOTE  Patient seen and examined and management plan personally reviewed with the patient. I agree with the note above by the CV/EP fellow.  Phillip Levi MD [facility]  Cardiology - Electrophysiology  Total time spent on patient visit, reviewing notes, imaging, labs, orders, and completing necessary documentation: 45 minutes.  >50% of visit spent on counseling patient and/or coordination of care.                 Please do not hesitate to contact me if you have any questions/concerns.     Sincerely,     Phillip Levi MD

## 2024-12-04 NOTE — NURSING NOTE
Current patient location: [address] [place] [id number]    Is the patient currently in the state of [place]? YES    Visit mode:VIDEO    If the visit is dropped, the patient can be reconnected by:VIDEO VISIT: Text to cell phone:   Telephone Information:   Mobile [phone]       Will anyone else be joining the visit? NO  (If patient encounters technical issues they should call [phone]    Are changes needed to the allergy or medication list? Pt stated no med changes    Are refills needed on medications prescribed by this physician? NO    Rooming Documentation:  Not applicable    Reason for visit: RECHECK    No other vitals to report per pt    Gema CONLEYF

## 2024-12-04 NOTE — PROGRESS NOTES
Virtual Visit Details    Type of service:  Video Visit     Originating Location (pt. Location): Home    Distant Location (provider location):  On-site  Platform used for Video Visit: [facility]            ELECTROPHYSIOLOGY VIDEO CLINIC VISIT    Assessment/Recommendations     Assessment/Plan:    Ms. 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     Patient developed brief episode of SVT while being in the hospital back in October. 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 She was started on digoxin. Hasn't had recurrence since then.     Today patient reports doing well.  She states having intermittent palpitations which are not so bothersome to her.  We reviewed device interrogation tracings which showed brief episodes of atrial fibrillation lasting less than 1 minute.  She appears to be doing well with digoxin.  We will not change medical therapy for now and will continue to observe.  Will see her again in 4 to 6 months      -follow up in 4 to 6 months     History of Present Illness/Subjective    Ms. 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        As you know, Ms. 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 She was seen in the emergency  "department at [facility] and was found to have prexcitation on her ECG in 2021, and this lead to an exercise ECG test which she achieved 10.8 METs, exercise duration of 9 min and 16 seconds, had no exercised induced arrhythmias, she did have a delta wave and repolarization abnormalities. Subsequently, she had an echocardiogram which showed an LVEF of 51% with asymetric LVH (septum 1.5cm). She has been following with Dr. Swift in the [facility] cardiology clinic and she obtained a CMR which showed and LVEF of 43%, LVEDD 4.9cm, IV septum 1.7cm,  normal RV function but RVH noted, no LOU, harman LGE with subendocardial, mid myocardial, and subendocardial enhancement not associated with a vascular territory.      She was seen by Dr. Fuentes on July 22, 2024. She was admitted after her RHC from 07/31-08/05. Her RHC showed an RA of 20, PA 51/23, 36, PCWP 29, Juan CO/CI 3.13/1.68, Thermo CO/CI 2.9/1.55 and a PVR of 1.9.     On 8/1 she was admitted for ambulatory cardiogenic shock with elevated filling pressures and severe biventricular dysfunction on for swan-guided therapies with IV afterload reduction and diuresis with a hospital course that has been complicated by paroxysmal SVT for which EP is consulted. Our colleague Dr. Will evaluated patient: \"It appears that patient has had brief episodes of symptomatic SVT while in the hospital, the most recent one was responsive to vagal maneuvers.  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 She is also in a low dose of BB carvedilolg 6.25 mg BID.     Patient reports doing well this morning. She follows up with Dr. Fuentes. No chest pain, palpitations or SOB. Endorses ongoing compliance with her medications. Has intermittently palpitations.     I have reviewed and updated " the patient's Past Medical History, Social History, Family History and Medication List.     Cardiographics (Personally Reviewed) :   Echo 7/2024:   Interpretation Summary  Left ventricular function is decreased. The ejection fraction is 20-25%  (severely reduced).  Global right ventricular function is moderately reduced.  Moderate biatrial enlargement is present.  IVC diameter >2.1 cm collapsing <50% with sniff suggests a high RA pressure  estimated at 15 mmHg or greater.  Trivial pericardial effusion is present.  There is no prior study for direct comparison.    Coronary Angiogram 7/2024:    Right sided filling pressures are severely elevated.    Left sided filling pressures are severely elevated.    Moderately elevated pulmonary artery hypertension.    Reduced cardiac output level.     Severely elevated biventricular filling pressures with ambulatory cardiogenic shock       CMR 4/2024:  1) Decreased left ventricular systolic function-severe; calculated ejection fraction 26%.   2) Asymmetric septal hypertrophy relative to the posterior wall.   3) Multiple regional wall motion abnormalities-mid to distal anterior wall akinetic, distal inferior wall akinetic, and apex akinetic.   4) Extensive late gadolinium hyperenhancement involving the mid-apical anterior wall, mid-apical inferior wall. The apical anterior wall and apex exhibit near transmural hyperenhancement.   5) Borderline elevated extracellular volume on parametric assessment (reported above).   6) Small, circumferential pericardial effusion.     Compared to prior study dated 11-, the left ventricular ejection fraction has decreased; segmental regional dysfunction is now apparent and extensive late gadolinium hyperenhancement continues to be present. In this patient with known diagnosis of Danon's disease, the above findings may suggest disease progression.            Physical Examination   LMP 10/16/2024 (Approximate)   Wt Readings from Last 3  Encounters:   11/19/24 72.2 kg (159 lb 1.6 oz)   11/15/24 71.5 kg (157 lb 9.6 oz)   11/01/24 73 kg (161 lb)     VIDEO VISIT       Medications  Allergies   Current Outpatient Medications   Medication Sig Dispense Refill    acetaminophen (TYLENOL) 325 MG tablet Take 650 mg by mouth      albuterol (PROAIR HFA/PROVENTIL HFA/VENTOLIN HFA) 108 (90 Base) MCG/ACT inhaler Inhale 1-2 puffs into the lungs      carvedilol (COREG) 6.25 MG tablet Take 1 tablet (6.25 mg) by mouth 2 times daily (with meals). 60 tablet 5    digoxin (LANOXIN) 250 MCG tablet Take 1 tablet (250 mcg) by mouth daily. 90 tablet 3    empagliflozin (JARDIANCE) 10 MG TABS tablet Take 1 tablet (10 mg) by mouth daily. 90 tablet 3    furosemide (LASIX) 20 MG tablet Take 1 tablet (20 mg) by mouth daily. 90 tablet 3    loperamide (IMODIUM) 2 MG capsule Take 4 mg by mouth as needed for diarrhea      melatonin 3 MG tablet Take 3 mg by mouth nightly as needed      nitroGLYcerin (NITROSTAT) 0.4 MG sublingual tablet For chest pain place 1 tablet under the tongue every 5 minutes for 3 doses. If symptoms persist 5 minutes after 1st dose call 911. 10 tablet 1    norgestimate-ethinyl estradiol (ESTARYLLA) 0.25-35 MG-MCG tablet Take 1 tablet by mouth at bedtime      omeprazole (PRILOSEC) 20 MG DR capsule Take 20 mg by mouth daily      ondansetron (ZOFRAN ODT) 4 MG ODT tab Place 4 mg under the tongue as needed      polyethylene glycol (MIRALAX) 17 GM/Dose powder Take 17 g by mouth daily      sacubitril-valsartan (ENTRESTO)  MG per tablet Take 1 tablet by mouth 2 times daily. 180 tablet 3    sertraline (ZOLOFT) 50 MG tablet Take 50 mg by mouth daily      spironolactone (ALDACTONE) 25 MG tablet Take 0.5 tablets (12.5 mg) by mouth daily. 90 tablet 3    No Known Allergies      Lab Results (Personally Reviewed)    Chemistry/lipid CBC Cardiac Enzymes/BNP/TSH/INR   Lab Results   Component Value Date    BUN 6.1 11/19/2024     11/19/2024    CO2 23 11/19/2024      Creatinine   Date Value Ref Range Status   11/19/2024 0.63 0.51 - 0.95 mg/dL Final       Lab Results   Component Value Date    CHOL 139 08/02/2024    HDL 50 08/02/2024    LDL 78 08/02/2024      Lab Results   Component Value Date    WBC 4.9 11/19/2024    HGB 14.2 11/19/2024    HCT 45.0 11/19/2024    MCV 83 11/19/2024     11/19/2024    Lab Results   Component Value Date    TSH 5.16 (H) 11/01/2024    INR 1.14 11/15/2024        Jorge Reyes Castro, MD, MS  Cardiac Electrophysiology Fellow     EP STAFF NOTE  Patient seen and examined and management plan personally reviewed with the patient. I agree with the note above by the CV/EP fellow.  Phillip Levi MD [facility]  Cardiology - Electrophysiology  Total time spent on patient visit, reviewing notes, imaging, labs, orders, and completing necessary documentation: 45 minutes.  >50% of visit spent on counseling patient and/or coordination of care.

## 2024-12-05 ENCOUNTER — VIRTUAL VISIT (OUTPATIENT)
Dept: BEHAVIORAL HEALTH | Facility: CLINIC | Age: 24
End: 2024-12-05
Payer: COMMERCIAL

## 2024-12-05 DIAGNOSIS — F43.23 ADJUSTMENT DISORDER WITH MIXED ANXIETY AND DEPRESSED MOOD: Primary | ICD-10-CM

## 2024-12-05 LAB
MDC_IDC_EPISODE_DTM: NORMAL
MDC_IDC_EPISODE_DURATION: 12 S
MDC_IDC_EPISODE_DURATION: 13 S
MDC_IDC_EPISODE_DURATION: 14 S
MDC_IDC_EPISODE_DURATION: 15 S
MDC_IDC_EPISODE_DURATION: 15 S
MDC_IDC_EPISODE_DURATION: 16 S
MDC_IDC_EPISODE_DURATION: 16 S
MDC_IDC_EPISODE_DURATION: 18 S
MDC_IDC_EPISODE_DURATION: 18 S
MDC_IDC_EPISODE_DURATION: 20 S
MDC_IDC_EPISODE_DURATION: 21 S
MDC_IDC_EPISODE_DURATION: 25 S
MDC_IDC_EPISODE_DURATION: 25 S
MDC_IDC_EPISODE_DURATION: 33 S
MDC_IDC_EPISODE_DURATION: 35 S
MDC_IDC_EPISODE_DURATION: 56 S
MDC_IDC_EPISODE_ID: NORMAL
MDC_IDC_EPISODE_TYPE: NORMAL
MDC_IDC_EPISODE_TYPE_INDUCED: NO
MDC_IDC_EPISODE_VENDOR_TYPE: NORMAL
MDC_IDC_LEAD_CONNECTION_STATUS: NORMAL
MDC_IDC_LEAD_CONNECTION_STATUS: NORMAL
MDC_IDC_LEAD_IMPLANT_DT: NORMAL
MDC_IDC_LEAD_IMPLANT_DT: NORMAL
MDC_IDC_LEAD_LOCATION: NORMAL
MDC_IDC_LEAD_LOCATION: NORMAL
MDC_IDC_LEAD_LOCATION_DETAIL_1: NORMAL
MDC_IDC_LEAD_LOCATION_DETAIL_1: NORMAL
MDC_IDC_LEAD_MFG: NORMAL
MDC_IDC_LEAD_MFG: NORMAL
MDC_IDC_LEAD_MODEL: NORMAL
MDC_IDC_LEAD_MODEL: NORMAL
MDC_IDC_LEAD_POLARITY_TYPE: NORMAL
MDC_IDC_LEAD_POLARITY_TYPE: NORMAL
MDC_IDC_LEAD_SERIAL: NORMAL
MDC_IDC_LEAD_SERIAL: NORMAL
MDC_IDC_MSMT_BATTERY_DTM: NORMAL
MDC_IDC_MSMT_BATTERY_REMAINING_LONGEVITY: 156 MO
MDC_IDC_MSMT_BATTERY_REMAINING_PERCENTAGE: 100 %
MDC_IDC_MSMT_BATTERY_STATUS: NORMAL
MDC_IDC_MSMT_CAP_CHARGE_DTM: NORMAL
MDC_IDC_MSMT_CAP_CHARGE_DTM: NORMAL
MDC_IDC_MSMT_CAP_CHARGE_ENERGY: 41 J
MDC_IDC_MSMT_CAP_CHARGE_TIME: 10.3 S
MDC_IDC_MSMT_CAP_CHARGE_TIME: 7.9 S
MDC_IDC_MSMT_CAP_CHARGE_TYPE: NORMAL
MDC_IDC_MSMT_CAP_CHARGE_TYPE: NORMAL
MDC_IDC_MSMT_LEADCHNL_RA_IMPEDANCE_VALUE: 816 OHM
MDC_IDC_MSMT_LEADCHNL_RA_PACING_THRESHOLD_AMPLITUDE: 0.4 V
MDC_IDC_MSMT_LEADCHNL_RA_PACING_THRESHOLD_PULSEWIDTH: 0.4 MS
MDC_IDC_MSMT_LEADCHNL_RV_IMPEDANCE_VALUE: 359 OHM
MDC_IDC_MSMT_LEADCHNL_RV_PACING_THRESHOLD_AMPLITUDE: 1 V
MDC_IDC_MSMT_LEADCHNL_RV_PACING_THRESHOLD_PULSEWIDTH: 0.4 MS
MDC_IDC_PG_IMPLANT_DTM: NORMAL
MDC_IDC_PG_MFG: NORMAL
MDC_IDC_PG_MODEL: NORMAL
MDC_IDC_PG_SERIAL: NORMAL
MDC_IDC_PG_TYPE: NORMAL
MDC_IDC_SESS_CLINIC_NAME: NORMAL
MDC_IDC_SESS_DTM: NORMAL
MDC_IDC_SESS_TYPE: NORMAL
MDC_IDC_SET_BRADY_AT_MODE_SWITCH_RATE: 140 {BEATS}/MIN
MDC_IDC_SET_BRADY_LOWRATE: 40 {BEATS}/MIN
MDC_IDC_SET_BRADY_MODE: NORMAL
MDC_IDC_SET_LEADCHNL_RA_PACING_AMPLITUDE: 3.5 V
MDC_IDC_SET_LEADCHNL_RA_PACING_CAPTURE_MODE: NORMAL
MDC_IDC_SET_LEADCHNL_RA_PACING_POLARITY: NORMAL
MDC_IDC_SET_LEADCHNL_RA_PACING_PULSEWIDTH: 0.4 MS
MDC_IDC_SET_LEADCHNL_RA_SENSING_ADAPTATION_MODE: NORMAL
MDC_IDC_SET_LEADCHNL_RA_SENSING_POLARITY: NORMAL
MDC_IDC_SET_LEADCHNL_RA_SENSING_SENSITIVITY: 0.25 MV
MDC_IDC_SET_LEADCHNL_RV_PACING_AMPLITUDE: 3.5 V
MDC_IDC_SET_LEADCHNL_RV_PACING_CAPTURE_MODE: NORMAL
MDC_IDC_SET_LEADCHNL_RV_PACING_POLARITY: NORMAL
MDC_IDC_SET_LEADCHNL_RV_PACING_PULSEWIDTH: 0.4 MS
MDC_IDC_SET_LEADCHNL_RV_SENSING_ADAPTATION_MODE: NORMAL
MDC_IDC_SET_LEADCHNL_RV_SENSING_POLARITY: NORMAL
MDC_IDC_SET_LEADCHNL_RV_SENSING_SENSITIVITY: 0.6 MV
MDC_IDC_SET_ZONE_DETECTION_INTERVAL: 240 MS
MDC_IDC_SET_ZONE_DETECTION_INTERVAL: 300 MS
MDC_IDC_SET_ZONE_DETECTION_INTERVAL: 333 MS
MDC_IDC_SET_ZONE_STATUS: NORMAL
MDC_IDC_SET_ZONE_TYPE: NORMAL
MDC_IDC_SET_ZONE_VENDOR_TYPE: NORMAL
MDC_IDC_STAT_AT_BURDEN_PERCENT: 0 %
MDC_IDC_STAT_BRADY_DTM_END: NORMAL
MDC_IDC_STAT_BRADY_DTM_START: NORMAL
MDC_IDC_STAT_BRADY_RA_PERCENT_PACED: 0 %
MDC_IDC_STAT_BRADY_RV_PERCENT_PACED: 0 %
MDC_IDC_STAT_EPISODE_RECENT_COUNT: 0
MDC_IDC_STAT_EPISODE_RECENT_COUNT: 1
MDC_IDC_STAT_EPISODE_RECENT_COUNT: 36
MDC_IDC_STAT_EPISODE_RECENT_COUNT_DTM_END: NORMAL
MDC_IDC_STAT_EPISODE_RECENT_COUNT_DTM_START: NORMAL
MDC_IDC_STAT_EPISODE_TYPE: NORMAL
MDC_IDC_STAT_EPISODE_VENDOR_TYPE: NORMAL
MDC_IDC_STAT_TACHYTHERAPY_ATP_DELIVERED_RECENT: 0
MDC_IDC_STAT_TACHYTHERAPY_ATP_DELIVERED_TOTAL: 2
MDC_IDC_STAT_TACHYTHERAPY_RECENT_DTM_END: NORMAL
MDC_IDC_STAT_TACHYTHERAPY_RECENT_DTM_START: NORMAL
MDC_IDC_STAT_TACHYTHERAPY_SHOCKS_ABORTED_RECENT: 0
MDC_IDC_STAT_TACHYTHERAPY_SHOCKS_ABORTED_TOTAL: 2
MDC_IDC_STAT_TACHYTHERAPY_SHOCKS_DELIVERED_RECENT: 0
MDC_IDC_STAT_TACHYTHERAPY_SHOCKS_DELIVERED_TOTAL: 1
MDC_IDC_STAT_TACHYTHERAPY_TOTAL_DTM_END: NORMAL
MDC_IDC_STAT_TACHYTHERAPY_TOTAL_DTM_START: NORMAL

## 2024-12-05 ASSESSMENT — COLUMBIA-SUICIDE SEVERITY RATING SCALE - C-SSRS
2. HAVE YOU ACTUALLY HAD ANY THOUGHTS OF KILLING YOURSELF?: NO
1. SINCE LAST CONTACT, HAVE YOU WISHED YOU WERE DEAD OR WISHED YOU COULD GO TO SLEEP AND NOT WAKE UP?: NO
ATTEMPT SINCE LAST CONTACT: NO
SUICIDE, SINCE LAST CONTACT: NO
TOTAL  NUMBER OF INTERRUPTED ATTEMPTS SINCE LAST CONTACT: NO
6. HAVE YOU EVER DONE ANYTHING, STARTED TO DO ANYTHING, OR PREPARED TO DO ANYTHING TO END YOUR LIFE?: NO
TOTAL  NUMBER OF ABORTED OR SELF INTERRUPTED ATTEMPTS SINCE LAST CONTACT: NO

## 2024-12-05 NOTE — PROGRESS NOTES
Transition Clinic Progress Note    Patient Name:   Mary Shaikh Date: 2024          Service Type: Individual        VISIT TIME START: 930      VISIT TIME END: 947      Session Length:  TC Session Length: 30 (16-37 Minutes)    Session #:  4    Attendees: TC Attendees: Client alone    Service Modality:  Service Modality: Video Visit:      Provider verified identity through the following two step process.  Patient provided:  Patient  and Patient address    Telemedicine Visit: The patient's condition can be safely assessed and treated via synchronous audio and visual telemedicine encounter.      Reason for Telemedicine Visit: Patient convenience (e.g. access to timely appointments / distance to available provider)    Originating Site (Patient Location): Patient's home    Distant Site (Provider Location): [facility]    Consent:  The patient/guardian has verbally consented to: the potential risks and benefits of telemedicine (video visit) versus in person care; bill my insurance or make self-payment for services provided; and responsibility for payment of non-covered services.     Patient would like the video invitation sent by:  My Chart    Mode of Communication:  Video Conference via [facility]    Distant Location (Provider):  Off-site    As the provider I attest to compliance with applicable laws and regulations related to telemedicine.    Informed Consent and Assessment Methods    This provider and patient discussed HIPAA, and the limits of confidentiality; including mandated reporting, the possibility of collaborative discussions with patient's primary care provider and the multidisciplinary team in the MH clinic during consultation.  Discussed the no show policy, and the benefits and possible unintended consequences of therapy.  Patient indicated understanding Transition Clinic services are short term, solution focused, until a referral can be  made and patient can bridge to long term therapy.  Patient verbally indicated understanding the informed consent.        DATA  Interactive Complexity: No  Crisis: No        Progress Since Last Session (Related to Symptoms / Goals / Homework):   Symptoms: Improving    Homework: Completed in session      Episode of Care Goals: Satisfactory progress - ACTION (Actively working towards change); Intervened by reinforcing change plan / affirming steps taken     Current / Ongoing Stressors and Concerns:   Pt reports she has been seeing new therapist at [place] Asaf Torres, PhD, had appt 11/21/24 and has a follow up 12/10/24, pt reports good rapport and reports it is going well so far.  Pt also has therapy at [facility] she reports, she will likely stick with [place] [facility].  Pt denies excessive sxs today.  Pt denies current SI, plan, intent, SIB, HI, AH/VH, and/or rosa sxs at this point in time.  CSSR completed.  Pt reports she is hopeful, looking forward to the weekend.  Pt reports parents, siblings, pets, part of consistent support network. Pt reports adequate supports, and denies current substance use.  Pt reports the following protective factors:  willingness to engage in therapy, attached by fam/friends/pet, hopeful, future-oriented, access to variety of clinical interventions, strong bond to family unit, community support, or employment, lives in a responsibly safe and stable environment, help seeking, sense of belonging, constructive use of leisure time, enjoyable activities, resilience.     Pt processed regarding sxs, psychologist appt for therapy, supports, coping.  Pt and writer explored coping skills including mindfulness, relaxation strategies and self-care.  Pt appeared engaged and receptive to the above interventions.      Pt requested to discharge transition clinic services today as successfully transitioned to next LOC:  [place] Baldomero Ron, PhD, 12/10/24      Treatment  Objective(s) Addressed in This Session:   identify three distraction and diversion activities and use those activities to decrease level of anxiety    Encourage challenge negative thoughts  Encourage self care     Intervention:   Solution Focused Brief Therapy   Brief Psychotherapy - discussed and prioritizing the most efficient treatment., Cognitive Behavioral Therapy (CBT) - Discussed changing thoughts is the path to changing behaviors and feelings., Mindfulness Therapy - Cultivation of present-oriented, non-judgmental attitude. It helps nurtures great awareness, clarity, and acceptance of reality., Motivational Interviewing - helping to find the motivation to make positive behavior changes., Person-Centered Therapy - Actualizes potential and moves towards increased awareness, spontaneity, trust in self and inner directedness., and Problem-Solving Therapy (PST) - Identify specific problems; brainstorming, evaluation, implementing and reviewing solutions.    Assessments completed prior to visit:  The following assessments were completed by patient for this visit:    PROMIS 10-Global Health (only subscores and total score):       11/4/2024     7:17 PM 11/20/2024     1:11 PM   PROMIS-10 Scores Only   Global Mental Health Score 4  9    Global Physical Health Score 7  11    PROMIS TOTAL - SUBSCORES 11  20        Patient-reported     [place] Suicide Severity Rating Scale (Short Version)      11/2/2024     2:08 AM 11/2/2024     2:10 AM 11/5/2024     2:00 PM 11/14/2024     7:00 AM 11/19/2024    10:00 AM 11/19/2024    12:22 PM 12/5/2024    10:00 AM   [place] Suicide Severity Rating (Short Version)   Q1 Wished to be Dead (Past Month)  1-->yes    0-->no    Q2 Suicidal Thoughts (Past Month)  1-->yes    0-->no    Q3 Suicidal Thought Method  0-->no        Q4 Suicidal Intent without Specific Plan  0-->no        Q5 Suicide Intent with Specific Plan  0-->no        Q6 Suicide Behavior (Lifetime) 0-->no     0-->no    If yes to Q6,  within past 3 months?  0-->no        Level of Risk per Screen  moderate risk    no risks indicated    1. Wish to be Dead (Since Last Contact)   N N N  N   2. Non-Specific Active Suicidal Thoughts (Since Last Contact)   N N N  N   Actual Attempt (Since Last Contact)   N N N  N   Has subject engaged in non-suicidal self-injurious behavior? (Since Last Contact)   N N N  N   Interrupted Attempts (Since Last Contact)   N N N  N   Aborted or Self-Interrupted Attempt (Since Last Contact)   N N N  N   Preparatory Acts or Behavior (Since Last Contact)   N N N  N   Suicide (Since Last Contact)   N N N  N   Calculated C-SSRS Risk Score (Since Last Contact)   No Risk Indicated No Risk Indicated No Risk Indicated  No Risk Indicated         ASSESSMENT: Current Emotional / Mental Status (status of significant symptoms):   Risk status (Self / Other harm or suicidal ideation)   Patient denies current fears or concerns for personal safety.   Patient denies current or recent suicidal ideation or behaviors.   Patient denies current or recent homicidal ideation or behaviors.   Patient denies current or recent self injurious behavior or ideation.   Patient denies other safety concerns.   Patient reports there has been no change in risk factors since their last session.     Patient reports there has been no change in protective factors since their last session.     Recommended that patient call 911 or go to the local ED should there be a change in any of these risk factors     Appearance:   Appropriate    Eye Contact:   Good    Psychomotor Behavior: Normal    Attitude:   Cooperative    Orientation:   All   Speech    Rate / Production: Normal     Volume:  Normal    Mood:    Normal   Affect:    Appropriate    Thought Content:  Clear    Thought Form:  Coherent  Logical    Insight:    Good      Medication Review:   No changes to current psychiatric medication(s)     Medication Compliance:   NA     Changes in Health Issues:   None  reported     Chemical Use Review:   Substance Use: Chemical use reviewed, no active concerns identified      Tobacco Use: No current tobacco use.      Diagnoses:   Adjustment Disorders  309.28 (F43.23) With mixed anxiety and depressed mood    Collateral Reports Completed:   TC Collateral: [facility] [place] electronic medical records reviewed.    PLAN: (Patient Tasks / Therapist Tasks / Other)     Pt requested to discharge transition clinic today as she successfully transitioned to next LOC:    Baldomero German, PhD, 11/21/14, 12/9/24 appts in place.    Is this the patient's last discharge?: Yes. Reason for Discharge Successfully connected with next level of care Level of care: Outpatient Counseling     Baldomero German, PhD, 11/21/14, 12/10/24 appts in place.    Procedure Code: Psychotherapy (with patient) - 30  [CPT 87776]    ANTHONY FOLEY                                                         ______________________________________________________________________  Safety Plan: Reviewed safety plan with pt, pt agrees to follow, safety plan in this note and in pts active mychart.  If unable to follow safety plan call 911.     Tc Sewell Safety Plan     STEP 1: WARNING SIGNS  overwhlemed  2. stress     STEP 2: INTERNAL COPING STRATEGIES - THINGS I CAN DO TO TAKE MY MIND OFF   MY PROBLEMS WITHOUT CONTACTING ANOTHER PERSON  Mindfulness meditation  2.  Music and cleaning up  3. Journaling  4. Walks with dog     STEP 3: PEOPLE AND SOCIAL SETTINGS THAT PROVIDE DISTRACTION  STEP 4: PEOPLE WHOM I CAN ASK FOR HELP DURING A CRISIS     1. Name: Contact:       Emily Regan-mother- [phone]   3. Place: 4. Place: Outside/nature/walk with dog  1. Name: Contact: CRISIS:  [facility] Crisis line:  [phone]     STEP 5: PROFESSIONALS OR AGENCIES I CAN CONTACT DURING A CRISIS:  911  2. Emergency dept  3.  [facility] Crisis line:  [phone]     STEP 6: MAKING THE  "ENVIRONMENT SAFER (PLAN FOR LETHAL MEANS SAFETY)  1.Restrict access to means  2.  Stay with friends/family     Mica Safety Plan. Lilia Montez and Napoleon Sewell. Used with permission of the  authors.        Grounding Techniques:  Try to notice where you are, your surroundings including the people, the sounds like the TV or  radio.  Concentrate on your breathing. Take a deep cleansing breath from your diaphragm. Count the  breaths as you exhale. Make sure you breath slowly.  Hold something that you find comforting, for some it may be a stuffed animal or a blanket. Notice  how it feels in your hands. Is it hard or soft?  During a non-crisis time make a list of positive affirmations. Print them out and keep them handy  for times of intense anxiety. At those times, read them aloud.  Try the [employer] game:  Name 5 things you can see in the room with you  Name 4 things you can feel (\"chair on my back\" or \"feet on floor\")  Name 3 things you can hear right now (\"people talking\" or \"tv\")  Name 2 things you can smell right now (or, 2 things you like the smell of)  Name 1 good thing about yourself  Create A Safe Place  Image a safe place - it can be a real or imaginary place:  What do you see - especially colors?  What sounds do you hear?  What sensations do you feel?  What smells do you smell?  What people or animals would you want in your safe place?  Imagine a protective bubble, wall or boundary around your safe place.  Imagine a door or gate with a guard at your safe place.  Image a lock and key to your safe place and only you can unlock it.  You can draw or make a collage that represents your safe place.  Choose a souvenir of your safe place - a color, an object, a song.  Keep your image of your safe place so you can come back to it when you need to.  Reduce Extreme Emotion QUICKLY: Changing Your Body Chemistry  T: Change your body Temperature to change your autonomic nervous system  Use Ice Water to calm " "yourself down FAST  Put your face in a bowl of ice water (this is the best way; have the person keep his/her face in ice  water for 30-45 seconds - initial research is showing that the longer s/he can hold her/his face in  the water, the better the response), or  Splash ice water on your face, or hold an ice pack on your face  I: Intensely exercise to calm down a body revved up by emotion  Examples: running, walking fast, jumping, playing basketball, weight lifting, swimming, calisthenics,  etc.  Engage in exercises that DO NOT include violent behaviors. Exercises that utilize violent behaviors  tend to function as \"behavioral rehearsal,\" and rather than calming the person down, may actually  \"rev\" the person up more, increasing the likelihood of violence, and lessening the likelihood that  they will \"burn off\" energy  Mica Safety Plan (continued)  P: Progressively relax your muscles  Starting with your hands, moving to your forearms, upper arms, shoulders, neck, forehead, eyes,  cheeks and lips, tongue and teeth, chest, upper back, stomach, buttocks, thighs, calves, ankles,  feet  Tense (10 seconds,   of the way), then relax each muscle (all the way)  Notice the tension  Notice the difference when relaxed (by tensing first, and then relaxing, you are able to get a more  thorough relaxation than by simply relaxing)  P: Paced breathing to relax  The standard technique is to begin with counting the number of steps one takes for a typical inhale,  then counting the steps one takes for a typical exhale, and then lengthening the amount of steps  for the exhalation by one or two steps. OR  Repeat this pattern for 1-2 minutes  Inhale for four (4) seconds  Exhale for six (6) to eight (8) seconds  Research demonstrated that one can change one's overall level of anxiety by doing this exercise  for even a few minutes per day     [place] Safety Plan. Lilia Montez and Napoleon Sewell. Used with permission of " the  authors.        Instructions

## 2024-12-15 ENCOUNTER — HOSPITAL ENCOUNTER (INPATIENT)
Facility: CLINIC | Age: 24
LOS: 3 days | Discharge: HOME | DRG: 287 | End: 2024-12-18
Attending: INTERNAL MEDICINE | Admitting: INTERNAL MEDICINE
Payer: COMMERCIAL

## 2024-12-15 ENCOUNTER — TRANSFERRED RECORDS (OUTPATIENT)
Dept: HEALTH INFORMATION MANAGEMENT | Facility: CLINIC | Age: 24
End: 2024-12-15

## 2024-12-15 ENCOUNTER — ANCILLARY PROCEDURE (OUTPATIENT)
Dept: CARDIOLOGY | Facility: CLINIC | Age: 24
End: 2024-12-15
Attending: INTERNAL MEDICINE
Payer: COMMERCIAL

## 2024-12-15 DIAGNOSIS — I42.8 NONISCHEMIC CARDIOMYOPATHY (H): ICD-10-CM

## 2024-12-15 DIAGNOSIS — I42.2 HYPERTROPHIC NONOBSTRUCTIVE CARDIOMYOPATHY (H): ICD-10-CM

## 2024-12-15 DIAGNOSIS — I50.20 HEART FAILURE WITH REDUCED EJECTION FRACTION (H): ICD-10-CM

## 2024-12-15 DIAGNOSIS — I49.1 PREMATURE ATRIAL COMPLEX: ICD-10-CM

## 2024-12-15 DIAGNOSIS — Z95.810 ICD (IMPLANTABLE CARDIOVERTER-DEFIBRILLATOR) IN PLACE: ICD-10-CM

## 2024-12-15 DIAGNOSIS — I47.20 VT (VENTRICULAR TACHYCARDIA) (H): Primary | ICD-10-CM

## 2024-12-15 DIAGNOSIS — F43.23 ADJUSTMENT DISORDER WITH MIXED ANXIETY AND DEPRESSED MOOD: ICD-10-CM

## 2024-12-15 DIAGNOSIS — I50.23 ACUTE ON CHRONIC SYSTOLIC HEART FAILURE (H): ICD-10-CM

## 2024-12-15 LAB
ALBUMIN SERPL BCG-MCNC: 4.3 G/DL (ref 3.5–5.2)
ALP SERPL-CCNC: 82 U/L (ref 40–150)
ALT SERPL W P-5'-P-CCNC: 10 U/L (ref 0–50)
ANION GAP SERPL CALCULATED.3IONS-SCNC: 10 MMOL/L (ref 7–15)
AST SERPL W P-5'-P-CCNC: 43 U/L (ref 0–45)
BASOPHILS # BLD AUTO: 0.1 10E3/UL (ref 0–0.2)
BASOPHILS NFR BLD AUTO: 1 %
BILIRUB SERPL-MCNC: 0.6 MG/DL
BUN SERPL-MCNC: 8.8 MG/DL (ref 6–20)
CALCIUM SERPL-MCNC: 9.8 MG/DL (ref 8.8–10.4)
CHLORIDE SERPL-SCNC: 104 MMOL/L (ref 98–107)
CREAT SERPL-MCNC: 0.6 MG/DL (ref 0.51–0.95)
EGFRCR SERPLBLD CKD-EPI 2021: >90 ML/MIN/1.73M2
EOSINOPHIL # BLD AUTO: 0 10E3/UL (ref 0–0.7)
EOSINOPHIL NFR BLD AUTO: 0 %
ERYTHROCYTE [DISTWIDTH] IN BLOOD BY AUTOMATED COUNT: 15.6 % (ref 10–15)
GLUCOSE SERPL-MCNC: 94 MG/DL (ref 70–99)
HCO3 SERPL-SCNC: 23 MMOL/L (ref 22–29)
HCT VFR BLD AUTO: 45.4 % (ref 35–47)
HGB BLD-MCNC: 14.4 G/DL (ref 11.7–15.7)
IMM GRANULOCYTES # BLD: 0 10E3/UL
IMM GRANULOCYTES NFR BLD: 0 %
INR PPP: 1.18 (ref 0.85–1.15)
LYMPHOCYTES # BLD AUTO: 2.8 10E3/UL (ref 0.8–5.3)
LYMPHOCYTES NFR BLD AUTO: 38 %
MAGNESIUM SERPL-MCNC: 2.2 MG/DL (ref 1.7–2.3)
MCH RBC QN AUTO: 26.8 PG (ref 26.5–33)
MCHC RBC AUTO-ENTMCNC: 31.7 G/DL (ref 31.5–36.5)
MCV RBC AUTO: 84 FL (ref 78–100)
MONOCYTES # BLD AUTO: 0.4 10E3/UL (ref 0–1.3)
MONOCYTES NFR BLD AUTO: 5 %
NEUTROPHILS # BLD AUTO: 4.1 10E3/UL (ref 1.6–8.3)
NEUTROPHILS NFR BLD AUTO: 56 %
NRBC # BLD AUTO: 0 10E3/UL
NRBC BLD AUTO-RTO: 0 /100
PLATELET # BLD AUTO: 265 10E3/UL (ref 150–450)
POTASSIUM SERPL-SCNC: 3.9 MMOL/L (ref 3.4–5.3)
PROT SERPL-MCNC: 7.2 G/DL (ref 6.4–8.3)
RBC # BLD AUTO: 5.38 10E6/UL (ref 3.8–5.2)
SODIUM SERPL-SCNC: 137 MMOL/L (ref 135–145)
TSH SERPL DL<=0.005 MIU/L-ACNC: 1.78 UIU/ML (ref 0.3–4.2)
WBC # BLD AUTO: 7.3 10E3/UL (ref 4–11)

## 2024-12-15 PROCEDURE — 999N000127 HC STATISTIC PERIPHERAL IV START W US GUIDANCE

## 2024-12-15 PROCEDURE — 84443 ASSAY THYROID STIM HORMONE: CPT

## 2024-12-15 PROCEDURE — 99223 1ST HOSP IP/OBS HIGH 75: CPT | Mod: 25 | Performed by: INTERNAL MEDICINE

## 2024-12-15 PROCEDURE — 83735 ASSAY OF MAGNESIUM: CPT

## 2024-12-15 PROCEDURE — 36415 COLL VENOUS BLD VENIPUNCTURE: CPT

## 2024-12-15 PROCEDURE — 85610 PROTHROMBIN TIME: CPT

## 2024-12-15 PROCEDURE — 85049 AUTOMATED PLATELET COUNT: CPT

## 2024-12-15 PROCEDURE — 85004 AUTOMATED DIFF WBC COUNT: CPT

## 2024-12-15 PROCEDURE — 80053 COMPREHEN METABOLIC PANEL: CPT

## 2024-12-15 PROCEDURE — 99207 CARDIAC DEVICE CHECK - REMOTE: CPT | Performed by: INTERNAL MEDICINE

## 2024-12-15 PROCEDURE — 120N000005 HC R&B MS OVERFLOW UMMC

## 2024-12-15 PROCEDURE — 93005 ELECTROCARDIOGRAM TRACING: CPT

## 2024-12-15 RX ORDER — SPIRONOLACTONE 25 MG
12.5 TABLET ORAL DAILY
Status: DISCONTINUED | OUTPATIENT
Start: 2024-12-16 | End: 2024-12-18 | Stop reason: HOSPADM

## 2024-12-15 RX ORDER — PANTOPRAZOLE SODIUM 40 MG/1
40 TABLET, DELAYED RELEASE ORAL EVERY MORNING
Status: DISCONTINUED | OUTPATIENT
Start: 2024-12-16 | End: 2024-12-15

## 2024-12-15 RX ORDER — LIDOCAINE 40 MG/G
CREAM TOPICAL
Status: DISCONTINUED | OUTPATIENT
Start: 2024-12-15 | End: 2024-12-18 | Stop reason: HOSPADM

## 2024-12-15 RX ORDER — DIGOXIN 125 MCG
250 TABLET ORAL DAILY
Status: DISCONTINUED | OUTPATIENT
Start: 2024-12-16 | End: 2024-12-18 | Stop reason: HOSPADM

## 2024-12-15 RX ORDER — ALBUTEROL SULFATE 90 UG/1
1-2 INHALANT RESPIRATORY (INHALATION)
Status: DISCONTINUED | OUTPATIENT
Start: 2024-12-15 | End: 2024-12-18 | Stop reason: HOSPADM

## 2024-12-15 RX ORDER — PANTOPRAZOLE SODIUM 40 MG/1
40 TABLET, DELAYED RELEASE ORAL EVERY MORNING
Status: DISCONTINUED | OUTPATIENT
Start: 2024-12-16 | End: 2024-12-18 | Stop reason: HOSPADM

## 2024-12-15 RX ORDER — CALCIUM CARBONATE 500 MG/1
1000 TABLET, CHEWABLE ORAL 4 TIMES DAILY PRN
Status: DISCONTINUED | OUTPATIENT
Start: 2024-12-15 | End: 2024-12-18 | Stop reason: HOSPADM

## 2024-12-15 RX ORDER — AMOXICILLIN 250 MG
1 CAPSULE ORAL 2 TIMES DAILY PRN
Status: DISCONTINUED | OUTPATIENT
Start: 2024-12-15 | End: 2024-12-18 | Stop reason: HOSPADM

## 2024-12-15 RX ORDER — POLYETHYLENE GLYCOL 3350 17 G/17G
17 POWDER, FOR SOLUTION ORAL 2 TIMES DAILY PRN
Status: DISCONTINUED | OUTPATIENT
Start: 2024-12-15 | End: 2024-12-18 | Stop reason: HOSPADM

## 2024-12-15 RX ORDER — ONDANSETRON 4 MG/1
4 TABLET, ORALLY DISINTEGRATING ORAL EVERY 6 HOURS PRN
Status: DISCONTINUED | OUTPATIENT
Start: 2024-12-15 | End: 2024-12-18 | Stop reason: HOSPADM

## 2024-12-15 RX ORDER — CARVEDILOL 6.25 MG/1
6.25 TABLET ORAL 2 TIMES DAILY WITH MEALS
Status: DISCONTINUED | OUTPATIENT
Start: 2024-12-15 | End: 2024-12-16

## 2024-12-15 RX ORDER — AMOXICILLIN 250 MG
2 CAPSULE ORAL 2 TIMES DAILY PRN
Status: DISCONTINUED | OUTPATIENT
Start: 2024-12-15 | End: 2024-12-18 | Stop reason: HOSPADM

## 2024-12-15 RX ORDER — DEXTROSE MONOHYDRATE 25 G/50ML
25-50 INJECTION, SOLUTION INTRAVENOUS
Status: DISCONTINUED | OUTPATIENT
Start: 2024-12-15 | End: 2024-12-18 | Stop reason: HOSPADM

## 2024-12-15 RX ORDER — NICOTINE POLACRILEX 4 MG
15-30 LOZENGE BUCCAL
Status: DISCONTINUED | OUTPATIENT
Start: 2024-12-15 | End: 2024-12-18 | Stop reason: HOSPADM

## 2024-12-15 RX ORDER — ONDANSETRON 2 MG/ML
4 INJECTION INTRAMUSCULAR; INTRAVENOUS EVERY 6 HOURS PRN
Status: DISCONTINUED | OUTPATIENT
Start: 2024-12-15 | End: 2024-12-18 | Stop reason: HOSPADM

## 2024-12-15 RX ORDER — FUROSEMIDE 20 MG/1
20 TABLET ORAL DAILY
Status: DISCONTINUED | OUTPATIENT
Start: 2024-12-16 | End: 2024-12-17

## 2024-12-15 ASSESSMENT — ACTIVITIES OF DAILY LIVING (ADL)
WALKING_OR_CLIMBING_STAIRS_DIFFICULTY: NO
DOING_ERRANDS_INDEPENDENTLY_DIFFICULTY: NO
CHANGE_IN_FUNCTIONAL_STATUS_SINCE_ONSET_OF_CURRENT_ILLNESS/INJURY: NO
CONCENTRATING,_REMEMBERING_OR_MAKING_DECISIONS_DIFFICULTY: NO
TOILETING_ISSUES: NO
ADLS_ACUITY_SCORE: 51
DIFFICULTY_EATING/SWALLOWING: NO
ADLS_ACUITY_SCORE: 34
WEAR_GLASSES_OR_BLIND: YES
DRESSING/BATHING_DIFFICULTY: NO
FALL_HISTORY_WITHIN_LAST_SIX_MONTHS: NO
VISION_MANAGEMENT: GLASSES
ADLS_ACUITY_SCORE: 56

## 2024-12-15 NOTE — Clinical Note
Called to [facility]. All questions answered. All belongings sent with patient, including glasses. Patient transported to  via  with transport.

## 2024-12-15 NOTE — Clinical Note
dry, intact, no bleeding and no hematoma. 7 fr sheath removed from right internal jugular. Manual pressure held. Hemostasis achieved. Primapore dressing applied.

## 2024-12-16 ENCOUNTER — APPOINTMENT (OUTPATIENT)
Dept: CARDIOLOGY | Facility: CLINIC | Age: 24
DRG: 287 | End: 2024-12-16
Payer: COMMERCIAL

## 2024-12-16 ENCOUNTER — ANCILLARY PROCEDURE (OUTPATIENT)
Dept: CARDIOLOGY | Facility: CLINIC | Age: 24
DRG: 287 | End: 2024-12-16
Payer: COMMERCIAL

## 2024-12-16 LAB
ANION GAP SERPL CALCULATED.3IONS-SCNC: 10 MMOL/L (ref 7–15)
BI-PLANE LVEF ECHO: NORMAL
BUN SERPL-MCNC: 7.7 MG/DL (ref 6–20)
CALCIUM SERPL-MCNC: 9.6 MG/DL (ref 8.8–10.4)
CHLORIDE SERPL-SCNC: 105 MMOL/L (ref 98–107)
CREAT SERPL-MCNC: 0.69 MG/DL (ref 0.51–0.95)
DIGOXIN SERPL-MCNC: 0.6 NG/ML (ref 0.6–1.2)
EGFRCR SERPLBLD CKD-EPI 2021: >90 ML/MIN/1.73M2
ERYTHROCYTE [DISTWIDTH] IN BLOOD BY AUTOMATED COUNT: 15.6 % (ref 10–15)
GLUCOSE SERPL-MCNC: 90 MG/DL (ref 70–99)
HCO3 SERPL-SCNC: 23 MMOL/L (ref 22–29)
HCT VFR BLD AUTO: 43.5 % (ref 35–47)
HGB BLD-MCNC: 13.5 G/DL (ref 11.7–15.7)
HGB BLD-MCNC: 14.1 G/DL (ref 11.7–15.7)
LVEF ECHO: NORMAL
MAGNESIUM SERPL-MCNC: 2.2 MG/DL (ref 1.7–2.3)
MCH RBC QN AUTO: 26 PG (ref 26.5–33)
MCHC RBC AUTO-ENTMCNC: 31 G/DL (ref 31.5–36.5)
MCV RBC AUTO: 84 FL (ref 78–100)
MDC_IDC_EPISODE_DTM: NORMAL
MDC_IDC_EPISODE_DURATION: 11 S
MDC_IDC_EPISODE_DURATION: 12 S
MDC_IDC_EPISODE_DURATION: 12 S
MDC_IDC_EPISODE_DURATION: 13 S
MDC_IDC_EPISODE_DURATION: 13 S
MDC_IDC_EPISODE_DURATION: 14 S
MDC_IDC_EPISODE_DURATION: 14 S
MDC_IDC_EPISODE_DURATION: 17 S
MDC_IDC_EPISODE_DURATION: 17 S
MDC_IDC_EPISODE_DURATION: 22 S
MDC_IDC_EPISODE_DURATION: 306 S
MDC_IDC_EPISODE_DURATION: 36 S
MDC_IDC_EPISODE_DURATION: 56 S
MDC_IDC_EPISODE_ID: NORMAL
MDC_IDC_EPISODE_TYPE: NORMAL
MDC_IDC_EPISODE_TYPE_INDUCED: NO
MDC_IDC_EPISODE_VENDOR_TYPE: NORMAL
MDC_IDC_LEAD_CONNECTION_STATUS: NORMAL
MDC_IDC_LEAD_IMPLANT_DT: NORMAL
MDC_IDC_LEAD_LOCATION: NORMAL
MDC_IDC_LEAD_LOCATION_DETAIL_1: NORMAL
MDC_IDC_LEAD_MFG: NORMAL
MDC_IDC_LEAD_MODEL: NORMAL
MDC_IDC_LEAD_POLARITY_TYPE: NORMAL
MDC_IDC_LEAD_SERIAL: NORMAL
MDC_IDC_MSMT_BATTERY_DTM: NORMAL
MDC_IDC_MSMT_BATTERY_DTM: NORMAL
MDC_IDC_MSMT_BATTERY_REMAINING_LONGEVITY: 150 MO
MDC_IDC_MSMT_BATTERY_REMAINING_LONGEVITY: 156 MO
MDC_IDC_MSMT_BATTERY_REMAINING_PERCENTAGE: 100 %
MDC_IDC_MSMT_BATTERY_REMAINING_PERCENTAGE: 100 %
MDC_IDC_MSMT_BATTERY_STATUS: NORMAL
MDC_IDC_MSMT_BATTERY_STATUS: NORMAL
MDC_IDC_MSMT_CAP_CHARGE_DTM: NORMAL
MDC_IDC_MSMT_CAP_CHARGE_ENERGY: 41 J
MDC_IDC_MSMT_CAP_CHARGE_ENERGY: 41 J
MDC_IDC_MSMT_CAP_CHARGE_TIME: 10.3 S
MDC_IDC_MSMT_CAP_CHARGE_TIME: 10.3 S
MDC_IDC_MSMT_CAP_CHARGE_TIME: 7.6 S
MDC_IDC_MSMT_CAP_CHARGE_TIME: 7.6 S
MDC_IDC_MSMT_CAP_CHARGE_TYPE: NORMAL
MDC_IDC_MSMT_LEADCHNL_RA_IMPEDANCE_VALUE: 828 OHM
MDC_IDC_MSMT_LEADCHNL_RA_IMPEDANCE_VALUE: 868 OHM
MDC_IDC_MSMT_LEADCHNL_RA_PACING_THRESHOLD_AMPLITUDE: 0.4 V
MDC_IDC_MSMT_LEADCHNL_RA_PACING_THRESHOLD_PULSEWIDTH: 0.4 MS
MDC_IDC_MSMT_LEADCHNL_RV_IMPEDANCE_VALUE: 366 OHM
MDC_IDC_MSMT_LEADCHNL_RV_IMPEDANCE_VALUE: 388 OHM
MDC_IDC_MSMT_LEADCHNL_RV_PACING_THRESHOLD_AMPLITUDE: 1 V
MDC_IDC_MSMT_LEADCHNL_RV_PACING_THRESHOLD_AMPLITUDE: 1 V
MDC_IDC_MSMT_LEADCHNL_RV_PACING_THRESHOLD_PULSEWIDTH: 0.4 MS
MDC_IDC_MSMT_LEADCHNL_RV_PACING_THRESHOLD_PULSEWIDTH: 0.4 MS
MDC_IDC_PG_IMPLANT_DTM: NORMAL
MDC_IDC_PG_IMPLANT_DTM: NORMAL
MDC_IDC_PG_MFG: NORMAL
MDC_IDC_PG_MFG: NORMAL
MDC_IDC_PG_MODEL: NORMAL
MDC_IDC_PG_MODEL: NORMAL
MDC_IDC_PG_SERIAL: NORMAL
MDC_IDC_PG_SERIAL: NORMAL
MDC_IDC_PG_TYPE: NORMAL
MDC_IDC_PG_TYPE: NORMAL
MDC_IDC_SESS_CLINIC_NAME: NORMAL
MDC_IDC_SESS_CLINIC_NAME: NORMAL
MDC_IDC_SESS_DTM: NORMAL
MDC_IDC_SESS_DTM: NORMAL
MDC_IDC_SESS_TYPE: NORMAL
MDC_IDC_SESS_TYPE: NORMAL
MDC_IDC_SET_BRADY_AT_MODE_SWITCH_MODE: NORMAL
MDC_IDC_SET_BRADY_AT_MODE_SWITCH_RATE: 140 {BEATS}/MIN
MDC_IDC_SET_BRADY_AT_MODE_SWITCH_RATE: 140 {BEATS}/MIN
MDC_IDC_SET_BRADY_LOWRATE: 40 {BEATS}/MIN
MDC_IDC_SET_BRADY_LOWRATE: 60 {BEATS}/MIN
MDC_IDC_SET_BRADY_MAX_TRACKING_RATE: 130 {BEATS}/MIN
MDC_IDC_SET_BRADY_MODE: NORMAL
MDC_IDC_SET_BRADY_MODE: NORMAL
MDC_IDC_SET_BRADY_PAV_DELAY_HIGH: 180 MS
MDC_IDC_SET_BRADY_PAV_DELAY_LOW: 240 MS
MDC_IDC_SET_BRADY_SAV_DELAY_HIGH: 135 MS
MDC_IDC_SET_BRADY_SAV_DELAY_LOW: 180 MS
MDC_IDC_SET_LEADCHNL_RA_PACING_AMPLITUDE: 3.5 V
MDC_IDC_SET_LEADCHNL_RA_PACING_AMPLITUDE: 3.5 V
MDC_IDC_SET_LEADCHNL_RA_PACING_CAPTURE_MODE: NORMAL
MDC_IDC_SET_LEADCHNL_RA_PACING_CAPTURE_MODE: NORMAL
MDC_IDC_SET_LEADCHNL_RA_PACING_POLARITY: NORMAL
MDC_IDC_SET_LEADCHNL_RA_PACING_POLARITY: NORMAL
MDC_IDC_SET_LEADCHNL_RA_PACING_PULSEWIDTH: 0.4 MS
MDC_IDC_SET_LEADCHNL_RA_PACING_PULSEWIDTH: 0.4 MS
MDC_IDC_SET_LEADCHNL_RA_SENSING_ADAPTATION_MODE: NORMAL
MDC_IDC_SET_LEADCHNL_RA_SENSING_ADAPTATION_MODE: NORMAL
MDC_IDC_SET_LEADCHNL_RA_SENSING_POLARITY: NORMAL
MDC_IDC_SET_LEADCHNL_RA_SENSING_POLARITY: NORMAL
MDC_IDC_SET_LEADCHNL_RA_SENSING_SENSITIVITY: 0.25 MV
MDC_IDC_SET_LEADCHNL_RA_SENSING_SENSITIVITY: 0.25 MV
MDC_IDC_SET_LEADCHNL_RV_PACING_AMPLITUDE: 3.5 V
MDC_IDC_SET_LEADCHNL_RV_PACING_AMPLITUDE: 3.5 V
MDC_IDC_SET_LEADCHNL_RV_PACING_CAPTURE_MODE: NORMAL
MDC_IDC_SET_LEADCHNL_RV_PACING_CAPTURE_MODE: NORMAL
MDC_IDC_SET_LEADCHNL_RV_PACING_POLARITY: NORMAL
MDC_IDC_SET_LEADCHNL_RV_PACING_POLARITY: NORMAL
MDC_IDC_SET_LEADCHNL_RV_PACING_PULSEWIDTH: 0.4 MS
MDC_IDC_SET_LEADCHNL_RV_PACING_PULSEWIDTH: 0.4 MS
MDC_IDC_SET_LEADCHNL_RV_SENSING_ADAPTATION_MODE: NORMAL
MDC_IDC_SET_LEADCHNL_RV_SENSING_ADAPTATION_MODE: NORMAL
MDC_IDC_SET_LEADCHNL_RV_SENSING_POLARITY: NORMAL
MDC_IDC_SET_LEADCHNL_RV_SENSING_POLARITY: NORMAL
MDC_IDC_SET_LEADCHNL_RV_SENSING_SENSITIVITY: 0.6 MV
MDC_IDC_SET_LEADCHNL_RV_SENSING_SENSITIVITY: 0.6 MV
MDC_IDC_SET_ZONE_DETECTION_INTERVAL: 240 MS
MDC_IDC_SET_ZONE_DETECTION_INTERVAL: 240 MS
MDC_IDC_SET_ZONE_DETECTION_INTERVAL: 300 MS
MDC_IDC_SET_ZONE_DETECTION_INTERVAL: 300 MS
MDC_IDC_SET_ZONE_DETECTION_INTERVAL: 333 MS
MDC_IDC_SET_ZONE_DETECTION_INTERVAL: 333 MS
MDC_IDC_SET_ZONE_STATUS: NORMAL
MDC_IDC_SET_ZONE_TYPE: NORMAL
MDC_IDC_SET_ZONE_VENDOR_TYPE: NORMAL
MDC_IDC_STAT_BRADY_DTM_END: NORMAL
MDC_IDC_STAT_BRADY_DTM_END: NORMAL
MDC_IDC_STAT_BRADY_DTM_START: NORMAL
MDC_IDC_STAT_BRADY_DTM_START: NORMAL
MDC_IDC_STAT_BRADY_RA_PERCENT_PACED: 0 %
MDC_IDC_STAT_BRADY_RA_PERCENT_PACED: 0 %
MDC_IDC_STAT_BRADY_RV_PERCENT_PACED: 0 %
MDC_IDC_STAT_BRADY_RV_PERCENT_PACED: 0 %
MDC_IDC_STAT_EPISODE_RECENT_COUNT: 0
MDC_IDC_STAT_EPISODE_RECENT_COUNT: 0
MDC_IDC_STAT_EPISODE_RECENT_COUNT: 1
MDC_IDC_STAT_EPISODE_RECENT_COUNT: 53
MDC_IDC_STAT_EPISODE_RECENT_COUNT: 53
MDC_IDC_STAT_EPISODE_RECENT_COUNT_DTM_END: NORMAL
MDC_IDC_STAT_EPISODE_RECENT_COUNT_DTM_START: NORMAL
MDC_IDC_STAT_EPISODE_TYPE: NORMAL
MDC_IDC_STAT_EPISODE_VENDOR_TYPE: NORMAL
MDC_IDC_STAT_TACHYTHERAPY_ATP_DELIVERED_RECENT: 2
MDC_IDC_STAT_TACHYTHERAPY_ATP_DELIVERED_RECENT: 2
MDC_IDC_STAT_TACHYTHERAPY_ATP_DELIVERED_TOTAL: 4
MDC_IDC_STAT_TACHYTHERAPY_ATP_DELIVERED_TOTAL: 4
MDC_IDC_STAT_TACHYTHERAPY_RECENT_DTM_END: NORMAL
MDC_IDC_STAT_TACHYTHERAPY_RECENT_DTM_END: NORMAL
MDC_IDC_STAT_TACHYTHERAPY_RECENT_DTM_START: NORMAL
MDC_IDC_STAT_TACHYTHERAPY_RECENT_DTM_START: NORMAL
MDC_IDC_STAT_TACHYTHERAPY_SHOCKS_ABORTED_RECENT: 1
MDC_IDC_STAT_TACHYTHERAPY_SHOCKS_ABORTED_RECENT: 1
MDC_IDC_STAT_TACHYTHERAPY_SHOCKS_ABORTED_TOTAL: 3
MDC_IDC_STAT_TACHYTHERAPY_SHOCKS_ABORTED_TOTAL: 3
MDC_IDC_STAT_TACHYTHERAPY_SHOCKS_DELIVERED_RECENT: 3
MDC_IDC_STAT_TACHYTHERAPY_SHOCKS_DELIVERED_RECENT: 3
MDC_IDC_STAT_TACHYTHERAPY_SHOCKS_DELIVERED_TOTAL: 4
MDC_IDC_STAT_TACHYTHERAPY_SHOCKS_DELIVERED_TOTAL: 4
MDC_IDC_STAT_TACHYTHERAPY_TOTAL_DTM_END: NORMAL
MDC_IDC_STAT_TACHYTHERAPY_TOTAL_DTM_END: NORMAL
MDC_IDC_STAT_TACHYTHERAPY_TOTAL_DTM_START: NORMAL
MDC_IDC_STAT_TACHYTHERAPY_TOTAL_DTM_START: NORMAL
OXYHGB MFR BLDV: 69 % (ref 70–75)
PLATELET # BLD AUTO: 247 10E3/UL (ref 150–450)
POTASSIUM SERPL-SCNC: 4.1 MMOL/L (ref 3.4–5.3)
RBC # BLD AUTO: 5.19 10E6/UL (ref 3.8–5.2)
SODIUM SERPL-SCNC: 138 MMOL/L (ref 135–145)
WBC # BLD AUTO: 6.4 10E3/UL (ref 4–11)

## 2024-12-16 PROCEDURE — 93451 RIGHT HEART CATH: CPT | Mod: 26 | Performed by: INTERNAL MEDICINE

## 2024-12-16 PROCEDURE — 80048 BASIC METABOLIC PNL TOTAL CA: CPT

## 2024-12-16 PROCEDURE — 85027 COMPLETE CBC AUTOMATED: CPT

## 2024-12-16 PROCEDURE — 250N000013 HC RX MED GY IP 250 OP 250 PS 637

## 2024-12-16 PROCEDURE — 99233 SBSQ HOSP IP/OBS HIGH 50: CPT | Mod: 25 | Performed by: INTERNAL MEDICINE

## 2024-12-16 PROCEDURE — 83735 ASSAY OF MAGNESIUM: CPT | Performed by: INTERNAL MEDICINE

## 2024-12-16 PROCEDURE — 93283 PRGRMG EVAL IMPLANTABLE DFB: CPT

## 2024-12-16 PROCEDURE — 80162 ASSAY OF DIGOXIN TOTAL: CPT | Performed by: INTERNAL MEDICINE

## 2024-12-16 PROCEDURE — 93451 RIGHT HEART CATH: CPT | Performed by: INTERNAL MEDICINE

## 2024-12-16 PROCEDURE — 85048 AUTOMATED LEUKOCYTE COUNT: CPT

## 2024-12-16 PROCEDURE — 85018 HEMOGLOBIN: CPT

## 2024-12-16 PROCEDURE — 999N000208 ECHOCARDIOGRAM COMPLETE

## 2024-12-16 PROCEDURE — 99233 SBSQ HOSP IP/OBS HIGH 50: CPT | Performed by: INTERNAL MEDICINE

## 2024-12-16 PROCEDURE — 4A023N6 MEASUREMENT OF CARDIAC SAMPLING AND PRESSURE, RIGHT HEART, PERCUTANEOUS APPROACH: ICD-10-PCS | Performed by: INTERNAL MEDICINE

## 2024-12-16 PROCEDURE — 82374 ASSAY BLOOD CARBON DIOXIDE: CPT

## 2024-12-16 PROCEDURE — 120N000003 HC R&B IMCU UMMC

## 2024-12-16 PROCEDURE — 250N000009 HC RX 250: Performed by: INTERNAL MEDICINE

## 2024-12-16 PROCEDURE — 93306 TTE W/DOPPLER COMPLETE: CPT | Mod: 26 | Performed by: STUDENT IN AN ORGANIZED HEALTH CARE EDUCATION/TRAINING PROGRAM

## 2024-12-16 PROCEDURE — 272N000001 HC OR GENERAL SUPPLY STERILE: Performed by: INTERNAL MEDICINE

## 2024-12-16 PROCEDURE — 93283 PRGRMG EVAL IMPLANTABLE DFB: CPT | Mod: 26 | Performed by: INTERNAL MEDICINE

## 2024-12-16 PROCEDURE — 82810 BLOOD GASES O2 SAT ONLY: CPT

## 2024-12-16 PROCEDURE — 85014 HEMATOCRIT: CPT

## 2024-12-16 PROCEDURE — 255N000002 HC RX 255 OP 636: Performed by: INTERNAL MEDICINE

## 2024-12-16 PROCEDURE — C1751 CATH, INF, PER/CENT/MIDLINE: HCPCS | Performed by: INTERNAL MEDICINE

## 2024-12-16 PROCEDURE — 36415 COLL VENOUS BLD VENIPUNCTURE: CPT

## 2024-12-16 RX ORDER — METOPROLOL SUCCINATE 50 MG/1
50 TABLET, EXTENDED RELEASE ORAL 2 TIMES DAILY
Status: DISCONTINUED | OUTPATIENT
Start: 2024-12-16 | End: 2024-12-18 | Stop reason: HOSPADM

## 2024-12-16 RX ADMIN — PANTOPRAZOLE SODIUM 40 MG: 40 TABLET, DELAYED RELEASE ORAL at 07:57

## 2024-12-16 RX ADMIN — SACUBITRIL AND VALSARTAN 1 TABLET: 97; 103 TABLET, FILM COATED ORAL at 20:30

## 2024-12-16 RX ADMIN — CARVEDILOL 6.25 MG: 6.25 TABLET, FILM COATED ORAL at 07:57

## 2024-12-16 RX ADMIN — HUMAN ALBUMIN MICROSPHERES AND PERFLUTREN 6 ML: 10; .22 INJECTION, SOLUTION INTRAVENOUS at 09:02

## 2024-12-16 RX ADMIN — SPIRONOLACTONE 12.5 MG: 25 TABLET, FILM COATED ORAL at 07:58

## 2024-12-16 RX ADMIN — METOPROLOL SUCCINATE 50 MG: 50 TABLET, EXTENDED RELEASE ORAL at 20:30

## 2024-12-16 RX ADMIN — SACUBITRIL AND VALSARTAN 1 TABLET: 97; 103 TABLET, FILM COATED ORAL at 07:57

## 2024-12-16 RX ADMIN — DIGOXIN 250 MCG: 125 TABLET ORAL at 07:57

## 2024-12-16 ASSESSMENT — ACTIVITIES OF DAILY LIVING (ADL)
ADLS_ACUITY_SCORE: 34
ADLS_ACUITY_SCORE: 36
ADLS_ACUITY_SCORE: 36
ADLS_ACUITY_SCORE: 34
ADLS_ACUITY_SCORE: 35
ADLS_ACUITY_SCORE: 35
ADLS_ACUITY_SCORE: 34
ADLS_ACUITY_SCORE: 35
ADLS_ACUITY_SCORE: 34
ADLS_ACUITY_SCORE: 34
ADLS_ACUITY_SCORE: 35
ADLS_ACUITY_SCORE: 34
ADLS_ACUITY_SCORE: 36
ADLS_ACUITY_SCORE: 34
ADLS_ACUITY_SCORE: 34
ADLS_ACUITY_SCORE: 35
ADLS_ACUITY_SCORE: 36
ADLS_ACUITY_SCORE: 34
ADLS_ACUITY_SCORE: 36

## 2024-12-16 NOTE — PLAN OF CARE
"Shift: 1756-5646    /68 (BP Location: Left arm)   Pulse 65   Temp 98.3  F (36.8  C) (Oral)   Resp 14   Ht 1.626 m (5' 4\")   Wt 71.1 kg (156 lb 12 oz)   LMP 10/16/2024 (Approximate)   SpO2 98%   BMI 26.91 kg/m      Summary:  Neuro: A&Ox4.   Cardiac: Afebrile, on tele, A paced with frequent PVCs , sinus arrhythmia  Respiratory: RA, denies SOB   GI/: Voiding spontaneously. No BM this shift.   Diet/appetite: Tolerating regular diet. Denies nausea   Activity: Up ad leonila  Pain: Denies   Skin: No new deficits noted. Has dressing on R internal jugular from right heart cath site  Lines: L PIV SL    R heart cath done this afternoon. Patient tolerated well.    Goal Outcome Evaluation:      Plan of Care Reviewed With: patient, parent    Overall Patient Progress: improvingOverall Patient Progress: improving    Outcome Evaluation: R heart cath done today, VSS    Problem: Adult Inpatient Plan of Care  Goal: Plan of Care Review  Description: The Plan of Care Review/Shift note should be completed every shift.  The Outcome Evaluation is a brief statement about your assessment that the patient is improving, declining, or no change.  This information will be displayed automatically on your shift  note.  Outcome: Progressing  Flowsheets (Taken 12/16/2024 1751)  Outcome Evaluation: R heart cath done today, VSS  Plan of Care Reviewed With:   patient   parent  Overall Patient Progress: improving       "

## 2024-12-16 NOTE — PLAN OF CARE
Neuro: A&Ox4.   Cardiac: Sinus arrhythmia with PVCs,  VSS.   Respiratory: Pt on RA   GI/: Pt up to the bathroom independently. Per pt last BM yesterday 12/15/24.   Diet/appetite: Pt NPO for cardiac cath today.   Activity:  Up ad leonila.   Pain: Pt denies pain.    Skin: No skin deficits.   LDA's: PIV    Plan: Mom at bedside. Pt waiting for heart cath. Hoping to go this afternoon. Continue with POC. Notify primary team with changes.     Goal Outcome Evaluation:      Plan of Care Reviewed With: patient, parent    Overall Patient Progress: improvingOverall Patient Progress: improving    Outcome Evaluation: No VT runs, no ICD shocks

## 2024-12-16 NOTE — CONSULTS
Electrophysiology Inpatient Consultation  Date of Service: 12/16/2024  Patient: Mary Shaikh  MRN: [id number]  Admission Date: 12/15/2024  Hospital Day: 1              ASSESSMENT/PLAN     ASSESSMENT  Ms. 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     Device check done, EGM showing a-fib with RVR. This is likely being inappropriately sensed at VT in the setting of baseline known IVCD. One such episode was recorded as VT lasting about 5 mins, 4 ATP sequences unable to break and so shocked 3 times. There are also multiple episodes recorded as NSVT, which also seem to be a-fib with RVR. She has been hemodynamically stable.    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 VT/VF detection time increased to 30s. Parameters to help discriminate native QRS from possible VT morphology are already in place, however this may not be 100% effective in preventing inappropriate sensing/shocking, especially with a-fib rates in the VT detection zone.    Change coreg 6.25 mg BID to metoprolol succinate 50 mg BID to allow more room to uptitrate to HR goal without as much hypotension.    Plan of care discussed with Dr. Levi, who agrees with above plan.    Thank you for consulting the cardiovascular services at the [facility]. Please do not hesitate to call us with any questions.              HISTORY OF PRESENT ILLNESS     Ms. 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     She reports receiving the first shock at 3 pm yesterday, followed by 2 shocks after and 1 shock en route with EMS. No further shocks while in the hospital. Has been hemodynamically stable through all this.  Seen by EP in clinic on 12/4/24. Recommended to continue on digoxin for a-fib/AVNRT.  Admitted with ambulatory cardiogenic shock in August 2024. During this admission, she had a brief episode of SVT, which was thought to be AVNRT by EP consult team. She was started on PO digoxin and was on low dose coreg already.  Primary team planning RHC today.    At the time of interview, the patient denies chest pain, dyspnea at rest or with exertion, orthopnea, PND, palpitations, lightheadedness, or syncope.     Review of Systems:    Complete review of systems was performed and negative except per HPI.             CARDIAC HISTORY AND IMAGING     12-Lead ECG(s):Sinus bradycardia, baseline known IVCD.      Transthoracic Echocardiogram(s):   Echo 7/2024:   Interpretation Summary  Left ventricular function is decreased. The ejection fraction is 20-25%  (severely reduced).  Global right ventricular function is moderately reduced.  Moderate biatrial enlargement is present.  IVC diameter >2.1 cm collapsing <50% with sniff suggests a high RA pressure  estimated at 15 mmHg or greater.  Trivial pericardial effusion is present.  There is no prior study for direct comparison.      Right Heart Catheterization(s) 7/24:    Right sided filling pressures are severely elevated.    Left sided filling pressures are severely elevated.    Moderately elevated pulmonary artery hypertension.    Reduced cardiac output level.    CMR and/or Additional Imaging  CMR 4/2024:  1) Decreased left ventricular systolic function-severe; calculated ejection fraction 26%.   2) Asymmetric  septal hypertrophy relative to the posterior wall.   3) Multiple regional wall motion abnormalities-mid to distal anterior wall akinetic, distal inferior wall akinetic, and apex akinetic.   4) Extensive late gadolinium hyperenhancement involving the mid-apical anterior wall, mid-apical inferior wall. The apical anterior wall and apex exhibit near transmural hyperenhancement.   5) Borderline elevated extracellular volume on parametric assessment (reported above).   6) Small, circumferential pericardial effusion          PAST MEDICAL HISTORY      No past medical history on file.  Active Problems:  Patient Active Problem List    Diagnosis Date Noted    VT (ventricular tachycardia) (H) 12/15/2024     Priority: Medium    Adjustment disorder with mixed anxiety and depressed mood 11/02/2024     Priority: Medium    Diabetes mellitus, type 2 (H) 08/22/2024     Priority: Medium    Acute on chronic systolic heart failure (H) 07/31/2024     Priority: Medium    Chronic cholecystitis 07/17/2024     Priority: Medium    ICD (implantable cardioverter-defibrillator) in place 07/12/2024     Priority: Medium     Formatting of this note might be different from the original.   [place] [employer] Resonate(D533) dual chamber ICD was placed on 6/20/2024 by Dr Alejandra for Cardiomyopathy. System IS MRI conditional 6 weeks post implant.      Biliary dyskinesia 07/11/2024     Priority: Medium    Heart failure with reduced ejection fraction (H) 07/02/2024     Priority: Medium    Nonischemic cardiomyopathy (H) 02/07/2022     Priority: Medium     Formatting of this note might be different from the original.   Confirmed genetic dx of Danon disease. See Misc lab from 12/4/21.       Last Assessment & Plan:    Formatting of this note might be different from the original.   Follow up with cardiology.   Last visit was 10/3/2023, was recommended cardiac MRI and follow up with cardiology in 3 month.       3/8/2024: No record or cardiology f/u or MRI.     Reminded patient's cardiology plan and provided number to schedule MRI and f/u with cardiology.      Hypertrophic nonobstructive cardiomyopathy (H) 12/07/2021     Priority: Medium     Last Assessment & Plan:    Formatting of this note might be different from the original.   Follow up with cardiology.   Last visit was 10/3/2023, was recommended cardiac MRI and follow up with cardiology in 3 month.       3/8/2024: No record or cardiology f/u or MRI.    Reminded patient's cardiology plan and provided number to schedule MRI and f/u with cardiology.      NSVT (nonsustained ventricular tachycardia) (H) 12/07/2021     Priority: Medium     Last Assessment & Plan:    Formatting of this note might be different from the original.   Follow up with cardiology.   Last visit was 10/3/2023, was recommended cardiac MRI and follow up with cardiology in 3 month.       3/8/2024: No record or cardiology f/u or MRI.    Reminded patient's cardiology plan and provided number to schedule MRI and f/u with cardiology.      Paroxysmal SVT (supraventricular tachycardia) (H) 12/07/2021     Priority: Medium     Last Assessment & Plan:    Formatting of this note might be different from the original.   Follow up with cardiology.   Last visit was 10/3/2023, was recommended cardiac MRI and follow up with cardiology in 3 month.       3/8/2024: No record or cardiology f/u or MRI.    Reminded patient's cardiology plan and provided number to schedule MRI and f/u with cardiology.      Josselyn Parkinson White pattern seen on electrocardiogram 10/19/2021     Priority: Medium     Last Assessment & Plan:    Formatting of this note might be different from the original.   Follow up with cardiology.   Last visit was 10/3/2023, was recommended cardiac MRI and follow up with cardiology in 3 month.       3/8/2024: No record or cardiology f/u or MRI.    Reminded patient's cardiology plan and provided number to schedule MRI and f/u with cardiology.    Sylvia Ware,  APRN, CNP, 3/8/2024 8:25 AM      Anxiety 03/30/2018     Priority: Medium    Moderate asthma without complication 03/30/2018     Priority: Medium    Moderate episode of recurrent major depressive disorder (H) 03/30/2018     Priority: Medium     Social History:  Social History     Tobacco Use    Smoking status: Never     Passive exposure: Never (per pt)    Smokeless tobacco: Never   Substance Use Topics    Alcohol use: Never    Drug use: Never     Family History:  No family history on file.    Medications:  Current Facility-Administered Medications   Medication Dose Route Frequency Provider Last Rate Last Admin    carvedilol (COREG) tablet 6.25 mg  6.25 mg Oral BID w/meals Sara Alexis MD   6.25 mg at 12/16/24 0757    digoxin (LANOXIN) tablet 250 mcg  250 mcg Oral Daily Sara Alexis MD   250 mcg at 12/16/24 0757    [Held by provider] empagliflozin (JARDIANCE) tablet 10 mg  10 mg Oral Daily Sara Alexis MD        [Held by provider] furosemide (LASIX) tablet 20 mg  20 mg Oral Daily Sara Alexis MD        pantoprazole (PROTONIX) EC tablet 40 mg  40 mg Oral QAM Sara Alexis MD   40 mg at 12/16/24 0757    sacubitril-valsartan (ENTRESTO)  MG per tablet 1 tablet  1 tablet Oral BID Sara Alexis MD   1 tablet at 12/16/24 0757    sodium chloride (PF) 0.9% PF flush 3 mL  3 mL Intracatheter Q8H Sara Alexis MD   3 mL at 12/16/24 0607    spironolactone (ALDACTONE) half-tab 12.5 mg  12.5 mg Oral Daily Sara Alexis MD   12.5 mg at 12/16/24 0758       Current Facility-Administered Medications   Medication Dose Route Frequency Provider Last Rate Last Admin             PHYSICAL EXAM     Temp:  [98.1  F (36.7  C)-98.6  F (37  C)] 98.1  F (36.7  C)  Pulse:  [57-78] 69  Resp:  [16-19] 19  BP: ()/(39-84) 91/56  SpO2:  [96 %-100 %] 96 %    Intake/Output Summary (Last 24 hours) at 12/16/2024 0839  Last data filed at 12/16/2024 0030  Gross per 24 hour   Intake --   Output 300 ml   Net -300  ml     General Appearance:  NAD, lying comfortably in bed  Respiratory: CTA bilaterally without crackles or wheezes   Cardiovascular: RRR without murmurs, JVD at the level of the clavicle   GI: Soft, nontender, nondistended, normal active bowel sounds  Ext: No BLE   Neuro: A&Ox3  Skin: No rashes or lesions          DIAGNOSTICS     All labs and imaging were reviewed, of note:    CMP  Recent Labs   Lab 12/16/24  0424 12/15/24  2128    137   POTASSIUM 4.1 3.9   CHLORIDE 105 104   CO2 23 23   ANIONGAP 10 10   GLC 90 94   BUN 7.7 8.8   CR 0.69 0.60   GFRESTIMATED >90 >90   WHITNEY 9.6 9.8   MAG 2.2 2.2   PROTTOTAL  --  7.2   ALBUMIN  --  4.3   BILITOTAL  --  0.6   ALKPHOS  --  82   AST  --  43   ALT  --  10     CBC  Recent Labs   Lab 12/16/24  0424 12/15/24  2128   WBC 6.4 7.3   RBC 5.19 5.38*   HGB 13.5 14.4   HCT 43.5 45.4   MCV 84 84   MCH 26.0* 26.8   MCHC 31.0* 31.7   RDW 15.6* 15.6*    265     INR  Recent Labs   Lab 12/15/24  2128   INR 1.18*     EP STAFF NOTE  Patient seen and examined and management plan personally reviewed with the patient. I agree with the note above by the CV/EP fellow.  Phillip Levi MD [facility]  Cardiology - Electrophysiology  Total time spent on patient visit, reviewing notes, imaging, labs, orders, and completing necessary documentation: 60 minutes.  >50% of visit spent on counseling patient and/or coordination of care.

## 2024-12-16 NOTE — H&P
[facility]    Cardiology History and Physical - Cardiology 2         Date of Admission:  12/15/2024    Assessment & Plan: S    Mary Shaikh is a 24 year old female admitted on 12/15/2024. 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     Tachycardia s/p 4 ICD shocks  Hx of cardiac arrest 2/2 VT  HFrEF (EF 20-25%) 2/2 NICM, Stage C, Class II  Danon disease with a pathogenic LAMP2 mutation   One episode of palpitations with dizziness s/p ICD shocks x4 in ~20 minutes (per patient) on 12/15, improved with metoprolol while en route. Mild bradycardia with HR 59, BP 79/67 and MAP 70 on arrival. BP improved to 105/58 on recheck with MAP 72. Pt appeared clinically stable and was asymptomatic on admission exam.   - ECG  - Device interrogation   - EP consult placed  - Baseline weight ~160 lbs, admission weight 158 lbs  - Volume status: euvolemic   - Diuretic   - PTA Lasix 20 mg  - High replacement K and Mg protocols  - GDMT  - ACE/ARB/ARNI: PTA Entresto  mg BID  - BB: PTA carvedilol 6.25 mg BID  - MRA: PTA spironolactone 12.5 mg  - SGLT2i: PTA empagliflozin 10 mg  - Contractility:   - PTA digoxin 250 mg    GERD  Takes omeprazole 20 mg at home  - Pantoprazole 40 mg ordered as formulary alternative    DM type II  Last A1C 7.3 on 8/2/24.   - Hypoglycemia protocol     Asthma, stable  - PTA albuterol          Diet: Combination Diet Regular Diet Adult  Fluid restriction 2000 ML FLUID    DVT Prophylaxis: Pneumatic Compression Devices  Ortiz Catheter: Not present  Cardiac Monitoring: ACTIVE order. Indication: Tachyarrhythmias, acute (48 hours)  Code Status: Full Code          Clinically Significant Risk Factors Present on  "Admission                    # End stage heart failure: Ventricular assist device (VAD) present         # DMII: A1C = N/A within past 6 months    # Overweight: Estimated body mass index is 27.21 kg/m  as calculated from the following:    Height as of this encounter: 1.626 m (5' 4\").    Weight as of this encounter: 71.9 kg (158 lb 8.2 oz).       # Financial/Environmental Concerns:    # Asthma: noted on problem list  # ICD device present        Disposition Plan   Expected discharge: 2 - 3 days, recommended to prior living arrangement once arrhythmia stabilized .    Entered: Sara Alexis MD 12/15/2024, 9:07 PM   The patient was discussed with the cardiology fellow and will be staffed in the AM.        Sara Alexis MD  [facility]    ______________________________________________________________________    Chief Complaint   \"Heart racing\"    History is obtained from the patient    History of Present Illness   Mary Shaikh is a 24 year old female with genetically confirmed Danon disease with a pathogenic LAMP2 mutation and associated hypertropic cardiomyopathy, preexcitation pattern and arrhythmias s/p ICD implantation, and hx of cardiac arrest 2/2 VT who presents from [facility] for palpations.    Patient reports an episode of palpitations today.  Associated symptoms include dizziness, but no chest pain, shortness of breath, syncope.  She notes that she was shocked 4 times in about 20 minutes.  She thinks she was last shocked at about 3 PM.  She received metoprolol in the ambulance on her way to the hospital with improvement in her symptoms.  The time of admission, patient reports that she is no longer feeling palpitations or dizziness.  She is accompanied by her mother and father.    Past Medical History    No past medical history on file.    Past Surgical History   Past Surgical History:   Procedure Laterality Date    CV RIGHT HEART CATH MEASUREMENTS RECORDED " N/A 7/31/2024    Procedure: Heart Cath Right Heart Cath;  Surgeon: Tanmay Briec MD;  Location:  HEART CARDIAC CATH LAB    CV RIGHT HEART CATH MEASUREMENTS RECORDED N/A 11/15/2024    Procedure: Heart Cath Right Heart Cath;  Surgeon: Tanmay Brice MD;  Location:  HEART CARDIAC CATH LAB       Prior to Admission Medications   Prior to Admission Medications   Prescriptions Last Dose Informant Patient Reported? Taking?   acetaminophen (TYLENOL) 325 MG tablet  EMS/Transport Team Yes No   Sig: Take 650 mg by mouth   albuterol (PROAIR HFA/PROVENTIL HFA/VENTOLIN HFA) 108 (90 Base) MCG/ACT inhaler  EMS/Transport Team Yes No   Sig: Inhale 1-2 puffs into the lungs   carvedilol (COREG) 6.25 MG tablet   No No   Sig: Take 1 tablet (6.25 mg) by mouth 2 times daily (with meals).   digoxin (LANOXIN) 250 MCG tablet   No No   Sig: Take 1 tablet (250 mcg) by mouth daily.   empagliflozin (JARDIANCE) 10 MG TABS tablet   No No   Sig: Take 1 tablet (10 mg) by mouth daily.   furosemide (LASIX) 20 MG tablet   No No   Sig: Take 1 tablet (20 mg) by mouth daily.   loperamide (IMODIUM) 2 MG capsule   Yes No   Sig: Take 4 mg by mouth as needed for diarrhea   melatonin 3 MG tablet  EMS/Transport Team Yes No   Sig: Take 3 mg by mouth nightly as needed   nitroGLYcerin (NITROSTAT) 0.4 MG sublingual tablet   No No   Sig: For chest pain place 1 tablet under the tongue every 5 minutes for 3 doses. If symptoms persist 5 minutes after 1st dose call 911.   norgestimate-ethinyl estradiol (ESTARYLLA) 0.25-35 MG-MCG tablet   Yes No   Sig: Take 1 tablet by mouth at bedtime   omeprazole (PRILOSEC) 20 MG DR capsule   Yes No   Sig: Take 20 mg by mouth daily   ondansetron (ZOFRAN ODT) 4 MG ODT tab  EMS/Transport Team Yes No   Sig: Place 4 mg under the tongue as needed   polyethylene glycol (MIRALAX) 17 GM/Dose powder   Yes No   Sig: Take 17 g by mouth daily   sacubitril-valsartan (ENTRESTO)  MG per tablet   No No   Sig: Take 1 tablet by mouth 2 times  daily.   sertraline (ZOLOFT) 50 MG tablet  EMS/Transport Team Yes No   Sig: Take 50 mg by mouth daily   spironolactone (ALDACTONE) 25 MG tablet   No No   Sig: Take 0.5 tablets (12.5 mg) by mouth daily.      Facility-Administered Medications: None           Physical Exam   Vital Signs: Temp: 98.6  F (37  C) Temp src: Oral BP: 105/58 Pulse: 59   Resp: 16 SpO2: 100 % O2 Device: None (Room air)    Weight: 158 lbs 8.17 oz    General Appearance: NAD, lying comfortably in bed  Respiratory: CTA bilaterally without crackles or wheezes   Cardiovascular: RRR without murmurs, JVD at the level of the clavicle   GI: Soft, nontender, nondistended, normal active bowel sounds  Ext: No BLE   Neuro: A&Ox3  Skin: No rashes or lesions      Medical Decision Making       Please see A&P for additional details of medical decision making.      Data         Imaging results reviewed over the past 24 hrs:   No results found for this or any previous visit (from the past 24 hours).

## 2024-12-16 NOTE — PROGRESS NOTES
Admission   Diagnosis: VT  Admitted from: [facility] from [facility] ED  Via: stretcher   Accompanied by: family/EMS staff  Belongings: Remain with patient  Admission paperwork: complete   Teaching: Use of console, meal times, visiting hours, orientation to unit, when to call for the RN (angina/sob/dizzyness, etc.), and the importance of safety.   Access: PIV   Telemetry: Placed on pt   Ht./Wt.: complete   Two nurse head-to-toe skin assessment performed by Kayleigh RUSSELL RN and Nadja MORRISON RN.  Skin issues noted: None.  See PCS for assessment and treatment of wounds and surgical sites.

## 2024-12-16 NOTE — PHARMACY-ADMISSION MEDICATION HISTORY
Pharmacist Admission Medication History    Admission medication history is complete. The information provided in this note is only as accurate as the sources available at the time of the update.    Information Source(s): Patient and CareEverywhere/SureScripts via in-person    Pertinent Information: Medication reconciliation completed at bedside with the patient and family member. Of note:  Patient has medication list with drug names, doses, and instructions that was used to complete medication history.     Changes made to PTA medication list:  Added: None  Deleted:   Norgestimate-ethinyl-estradiol (Estarylla) 0.25-35 mg/mcg tablet - patient no longer taking, dispense history also supports this  Omeprazole (Prilosec) 20 mg DR capsule - patient no longer taking and dispense report only mentions 10-day supply in 6/2024  Polyethylene glycol (Miralax) 17 g/dose powder - patient not taking.  Sertraline (Zoloft) 50 mg tablet - patient no longer taking and requests to remove.   Changed: None    Allergies reviewed with patient and updates made in EHR: yes    Medication History Completed By: Rere Leon, PGY-1 Pharmacy Resident 12/16/2024 1:37 PM    PTA Med List   Medication Sig Last Dose/Taking    acetaminophen (TYLENOL) 325 MG tablet Take 325-650 mg by mouth every 6 hours as needed for mild pain. Unknown    albuterol (PROAIR HFA/PROVENTIL HFA/VENTOLIN HFA) 108 (90 Base) MCG/ACT inhaler Inhale 1-2 puffs into the lungs 12/14/2024    carvedilol (COREG) 6.25 MG tablet Take 1 tablet (6.25 mg) by mouth 2 times daily (with meals). 12/15/2024 Evening    digoxin (LANOXIN) 250 MCG tablet Take 1 tablet (250 mcg) by mouth daily. 12/15/2024 Morning    empagliflozin (JARDIANCE) 10 MG TABS tablet Take 1 tablet (10 mg) by mouth daily. 12/15/2024 Morning    furosemide (LASIX) 20 MG tablet Take 1 tablet (20 mg) by mouth daily. 12/15/2024 Morning    loperamide (IMODIUM) 2 MG capsule Take 4 mg by mouth as needed for diarrhea More than a  month    melatonin 3 MG tablet Take 3 mg by mouth nightly as needed More than a month    nitroGLYcerin (NITROSTAT) 0.4 MG sublingual tablet For chest pain place 1 tablet under the tongue every 5 minutes for 3 doses. If symptoms persist 5 minutes after 1st dose call 911. Taking    ondansetron (ZOFRAN ODT) 4 MG ODT tab Place 4 mg under the tongue as needed More than a month    sacubitril-valsartan (ENTRESTO)  MG per tablet Take 1 tablet by mouth 2 times daily. 12/15/2024 Evening    spironolactone (ALDACTONE) 25 MG tablet Take 0.5 tablets (12.5 mg) by mouth daily. 12/15/2024 Morning

## 2024-12-16 NOTE — PROGRESS NOTES
2045-0700    Neuro: A&Ox4. Rested well overnight. Utilizing call light appropriately and able to make needs known.    Cardiac: Sinus dysrhythmia w BBB and frequent multifocal PVCs. Intermittently amelia overnight into 40s, but mostly 50s-70s. Appears to have had a couple short intermittent/non-sustained episodes of afib. Did have a couple 8 and 9-beat runs of VT around 0210. Intermittent episodes of palpitations w PVCs. No ICD shocks. Hypotensive at 2330 w 80s/40s w MAPs 50s. Provider notified. Discussed possibly doing a small bolus but pressures improved w/o intervention. No electrolyte replacements needed this shift.    Respiratory: RR even and nonlabored w sats >90 on RA. Lung sounds dim throughout.    GI/: No BM this shift. Adequate UOP.    Diet/appetite: No meals or snacks this shift. Regular diet w 2 L FR  Activity:  Independent    Pain: Denied pain throughout shift.    Skin:  No new skin integrity issues or concerns noted this shift.    LDA's: 20g L FA PIV    Drips: None    Plan: No changes to current plan of care. Contact primary care team (CARDS 2) w changes or concerns.     Temp: 98.2  F (36.8  C) Temp src: Oral BP: 90/58 Pulse: 59   Resp: 16 SpO2: 100 % O2 Device: None (Room air)           Recent Labs   Lab Test 12/16/24  0424 12/15/24  2128    137   POTASSIUM 4.1 3.9   CHLORIDE 105 104   CO2 23 23   ANIONGAP 10 10   GLC 90 94   BUN 7.7 8.8   CR 0.69 0.60   WHITNEY 9.6 9.8      CBC RESULTS:   Recent Labs   Lab Test 12/16/24 0424   WBC 6.4   RBC 5.19   HGB 13.5   HCT 43.5   MCV 84   MCH 26.0*   MCHC 31.0*   RDW 15.6*

## 2024-12-16 NOTE — PROGRESS NOTES
Cardiology Progress Note  Mary Shaikh MRN: [id number]  Age: 24 year old, : 24          Assessment and Plan:     Mary Shaikh is a 24 year old female admitted on 12/15/2024. 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     Tachycardia s/p 3 ICD shocks C/f Afib with RVR  Hx of cardiac arrest 2/2 VT  Hx of SVT with preexcitation  One episode of palpitations with dizziness s/p ICD shocks x4 in ~20 minutes (per patient) on 12/15, improved with metoprolol while en route. Mild bradycardia with HR 59, BP 79/67 and MAP 70 on arrival. BP improved to 105/58 on recheck with MAP 72. Pt appeared clinically stable and was asymptomatic on admission exam.   Device interrogation showed 1 VT zone lasting 5 mins s/p 4 failed ATP then 3 shocks, suggested AF with RVR. 6 NSVTs, suggest atrial in origin.   Echo () showed LVEF 20-30% with moderate diffuse hypokinesia, normal IVC. No significant change from 2024 echo.     -  RHC today   - EP consult - pending  - If confirmed AF, may need to discuss about AC  - PTA digoxin 250 mg for SVT  - Digoxin level 0.6 ()    HFrEF (EF 20-25%) 2/2 NICM, Stage C, Class II  Danon disease with a pathogenic LAMP2 mutation   Hypertrophic cardiomyopathy due to Danon disease undergoing LVAD/transplant evaluation. No new chest pain/sign of heart failure.    - Baseline weight ~160 lbs, admission weight 158 lbs  - Volume status: euvolemic. C/f with echo   - Diuretic              - PTA Lasix 20 mg - HOLD  - High replacement K and Mg protocols  - GDMT  - ACE/ARB/ARNI: PTA Entresto  mg BID  - BB: PTA carvedilol 6.25 mg BID  - MRA: PTA spironolactone 12.5 mg  - SGLT2i: PTA empagliflozin 10 mg -  HOLD    GERD  Takes omeprazole 20 mg at home  - Pantoprazole 40 mg ordered as formulary alternative     DM type II  Last A1C 7.3 on 8/2/24.   - Hypoglycemia protocol      Asthma, stable  - PTA albuterol      Diet: Combination Diet Regular Diet Adult - NPO before RHC  Fluid restriction 2000 ML FLUID    DVT Prophylaxis: Pneumatic Compression Devices  Ortiz Catheter: Not present  Cardiac Monitoring: ACTIVE order. Indication: Tachyarrhythmias, acute (48 hours)  Code Status: Full Code      Patient was discussed with staff attending, Dr. Hipolito Fuentes.    Estefania Acevedo MD  Internal Medicine Resident (PGY-2)              Subjective     She is doing fine overnight. No more palpitation or shock since the episode at home.   No chest pain. No clear sign of volume overload - no orthopnea, leg swelling.   No fever. No clear triggers for her episodes.           Objective     Vitals:  Temp:  [98.1  F (36.7  C)-98.6  F (37  C)] 98.1  F (36.7  C)  Pulse:  [57-78] 69  Resp:  [16-19] 19  BP: ()/(39-84) 91/56  SpO2:  [96 %-100 %] 96 %  MAP: 67  Tele: PAC, PVC, amelia to 40s but mostly in 50s-60s range. NS Afib? NSVT 9 beats at 1 am    I/O: no intake, urine -625 mL    Vitals:    12/15/24 2045 12/16/24 0800   Weight: 71.9 kg (158 lb 8.2 oz) 71.1 kg (156 lb 12 oz)     Gen: AA&Ox3, no acute distress  PULM/THORAX: Clear to auscultation bilaterally, no rales/stridor/wheezes  CV: JVP ~8 cm, regular without murmur  ABD: soft, nontender, nondistended.  EXT: no edema, Warm well perfused.          Data:     Device: The [email or web address] D533 RESONATE HF ICD 12/16/2024  Normal Device Function  Mode: DDI 40 bpm  AP: 0%  : 0%  Presenting EGM: AS/VS 90 bpm with occasional PVC's  Lead Trends Appear Stable.  Estimated battery longevity to RRT = 13 years.  Anticoagulant: None.  Ventricular Arrhythmia: 1 recorded episode in VT zone lasting 5 min 6 sec, 236 bpm. 4 ATP sequences were delivered with no success, 3 shocks were then  delivered. EGM available suggesting AF with RVR.   2 Monitored VT episodes, 22-56 sec, 226 - 236 bpm. EGM suggests AF with RVR.  6 NSVT episodes 163 - 194 bpm, EGM available suggest atrial in origination.  Pt Notified of Transmission Results: Ep Fellow Kelley Lopez notified of results.    Results for orders placed or performed during the hospital encounter of 12/15/24 (from the past 24 hours)   EKG 12-lead, complete   Result Value Ref Range    Systolic Blood Pressure  mmHg    Diastolic Blood Pressure  mmHg    Ventricular Rate 59 BPM    Atrial Rate 59 BPM    HI Interval 136 ms    QRS Duration 126 ms     ms    QTc 447 ms    P Axis -4 degrees    R AXIS 40 degrees    T Axis 252 degrees    Interpretation ECG       Sinus bradycardia  Non-specific intra-ventricular conduction block  T wave abnormality, consider inferior ischemia  T wave abnormality, consider anterolateral ischemia  Abnormal ECG  When compared with ECG of 01-Nov-2024 22:19,  No significant change was found     Comprehensive metabolic panel   Result Value Ref Range    Sodium 137 135 - 145 mmol/L    Potassium 3.9 3.4 - 5.3 mmol/L    Carbon Dioxide (CO2) 23 22 - 29 mmol/L    Anion Gap 10 7 - 15 mmol/L    Urea Nitrogen 8.8 6.0 - 20.0 mg/dL    Creatinine 0.60 0.51 - 0.95 mg/dL    GFR Estimate >90 >60 mL/min/1.73m2    Calcium 9.8 8.8 - 10.4 mg/dL    Chloride 104 98 - 107 mmol/L    Glucose 94 70 - 99 mg/dL    Alkaline Phosphatase 82 40 - 150 U/L    AST 43 0 - 45 U/L    ALT 10 0 - 50 U/L    Protein Total 7.2 6.4 - 8.3 g/dL    Albumin 4.3 3.5 - 5.2 g/dL    Bilirubin Total 0.6 <=1.2 mg/dL   CBC with Platelets & Differential    Narrative    The following orders were created for panel order CBC with Platelets & Differential.  Procedure                               Abnormality         Status                     ---------                               -----------         ------                     CBC with platelets and d...[[id number]]  Abnormal            Final  result                 Please view results for these tests on the individual orders.   INR   Result Value Ref Range    INR 1.18 (H) 0.85 - 1.15   Magnesium   Result Value Ref Range    Magnesium 2.2 1.7 - 2.3 mg/dL   CBC with platelets and differential   Result Value Ref Range    WBC Count 7.3 4.0 - 11.0 10e3/uL    RBC Count 5.38 (H) 3.80 - 5.20 10e6/uL    Hemoglobin 14.4 11.7 - 15.7 g/dL    Hematocrit 45.4 35.0 - 47.0 %    MCV 84 78 - 100 fL    MCH 26.8 26.5 - 33.0 pg    MCHC 31.7 31.5 - 36.5 g/dL    RDW 15.6 (H) 10.0 - 15.0 %    Platelet Count 265 150 - 450 10e3/uL    % Neutrophils 56 %    % Lymphocytes 38 %    % Monocytes 5 %    % Eosinophils 0 %    % Basophils 1 %    % Immature Granulocytes 0 %    NRBCs per 100 WBC 0 <1 /100    Absolute Neutrophils 4.1 1.6 - 8.3 10e3/uL    Absolute Lymphocytes 2.8 0.8 - 5.3 10e3/uL    Absolute Monocytes 0.4 0.0 - 1.3 10e3/uL    Absolute Eosinophils 0.0 0.0 - 0.7 10e3/uL    Absolute Basophils 0.1 0.0 - 0.2 10e3/uL    Absolute Immature Granulocytes 0.0 <=0.4 10e3/uL    Absolute NRBCs 0.0 10e3/uL   TSH with free T4 reflex   Result Value Ref Range    TSH 1.78 0.30 - 4.20 uIU/mL   Basic metabolic panel   Result Value Ref Range    Sodium 138 135 - 145 mmol/L    Potassium 4.1 3.4 - 5.3 mmol/L    Chloride 105 98 - 107 mmol/L    Carbon Dioxide (CO2) 23 22 - 29 mmol/L    Anion Gap 10 7 - 15 mmol/L    Urea Nitrogen 7.7 6.0 - 20.0 mg/dL    Creatinine 0.69 0.51 - 0.95 mg/dL    GFR Estimate >90 >60 mL/min/1.73m2    Calcium 9.6 8.8 - 10.4 mg/dL    Glucose 90 70 - 99 mg/dL   CBC with platelets   Result Value Ref Range    WBC Count 6.4 4.0 - 11.0 10e3/uL    RBC Count 5.19 3.80 - 5.20 10e6/uL    Hemoglobin 13.5 11.7 - 15.7 g/dL    Hematocrit 43.5 35.0 - 47.0 %    MCV 84 78 - 100 fL    MCH 26.0 (L) 26.5 - 33.0 pg    MCHC 31.0 (L) 31.5 - 36.5 g/dL    RDW 15.6 (H) 10.0 - 15.0 %    Platelet Count 247 150 - 450 10e3/uL   Magnesium   Result Value Ref Range    Magnesium 2.2 1.7 - 2.3 mg/dL   Digoxin  level   Result Value Ref Range    Digoxin 0.6 0.6 - 1.2 ng/mL   Echo Complete   Result Value Ref Range    Biplane LVEF 27%     LVEF  20-30% (severely reduced)     [facility]    [id number]  [facility]  [id number]  [id number]^MIGUEL^NATALYA     [facility]  Echocardiography Laboratory  [address] [place] [address]     Name: MS. JE PARK  MRN: [id number]  : 2000  Study Date: 2024 08:32 AM  Age: 24 yrs  Gender: Female  Patient Location: [facility]  Reason For Study: Heart Transplant  Ordering Physician: NATALYA RIVERA  Referring Physician: SYSTEM, PROVIDER NOT IN  Performed By: Alannah Saldana RDCS     BSA: 1.8 m2  Height: 64 in  Weight: 158 lb  ______________________________________________________________________________  Procedure  Complete Portable Echo Adult. Contrast Optison. Optison (NDC #3562-8327-26)  given intravenously. Patient was given 6 ml mixture of 3 ml Optison and 6 ml  saline. 3 ml wasted.  ______________________________________________________________________________  Interpretation Summary  Left ventricular function is decreased. The ejection fraction is 20-30%  (severely reduced).  Moderate diffuse hypokinesis is present.  Global right ventricular function is moderately reduced  IVC diameter <2.1 cm collapsing >50% with sniff suggests a normal RA pressure  of 3 mmHg.  No pericardial effusion is present.     ______________________________________________________________________________  Left Ventricle  Biplane LVEF is 27%. LVIDd 5.8cm. Left ventricular function is decreased. The  ejection fraction is 20-30% (severely reduced). Diastolic function not  assessed due to atrial fibrillation. The Ejection Fraction was calculated  using Bi-plane contrast. Moderate diffuse hypokinesis is present.     Right Ventricle  The right ventricle is normal size. Global right ventricular function is  moderately reduced.     Atria  Mild left atrial enlargement is  present.     Mitral Valve  Trace mitral insufficiency is present.     Aortic Valve  Aortic valve is normal in structure and function.     Tricuspid Valve  Trace to mild tricuspid insufficiency is present. The right ventricular  systolic pressure is 23mmHg above the right atrial pressure. Pulmonary artery  systolic pressure is normal.     Pulmonic Valve  The pulmonic valve is normal. On Doppler interrogation, there is no  significant stenosis or regurgitation.     Vessels  The aorta root is normal. The thoracic aorta is normal. IVC diameter <2.1 cm  collapsing >50% with sniff suggests a normal RA pressure of 3 mmHg.     Pericardium  No pericardial effusion is present.     Compared to Previous Study  There has been no change.  ______________________________________________________________________________  MMode/2D Measurements & Calculations  IVSd: 1.1 cm  LVIDd: 5.8 cm  LVIDs: 4.9 cm  LVPWd: 1.0 cm  FS: 16.8 %  LV mass(C)d: 251.4 grams  LV mass(C)dI: 142.0 grams/m2  Ao root diam: 2.4 cm  asc Aorta Diam: 2.3 cm  LVOT diam: 2.0 cm  LVOT area: 3.2 cm2     EF(MOD-bp): 27.1 %  Ao root diam index Ht(cm/m): 1.5  Ao root diam index BSA (cm/m2): 1.3  Asc Ao diam index BSA (cm/m2): 1.3  Asc Ao diam index Ht(cm/m): 1.4  LA Volume (BP): 41.0 ml     LA Volume Index (BP): 23.2 ml/m2  RWT: 0.36     Doppler Measurements & Calculations  TR max darnell: 241.8 cm/sec  TR max P.4 mmHg  RV S Darnell: 8.8 cm/sec     ______________________________________________________________________________  Report approved by: Kerry Brink Dr on 2024 09:33 AM               Imaging reviewed.          Medications     Medications  Current Facility-Administered Medications   Medication Dose Route Frequency Provider Last Rate Last Admin    carvedilol (COREG) tablet 6.25 mg  6.25 mg Oral BID w/meals Sara Alexis MD   6.25 mg at 24 0757    digoxin (LANOXIN) tablet 250 mcg  250 mcg Oral Daily Sara Alexis MD   250 mcg at  12/16/24 0757    [Held by provider] empagliflozin (JARDIANCE) tablet 10 mg  10 mg Oral Daily Sara Alexis MD        [Held by provider] furosemide (LASIX) tablet 20 mg  20 mg Oral Daily Sara Alexis MD        pantoprazole (PROTONIX) EC tablet 40 mg  40 mg Oral QAM Sara Alexis MD   40 mg at 12/16/24 0757    sacubitril-valsartan (ENTRESTO)  MG per tablet 1 tablet  1 tablet Oral BID Sara Alexis MD   1 tablet at 12/16/24 0757    sodium chloride (PF) 0.9% PF flush 3 mL  3 mL Intracatheter Q8H Sara Alexis MD   3 mL at 12/16/24 0607    spironolactone (ALDACTONE) half-tab 12.5 mg  12.5 mg Oral Daily Sara Alxeis MD   12.5 mg at 12/16/24 0758     Current Facility-Administered Medications   Medication Dose Route Frequency Provider Last Rate Last Admin

## 2024-12-17 LAB
ANION GAP SERPL CALCULATED.3IONS-SCNC: 13 MMOL/L (ref 7–15)
ATRIAL RATE - MUSE: 59 BPM
BUN SERPL-MCNC: 11.7 MG/DL (ref 6–20)
CALCIUM SERPL-MCNC: 9.4 MG/DL (ref 8.8–10.4)
CHLORIDE SERPL-SCNC: 106 MMOL/L (ref 98–107)
CREAT SERPL-MCNC: 0.66 MG/DL (ref 0.51–0.95)
DIASTOLIC BLOOD PRESSURE - MUSE: NORMAL MMHG
EGFRCR SERPLBLD CKD-EPI 2021: >90 ML/MIN/1.73M2
ERYTHROCYTE [DISTWIDTH] IN BLOOD BY AUTOMATED COUNT: 15.9 % (ref 10–15)
GLUCOSE SERPL-MCNC: 107 MG/DL (ref 70–99)
HCO3 SERPL-SCNC: 20 MMOL/L (ref 22–29)
HCT VFR BLD AUTO: 42.9 % (ref 35–47)
HGB BLD-MCNC: 13.4 G/DL (ref 11.7–15.7)
INTERPRETATION ECG - MUSE: NORMAL
MAGNESIUM SERPL-MCNC: 2.1 MG/DL (ref 1.7–2.3)
MCH RBC QN AUTO: 26.4 PG (ref 26.5–33)
MCHC RBC AUTO-ENTMCNC: 31.2 G/DL (ref 31.5–36.5)
MCV RBC AUTO: 85 FL (ref 78–100)
P AXIS - MUSE: -4 DEGREES
PLATELET # BLD AUTO: 234 10E3/UL (ref 150–450)
POTASSIUM SERPL-SCNC: 3.6 MMOL/L (ref 3.4–5.3)
PR INTERVAL - MUSE: 136 MS
QRS DURATION - MUSE: 126 MS
QT - MUSE: 452 MS
QTC - MUSE: 447 MS
R AXIS - MUSE: 40 DEGREES
RBC # BLD AUTO: 5.07 10E6/UL (ref 3.8–5.2)
SODIUM SERPL-SCNC: 139 MMOL/L (ref 135–145)
SYSTOLIC BLOOD PRESSURE - MUSE: NORMAL MMHG
T AXIS - MUSE: 252 DEGREES
VENTRICULAR RATE- MUSE: 59 BPM
WBC # BLD AUTO: 4.4 10E3/UL (ref 4–11)

## 2024-12-17 PROCEDURE — 250N000013 HC RX MED GY IP 250 OP 250 PS 637

## 2024-12-17 PROCEDURE — 99233 SBSQ HOSP IP/OBS HIGH 50: CPT | Mod: GC | Performed by: INTERNAL MEDICINE

## 2024-12-17 PROCEDURE — 36415 COLL VENOUS BLD VENIPUNCTURE: CPT

## 2024-12-17 PROCEDURE — 82374 ASSAY BLOOD CARBON DIOXIDE: CPT

## 2024-12-17 PROCEDURE — 80048 BASIC METABOLIC PNL TOTAL CA: CPT

## 2024-12-17 PROCEDURE — 120N000003 HC R&B IMCU UMMC

## 2024-12-17 PROCEDURE — 250N000013 HC RX MED GY IP 250 OP 250 PS 637: Performed by: INTERNAL MEDICINE

## 2024-12-17 PROCEDURE — 85014 HEMATOCRIT: CPT

## 2024-12-17 PROCEDURE — 83735 ASSAY OF MAGNESIUM: CPT

## 2024-12-17 RX ORDER — METOPROLOL SUCCINATE 50 MG/1
50 TABLET, EXTENDED RELEASE ORAL 2 TIMES DAILY
Qty: 62 TABLET | Refills: 2 | Status: SHIPPED | OUTPATIENT
Start: 2024-12-17 | End: 2024-12-18

## 2024-12-17 RX ORDER — POTASSIUM CHLORIDE 750 MG/1
20 TABLET, EXTENDED RELEASE ORAL ONCE
Status: COMPLETED | OUTPATIENT
Start: 2024-12-17 | End: 2024-12-17

## 2024-12-17 RX ORDER — FUROSEMIDE 20 MG/1
20 TABLET ORAL DAILY
Status: DISCONTINUED | OUTPATIENT
Start: 2024-12-17 | End: 2024-12-18 | Stop reason: HOSPADM

## 2024-12-17 RX ADMIN — PANTOPRAZOLE SODIUM 40 MG: 40 TABLET, DELAYED RELEASE ORAL at 07:58

## 2024-12-17 RX ADMIN — SACUBITRIL AND VALSARTAN 1 TABLET: 97; 103 TABLET, FILM COATED ORAL at 08:22

## 2024-12-17 RX ADMIN — EMPAGLIFLOZIN 25 MG: 25 TABLET, FILM COATED ORAL at 08:22

## 2024-12-17 RX ADMIN — SPIRONOLACTONE 12.5 MG: 25 TABLET, FILM COATED ORAL at 07:58

## 2024-12-17 RX ADMIN — METOPROLOL SUCCINATE 50 MG: 50 TABLET, EXTENDED RELEASE ORAL at 20:56

## 2024-12-17 RX ADMIN — SACUBITRIL AND VALSARTAN 1 TABLET: 97; 103 TABLET, FILM COATED ORAL at 20:56

## 2024-12-17 RX ADMIN — METOPROLOL SUCCINATE 50 MG: 50 TABLET, EXTENDED RELEASE ORAL at 08:22

## 2024-12-17 RX ADMIN — FUROSEMIDE 20 MG: 20 TABLET ORAL at 09:56

## 2024-12-17 RX ADMIN — DIGOXIN 250 MCG: 125 TABLET ORAL at 08:22

## 2024-12-17 RX ADMIN — POTASSIUM CHLORIDE 20 MEQ: 750 TABLET, EXTENDED RELEASE ORAL at 07:58

## 2024-12-17 ASSESSMENT — ACTIVITIES OF DAILY LIVING (ADL)
ADLS_ACUITY_SCORE: 35
ADLS_ACUITY_SCORE: 38
ADLS_ACUITY_SCORE: 38
ADLS_ACUITY_SCORE: 35
ADLS_ACUITY_SCORE: 38
ADLS_ACUITY_SCORE: 35
ADLS_ACUITY_SCORE: 35
ADLS_ACUITY_SCORE: 38
ADLS_ACUITY_SCORE: 35

## 2024-12-17 NOTE — PLAN OF CARE
"Neuro: A&Ox4.   Cardiac: A-paced with occasional PVCs. VSS.   Respiratory: Sating >92% on RA.  GI/: Adequate urine output. No BM this shift.   Diet/appetite: Tolerating Regular diet. Eating well.  Activity:  Independent, up to chair and in halls.  Pain: At acceptable level on current regimen.   Skin: No new deficits noted.  LDA's:L PIV-SL.     Plan: Medications changed per MD (see note from Dr. Acevedo). Pt hopeful to discharge 12/18. Curtis notified at 1604: \"Pt had 2 episodes of palpitations HR Max 135. No shock or VTACH. Pt complained of shortness or breath with ambulation, per pt report improved throughout day\". 1650, Curtis responded: \"I just saw tele, nothing abnormal.\" Pt notified MD had seen telemetry and pt reported relief that MD was aware. Encouraged activity as tolerated and ADLS. Mother at bedside much of day. Updated care plan.         "

## 2024-12-17 NOTE — PLAN OF CARE
"Goal Outcome Evaluation:  BP 94/59 (BP Location: Left arm)   Pulse 67   Temp 98  F (36.7  C) (Oral)   Resp 16   Ht 1.626 m (5' 4\")   Wt 71.1 kg (156 lb 12 oz)   LMP 10/16/2024 (Approximate)   SpO2 97%   BMI 26.91 kg/m      VSS. Atrial paced. BPs soft but stable. RA. Denies pain. A&O x4. Regular diet. Potassium replaced. Voiding adequately. Up independently. Hopeful discharge today. Continue to monitor.   "

## 2024-12-17 NOTE — PLAN OF CARE
"Goal Outcome Evaluation:      Plan of Care Reviewed With: patient    Overall Patient Progress: no changeOverall Patient Progress: no change    Outcome Evaluation: Pt reports no pain, VSS. HR still irregular, frequent PVC's and \"fluttering\" per pt. No runs of VTACH or SVT overnight.    Neuro: A&Ox4.   Cardiac: Irregular HR, frequent PVC's. VSS otherwise.   Respiratory: Sating 99% on RA.  GI/: Adequate urine output.  Diet/appetite: Tolerating regular diet. Eating well.  Activity:  Independent, up to bathroom.  Pain: Denied pain all shift.  Skin: R neck access site, primapore can be changed in afternoon 12/17 (24 hours after placement).   LDA's: LPIV, SL.     Plan: Continue with POC. Notify primary team with changes.   "

## 2024-12-17 NOTE — PROGRESS NOTES
Cardiology Progress Note  Mary Shaikh MRN: [id number]  Age: 24 year old, : 24          Assessment and Plan:     Mary Shaikh is a 24 year old female admitted on 12/15/2024. 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     Changes today  - Still has palpitation - tele review: multiple PACs with no Afib  - Carvedilol -> Metoprolol succinate 50 mg BID  - Device to mode DDD per EP   - RHC - stable parameters  - Increase empagliflozin 10->25 mg  - Hold lasix (already got 20 this AM)    Tachycardia s/p 3 ICD shocks C/f Afib with RVR  Hx of cardiac arrest 2/2 VT  Hx of SVT with preexcitation  One episode of palpitations with dizziness s/p ICD shocks x4 in ~20 minutes (per patient) on 12/15, improved with metoprolol while en route. Mild bradycardia with HR 59, BP 79/67 and MAP 70 on arrival. BP improved to 105/58 on recheck with MAP 72. Pt appeared clinically stable and was asymptomatic on admission exam.   Device interrogation showed 1 VT zone lasting 5 mins s/p 4 failed ATP then 3 shocks, suggested AF with RVR. 6 NSVTs, suggest atrial in origin.   Echo () showed LVEF 20-30% with moderate diffuse hypokinesia, normal IVC. No significant change from 2024 echo.     - Still has palpitation - tele review: multiple PACs with no Afib. 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 No new chest pain/sign of heart failure.  RHC 12/16/2024 - RA 8/--/--, RV 34/6, PA 32/9 (19), PCWP --/--/10, TDCO 4.03, TDCI 2.29, Nohelia CO 4.46, nohelia CI 2.53   Echo 12/16/2024 - unchanged from prior. LVEF 20-30%, moderate diffuse hypokinesia. Reduced RV function.     - Baseline weight ~160 lbs, admission weight 158 lbs  - Volume status: euvolemic. C/f with echo 12/16  - Diuretic              - PTA Lasix 20 mg - HOLD  - High replacement K and Mg protocols  - GDMT  - ACE/ARB/ARNI: PTA Entresto  mg BID  - BB: PTA Metoprolol succinate 50 mg BID  - MRA: PTA spironolactone 12.5 mg  - SGLT2i: empagliflozin 10-> 25 mg     GERD  Takes omeprazole 20 mg at home  - Pantoprazole 40 mg ordered as formulary alternative     DM type II  Last A1C 7.3 on 8/2/24.   - Hypoglycemia protocol      Asthma, stable  - PTA albuterol      Diet: Combination Diet Regular Diet Adult - NPO before RHC  Fluid restriction 2000 ML FLUID    DVT Prophylaxis: Pneumatic Compression Devices  Line : R IJV  Ortiz Catheter: Not present  Cardiac Monitoring: ACTIVE order. Indication: Tachyarrhythmias, acute (48 hours)  Code Status: Full Code      Patient was discussed with staff attending, Dr. Hipolito Fuentes.    Estefania Acevedo MD  Internal Medicine Resident (PGY-2)          Subjective     Had palpitation on and off for ~ 30 minutes without shock.   No chest pain, shortness of breath.   No bleeding from the procedure sites.          Objective     Vitals:  Temp:  [97.7  F (36.5  C)-98.3  F (36.8  C)] 97.7  F (36.5  C)  Pulse:  [65-75] 72  Resp:  [14-19] 18  BP: ()/() 122/111  SpO2:  [96 %-99 %] 99 %  MAP: 66  Tele: frequent PAC, no Afib    I/O: +95mL  Weight 71.9->71.1 kg    Vitals:    12/15/24 2045 12/16/24 0800   Weight: 71.9 kg (158 lb 8.2 oz) 71.1 kg (156 lb 12 oz)     Gen: AA&Ox3, no acute distress, watching youtube  PULM/THORAX: Clear to auscultation bilaterally, no  rales/stridor/wheezes  CV: JVP not seen  ABD: soft, nontender, nondistended.  EXT: no edema, Warm well perfused.          Data:     RHC (12/16/2024)      Right sided filling pressures are normal.    Left sided filling pressures are normal.    Normal PA pressures.    Left ventricular filling pressures are normal.    Normal cardiac output level.  RA 8/--/--  RV 34/6  PA 32/9 (19)  PCWP --/--/10  TDCO 4.03, TDCI 2.29  Nohelia CO 4.46, nohelia CI 2.53 Right sided filling pressures are normal. Left sided filling pressures are normal. Normal PA pressures. Left ventricular filling pressures are normal. Normal cardiac output level.     RHC (11/15/2024)  Aorta 109/58/79  RA 5/6/4  RV 35/4  PA 35/15/20  PCWP 12/20/11  Pa Sat 66.5   Hb 14.3   Nohelia CO 3.4 Nohelia CI 2.0  TD CO 4.0 TD CI 2.3    PVR 2.37  SVR 1775  Right sided filling pressures are normal. Left sided filling pressures are normal. Normal PA pressures. Left ventricular filling pressures are normal. Normal cardiac output level. Hemodynamic data has been modified in Epic per physician review.     Device: [employer] D533 RESONATE HF ICD 12/16/2024  Normal device function.  Mode: DDD  bpm  AP: <1%  : <1%  Intrinsic rhythm: SB 45 bpm  Lead Trends Appear Stable.  Estimated battery longevity to RRT = 13 years.  No Arrhythmia's since remote transmission on 12/15/2024.  Setting Changes: Increased LRL from 40 bpm to 60 bpm. PAV decreased from 180 ms to 240 ms. Mode changed from DDI to DDD. VF Initial duration extended to 5 sec, VT-1 initial detection extended to 30 sec, VT initial detection extended to 25 sec. All changes made bedside with Dr. Levi present.        Device: [employer] D533 RESONATE HF ICD 12/16/2024  Normal Device Function  Mode: DDI 40 bpm  AP: 0%  : 0%  Presenting EGM: AS/VS 90 bpm with occasional PVC's  Lead Trends Appear Stable.  Estimated battery longevity to RRT = 13 years.  Anticoagulant: None.  Ventricular Arrhythmia: 1 recorded  episode in VT zone lasting 5 min 6 sec, 236 bpm. 4 ATP sequences were delivered with no success, 3 shocks were then delivered. EGM available suggesting AF with RVR.   2 Monitored VT episodes, 22-56 sec, 226 - 236 bpm. EGM suggests AF with RVR.  6 NSVT episodes 163 - 194 bpm, EGM available suggest atrial in origination.  Pt Notified of Transmission Results: Ep Fellow Kelley Lopez notified of results.    Results for orders placed or performed during the hospital encounter of 12/15/24 (from the past 24 hours)   Echo Complete   Result Value Ref Range    Biplane LVEF 27%     LVEF  20-30% (severely reduced)     Narrative    [id number]  [id number]  [id number]  [id number]^MIGUEL^NATALYA     [facility],[place]  Echocardiography Laboratory  [address] [place] [address]     Name: MS. JE PARK  MRN: [id number]  : 2000  Study Date: 2024 08:32 AM  Age: 24 yrs  Gender: Female  Patient Location: [facility]  Reason For Study: Heart Transplant  Ordering Physician: NATALYA RIVERA  Referring Physician: SYSTEM, PROVIDER NOT IN  Performed By: Alannah Saldana RDCS     BSA: 1.8 m2  Height: 64 in  Weight: 158 lb  ______________________________________________________________________________  Procedure  Complete Portable Echo Adult. Contrast Optison. Optison (NDC #0308-2007-29)  given intravenously. Patient was given 6 ml mixture of 3 ml Optison and 6 ml  saline. 3 ml wasted.  ______________________________________________________________________________  Interpretation Summary  Left ventricular function is decreased. The ejection fraction is 20-30%  (severely reduced).  Moderate diffuse hypokinesis is present.  Global right ventricular function is moderately reduced  IVC diameter <2.1 cm collapsing >50% with sniff suggests a normal RA pressure  of 3 mmHg.  No pericardial effusion is present.     ______________________________________________________________________________  Left  Ventricle  Biplane LVEF is 27%. LVIDd 5.8cm. Left ventricular function is decreased. The  ejection fraction is 20-30% (severely reduced). Diastolic function not  assessed due to atrial fibrillation. The Ejection Fraction was calculated  using Bi-plane contrast. Moderate diffuse hypokinesis is present.     Right Ventricle  The right ventricle is normal size. Global right ventricular function is  moderately reduced.     Atria  Mild left atrial enlargement is present.     Mitral Valve  Trace mitral insufficiency is present.     Aortic Valve  Aortic valve is normal in structure and function.     Tricuspid Valve  Trace to mild tricuspid insufficiency is present. The right ventricular  systolic pressure is 23mmHg above the right atrial pressure. Pulmonary artery  systolic pressure is normal.     Pulmonic Valve  The pulmonic valve is normal. On Doppler interrogation, there is no  significant stenosis or regurgitation.     Vessels  The aorta root is normal. The thoracic aorta is normal. IVC diameter <2.1 cm  collapsing >50% with sniff suggests a normal RA pressure of 3 mmHg.     Pericardium  No pericardial effusion is present.     Compared to Previous Study  There has been no change.  ______________________________________________________________________________  MMode/2D Measurements & Calculations  IVSd: 1.1 cm  LVIDd: 5.8 cm  LVIDs: 4.9 cm  LVPWd: 1.0 cm  FS: 16.8 %  LV mass(C)d: 251.4 grams  LV mass(C)dI: 142.0 grams/m2  Ao root diam: 2.4 cm  asc Aorta Diam: 2.3 cm  LVOT diam: 2.0 cm  LVOT area: 3.2 cm2     EF(MOD-bp): 27.1 %  Ao root diam index Ht(cm/m): 1.5  Ao root diam index BSA (cm/m2): 1.3  Asc Ao diam index BSA (cm/m2): 1.3  Asc Ao diam index Ht(cm/m): 1.4  LA Volume (BP): 41.0 ml     LA Volume Index (BP): 23.2 ml/m2  RWT: 0.36     Doppler Measurements & Calculations  TR max darnell: 241.8 cm/sec  TR max P.4 mmHg  RV S Darnell: 8.8 cm/sec      ______________________________________________________________________________  Report approved by: Kerry Brink Dr on 12/16/2024 09:33 AM         Cardiac Device Check - Inpatient   Result Value Ref Range    Date Time Interrogation Session [id number]     Implantable Pulse Generator  [employer]     Implantable Pulse Generator Model D533 RESONATE HF ICD     Implantable Pulse Generator Serial Number [id number]     Type Interrogation Session In Clinic     Clinic Name [facility]     Implantable Pulse Generator Type Defibrillator     Implantable Pulse Generator Implant Date 20240620     Implantable Lead  [employer]     Implantable Lead Model 0672 [place] 4-Front S     Implantable Lead Serial Number [id number]     Implantable Lead Implant Date 20240620     Implantable Lead Polarity Type Bipolar Lead     Implantable Lead Location Detail 1 UNKNOWN     Implantable Lead Location Right Ventricle     Implantable Lead Connection Status Connected     Implantable Lead  [employer]     Implantable Lead Model 7840 Ingevity+ MRI     Implantable Lead Serial Number [id number]     Implantable Lead Implant Date 20240620     Implantable Lead Polarity Type Bipolar Lead     Implantable Lead Location Detail 1 UNKNOWN     Implantable Lead Location Right Atrium     Implantable Lead Connection Status Connected     Ralph Setting Mode (NBG Code) DDD     Ralph Setting Lower Rate Limit 60 [beats]/min    Ralph Setting Maximum Tracking Rate 130 [beats]/min    Ralph Setting BECKY Delay Low 180 ms    Ralph Setting PAV Delay Low 240 ms    Ralph Setting PAV Delay High 180 ms    Ralph Setting BECKY Delay High 135 ms    Ralph Setting AT Mode Switch Rate 140 [beats]/min    Ralph Setting AT Mode Switch Mode VDI     Lead Channel Setting Sensing Polarity Bipolar     Lead Channel Setting Sensing Sensitivity 0.25 mV    Lead Channel Setting Sensing Adaptation Mode Adaptive      Lead Channel Setting Sensing Polarity Bipolar     Lead Channel Setting Sensing Sensitivity 0.6 mV    Lead Channel Setting Sensing Adaptation Mode Adaptive     Lead Channel Setting Pacing Polarity Bipolar     Lead Channel Setting Pacing Pulse Width 0.4 ms    Lead Channel Setting Pacing Amplitude 3.5 V    Lead Channel Setting Pacing Capture Mode Monitor     Lead Channel Setting Pacing Polarity Bipolar     Lead Channel Setting Pacing Pulse Width 0.4 ms    Lead Channel Setting Pacing Amplitude 3.5 V    Lead Channel Setting Pacing Capture Mode Monitor     Zone Setting Type Category VF     Zone Setting Vendor Type Category VF     Zone Setting Status Active     Zone Setting Detection Interval 240 ms    Zone Setting Type Category VT     Zone Setting Vendor Type Category VT     Zone Setting Status Active     Zone Setting Detection Interval 300 ms    Zone Setting Type Category VT     Zone Setting Vendor Type Category VT-1     Zone Setting Status Monitor     Zone Setting Detection Interval 333 ms    Lead Channel Impedance Value 868 ohm    Lead Channel Impedance Value 388 ohm    Lead Channel Pacing Threshold Amplitude 1.0 V    Lead Channel Pacing Threshold Pulse Width 0.4 ms    Battery Date Time of Measurements 20241216125300     Battery Status Beginning of Service     Battery Remaining Longevity 150 mo    Battery Remaining Percentage 100 %    Capacitor Charge Type Reformation     Capacitor Last Charge Date Time [id number]     Capacitor Charge Time 10.3 s    Capacitor Charge Type Shock     Capacitor Last Charge Date Time [id number]     Capacitor Charge Time 7.6 s    Capacitor Charge Energy 41 J    Ralph Statistic Date Time Start [id number]     Ralph Statistic Date Time End [id number]     Ralph Statistic RA Percent Paced 0 %    Ralph Statistic RV Percent Paced 0 %    Therapy Statistic Recent Shocks Delivered 3     Therapy Statistic Recent Shocks Aborted 1     Therapy Statistic Recent ATP Delivered 2     Therapy  Statistic Recent Date Time Start 20241028000000     Therapy Statistic Recent Date Time End 20241216000000     Therapy Statistic Total Shocks Delivered 4     Therapy Statistic Total Shocks Aborted 3     Therapy Statistic Total ATP Delivered 4     Therapy Statistic Total  Date Time Start 20240620000000     Therapy Statistic Total  Date Time End 20241216000000     Episode Statistic Recent Count 1     Episode Statistic Type Category Other     Episode Statistic Recent Count 53     Episode Statistic Type Category VT     Episode Statistic Vendor Type Category NSVT     Episode Statistic Recent Count 0     Episode Statistic Type Category VT     Episode Statistic Vendor Type Category VT     Episode Statistic Recent Count 1     Episode Statistic Type Category VT     Episode Statistic Vendor Type Category VT-1     Episode Statistic Recent Date Time Start 20241028000000     Episode Statistic Recent Date Time End 20241216000000     Episode Statistic Recent Date Time Start 20241028000000     Episode Statistic Recent Date Time End 20241216000000     Episode Statistic Recent Date Time Start 20241028000000     Episode Statistic Recent Date Time End 20241216000000     Episode Statistic Recent Date Time Start 20241028000000     Episode Statistic Recent Date Time End 20241216000000     Episode Identifier [id number]     Episode Type Category VT     Episode Date Time [id number]     Episode Duration 12 s    Episode Type Induced Flag NO     Episode Identifier [id number]     Episode Type Category VT     Episode Date Time [id number]     Episode Duration 12 s    Episode Type Induced Flag NO     Narrative    Patient seen in [facility] for evaluation and iterative programming of their   ICD per MD orders.     Device: "[employer] Scientific D533 RESONATE HF ICD  Normal device function.  Mode: DDD  bpm  AP: <1%  : <1%  Intrinsic rhythm: SB 45 bpm  Lead Trends Appear Stable.  Estimated battery longevity to RRT = 13 years.  No Arrhythmia's since  remote transmission on 12/15/2024.  Setting Changes: Increased LRL from 40 bpm to 60 bpm. PAV decreased from   180 ms to 240 ms. Mode changed from DDI to DDD. VF Initial duration   extended to 5 sec, VT-1 initial detection extended to 30 sec, VT initial   detection extended to 25 sec. All changes made bedside with Dr. Levi   present.     ERIKA Brock RN    Dual lead ICD    I have reviewed and interpreted the device interrogation, settings,   programming and nurse's summary. The device is functioning within normal   device parameters. I agree with the current findings, assessment and plan.   Hemoglobin POCT   Result Value Ref Range    Hemoglobin POCT 14.1 11.7 - 15.7 g/dL   Oxyhemoglobin, venous POCT   Result Value Ref Range    Oxyhemoglobin Venous POCT 69 (L) 70 - 75 %   Basic metabolic panel   Result Value Ref Range    Sodium 139 135 - 145 mmol/L    Potassium 3.6 3.4 - 5.3 mmol/L    Chloride 106 98 - 107 mmol/L    Carbon Dioxide (CO2) 20 (L) 22 - 29 mmol/L    Anion Gap 13 7 - 15 mmol/L    Urea Nitrogen 11.7 6.0 - 20.0 mg/dL    Creatinine 0.66 0.51 - 0.95 mg/dL    GFR Estimate >90 >60 mL/min/1.73m2    Calcium 9.4 8.8 - 10.4 mg/dL    Glucose 107 (H) 70 - 99 mg/dL   Magnesium   Result Value Ref Range    Magnesium 2.1 1.7 - 2.3 mg/dL   CBC with platelets   Result Value Ref Range    WBC Count 4.4 4.0 - 11.0 10e3/uL    RBC Count 5.07 3.80 - 5.20 10e6/uL    Hemoglobin 13.4 11.7 - 15.7 g/dL    Hematocrit 42.9 35.0 - 47.0 %    MCV 85 78 - 100 fL    MCH 26.4 (L) 26.5 - 33.0 pg    MCHC 31.2 (L) 31.5 - 36.5 g/dL    RDW 15.9 (H) 10.0 - 15.0 %    Platelet Count 234 150 - 450 10e3/uL         Imaging reviewed.          Medications     Medications  Current Facility-Administered Medications   Medication Dose Route Frequency Provider Last Rate Last Admin    digoxin (LANOXIN) tablet 250 mcg  250 mcg Oral Daily Sara Alexis MD   250 mcg at 12/16/24 0757    empagliflozin (JARDIANCE) tablet 10 mg  10 mg Oral Daily Joann  MD Tomás        [Held by provider] furosemide (LASIX) tablet 20 mg  20 mg Oral Daily Sara Alexis MD        metoprolol succinate ER (TOPROL XL) 24 hr tablet 50 mg  50 mg Oral BID Estefania Acevedo MD   50 mg at 12/16/24 2030    pantoprazole (PROTONIX) EC tablet 40 mg  40 mg Oral QAM Sara Alexis MD   40 mg at 12/16/24 0757    sacubitril-valsartan (ENTRESTO)  MG per tablet 1 tablet  1 tablet Oral BID Sara Alexis MD   1 tablet at 12/16/24 2030    sodium chloride (PF) 0.9% PF flush 3 mL  3 mL Intracatheter Q8H Sara Alexis MD   3 mL at 12/17/24 0524    spironolactone (ALDACTONE) half-tab 12.5 mg  12.5 mg Oral Daily Sara Alexis MD   12.5 mg at 12/16/24 0758     Current Facility-Administered Medications   Medication Dose Route Frequency Provider Last Rate Last Admin

## 2024-12-18 VITALS
WEIGHT: 155.65 LBS | TEMPERATURE: 98.1 F | SYSTOLIC BLOOD PRESSURE: 93 MMHG | BODY MASS INDEX: 26.57 KG/M2 | HEIGHT: 64 IN | OXYGEN SATURATION: 99 % | RESPIRATION RATE: 18 BRPM | DIASTOLIC BLOOD PRESSURE: 60 MMHG | HEART RATE: 62 BPM

## 2024-12-18 LAB
ANION GAP SERPL CALCULATED.3IONS-SCNC: 11 MMOL/L (ref 7–15)
BUN SERPL-MCNC: 7.4 MG/DL (ref 6–20)
CALCIUM SERPL-MCNC: 9.8 MG/DL (ref 8.8–10.4)
CHLORIDE SERPL-SCNC: 104 MMOL/L (ref 98–107)
CREAT SERPL-MCNC: 0.68 MG/DL (ref 0.51–0.95)
EGFRCR SERPLBLD CKD-EPI 2021: >90 ML/MIN/1.73M2
GLUCOSE SERPL-MCNC: 92 MG/DL (ref 70–99)
HCO3 SERPL-SCNC: 23 MMOL/L (ref 22–29)
MAGNESIUM SERPL-MCNC: 2.2 MG/DL (ref 1.7–2.3)
POTASSIUM SERPL-SCNC: 4.3 MMOL/L (ref 3.4–5.3)
SODIUM SERPL-SCNC: 138 MMOL/L (ref 135–145)

## 2024-12-18 PROCEDURE — 250N000013 HC RX MED GY IP 250 OP 250 PS 637

## 2024-12-18 PROCEDURE — 250N000013 HC RX MED GY IP 250 OP 250 PS 637: Performed by: INTERNAL MEDICINE

## 2024-12-18 PROCEDURE — 83735 ASSAY OF MAGNESIUM: CPT

## 2024-12-18 PROCEDURE — 82565 ASSAY OF CREATININE: CPT

## 2024-12-18 PROCEDURE — 99239 HOSP IP/OBS DSCHRG MGMT >30: CPT | Mod: GC | Performed by: INTERNAL MEDICINE

## 2024-12-18 PROCEDURE — 36415 COLL VENOUS BLD VENIPUNCTURE: CPT

## 2024-12-18 PROCEDURE — 80048 BASIC METABOLIC PNL TOTAL CA: CPT

## 2024-12-18 RX ORDER — METOPROLOL SUCCINATE 50 MG/1
50 TABLET, EXTENDED RELEASE ORAL 2 TIMES DAILY
Qty: 180 TABLET | Refills: 2 | Status: SHIPPED | OUTPATIENT
Start: 2024-12-18

## 2024-12-18 RX ORDER — LORAZEPAM 0.5 MG/1
0.5 TABLET ORAL EVERY 6 HOURS PRN
Qty: 10 TABLET | Refills: 0 | Status: SHIPPED | OUTPATIENT
Start: 2024-12-18 | End: 2024-12-18

## 2024-12-18 RX ORDER — FUROSEMIDE 20 MG/1
20 TABLET ORAL DAILY PRN
Qty: 90 TABLET | Refills: 3 | Status: SHIPPED | OUTPATIENT
Start: 2024-12-18

## 2024-12-18 RX ORDER — LORAZEPAM 0.5 MG/1
1 TABLET ORAL EVERY 6 HOURS PRN
Qty: 10 TABLET | Refills: 0 | Status: SHIPPED | OUTPATIENT
Start: 2024-12-18

## 2024-12-18 RX ADMIN — METOPROLOL SUCCINATE 50 MG: 50 TABLET, EXTENDED RELEASE ORAL at 08:11

## 2024-12-18 RX ADMIN — SACUBITRIL AND VALSARTAN 1 TABLET: 97; 103 TABLET, FILM COATED ORAL at 08:11

## 2024-12-18 RX ADMIN — SPIRONOLACTONE 12.5 MG: 25 TABLET, FILM COATED ORAL at 08:11

## 2024-12-18 RX ADMIN — DIGOXIN 250 MCG: 125 TABLET ORAL at 08:11

## 2024-12-18 RX ADMIN — EMPAGLIFLOZIN 25 MG: 25 TABLET, FILM COATED ORAL at 08:11

## 2024-12-18 RX ADMIN — PANTOPRAZOLE SODIUM 40 MG: 40 TABLET, DELAYED RELEASE ORAL at 08:11

## 2024-12-18 ASSESSMENT — ACTIVITIES OF DAILY LIVING (ADL)
ADLS_ACUITY_SCORE: 35

## 2024-12-18 NOTE — DISCHARGE SUMMARY
[facility]   Cardiology II Service / Advanced Heart Failure  Discharge Summary     Mary Shaikh MRN# [id number]   YOB: 2000 Age: 24 year old     DATE OF ADMISSION:  12/15/2024  DATE OF DISCHARGE: 12/18/2024    DISCHARGE DIAGNOSES:   1. ICD shock    ITEMS FOR OUTPATIENT FOLLOW UP:   - Medication adjustment to GDMT, carvedilol changed to metoprolol succinate  - Address anxiety and stress secondary to medical illness (mental health follow up)    HPI: Please see the detailed H & P from 12/15/2024. Briefly, 24 year old female admitted on 12/15/2024. 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     HOSPITAL COURSE BY PROBLEM:     Tachycardia s/p 3 ICD shocks C/f Afib with RVR  Hx of cardiac arrest 2/2 VT  Hx of SVT with preexcitation  One episode of palpitations with dizziness s/p ICD shocks x4 in ~20 minutes (per patient) on 12/15, improved with metoprolol while en route. Mild bradycardia with HR 59, BP 79/67 and MAP 70 on arrival. BP improved to 105/58 on recheck with MAP 72. Pt appeared clinically stable and was asymptomatic on admission exam.   Device interrogation showed 1 VT zone lasting 5 mins s/p 4 failed ATP then 3 shocks, suggested AF with RVR. 6 NSVTs, suggest atrial in origin.   Echo (12/16) showed LVEF 20-30% with moderate diffuse hypokinesia, normal IVC. No significant change from 7/2024 echo.   tele review showed: multiple PACs with no Afib.   Her CHADSVASC core was 1 and thus no anti-coagulation was initiated at this time.   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 No new chest pain/sign of heart failure.   RHC 12/16/2024 - RA 8/--/--, RV 34/6, PA 32/9 (19), PCWP --/--/10, TDCO 4.03, TDCI 2.29, Nohelia CO 4.46, nohelia CI 2.53   Echo 12/16/2024 - unchanged from prior. LVEF 20-30%, moderate diffuse hypokinesia. Reduced RV function. She continued to have short lived episodes of paroxysmal atrial fibrillation. - GDMT adjusted to:  - ACE/ARB/ARNI: PTA Entresto  mg BID  - BB: PTA Metoprolol succinate 50 mg BID  - MRA: PTA spironolactone 12.5 mg  - SGLT2i: empagliflozin 25 mg      GERD  Takes omeprazole 20 mg at home  - Pantoprazole 40 mg ordered as formulary alternative     DM type II  Last A1C 7.3 on 8/2/24.   - Hypoglycemia protocol      Asthma, stable  - PTA albuterol     PHYSICAL EXAM:    BP 93/60 (BP Location: Left arm)   Pulse 62   Temp 98.1  F (36.7  C) (Oral)   Resp 18   Ht 1.626 m (5' 4\")   Wt 70.6 kg (155 lb 10.3 oz)   LMP 10/16/2024 (Approximate)   SpO2 99%   BMI 26.72 kg/m     Wt Readings from Last 1 Encounters:   12/18/24 70.6 kg (155 lb 10.3 oz)       General: Alert and oriented; no acute distress  Neck: JVP is 5 cm  CV: S1 S2 regular; no m/r/g  Lungs: CTABL, no rhonchi or wheezing  Extremities: No pretibial edema, warm and well perfused  Skin: warm, no rashes  Neuro: alert and oriented  Psych: congruent affect    LABS:   Recent Labs   Lab 12/17/24  0542 12/16/24  1634 12/16/24  0424 12/15/24  2128   WBC 4.4  --  6.4 7.3   HGB 13.4 14.1 13.5 14.4   HCT 42.9  --  43.5 45.4   MCV 85  --  84 84     --  247 265     Recent Labs   Lab 12/18/24  0932 12/17/24  0542    139   POTASSIUM 4.3 3.6   CHLORIDE 104 106   CO2 23 20*   ANIONGAP 11 13   BUN 7.4 11.7   CR 0.68 0.66   WHITNEY 9.8 9.4     Recent Labs   Lab Test 08/02/24  0356 05/09/22  1449   CHOL 139 128   HDL 50 58   LDL 78 57   TRIG " 57 63       IMAGING:  Results for orders placed or performed during the hospital encounter of 12/15/24   Echo Complete     Value    Biplane LVEF 27%    LVEF  20-30% (severely reduced)    [facility]    [id number]  [facility]  [id number]  [id number]^MIGUEL^NATALYA     [facility],[place]  Echocardiography Laboratory  [address] [place] [address]     Name: MS. JE PARK  MRN: [id number]  : 2000  Study Date: 2024 08:32 AM  Age: 24 yrs  Gender: Female  Patient Location: [facility]  Reason For Study: Heart Transplant  Ordering Physician: NATALYA RIVERA  Referring Physician: SYSTEM, PROVIDER NOT IN  Performed By: Alannah Saldana RDCS     BSA: 1.8 m2  Height: 64 in  Weight: 158 lb  ______________________________________________________________________________  Procedure  Complete Portable Echo Adult. Contrast Optison. Optison (NDC #2263-3349-69)  given intravenously. Patient was given 6 ml mixture of 3 ml Optison and 6 ml  saline. 3 ml wasted.  ______________________________________________________________________________  Interpretation Summary  Left ventricular function is decreased. The ejection fraction is 20-30%  (severely reduced).  Moderate diffuse hypokinesis is present.  Global right ventricular function is moderately reduced  IVC diameter <2.1 cm collapsing >50% with sniff suggests a normal RA pressure  of 3 mmHg.  No pericardial effusion is present.     ______________________________________________________________________________  Left Ventricle  Biplane LVEF is 27%. LVIDd 5.8cm. Left ventricular function is decreased. The  ejection fraction is 20-30% (severely reduced). Diastolic function not  assessed due to atrial fibrillation. The Ejection Fraction was calculated  using Bi-plane contrast. Moderate diffuse hypokinesis is present.     Right Ventricle  The right ventricle is normal size. Global right ventricular function is  moderately reduced.     Atria  Mild left  atrial enlargement is present.     Mitral Valve  Trace mitral insufficiency is present.     Aortic Valve  Aortic valve is normal in structure and function.     Tricuspid Valve  Trace to mild tricuspid insufficiency is present. The right ventricular  systolic pressure is 23mmHg above the right atrial pressure. Pulmonary artery  systolic pressure is normal.     Pulmonic Valve  The pulmonic valve is normal. On Doppler interrogation, there is no  significant stenosis or regurgitation.     Vessels  The aorta root is normal. The thoracic aorta is normal. IVC diameter <2.1 cm  collapsing >50% with sniff suggests a normal RA pressure of 3 mmHg.     Pericardium  No pericardial effusion is present.     Compared to Previous Study  There has been no change.  ______________________________________________________________________________  MMode/2D Measurements & Calculations  IVSd: 1.1 cm  LVIDd: 5.8 cm  LVIDs: 4.9 cm  LVPWd: 1.0 cm  FS: 16.8 %  LV mass(C)d: 251.4 grams  LV mass(C)dI: 142.0 grams/m2  Ao root diam: 2.4 cm  asc Aorta Diam: 2.3 cm  LVOT diam: 2.0 cm  LVOT area: 3.2 cm2     EF(MOD-bp): 27.1 %  Ao root diam index Ht(cm/m): 1.5  Ao root diam index BSA (cm/m2): 1.3  Asc Ao diam index BSA (cm/m2): 1.3  Asc Ao diam index Ht(cm/m): 1.4  LA Volume (BP): 41.0 ml     LA Volume Index (BP): 23.2 ml/m2  RWT: 0.36     Doppler Measurements & Calculations  TR max darnell: 241.8 cm/sec  TR max P.4 mmHg  RV S Darnell: 8.8 cm/sec     ______________________________________________________________________________  Report approved by: Kerry Brink Dr on 2024 09:33 AM         Cardiac Device Check - Inpatient     Value    Date Time Interrogation Session [id number]    Implantable Pulse Generator  [employer]    Implantable Pulse Generator Model D533 RESONATE HF ICD    Implantable Pulse Generator Serial Number [id number]    Type Interrogation Session In Clinic    Clinic Name [facility]    Implantable Pulse Generator Type Defibrillator    Implantable Pulse Generator Implant Date 20240620    Implantable Lead  [employer]    Implantable Lead Model 0672 [place] 4-Front S    Implantable Lead Serial Number [id number]    Implantable Lead Implant Date 20240620    Implantable Lead Polarity Type Bipolar Lead    Implantable Lead Location Detail 1 UNKNOWN    Implantable Lead Location Right Ventricle    Implantable Lead Connection Status Connected    Implantable Lead  [employer]    Implantable Lead Model 7840 Ingevity+ MRI    Implantable Lead Serial Number [id number]    Implantable Lead Implant Date 20240620    Implantable Lead Polarity Type Bipolar Lead    Implantable Lead Location Detail 1 UNKNOWN    Implantable Lead Location Right Atrium    Implantable Lead Connection Status Connected    Ralph Setting Mode (NBG Code) DDD    Ralph Setting Lower Rate Limit 60    Ralph Setting Maximum Tracking Rate 130    Ralph Setting BECKY Delay Low 180    Ralph Setting PAV Delay Low 240    Ralph Setting PAV Delay High 180    Ralph Setting BECKY Delay High 135    Ralph Setting AT Mode Switch Rate 140    Ralph Setting AT Mode Switch Mode VDI    Lead Channel Setting Sensing Polarity Bipolar    Lead Channel Setting Sensing Sensitivity 0.25    Lead Channel Setting Sensing Adaptation Mode Adaptive    Lead Channel Setting Sensing Polarity Bipolar    Lead Channel Setting Sensing Sensitivity 0.6    Lead Channel Setting Sensing Adaptation Mode Adaptive    Lead Channel Setting Pacing Polarity Bipolar    Lead Channel Setting Pacing Pulse Width 0.4    Lead Channel Setting Pacing Amplitude 3.5    Lead Channel Setting Pacing Capture Mode Monitor    Lead Channel Setting Pacing Polarity Bipolar    Lead Channel Setting Pacing Pulse Width 0.4    Lead Channel Setting Pacing Amplitude 3.5    Lead Channel Setting Pacing Capture Mode Monitor    Zone Setting Type Category VF    Zone Setting Vendor Type Category VF     Zone Setting Status Active    Zone Setting Detection Interval 240    Zone Setting Type Category VT    Zone Setting Vendor Type Category VT    Zone Setting Status Active    Zone Setting Detection Interval 300    Zone Setting Type Category VT    Zone Setting Vendor Type Category VT-1    Zone Setting Status Monitor    Zone Setting Detection Interval 333    Lead Channel Impedance Value 868    Lead Channel Impedance Value 388    Lead Channel Pacing Threshold Amplitude 1.0    Lead Channel Pacing Threshold Pulse Width 0.4    Battery Date Time of Measurements 20241216125300    Battery Status Beginning of Service    Battery Remaining Longevity 150    Battery Remaining Percentage 100    Capacitor Charge Type Reformation    Capacitor Last Charge Date Time 20241215161300    Capacitor Charge Time 10.3    Capacitor Charge Type Shock    Capacitor Last Charge Date Time [id number]    Capacitor Charge Time 7.6    Capacitor Charge Energy 41    Ralph Statistic Date Time Start 20241028000000    Ralph Statistic Date Time End 20241216000000    Ralph Statistic RA Percent Paced 0    Ralph Statistic RV Percent Paced 0    Therapy Statistic Recent Shocks Delivered 3    Therapy Statistic Recent Shocks Aborted 1    Therapy Statistic Recent ATP Delivered 2    Therapy Statistic Recent Date Time Start 20241028000000    Therapy Statistic Recent Date Time End 20241216000000    Therapy Statistic Total Shocks Delivered 4    Therapy Statistic Total Shocks Aborted 3    Therapy Statistic Total ATP Delivered 4    Therapy Statistic Total  Date Time Start 20240620000000    Therapy Statistic Total  Date Time End 20241216000000    Episode Statistic Recent Count 1    Episode Statistic Type Category Other    Episode Statistic Recent Count 53    Episode Statistic Type Category VT    Episode Statistic Vendor Type Category NSVT    Episode Statistic Recent Count 0    Episode Statistic Type Category VT    Episode Statistic Vendor Type Category VT    Episode  Statistic Recent Count 1    Episode Statistic Type Category VT    Episode Statistic Vendor Type Category VT-1    Episode Statistic Recent Date Time Start 20241028000000    Episode Statistic Recent Date Time End 20241216000000    Episode Statistic Recent Date Time Start 20241028000000    Episode Statistic Recent Date Time End 20241216000000    Episode Statistic Recent Date Time Start 20241028000000    Episode Statistic Recent Date Time End 20241216000000    Episode Statistic Recent Date Time Start 20241028000000    Episode Statistic Recent Date Time End 20241216000000    Episode Identifier [id number]    Episode Type Category VT    Episode Date Time 20241215164000    Episode Duration 12    Episode Type Induced Flag NO    Episode Identifier [id number]    Episode Type Category VT    Episode Date Time 20241215163400    Episode Duration 12    Episode Type Induced Flag NO    Narrative    Patient seen in [facility] for evaluation and iterative programming of their   ICD per MD orders.     Device: [employer] D533 RESONATE HF ICD  Normal device function.  Mode: DDD  bpm  AP: <1%  : <1%  Intrinsic rhythm: SB 45 bpm  Lead Trends Appear Stable.  Estimated battery longevity to RRT = 13 years.  No Arrhythmia's since remote transmission on 12/15/2024.  Setting Changes: Increased LRL from 40 bpm to 60 bpm. PAV decreased from   180 ms to 240 ms. Mode changed from DDI to DDD. VF Initial duration   extended to 5 sec, VT-1 initial detection extended to 30 sec, VT initial   detection extended to 25 sec. All changes made bedside with Dr. Levi   present.     ERIKA Brock, MARBIN    Dual lead ICD    I have reviewed and interpreted the device interrogation, settings,   programming and nurse's summary. The device is functioning within normal   device parameters. I agree with the current findings, assessment and plan.       Kelton Perez MD-PhD  PSTP- PGY - 1

## 2024-12-18 NOTE — PLAN OF CARE
Goal Outcome Evaluation:      Plan of Care Reviewed With: patient    Overall Patient Progress: improvingOverall Patient Progress: improving    Outcome Evaluation: HR A-paced 50% of the time with occasional PVC's. 3 instances of HR above 120, provider notified and aware.    Neuro: A&Ox4.   Cardiac: A-paced, with occasional PVC's.    Respiratory: Sating 98% on RA.  GI/: Adequate urine output.  Diet/appetite: Tolerating regular diet. Eating well.  Activity:  Independent, up to bathroom.  Pain: Denies pain.   Skin: No new deficits noted. R neck access site on neck intact.  LDA's: LPIV, SL.    Plan: Continue with POC. Discharging today.

## 2024-12-18 NOTE — PROGRESS NOTES
DISCHARGE                     12/18/2024  1400  ----------------------------------------------------------------------------  Discharged to: Home  Via: private transportation  Accompanied by: Family (dad)  Discharge Instructions: Reg with 2L FR diet, as tolerated activity, medications, follow up appointments (labs, cardio, neuro)  when to call the MD/ 911 if nitroglycerin X3 does not work, defib pacer.  Prescriptions: To be filled by [facility]; medication list reviewed & sent with pt  Follow Up Appointments: arranged; information given  Belongings: All sent with phone, I-pad, clothes, personal bag   IV: d/c'd  Telemetry: d/c'd  Pt exhibits understanding of above discharge instructions; all questions answered.    Discharge Paperwork: Signed, copied, and sent home with patient.

## 2024-12-19 NOTE — TELEPHONE ENCOUNTER
[facility] Call Center    Phone Message    May a detailed message be left on voicemail: yes     Reason for Call: Medication Refill Request    Has the patient contacted the pharmacy for the refill? Yes   Name of medication being requested: albuterol (PROAIR HFA/PROVENTIL HFA/VENTOLIN HFA) 108 (90 Base) MCG/ACT inhaler  Asmalnex hfa inhaler   Provider who prescribed the medication: Dr. Fuentes  Pharmacy: [id number] [address] [place] [address] AT [facility]   Date medication is needed: 12/19/24       Action Taken: Other: cardiology    Travel Screening: Not Applicable  Thank you!  [facility]       Date of Service:

## 2024-12-20 RX ORDER — ALBUTEROL SULFATE 90 UG/1
1-2 INHALANT RESPIRATORY (INHALATION)
Qty: 18 G | OUTPATIENT
Start: 2024-12-20

## 2024-12-20 NOTE — TELEPHONE ENCOUNTER
albuterol (PROAIR HFA/PROVENTIL HFA/VENTOLIN HFA) 108 (90 Base) MCG/ACT inhaler   Historical    Asmalnex hfa inhaler   Not on med list  10/28/2024  [facility] Hipolito Chacon MD  Cardiovascular Disease    Routed because: request per pt call. No pcp on file.  No answer on call to pt.lm  to have previous ordering dr to  fill.called pharm ,lm to  send request to pcp if on file. Any other  ideas? Thanks,.

## 2024-12-25 ASSESSMENT — ANXIETY QUESTIONNAIRES
5. BEING SO RESTLESS THAT IT IS HARD TO SIT STILL: MORE THAN HALF THE DAYS
1. FEELING NERVOUS, ANXIOUS, OR ON EDGE: MORE THAN HALF THE DAYS
6. BECOMING EASILY ANNOYED OR IRRITABLE: MORE THAN HALF THE DAYS
2. NOT BEING ABLE TO STOP OR CONTROL WORRYING: MORE THAN HALF THE DAYS
8. IF YOU CHECKED OFF ANY PROBLEMS, HOW DIFFICULT HAVE THESE MADE IT FOR YOU TO DO YOUR WORK, TAKE CARE OF THINGS AT HOME, OR GET ALONG WITH OTHER PEOPLE?: VERY DIFFICULT
7. FEELING AFRAID AS IF SOMETHING AWFUL MIGHT HAPPEN: MORE THAN HALF THE DAYS
3. WORRYING TOO MUCH ABOUT DIFFERENT THINGS: MORE THAN HALF THE DAYS
7. FEELING AFRAID AS IF SOMETHING AWFUL MIGHT HAPPEN: MORE THAN HALF THE DAYS
GAD7 TOTAL SCORE: 14
GAD7 TOTAL SCORE: 14
4. TROUBLE RELAXING: MORE THAN HALF THE DAYS
GAD7 TOTAL SCORE: 14
IF YOU CHECKED OFF ANY PROBLEMS ON THIS QUESTIONNAIRE, HOW DIFFICULT HAVE THESE PROBLEMS MADE IT FOR YOU TO DO YOUR WORK, TAKE CARE OF THINGS AT HOME, OR GET ALONG WITH OTHER PEOPLE: VERY DIFFICULT

## 2024-12-25 ASSESSMENT — PATIENT HEALTH QUESTIONNAIRE - PHQ9
SUM OF ALL RESPONSES TO PHQ QUESTIONS 1-9: 18
SUM OF ALL RESPONSES TO PHQ QUESTIONS 1-9: 18
10. IF YOU CHECKED OFF ANY PROBLEMS, HOW DIFFICULT HAVE THESE PROBLEMS MADE IT FOR YOU TO DO YOUR WORK, TAKE CARE OF THINGS AT HOME, OR GET ALONG WITH OTHER PEOPLE: VERY DIFFICULT

## 2024-12-26 ENCOUNTER — VIRTUAL VISIT (OUTPATIENT)
Dept: PSYCHOLOGY | Facility: CLINIC | Age: 24
End: 2024-12-26
Payer: COMMERCIAL

## 2024-12-26 DIAGNOSIS — I42.8 NONISCHEMIC CARDIOMYOPATHY (H): ICD-10-CM

## 2024-12-31 DIAGNOSIS — I50.20 HEART FAILURE WITH REDUCED EJECTION FRACTION (H): Primary | ICD-10-CM

## 2025-01-02 ENCOUNTER — TELEPHONE (OUTPATIENT)
Dept: BEHAVIORAL HEALTH | Facility: CLINIC | Age: 25
End: 2025-01-02
Payer: COMMERCIAL

## 2025-01-02 NOTE — TELEPHONE ENCOUNTER
Patient called TC, stated therapist transferred to is no longer available. Asking to see TC again. Scheduled return visit with Estefanía Henry  Transition Clinic Coordinator  01/02/25 11:19 AM

## 2025-01-03 NOTE — PROGRESS NOTES
[facility] Cardiology   CORE Clinic      HPI:   Ms. 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 She presents to cardiology clinic for CORE follow up.      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 Per chart review from recent visit with Dr. Fuentes, patient has had a signficant reduction in her LVEF to 26% on CMR with extensive LGE that is consistent with Danon disease. 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 She has a normal CK but has not yet seen neuromuscular neurology regarding the skeletal myopathy. She had an ICD as secondary SCD prevention after syncope and SVT. Her exercise tolerance has decreased. Her CPEX demonstrates an exercise duration of 8 min and 17 seconds. She achieved an RER of 1.09 and had a peak V02 of 18.7 which is a reduction from past CPEX, and a VE/VC02 slope of 32. Her HR and BP responses were normal.     Underwent RHC 7/31/24 which revealed patient in ambulatory cardiogenic shock. She was therefore admitted for GDMT optimization and to start advance therapies evaluation. Her body weight at admission 170 lbs, discharge weight 157 lbs.      Seen by Dr. Fuentes on 10/28/24. At this visit she reported dyspnea after walking about 15 minutes. She had an ICD shock on 10/3 for VT with rate of 237 bpm. She also had 105 NSVT episodes. Her carvedilol was increased to 6.25mg BID. Following this visit she was referred to neuromuscular neurology with neuromuscular PFTs. Also referred to mental health.  Underwent repeat RHC on 12/16/24 which showed normal right and left filling pressures.     Most recently admitted 12/15-12/18 following ICD shocks. Reported having palpitations with dizziness. Device interrogation showed 1 VT zone lasting 5 mins s/p 4 failed ATP then 3 shocks, suggested AF with RVR. 6 NSVTs, suggest atrial in origin. Echo (12/16) showed LVEF 20-30% with moderate diffuse hypokinesia, normal IVC. No significant change from 7/2024 echo.     Today, Ms. Shaikh presents to clinic with her mother. Since hospital discharge, they both report that anxiety has been a significant issue. She is worried about potential shocks. Typically will feel well in the morning, but by afternoon, around 2 or 3pm she notes increased palpitations/fluttering with HR ~ 80-90s. When she feels increased fluttering she starts to feel some shortness of breath and lightheaded. She acknowledges that anxiety is also likely playing a role. She does not do much for exertion. She is able to climb a flight of stairs and walks around her house, does some cooking. HR with exertion ~ 120 bpm. She denies having rates faster than this. She wears a smart watch and has it programmed to alert her for any rates > 100 bpm. She was discharged with ativan 1 mg PRN q6h, which she has has been taking and finds helpful. She does not take ativan daily.       Cardiac Medications  - Digoxin 250 mcg daily   - Jardiance 10 mg daily   - Metoprolol succinate 50 mg BID  - Lasix 20 mg daily PRN  - Entresto  mg BID  - Spironolactone 12.5 mg daily       PAST MEDICAL HISTORY:  No past medical history on file.    FAMILY HISTORY:  No family history on file.    SOCIAL HISTORY:  Social History     Socioeconomic History    Marital status: Single   Tobacco Use    Smoking status: Never    Smokeless tobacco: Never   Substance and Sexual Activity    Alcohol use: Never    Drug use: Never     Social Determinants of Health     Financial Resource Strain: Low Risk   (7/10/2024)    Received from [facility]    Financial Resource Strain     Difficulty of Paying Living Expenses: 3   Food Insecurity: No Food Insecurity (7/10/2024)    Received from [facility]    Food Insecurity     Worried About Running Out of Food in the Last Year: 1   Transportation Needs: No Transportation Needs (7/10/2024)    Received from [facility]    Transportation Needs     Lack of Transportation (Medical): 1   Social Connections: Socially Integrated (7/10/2024)    Received from [facility] [employer] [facility]    Social Connections     Frequency of Communication with Friends and Family: 0   Interpersonal Safety: Not At Risk (6/20/2024)    Received from [facility]    Humiliation, Afraid, Rape, and Kick questionnaire     Fear of Current or Ex-Partner: No     Emotionally Abused: No     Physically Abused: No     Sexually Abused: No   Housing Stability: Low Risk  (7/10/2024)    Received from [facility] [employer] [facility]    Housing Stability     Unable to Pay for Housing in the Last Year: 1       CURRENT MEDICATIONS:  Current Outpatient Medications   Medication Sig Dispense Refill    acetaminophen (TYLENOL) 325 MG tablet Take 325-650 mg by mouth every 6 hours as needed for mild pain.      albuterol (PROAIR HFA/PROVENTIL HFA/VENTOLIN HFA) 108 (90 Base) MCG/ACT inhaler Inhale 1-2 puffs into the lungs      digoxin (LANOXIN) 250 MCG tablet Take 1 tablet (250 mcg) by mouth daily. 90 tablet 3    empagliflozin (JARDIANCE) 25 MG TABS tablet Take 1 tablet (25 mg) by mouth daily. 90 tablet 1    furosemide (LASIX) 20 MG tablet Take 1 tablet (20 mg) by mouth daily as needed (Take daily if your weight increases by more than 3 lbs in one day or  5 lbs in three days.). 90 tablet 3    loperamide (IMODIUM) 2 MG capsule Take 4 mg by mouth as needed for diarrhea      LORazepam (ATIVAN) 0.5 MG  tablet Take 2 tablets (1 mg) by mouth every 6 hours as needed for anxiety. 10 tablet 0    melatonin 3 MG tablet Take 3 mg by mouth nightly as needed      metoprolol succinate ER (TOPROL XL) 50 MG 24 hr tablet Take 1 tablet (50 mg) by mouth 2 times daily. 180 tablet 2    nitroGLYcerin (NITROSTAT) 0.4 MG sublingual tablet For chest pain place 1 tablet under the tongue every 5 minutes for 3 doses. If symptoms persist 5 minutes after 1st dose call 911. 10 tablet 1    ondansetron (ZOFRAN ODT) 4 MG ODT tab Place 4 mg under the tongue as needed      sacubitril-valsartan (ENTRESTO)  MG per tablet Take 1 tablet by mouth 2 times daily. 180 tablet 3    spironolactone (ALDACTONE) 25 MG tablet Take 0.5 tablets (12.5 mg) by mouth daily. 90 tablet 3     No current facility-administered medications for this visit.       ROS:   Refer to HPI    EXAM:  LMP 10/16/2024 (Approximate)    BP 99/61 (BP Location: Right arm, Patient Position: Chair, Cuff Size: Adult Regular)   Pulse 57   Wt 69.4 kg (153 lb)   LMP 10/16/2024 (Approximate)   SpO2 99%   BMI 26.26 kg/m     GENERAL: Appears comfortable, in no acute distress.   HEENT: Eye symmetrical, no discharge or icterus bilaterally. Mucous membranes moist and without lesions.  CV: bradycardic, +S1S2, no murmur, rub, or gallop. JVP < 6 cm at 90 degrees.   RESPIRATORY: Respirations regular, even, and unlabored. Lungs CTA throughout  GI: Soft and non distended with normoactive bowel sounds present in all quadrants. No tenderness, rebound, guarding. No hepatomegaly.   EXTREMITIES: no peripheral edema. 2+ bilateral pedal pulses.   NEUROLOGIC: Alert and oriented x 3. No focal deficits.   MUSCULOSKELETAL: No joint swelling or tenderness.   SKIN: No jaundice. No rashes or lesions.     Labs, reviewed with patient in clinic today:  CBC RESULTS:  Lab Results   Component Value Date    WBC 4.4 12/17/2024    RBC 5.07 12/17/2024    HGB 13.4 12/17/2024    HCT 42.9 12/17/2024    MCV 85 12/17/2024     MCH 26.4 (L) 12/17/2024    MCHC 31.2 (L) 12/17/2024    RDW 15.9 (H) 12/17/2024     12/17/2024       CMP RESULTS:  Lab Results   Component Value Date     12/18/2024    POTASSIUM 4.3 12/18/2024    POTASSIUM 4.0 05/09/2022    CHLORIDE 104 12/18/2024    CHLORIDE 107 05/09/2022    CO2 23 12/18/2024    CO2 29 05/09/2022    ANIONGAP 11 12/18/2024    ANIONGAP 5 05/09/2022    GLC 92 12/18/2024    GLC 96 08/01/2024    GLC 86 05/09/2022    BUN 7.4 12/18/2024    BUN 8 05/09/2022    CR 0.68 12/18/2024    GFRESTIMATED >90 12/18/2024    WHITNEY 9.8 12/18/2024    BILITOTAL 0.6 12/15/2024    ALBUMIN 4.3 12/15/2024    ALBUMIN 3.9 05/09/2022    ALKPHOS 82 12/15/2024    ALT 10 12/15/2024    AST 43 12/15/2024        INR RESULTS:  Lab Results   Component Value Date    INR 1.18 (H) 12/15/2024       Lab Results   Component Value Date    MAG 2.2 12/18/2024     Lab Results   Component Value Date    NTBNPI 1,110 (H) 11/19/2024     Lab Results   Component Value Date    NTBNP 693 (H) 10/28/2024       Cardiac Diagnostics:    12/16/2024 RHC    Right sided filling pressures are normal.    Left sided filling pressures are normal.    Normal PA pressures.    Normal cardiac output level.  RA 8/--/--  RV 34/6  PA 32/9 (19)  PCWP --/--/10  TDCO 4.03, TDCI 2.29  Nohelia CO 4.46, nohelia CI 2.53     12/16/2024 Echo  Interpretation Summary  Left ventricular function is decreased. The ejection fraction is 20-30%  (severely reduced).  Moderate diffuse hypokinesis is present.  Global right ventricular function is moderately reduced  IVC diameter <2.1 cm collapsing >50% with sniff suggests a normal RA pressure  of 3 mmHg.  No pericardial effusion is present.    7/31/2024 Echo  Interpretation Summary  Left ventricular function is decreased. The ejection fraction is 20-25%  (severely reduced).  Global right ventricular function is moderately reduced.  Moderate biatrial enlargement is present.  IVC diameter >2.1 cm collapsing <50% with sniff suggests a high RA  pressure  estimated at 15 mmHg or greater.  Trivial pericardial effusion is present.  There is no prior study for direct comparison.  _____________________________________    7/31/2024 RHC  RIGHT HEART CATHETERIZATION:    === Baseline ====    BSA 1.87 m2  /74/89 mmHg  RA 21/25/20 mmHg  RV 51/20mmHg  PA 51/29/36 mmHg  PCWP 30/40/29 mmHg  TD CO/CI 2.9/1.55   Juan CO/CI 3.13/1.68   PVR 1.9   SVR 1762  PA sat 43.5%   PCW Oximeter sat 98% Right sided filling pressures are severely elevated. Left sided filling pressures are severely elevated. Moderately elevated pulmonary artery hypertension. Reduced cardiac output level.       5/15/2024 CPX    A cardiopulmonary stress test was performed following a Vivek ramp protocol with the patient exercising for 8 minutes and 17 seconds under the supervision of Dr. SURAJ Martinez.  Heart rate demonstrated a normal response to exercise.  Blood pressure demonstrated a blunted response to exercise. The patient's exercise capacity is severely impaired. The test was stopped due to 8/10 dyspnea. The patient reported dyspnea during the stress test. Functional capacity response is Class II: 50-74%. The patient experienced no angina during the test.    Prior Study: A prior study was conducted on 7/24/2023. This study has changes noted with the prior study. The patients peak VO2 decreased by 13% compared to previous test.      Assessment and Plan:   Ms. 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 She presents to cardiology clinic for CORE follow up.     Appears grossly euvolemic by exam. Warm and compensated. Since hospital discharge she is reporting increased anxiety around getting shocked again. She is having episodes of palpitations/fluttering only in the afternoons that are associated with  "shortness of breath and lightheadedness. By report, HRs have been well controlled, mostly in 60s-90s, and up to 120 with exertion. We discussed that I think her anxiety is playing a significant role in her afternoon episodes, especially given the fact that she is not having significant tachycardia. She is nearly out of her PRN ativan that she was discharged from the hospital with. She was referred to mental health, but reports that she has not been contacted to make any appointment yet. She does see a therapist and I encouraged her to continue to do so. I will reach out the our mental health department and request that they contact Mary, but will also provide her with the scheduling phone number. In the short-term I will provide Mary with a one time refill for ativan. We discussed that while useful for anxiety, I think she would benefit from being on a selective serotonin reuptake inhibitor or SNRI. I also recommended that she adjust her smart watch parameter to be alerted for HR >140 bpm. She is currently well optimized on GDMT. BPs are controlled. Review of labs with normal lytes and stable renal function.     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  No signs of organ dysfunction and or hypoperfusion. Making adequate UO. S/p IV nitroprusside for afterload reduction and IV diuretics. BW at admission 170 lbs, discharge weight 157 lbs. GDMT optimization and start advance therapies evaluation.  GDMT:   - BB: Metoprolol succinate 50 mg BID  - ACEI/ARB/ARNI: Entresto 97/103 BID  - SGLT2i: Jardiance 10mg  - MRA: Spirolactone 12.5 mg daily    - Diuretics : Furosemide 20 mg daily PRN  - BMP WNL today, will repeat prior to next CORE appt (prefers [facility])  - Continue outpatient LVAD/transplant evaluation: " "neuropsych consult (8/27)  - Contraception: on OCP     #History of SVT with preexitation   #S/P ICD sudden cardiac death prevention  Brief episodes of sypmtomatic SVT while in the hospital, responsive to vagal maneuvers. 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 EP was consulted and recommended starting oral digoxin and low-dose beta-blocker.  Per EP note, \"if SVT increases in frequency or duration she may require another EP study in the future to further characterize the arrhythmia and determine whether it would be amenable to an ablation\".   - Digoxin 250 mcg daily   - Beta blocker as above  - okay to trial metoprolol tartrate 25 mg daily PRN for breakthrough palpitations        Follow up:  - Dr. Fuentes 2/24/25  - CORE 3 months      A total of 45 minutes was spent on the day of the visit, which includes preparation for the visit (reviewing previous medical records, laboratories and investigations), in conjunction with the actual clinic visit with the patient, which includes obtaining a history and physical exam, creating and reviewing the care plan, patient education (and family if present), counseling, documenting clinical information in the electronic health record and care coordination.       Jocelyn Pichardo, KEVON, NP-C  Advance Heart Failure  1/6/2025    "

## 2025-01-04 ENCOUNTER — MYC MEDICAL ADVICE (OUTPATIENT)
Dept: CARDIOLOGY | Facility: CLINIC | Age: 25
End: 2025-01-04
Payer: COMMERCIAL

## 2025-01-05 ENCOUNTER — MYC MEDICAL ADVICE (OUTPATIENT)
Dept: CARDIOLOGY | Facility: CLINIC | Age: 25
End: 2025-01-05
Payer: COMMERCIAL

## 2025-01-06 ENCOUNTER — OFFICE VISIT (OUTPATIENT)
Dept: CARDIOLOGY | Facility: CLINIC | Age: 25
End: 2025-01-06
Attending: NURSE PRACTITIONER
Payer: COMMERCIAL

## 2025-01-06 ENCOUNTER — MYC MEDICAL ADVICE (OUTPATIENT)
Dept: CARDIOLOGY | Facility: CLINIC | Age: 25
End: 2025-01-06

## 2025-01-06 ENCOUNTER — ANCILLARY PROCEDURE (OUTPATIENT)
Dept: CARDIOLOGY | Facility: CLINIC | Age: 25
End: 2025-01-06
Attending: INTERNAL MEDICINE
Payer: COMMERCIAL

## 2025-01-06 ENCOUNTER — LAB (OUTPATIENT)
Dept: LAB | Facility: CLINIC | Age: 25
End: 2025-01-06
Attending: NURSE PRACTITIONER
Payer: COMMERCIAL

## 2025-01-06 ENCOUNTER — TELEPHONE (OUTPATIENT)
Dept: BEHAVIORAL HEALTH | Facility: CLINIC | Age: 25
End: 2025-01-06

## 2025-01-06 VITALS
BODY MASS INDEX: 26.26 KG/M2 | DIASTOLIC BLOOD PRESSURE: 61 MMHG | WEIGHT: 153 LBS | OXYGEN SATURATION: 99 % | SYSTOLIC BLOOD PRESSURE: 99 MMHG | HEART RATE: 57 BPM

## 2025-01-06 DIAGNOSIS — I50.23 ACUTE ON CHRONIC SYSTOLIC HEART FAILURE (H): ICD-10-CM

## 2025-01-06 DIAGNOSIS — R00.2 PALPITATIONS: Primary | ICD-10-CM

## 2025-01-06 DIAGNOSIS — Z95.810 ICD (IMPLANTABLE CARDIOVERTER-DEFIBRILLATOR) IN PLACE: ICD-10-CM

## 2025-01-06 DIAGNOSIS — I49.1 PREMATURE ATRIAL COMPLEX: ICD-10-CM

## 2025-01-06 DIAGNOSIS — I50.20 HEART FAILURE WITH REDUCED EJECTION FRACTION (H): ICD-10-CM

## 2025-01-06 DIAGNOSIS — F43.23 ADJUSTMENT DISORDER WITH MIXED ANXIETY AND DEPRESSED MOOD: ICD-10-CM

## 2025-01-06 LAB
ANION GAP SERPL CALCULATED.3IONS-SCNC: 10 MMOL/L (ref 7–15)
BUN SERPL-MCNC: 9.6 MG/DL (ref 6–20)
CALCIUM SERPL-MCNC: 10 MG/DL (ref 8.8–10.4)
CHLORIDE SERPL-SCNC: 102 MMOL/L (ref 98–107)
CREAT SERPL-MCNC: 0.8 MG/DL (ref 0.51–0.95)
EGFRCR SERPLBLD CKD-EPI 2021: >90 ML/MIN/1.73M2
GLUCOSE SERPL-MCNC: 85 MG/DL (ref 70–99)
HCO3 SERPL-SCNC: 27 MMOL/L (ref 22–29)
POTASSIUM SERPL-SCNC: 4.4 MMOL/L (ref 3.4–5.3)
SODIUM SERPL-SCNC: 139 MMOL/L (ref 135–145)

## 2025-01-06 PROCEDURE — 99213 OFFICE O/P EST LOW 20 MIN: CPT | Performed by: NURSE PRACTITIONER

## 2025-01-06 PROCEDURE — 80048 BASIC METABOLIC PNL TOTAL CA: CPT | Performed by: PATHOLOGY

## 2025-01-06 PROCEDURE — 93295 DEV INTERROG REMOTE 1/2/MLT: CPT | Performed by: INTERNAL MEDICINE

## 2025-01-06 PROCEDURE — 36415 COLL VENOUS BLD VENIPUNCTURE: CPT | Performed by: PATHOLOGY

## 2025-01-06 PROCEDURE — 99215 OFFICE O/P EST HI 40 MIN: CPT | Mod: 25 | Performed by: NURSE PRACTITIONER

## 2025-01-06 PROCEDURE — 93296 REM INTERROG EVL PM/IDS: CPT

## 2025-01-06 RX ORDER — LORAZEPAM 1 MG/1
1 TABLET ORAL EVERY 6 HOURS PRN
Qty: 20 TABLET | Refills: 0 | Status: SHIPPED | OUTPATIENT
Start: 2025-01-06

## 2025-01-06 RX ORDER — METOPROLOL TARTRATE 25 MG/1
25 TABLET, FILM COATED ORAL DAILY PRN
Qty: 90 TABLET | Refills: 1 | Status: SHIPPED | OUTPATIENT
Start: 2025-01-06

## 2025-01-06 RX ORDER — METOPROLOL SUCCINATE 50 MG/1
50 TABLET, EXTENDED RELEASE ORAL 2 TIMES DAILY
Qty: 180 TABLET | Refills: 2 | OUTPATIENT
Start: 2025-01-06

## 2025-01-06 RX ORDER — METOPROLOL SUCCINATE 50 MG/1
50 TABLET, EXTENDED RELEASE ORAL 2 TIMES DAILY
Qty: 180 TABLET | Refills: 3 | Status: SHIPPED | OUTPATIENT
Start: 2025-01-06

## 2025-01-06 ASSESSMENT — PAIN SCALES - GENERAL: PAINLEVEL_OUTOF10: NO PAIN (0)

## 2025-01-06 NOTE — NURSING NOTE
Chief Complaint   Patient presents with    Follow Up     RETURN CORE - [facility]       Vitals were taken and medications reconciled.    Jose R Dodge, EMT  8:10 AM

## 2025-01-06 NOTE — TELEPHONE ENCOUNTER
Called and spoke with patient who saw Jocelyn Pichardo today and states had her concerns addressed in clinic. No further concerns or questions at this time.

## 2025-01-06 NOTE — LETTER
1/6/2025      RE: Mary Shaikh  [address]  [place] [address]       Dear Colleague,    Thank you for the opportunity to participate in the care of your patient, Mary Shaikh, at the [facility] [place] at [facility]. Please see a copy of my visit note below.      [facility] Cardiology   CORE Clinic      HPI:   Ms. 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 She presents to cardiology clinic for CORE follow up.      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 Per chart review from recent visit with Dr. Fuentes, patient has had a signficant reduction in her LVEF to 26% on CMR with extensive LGE that is consistent with Danon disease. 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 She has a normal CK but has not yet seen neuromuscular neurology regarding the skeletal myopathy. She had an ICD as secondary SCD prevention after syncope and SVT. Her exercise tolerance has decreased. Her CPEX demonstrates an exercise duration of 8 min and 17 seconds. She achieved an RER of 1.09 and had a peak V02 of 18.7 which is a reduction from past CPEX, and a VE/VC02 slope of 32. Her HR and BP responses were normal.     Underwent RHC 7/31/24 which revealed patient in ambulatory cardiogenic shock. She was therefore admitted for GDMT optimization and to start advance therapies evaluation. Her body weight at admission 170 lbs, discharge weight 157 lbs.      Seen by   Gina on 10/28/24. At this visit she reported dyspnea after walking about 15 minutes. She had an ICD shock on 10/3 for VT with rate of 237 bpm. She also had 105 NSVT episodes. Her carvedilol was increased to 6.25mg BID. Following this visit she was referred to neuromuscular neurology with neuromuscular PFTs. Also referred to mental health. Underwent repeat RHC on 12/16/24 which showed normal right and left filling pressures.     Most recently admitted 12/15-12/18 following ICD shocks. Reported having palpitations with dizziness. Device interrogation showed 1 VT zone lasting 5 mins s/p 4 failed ATP then 3 shocks, suggested AF with RVR. 6 NSVTs, suggest atrial in origin. Echo (12/16) showed LVEF 20-30% with moderate diffuse hypokinesia, normal IVC. No significant change from 7/2024 echo.     Today, Ms. Shaikh presents to clinic with her mother. Since hospital discharge, they both report that anxiety has been a significant issue. She is worried about potential shocks. Typically will feel well in the morning, but by afternoon, around 2 or 3pm she notes increased palpitations/fluttering with HR ~ 80-90s. When she feels increased fluttering she starts to feel some shortness of breath and lightheaded. She acknowledges that anxiety is also likely playing a role. She does not do much for exertion. She is able to climb a flight of stairs and walks around her house, does some cooking. HR with exertion ~ 120 bpm. She denies having rates faster than this. She wears a smart watch and has it programmed to alert her for any rates > 100 bpm. She was discharged with ativan 1 mg PRN q6h, which she has has been taking and finds helpful. She does not take ativan daily.       Cardiac Medications  - Digoxin 250 mcg daily   - Jardiance 10 mg daily   - Metoprolol succinate 50 mg BID  - Lasix 20 mg daily PRN  - Entresto  mg BID  - Spironolactone 12.5 mg daily       PAST MEDICAL HISTORY:  No past medical history on file.    FAMILY  HISTORY:  No family history on file.    SOCIAL HISTORY:  Social History     Socioeconomic History     Marital status: Single   Tobacco Use     Smoking status: Never     Smokeless tobacco: Never   Substance and Sexual Activity     Alcohol use: Never     Drug use: Never     Social Determinants of Health     Financial Resource Strain: Low Risk  (7/10/2024)    Received from [facility] [employer] [facility]    Financial Resource Strain      Difficulty of Paying Living Expenses: 3   Food Insecurity: No Food Insecurity (7/10/2024)    Received from [facility] [employer] [facility]    Food Insecurity      Worried About Running Out of Food in the Last Year: 1   Transportation Needs: No Transportation Needs (7/10/2024)    Received from [facility] [employer] [facility]    Transportation Needs      Lack of Transportation (Medical): 1   Social Connections: Socially Integrated (7/10/2024)    Received from [facility] [employer] [facility]    Social Connections      Frequency of Communication with Friends and Family: 0   Interpersonal Safety: Not At Risk (6/20/2024)    Received from [facility]    Humiliation, Afraid, Rape, and Kick questionnaire      Fear of Current or Ex-Partner: No      Emotionally Abused: No      Physically Abused: No      Sexually Abused: No   Housing Stability: Low Risk  (7/10/2024)    Received from [facility] [employer] [facility]    Housing Stability      Unable to Pay for Housing in the Last Year: 1       CURRENT MEDICATIONS:  Current Outpatient Medications   Medication Sig Dispense Refill     acetaminophen (TYLENOL) 325 MG tablet Take 325-650 mg by mouth every 6 hours as needed for mild pain.       albuterol (PROAIR HFA/PROVENTIL HFA/VENTOLIN HFA) 108 (90 Base) MCG/ACT inhaler Inhale 1-2 puffs into the lungs       digoxin (LANOXIN) 250 MCG tablet Take 1 tablet (250 mcg) by mouth daily. 90 tablet 3     empagliflozin (JARDIANCE) 25 MG TABS  tablet Take 1 tablet (25 mg) by mouth daily. 90 tablet 1     furosemide (LASIX) 20 MG tablet Take 1 tablet (20 mg) by mouth daily as needed (Take daily if your weight increases by more than 3 lbs in one day or  5 lbs in three days.). 90 tablet 3     loperamide (IMODIUM) 2 MG capsule Take 4 mg by mouth as needed for diarrhea       LORazepam (ATIVAN) 0.5 MG tablet Take 2 tablets (1 mg) by mouth every 6 hours as needed for anxiety. 10 tablet 0     melatonin 3 MG tablet Take 3 mg by mouth nightly as needed       metoprolol succinate ER (TOPROL XL) 50 MG 24 hr tablet Take 1 tablet (50 mg) by mouth 2 times daily. 180 tablet 2     nitroGLYcerin (NITROSTAT) 0.4 MG sublingual tablet For chest pain place 1 tablet under the tongue every 5 minutes for 3 doses. If symptoms persist 5 minutes after 1st dose call 911. 10 tablet 1     ondansetron (ZOFRAN ODT) 4 MG ODT tab Place 4 mg under the tongue as needed       sacubitril-valsartan (ENTRESTO)  MG per tablet Take 1 tablet by mouth 2 times daily. 180 tablet 3     spironolactone (ALDACTONE) 25 MG tablet Take 0.5 tablets (12.5 mg) by mouth daily. 90 tablet 3     No current facility-administered medications for this visit.       ROS:   Refer to HPI    EXAM:  LMP 10/16/2024 (Approximate)    BP 99/61 (BP Location: Right arm, Patient Position: Chair, Cuff Size: Adult Regular)   Pulse 57   Wt 69.4 kg (153 lb)   LMP 10/16/2024 (Approximate)   SpO2 99%   BMI 26.26 kg/m     GENERAL: Appears comfortable, in no acute distress.   HEENT: Eye symmetrical, no discharge or icterus bilaterally. Mucous membranes moist and without lesions.  CV: bradycardic, +S1S2, no murmur, rub, or gallop. JVP < 6 cm at 90 degrees.   RESPIRATORY: Respirations regular, even, and unlabored. Lungs CTA throughout  GI: Soft and non distended with normoactive bowel sounds present in all quadrants. No tenderness, rebound, guarding. No hepatomegaly.   EXTREMITIES: no peripheral edema. 2+ bilateral pedal pulses.    NEUROLOGIC: Alert and oriented x 3. No focal deficits.   MUSCULOSKELETAL: No joint swelling or tenderness.   SKIN: No jaundice. No rashes or lesions.     Labs, reviewed with patient in clinic today:  CBC RESULTS:  Lab Results   Component Value Date    WBC 4.4 12/17/2024    RBC 5.07 12/17/2024    HGB 13.4 12/17/2024    HCT 42.9 12/17/2024    MCV 85 12/17/2024    MCH 26.4 (L) 12/17/2024    MCHC 31.2 (L) 12/17/2024    RDW 15.9 (H) 12/17/2024     12/17/2024       CMP RESULTS:  Lab Results   Component Value Date     12/18/2024    POTASSIUM 4.3 12/18/2024    POTASSIUM 4.0 05/09/2022    CHLORIDE 104 12/18/2024    CHLORIDE 107 05/09/2022    CO2 23 12/18/2024    CO2 29 05/09/2022    ANIONGAP 11 12/18/2024    ANIONGAP 5 05/09/2022    GLC 92 12/18/2024    GLC 96 08/01/2024    GLC 86 05/09/2022    BUN 7.4 12/18/2024    BUN 8 05/09/2022    CR 0.68 12/18/2024    GFRESTIMATED >90 12/18/2024    WHITNEY 9.8 12/18/2024    BILITOTAL 0.6 12/15/2024    ALBUMIN 4.3 12/15/2024    ALBUMIN 3.9 05/09/2022    ALKPHOS 82 12/15/2024    ALT 10 12/15/2024    AST 43 12/15/2024        INR RESULTS:  Lab Results   Component Value Date    INR 1.18 (H) 12/15/2024       Lab Results   Component Value Date    MAG 2.2 12/18/2024     Lab Results   Component Value Date    NTBNPI 1,110 (H) 11/19/2024     Lab Results   Component Value Date    NTBNP 693 (H) 10/28/2024       Cardiac Diagnostics:    12/16/2024 RHC     Right sided filling pressures are normal.     Left sided filling pressures are normal.     Normal PA pressures.     Normal cardiac output level.  RA 8/--/--  RV 34/6  PA 32/9 (19)  PCWP --/--/10  TDCO 4.03, TDCI 2.29  Nohelia CO 4.46, nohelia CI 2.53     12/16/2024 Echo  Interpretation Summary  Left ventricular function is decreased. The ejection fraction is 20-30%  (severely reduced).  Moderate diffuse hypokinesis is present.  Global right ventricular function is moderately reduced  IVC diameter <2.1 cm collapsing >50% with sniff suggests a  normal RA pressure  of 3 mmHg.  No pericardial effusion is present.    7/31/2024 Echo  Interpretation Summary  Left ventricular function is decreased. The ejection fraction is 20-25%  (severely reduced).  Global right ventricular function is moderately reduced.  Moderate biatrial enlargement is present.  IVC diameter >2.1 cm collapsing <50% with sniff suggests a high RA pressure  estimated at 15 mmHg or greater.  Trivial pericardial effusion is present.  There is no prior study for direct comparison.  _____________________________________    7/31/2024 RHC  RIGHT HEART CATHETERIZATION:    === Baseline ====    BSA 1.87 m2  /74/89 mmHg  RA 21/25/20 mmHg  RV 51/20mmHg  PA 51/29/36 mmHg  PCWP 30/40/29 mmHg  TD CO/CI 2.9/1.55   Juan CO/CI 3.13/1.68   PVR 1.9   SVR 1762  PA sat 43.5%   PCW Oximeter sat 98% Right sided filling pressures are severely elevated. Left sided filling pressures are severely elevated. Moderately elevated pulmonary artery hypertension. Reduced cardiac output level.       5/15/2024 CPX     A cardiopulmonary stress test was performed following a Vivek ramp protocol with the patient exercising for 8 minutes and 17 seconds under the supervision of Dr. SURAJ Martinez.  Heart rate demonstrated a normal response to exercise.  Blood pressure demonstrated a blunted response to exercise. The patient's exercise capacity is severely impaired. The test was stopped due to 8/10 dyspnea. The patient reported dyspnea during the stress test. Functional capacity response is Class II: 50-74%. The patient experienced no angina during the test.     Prior Study: A prior study was conducted on 7/24/2023. This study has changes noted with the prior study. The patients peak VO2 decreased by 13% compared to previous test.      Assessment and Plan:   Ms. 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 She presents to cardiology clinic for CORE follow up.     Appears grossly euvolemic by exam. Warm and compensated. Since hospital discharge she is reporting increased anxiety around getting shocked again. She is having episodes of palpitations/fluttering only in the afternoons that are associated with shortness of breath and lightheadedness. By report, HRs have been well controlled, mostly in 60s-90s, and up to 120 with exertion. We discussed that I think her anxiety is playing a significant role in her afternoon episodes, especially given the fact that she is not having significant tachycardia. She is nearly out of her PRN ativan that she was discharged from the hospital with. She was referred to mental health, but reports that she has not been contacted to make any appointment yet. She does see a therapist and I encouraged her to continue to do so. I will reach out the our mental health department and request that they contact Mary, but will also provide her with the scheduling phone number. In the short-term I will provide Mary with a one time refill for ativan. We discussed that while useful for anxiety, I think she would benefit from being on a selective serotonin reuptake inhibitor or SNRI. I also recommended that she adjust her smart watch parameter to be alerted for HR >140 bpm. She is currently well optimized on GDMT. BPs are controlled. Review of labs with normal lytes and stable renal function.     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  No signs of organ dysfunction and or hypoperfusion. Making adequate UO. S/p IV nitroprusside for afterload reduction and IV diuretics. BW at admission 170 lbs, discharge weight 157 lbs. GDMT " "optimization and start advance therapies evaluation.  GDMT:   - BB: Metoprolol succinate 50 mg BID  - ACEI/ARB/ARNI: Entresto 97/103 BID  - SGLT2i: Jardiance 10mg  - MRA: Spirolactone 12.5 mg daily    - Diuretics : Furosemide 20 mg daily PRN  - BMP WNL today, will repeat prior to next CORE appt (prefers [place] location)  - Continue outpatient LVAD/transplant evaluation: neuropsych consult (8/27)  - Contraception: on OCP     #History of SVT with preexitation   #S/P ICD sudden cardiac death prevention  Brief episodes of sypmtomatic SVT while in the hospital, responsive to vagal maneuvers. 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 EP was consulted and recommended starting oral digoxin and low-dose beta-blocker.  Per EP note, \"if SVT increases in frequency or duration she may require another EP study in the future to further characterize the arrhythmia and determine whether it would be amenable to an ablation\".   - Digoxin 250 mcg daily   - Beta blocker as above  - okay to trial metoprolol tartrate 25 mg daily PRN for breakthrough palpitations        Follow up:  - Dr. Fuentes 2/24/25  - CORE 3 months      A total of 45 minutes was spent on the day of the visit, which includes preparation for the visit (reviewing previous medical records, laboratories and investigations), in conjunction with the actual clinic visit with the patient, which includes obtaining a history and physical exam, creating and reviewing the care plan, patient education (and family if present), counseling, documenting clinical information in the electronic health record and care coordination.       Jocelyn Pichardo DNP, NP-C  Advance Heart Failure  1/6/2025      Please do not hesitate to contact me if you have any questions/concerns.     Sincerely,     Jocelyn Pichardo, ZITA CNP  "

## 2025-01-06 NOTE — TELEPHONE ENCOUNTER
Per request of TC therapist called pt to reschedule as appt was missed today. Patient stated that she was early to appt and waited for appt but provider didn't didn't show. Coordinator encouraged patient to call TC if ever any having any issues with connecting with provider at scheduled time, provided TC number.     Jojo Henry  Transition Clinic Coordinator  01/06/25 1:12 PM

## 2025-01-06 NOTE — TELEPHONE ENCOUNTER
Kristen Sanchez LPN [facility] Med Refills Team  Phone Number: [phone]            Previous Messages       ----- Message -----  From: Mary Shaikh  Sent: 1/4/2025  11:44 AM CST  To: [facility] Nursing Staff-  Subject: Medication Renewal Request                      Refills have been requested for the following medications:        metoprolol succinate ER (TOPROL XL) 50 MG 24 hr tablet [Kelton Perez]      Patient Comment: Running out    Preferred pharmacy: [id number] - [place], [place] - [address] [facility]

## 2025-01-06 NOTE — PATIENT INSTRUCTIONS
CORE: Cardiomyopathy, Optimization, Rehabilitation, Education      Take your medicines every day, as directed    Changes/Recommendations made today:  We will continue metoprolol succinate 50 mg by mouth 2 times daily, but will trial metoprolol tartrate 25 mg daily as needed for increased fluttering/palpitations and or HR > 115 bmp   Recommend adjusting Apple Watch HR parameters to alert you for HR > 140 bpm   Refill for Ativan 1 mg every 6 hours as needed, 20 tab  I will reach out to the [facility] Department and request that they call to schedule an appointment. If you don't hear from a representative within 2 business days, please call [phone].    Monitor Your Weight and Symptoms    Contact us if you:    Gain 2 pounds in one day or 5 pounds in one week  Feel more short of breath  Notice more leg swelling  Feel lightheadeded   Change your lifestyle    Limit Salt or Sodium:  2000 mg  Limit Fluids:  2000 mL or approximately 64 ounces  Eat a Heart Healthy Diet  Low in saturated fats  Stay Active:  Aim to move at least 150 minutes every  week         To Contact us    During Business Hours:  [phone], option # 1      After hours, weekends or holidays:   [phone], Option #4  Ask to speak to the On-Call Cardiologist. Inform them you are a CORE/heart failure patient at the [place].     Use [employer] allows you to communicate directly with your heart team through secure messaging.  [email or web address] can be accessed any time on your phone, computer, or tablet.  If you need assistance, we'd be happy to help!         Keep your Heart Appointments:    Dr. Fuentes 2/24/25    CORE in 3 months

## 2025-01-07 LAB
MDC_IDC_EPISODE_DTM: NORMAL
MDC_IDC_EPISODE_DURATION: 1 S
MDC_IDC_EPISODE_DURATION: 10 S
MDC_IDC_EPISODE_DURATION: 11 S
MDC_IDC_EPISODE_DURATION: 162 S
MDC_IDC_EPISODE_DURATION: 2 S
MDC_IDC_EPISODE_DURATION: 3 S
MDC_IDC_EPISODE_DURATION: 4 S
MDC_IDC_EPISODE_DURATION: 44 S
MDC_IDC_EPISODE_DURATION: 47 S
MDC_IDC_EPISODE_DURATION: 48 S
MDC_IDC_EPISODE_DURATION: 49 S
MDC_IDC_EPISODE_DURATION: 49 S
MDC_IDC_EPISODE_DURATION: 5 S
MDC_IDC_EPISODE_DURATION: 50 S
MDC_IDC_EPISODE_DURATION: 50 S
MDC_IDC_EPISODE_DURATION: 51 S
MDC_IDC_EPISODE_DURATION: 52 S
MDC_IDC_EPISODE_DURATION: 53 S
MDC_IDC_EPISODE_DURATION: 53 S
MDC_IDC_EPISODE_DURATION: 54 S
MDC_IDC_EPISODE_DURATION: 55 S
MDC_IDC_EPISODE_DURATION: 56 S
MDC_IDC_EPISODE_DURATION: 57 S
MDC_IDC_EPISODE_DURATION: 6 S
MDC_IDC_EPISODE_DURATION: 7 S
MDC_IDC_EPISODE_DURATION: 8 S
MDC_IDC_EPISODE_DURATION: 8 S
MDC_IDC_EPISODE_DURATION: 84 S
MDC_IDC_EPISODE_DURATION: 9 S
MDC_IDC_EPISODE_ID: NORMAL
MDC_IDC_EPISODE_TYPE: NORMAL
MDC_IDC_LEAD_CONNECTION_STATUS: NORMAL
MDC_IDC_LEAD_CONNECTION_STATUS: NORMAL
MDC_IDC_LEAD_IMPLANT_DT: NORMAL
MDC_IDC_LEAD_IMPLANT_DT: NORMAL
MDC_IDC_LEAD_LOCATION: NORMAL
MDC_IDC_LEAD_LOCATION: NORMAL
MDC_IDC_LEAD_LOCATION_DETAIL_1: NORMAL
MDC_IDC_LEAD_LOCATION_DETAIL_1: NORMAL
MDC_IDC_LEAD_MFG: NORMAL
MDC_IDC_LEAD_MFG: NORMAL
MDC_IDC_LEAD_MODEL: NORMAL
MDC_IDC_LEAD_MODEL: NORMAL
MDC_IDC_LEAD_POLARITY_TYPE: NORMAL
MDC_IDC_LEAD_POLARITY_TYPE: NORMAL
MDC_IDC_LEAD_SERIAL: NORMAL
MDC_IDC_LEAD_SERIAL: NORMAL
MDC_IDC_MSMT_BATTERY_DTM: NORMAL
MDC_IDC_MSMT_BATTERY_REMAINING_LONGEVITY: 126 MO
MDC_IDC_MSMT_BATTERY_REMAINING_PERCENTAGE: 100 %
MDC_IDC_MSMT_BATTERY_STATUS: NORMAL
MDC_IDC_MSMT_CAP_CHARGE_DTM: NORMAL
MDC_IDC_MSMT_CAP_CHARGE_DTM: NORMAL
MDC_IDC_MSMT_CAP_CHARGE_ENERGY: 41 J
MDC_IDC_MSMT_CAP_CHARGE_TIME: 10.3 S
MDC_IDC_MSMT_CAP_CHARGE_TIME: 7.6 S
MDC_IDC_MSMT_CAP_CHARGE_TYPE: NORMAL
MDC_IDC_MSMT_CAP_CHARGE_TYPE: NORMAL
MDC_IDC_MSMT_LEADCHNL_RA_IMPEDANCE_VALUE: 764 OHM
MDC_IDC_MSMT_LEADCHNL_RA_PACING_THRESHOLD_AMPLITUDE: 0.5 V
MDC_IDC_MSMT_LEADCHNL_RA_PACING_THRESHOLD_PULSEWIDTH: 0.4 MS
MDC_IDC_MSMT_LEADCHNL_RV_IMPEDANCE_VALUE: 373 OHM
MDC_IDC_MSMT_LEADCHNL_RV_PACING_THRESHOLD_AMPLITUDE: 0.9 V
MDC_IDC_MSMT_LEADCHNL_RV_PACING_THRESHOLD_PULSEWIDTH: 0.4 MS
MDC_IDC_PG_IMPLANT_DTM: NORMAL
MDC_IDC_PG_MFG: NORMAL
MDC_IDC_PG_MODEL: NORMAL
MDC_IDC_PG_SERIAL: NORMAL
MDC_IDC_PG_TYPE: NORMAL
MDC_IDC_SESS_CLINIC_NAME: NORMAL
MDC_IDC_SESS_DTM: NORMAL
MDC_IDC_SESS_TYPE: NORMAL
MDC_IDC_SET_BRADY_AT_MODE_SWITCH_MODE: NORMAL
MDC_IDC_SET_BRADY_AT_MODE_SWITCH_RATE: 140 {BEATS}/MIN
MDC_IDC_SET_BRADY_LOWRATE: 60 {BEATS}/MIN
MDC_IDC_SET_BRADY_MAX_TRACKING_RATE: 130 {BEATS}/MIN
MDC_IDC_SET_BRADY_MODE: NORMAL
MDC_IDC_SET_BRADY_PAV_DELAY_HIGH: 180 MS
MDC_IDC_SET_BRADY_PAV_DELAY_LOW: 240 MS
MDC_IDC_SET_BRADY_SAV_DELAY_HIGH: 135 MS
MDC_IDC_SET_BRADY_SAV_DELAY_LOW: 180 MS
MDC_IDC_SET_LEADCHNL_RA_PACING_AMPLITUDE: 3.5 V
MDC_IDC_SET_LEADCHNL_RA_PACING_CAPTURE_MODE: NORMAL
MDC_IDC_SET_LEADCHNL_RA_PACING_POLARITY: NORMAL
MDC_IDC_SET_LEADCHNL_RA_PACING_PULSEWIDTH: 0.4 MS
MDC_IDC_SET_LEADCHNL_RA_SENSING_ADAPTATION_MODE: NORMAL
MDC_IDC_SET_LEADCHNL_RA_SENSING_POLARITY: NORMAL
MDC_IDC_SET_LEADCHNL_RA_SENSING_SENSITIVITY: 0.25 MV
MDC_IDC_SET_LEADCHNL_RV_PACING_AMPLITUDE: 3.5 V
MDC_IDC_SET_LEADCHNL_RV_PACING_CAPTURE_MODE: NORMAL
MDC_IDC_SET_LEADCHNL_RV_PACING_POLARITY: NORMAL
MDC_IDC_SET_LEADCHNL_RV_PACING_PULSEWIDTH: 0.4 MS
MDC_IDC_SET_LEADCHNL_RV_SENSING_ADAPTATION_MODE: NORMAL
MDC_IDC_SET_LEADCHNL_RV_SENSING_POLARITY: NORMAL
MDC_IDC_SET_LEADCHNL_RV_SENSING_SENSITIVITY: 0.6 MV
MDC_IDC_SET_ZONE_DETECTION_INTERVAL: 240 MS
MDC_IDC_SET_ZONE_DETECTION_INTERVAL: 300 MS
MDC_IDC_SET_ZONE_DETECTION_INTERVAL: 333 MS
MDC_IDC_SET_ZONE_STATUS: NORMAL
MDC_IDC_SET_ZONE_TYPE: NORMAL
MDC_IDC_SET_ZONE_VENDOR_TYPE: NORMAL
MDC_IDC_STAT_AT_BURDEN_PERCENT: 1 %
MDC_IDC_STAT_AT_DTM_END: NORMAL
MDC_IDC_STAT_AT_DTM_START: NORMAL
MDC_IDC_STAT_BRADY_DTM_END: NORMAL
MDC_IDC_STAT_BRADY_DTM_START: NORMAL
MDC_IDC_STAT_BRADY_RA_PERCENT_PACED: 84 %
MDC_IDC_STAT_BRADY_RV_PERCENT_PACED: 3 %
MDC_IDC_STAT_EPISODE_RECENT_COUNT: 0
MDC_IDC_STAT_EPISODE_RECENT_COUNT_DTM_END: NORMAL
MDC_IDC_STAT_EPISODE_RECENT_COUNT_DTM_START: NORMAL
MDC_IDC_STAT_EPISODE_TYPE: NORMAL
MDC_IDC_STAT_EPISODE_VENDOR_TYPE: NORMAL
MDC_IDC_STAT_TACHYTHERAPY_ATP_DELIVERED_RECENT: 0
MDC_IDC_STAT_TACHYTHERAPY_ATP_DELIVERED_TOTAL: 4
MDC_IDC_STAT_TACHYTHERAPY_RECENT_DTM_END: NORMAL
MDC_IDC_STAT_TACHYTHERAPY_RECENT_DTM_START: NORMAL
MDC_IDC_STAT_TACHYTHERAPY_SHOCKS_ABORTED_RECENT: 0
MDC_IDC_STAT_TACHYTHERAPY_SHOCKS_ABORTED_TOTAL: 3
MDC_IDC_STAT_TACHYTHERAPY_SHOCKS_DELIVERED_RECENT: 0
MDC_IDC_STAT_TACHYTHERAPY_SHOCKS_DELIVERED_TOTAL: 4
MDC_IDC_STAT_TACHYTHERAPY_TOTAL_DTM_END: NORMAL
MDC_IDC_STAT_TACHYTHERAPY_TOTAL_DTM_START: NORMAL

## 2025-01-09 ASSESSMENT — ANXIETY QUESTIONNAIRES
IF YOU CHECKED OFF ANY PROBLEMS ON THIS QUESTIONNAIRE, HOW DIFFICULT HAVE THESE PROBLEMS MADE IT FOR YOU TO DO YOUR WORK, TAKE CARE OF THINGS AT HOME, OR GET ALONG WITH OTHER PEOPLE: VERY DIFFICULT
3. WORRYING TOO MUCH ABOUT DIFFERENT THINGS: MORE THAN HALF THE DAYS
7. FEELING AFRAID AS IF SOMETHING AWFUL MIGHT HAPPEN: MORE THAN HALF THE DAYS
1. FEELING NERVOUS, ANXIOUS, OR ON EDGE: NEARLY EVERY DAY
5. BEING SO RESTLESS THAT IT IS HARD TO SIT STILL: MORE THAN HALF THE DAYS
6. BECOMING EASILY ANNOYED OR IRRITABLE: MORE THAN HALF THE DAYS
GAD7 TOTAL SCORE: 15
8. IF YOU CHECKED OFF ANY PROBLEMS, HOW DIFFICULT HAVE THESE MADE IT FOR YOU TO DO YOUR WORK, TAKE CARE OF THINGS AT HOME, OR GET ALONG WITH OTHER PEOPLE?: VERY DIFFICULT
2. NOT BEING ABLE TO STOP OR CONTROL WORRYING: MORE THAN HALF THE DAYS
GAD7 TOTAL SCORE: 15
GAD7 TOTAL SCORE: 15
7. FEELING AFRAID AS IF SOMETHING AWFUL MIGHT HAPPEN: MORE THAN HALF THE DAYS
4. TROUBLE RELAXING: MORE THAN HALF THE DAYS

## 2025-01-12 ENCOUNTER — MYC MEDICAL ADVICE (OUTPATIENT)
Dept: CARDIOLOGY | Facility: CLINIC | Age: 25
End: 2025-01-12
Payer: COMMERCIAL

## 2025-01-12 DIAGNOSIS — I50.20 HEART FAILURE WITH REDUCED EJECTION FRACTION (H): ICD-10-CM

## 2025-01-12 DIAGNOSIS — I42.8 NONISCHEMIC CARDIOMYOPATHY (H): ICD-10-CM

## 2025-01-12 DIAGNOSIS — I50.23 ACUTE ON CHRONIC SYSTOLIC HEART FAILURE (H): ICD-10-CM

## 2025-01-12 DIAGNOSIS — R10.84 ABDOMINAL PAIN, GENERALIZED: Primary | ICD-10-CM

## 2025-01-12 DIAGNOSIS — E74.05 DANON DISEASE IN HETEROZYGOUS FEMALE (H): ICD-10-CM

## 2025-01-13 ENCOUNTER — HOSPITAL ENCOUNTER (EMERGENCY)
Facility: CLINIC | Age: 25
Discharge: HOME | End: 2025-01-14
Attending: EMERGENCY MEDICINE
Payer: COMMERCIAL

## 2025-01-13 ENCOUNTER — APPOINTMENT (OUTPATIENT)
Dept: ULTRASOUND IMAGING | Facility: CLINIC | Age: 25
End: 2025-01-13
Attending: EMERGENCY MEDICINE
Payer: COMMERCIAL

## 2025-01-13 DIAGNOSIS — R10.11 RUQ ABDOMINAL PAIN: ICD-10-CM

## 2025-01-13 LAB
ALBUMIN SERPL BCG-MCNC: 4.5 G/DL (ref 3.5–5.2)
ALBUMIN UR-MCNC: NEGATIVE MG/DL
ALP SERPL-CCNC: 83 U/L (ref 40–150)
ALT SERPL W P-5'-P-CCNC: 26 U/L (ref 0–50)
ANION GAP SERPL CALCULATED.3IONS-SCNC: 10 MMOL/L (ref 7–15)
APPEARANCE UR: CLEAR
AST SERPL W P-5'-P-CCNC: 48 U/L (ref 0–45)
BASOPHILS # BLD AUTO: 0.1 10E3/UL (ref 0–0.2)
BASOPHILS NFR BLD AUTO: 1 %
BILIRUB SERPL-MCNC: 0.7 MG/DL
BILIRUB UR QL STRIP: NEGATIVE
BUN SERPL-MCNC: 10.6 MG/DL (ref 6–20)
CALCIUM SERPL-MCNC: 9.8 MG/DL (ref 8.8–10.4)
CHLORIDE SERPL-SCNC: 102 MMOL/L (ref 98–107)
COLOR UR AUTO: ABNORMAL
CREAT SERPL-MCNC: 0.7 MG/DL (ref 0.51–0.95)
EGFRCR SERPLBLD CKD-EPI 2021: >90 ML/MIN/1.73M2
EOSINOPHIL # BLD AUTO: 0 10E3/UL (ref 0–0.7)
EOSINOPHIL NFR BLD AUTO: 0 %
ERYTHROCYTE [DISTWIDTH] IN BLOOD BY AUTOMATED COUNT: 15.9 % (ref 10–15)
GLUCOSE SERPL-MCNC: 106 MG/DL (ref 70–99)
GLUCOSE UR STRIP-MCNC: >=1000 MG/DL
HCG UR QL: NEGATIVE
HCO3 SERPL-SCNC: 25 MMOL/L (ref 22–29)
HCT VFR BLD AUTO: 47.8 % (ref 35–47)
HGB BLD-MCNC: 15.8 G/DL (ref 11.7–15.7)
HGB UR QL STRIP: NEGATIVE
IMM GRANULOCYTES # BLD: 0 10E3/UL
IMM GRANULOCYTES NFR BLD: 0 %
KETONES UR STRIP-MCNC: NEGATIVE MG/DL
LEUKOCYTE ESTERASE UR QL STRIP: NEGATIVE
LIPASE SERPL-CCNC: 18 U/L (ref 13–60)
LYMPHOCYTES # BLD AUTO: 2.4 10E3/UL (ref 0.8–5.3)
LYMPHOCYTES NFR BLD AUTO: 54 %
MCH RBC QN AUTO: 27 PG (ref 26.5–33)
MCHC RBC AUTO-ENTMCNC: 33.1 G/DL (ref 31.5–36.5)
MCV RBC AUTO: 82 FL (ref 78–100)
MONOCYTES # BLD AUTO: 0.3 10E3/UL (ref 0–1.3)
MONOCYTES NFR BLD AUTO: 6 %
NEUTROPHILS # BLD AUTO: 1.7 10E3/UL (ref 1.6–8.3)
NEUTROPHILS NFR BLD AUTO: 38 %
NITRATE UR QL: NEGATIVE
NRBC # BLD AUTO: 0 10E3/UL
NRBC BLD AUTO-RTO: 0 /100
PH UR STRIP: 5.5 [PH] (ref 5–7)
PLATELET # BLD AUTO: 264 10E3/UL (ref 150–450)
POTASSIUM SERPL-SCNC: 3.7 MMOL/L (ref 3.4–5.3)
PROT SERPL-MCNC: 7.8 G/DL (ref 6.4–8.3)
RBC # BLD AUTO: 5.86 10E6/UL (ref 3.8–5.2)
RBC URINE: <1 /HPF
SODIUM SERPL-SCNC: 137 MMOL/L (ref 135–145)
SP GR UR STRIP: 1.01 (ref 1–1.03)
SQUAMOUS EPITHELIAL: 5 /HPF
TROPONIN T SERPL HS-MCNC: 46 NG/L
UROBILINOGEN UR STRIP-MCNC: NORMAL MG/DL
WBC # BLD AUTO: 4.5 10E3/UL (ref 4–11)
WBC URINE: 2 /HPF

## 2025-01-13 PROCEDURE — 76705 ECHO EXAM OF ABDOMEN: CPT | Mod: 26 | Performed by: RADIOLOGY

## 2025-01-13 PROCEDURE — 76705 ECHO EXAM OF ABDOMEN: CPT

## 2025-01-13 PROCEDURE — 36415 COLL VENOUS BLD VENIPUNCTURE: CPT | Performed by: EMERGENCY MEDICINE

## 2025-01-13 PROCEDURE — 81025 URINE PREGNANCY TEST: CPT | Performed by: EMERGENCY MEDICINE

## 2025-01-13 PROCEDURE — 250N000011 HC RX IP 250 OP 636: Performed by: EMERGENCY MEDICINE

## 2025-01-13 PROCEDURE — 93010 ELECTROCARDIOGRAM REPORT: CPT | Performed by: EMERGENCY MEDICINE

## 2025-01-13 PROCEDURE — 84075 ASSAY ALKALINE PHOSPHATASE: CPT | Performed by: EMERGENCY MEDICINE

## 2025-01-13 PROCEDURE — 250N000013 HC RX MED GY IP 250 OP 250 PS 637: Performed by: EMERGENCY MEDICINE

## 2025-01-13 PROCEDURE — 85004 AUTOMATED DIFF WBC COUNT: CPT | Performed by: EMERGENCY MEDICINE

## 2025-01-13 PROCEDURE — 84484 ASSAY OF TROPONIN QUANT: CPT | Mod: 91 | Performed by: EMERGENCY MEDICINE

## 2025-01-13 PROCEDURE — 99284 EMERGENCY DEPT VISIT MOD MDM: CPT | Performed by: EMERGENCY MEDICINE

## 2025-01-13 PROCEDURE — 99285 EMERGENCY DEPT VISIT HI MDM: CPT | Mod: 25 | Performed by: EMERGENCY MEDICINE

## 2025-01-13 PROCEDURE — 83690 ASSAY OF LIPASE: CPT | Performed by: EMERGENCY MEDICINE

## 2025-01-13 PROCEDURE — 93005 ELECTROCARDIOGRAM TRACING: CPT | Performed by: EMERGENCY MEDICINE

## 2025-01-13 PROCEDURE — 81001 URINALYSIS AUTO W/SCOPE: CPT | Performed by: EMERGENCY MEDICINE

## 2025-01-13 RX ORDER — ONDANSETRON 2 MG/ML
4 INJECTION INTRAMUSCULAR; INTRAVENOUS EVERY 30 MIN PRN
Status: DISCONTINUED | OUTPATIENT
Start: 2025-01-13 | End: 2025-01-14 | Stop reason: HOSPADM

## 2025-01-13 RX ORDER — OXYCODONE HYDROCHLORIDE 5 MG/1
5 TABLET ORAL ONCE
Status: COMPLETED | OUTPATIENT
Start: 2025-01-13 | End: 2025-01-13

## 2025-01-13 RX ADMIN — OXYCODONE HYDROCHLORIDE 5 MG: 5 TABLET ORAL at 23:29

## 2025-01-13 ASSESSMENT — COLUMBIA-SUICIDE SEVERITY RATING SCALE - C-SSRS
2. HAVE YOU ACTUALLY HAD ANY THOUGHTS OF KILLING YOURSELF IN THE PAST MONTH?: NO
6. HAVE YOU EVER DONE ANYTHING, STARTED TO DO ANYTHING, OR PREPARED TO DO ANYTHING TO END YOUR LIFE?: NO
1. IN THE PAST MONTH, HAVE YOU WISHED YOU WERE DEAD OR WISHED YOU COULD GO TO SLEEP AND NOT WAKE UP?: NO

## 2025-01-13 ASSESSMENT — ACTIVITIES OF DAILY LIVING (ADL): ADLS_ACUITY_SCORE: 58

## 2025-01-13 NOTE — TELEPHONE ENCOUNTER
Date: 1/13/2025    Time of Call: 11:51 AM     Diagnosis:  abdominal pain     [ TORB ] Ordering provider: Hipolito Fuentes MD  Order: LFTs, GI consult     Order received by: Martha Lawton RN

## 2025-01-14 ENCOUNTER — VIRTUAL VISIT (OUTPATIENT)
Dept: PSYCHOLOGY | Facility: CLINIC | Age: 25
End: 2025-01-14
Payer: COMMERCIAL

## 2025-01-14 VITALS
DIASTOLIC BLOOD PRESSURE: 66 MMHG | SYSTOLIC BLOOD PRESSURE: 93 MMHG | RESPIRATION RATE: 19 BRPM | HEART RATE: 57 BPM | WEIGHT: 145 LBS | HEIGHT: 64 IN | BODY MASS INDEX: 24.75 KG/M2 | TEMPERATURE: 98.3 F | OXYGEN SATURATION: 100 %

## 2025-01-14 DIAGNOSIS — F43.23 ADJUSTMENT DISORDER WITH MIXED ANXIETY AND DEPRESSED MOOD: Primary | ICD-10-CM

## 2025-01-14 LAB
ATRIAL RATE - MUSE: 62 BPM
DIASTOLIC BLOOD PRESSURE - MUSE: NORMAL MMHG
INTERPRETATION ECG - MUSE: NORMAL
P AXIS - MUSE: 27 DEGREES
PR INTERVAL - MUSE: 184 MS
QRS DURATION - MUSE: 124 MS
QT - MUSE: 406 MS
QTC - MUSE: 412 MS
R AXIS - MUSE: 45 DEGREES
SYSTOLIC BLOOD PRESSURE - MUSE: NORMAL MMHG
T AXIS - MUSE: 250 DEGREES
TROPONIN T SERPL HS-MCNC: 43 NG/L
VENTRICULAR RATE- MUSE: 62 BPM

## 2025-01-14 PROCEDURE — 250N000011 HC RX IP 250 OP 636: Performed by: EMERGENCY MEDICINE

## 2025-01-14 PROCEDURE — 84484 ASSAY OF TROPONIN QUANT: CPT | Performed by: EMERGENCY MEDICINE

## 2025-01-14 PROCEDURE — 36415 COLL VENOUS BLD VENIPUNCTURE: CPT | Performed by: EMERGENCY MEDICINE

## 2025-01-14 PROCEDURE — 96374 THER/PROPH/DIAG INJ IV PUSH: CPT | Performed by: EMERGENCY MEDICINE

## 2025-01-14 RX ORDER — METOCLOPRAMIDE HYDROCHLORIDE 5 MG/ML
5 INJECTION INTRAMUSCULAR; INTRAVENOUS ONCE
Status: COMPLETED | OUTPATIENT
Start: 2025-01-14 | End: 2025-01-14

## 2025-01-14 RX ADMIN — ONDANSETRON 4 MG: 2 INJECTION INTRAMUSCULAR; INTRAVENOUS at 01:25

## 2025-01-14 ASSESSMENT — ACTIVITIES OF DAILY LIVING (ADL): ADLS_ACUITY_SCORE: 58

## 2025-01-14 NOTE — ED TRIAGE NOTES
Patient presents to the ED today with severe abdominal pain that is sharp. This has been going on for a few weeks. Patient has not been taking meds to help the pain.

## 2025-01-14 NOTE — ED PROVIDER NOTES
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     Patient states that she has a sudden onset abdominal pain. She has had this for several weeks now. She states that it was similar to the pain that she had before she had her gallbladder removed. Pain is sharp. It is a 8 on a 10 scale. It is aggravated by walking and sitting down. Pain has been constant.  It is associated with nausea, back pain and pain gets aggravated on deep breaths also sometimes. The back pain is present in the middle upper left upper back. She states that even touching it causes pain there. Patient had contact her cardiologist and had said a referral will be put in. LFTs were recommended. She is seeking stronger pain medication than ibuprofen's since she got her gallbladder removed in June of last year. She has history of heart failure. Denies being pregnant. Denies any fevers. Denies having any pain on the left side but only on the right side. Denies seeing a GI doctor about the pain. Denies any history of pancreatitis. Denies have any history of stones in gallbladder.      Past Medical History  No past medical history on file.  Past Surgical History:   Procedure Laterality Date    CV RIGHT HEART CATH MEASUREMENTS RECORDED N/A 7/31/2024    Procedure: Heart Cath Right Heart Cath;  Surgeon: Tanmay Brice MD;  Location:  HEART CARDIAC CATH LAB    CV RIGHT HEART CATH MEASUREMENTS RECORDED N/A 11/15/2024    Procedure: Heart Cath Right Heart Cath;  Surgeon: Tanmay Brice MD;  Location:  HEART CARDIAC  "CATH LAB    CV RIGHT HEART CATH MEASUREMENTS RECORDED N/A 12/16/2024    Procedure: Right Heart Catheterization;  Surgeon: Evens Rodrigues MD;  Location:  HEART CARDIAC CATH LAB     acetaminophen (TYLENOL) 325 MG tablet  albuterol (PROAIR HFA/PROVENTIL HFA/VENTOLIN HFA) 108 (90 Base) MCG/ACT inhaler  digoxin (LANOXIN) 250 MCG tablet  empagliflozin (JARDIANCE) 25 MG TABS tablet  furosemide (LASIX) 20 MG tablet  loperamide (IMODIUM) 2 MG capsule  LORazepam (ATIVAN) 1 MG tablet  melatonin 3 MG tablet  metoprolol succinate ER (TOPROL XL) 50 MG 24 hr tablet  metoprolol tartrate (LOPRESSOR) 25 MG tablet  nitroGLYcerin (NITROSTAT) 0.4 MG sublingual tablet  ondansetron (ZOFRAN ODT) 4 MG ODT tab  sacubitril-valsartan (ENTRESTO)  MG per tablet  spironolactone (ALDACTONE) 25 MG tablet      No Known Allergies  Family History  No family history on file.  Social History   Social History     Tobacco Use    Smoking status: Never     Passive exposure: Never (per pt)    Smokeless tobacco: Never   Substance Use Topics    Alcohol use: Never    Drug use: Never      Past medical history, past surgical history, medications, allergies, family history, and social history were reviewed with the patient. No additional pertinent items.   A complete review of systems was performed with pertinent positives and negatives noted in the HPI, and all other systems negative.    Physical Exam   BP: 91/54  Pulse: 60  Temp: 98  F (36.7  C)  Resp: 18  Height: 162.6 cm (5' 4\")  Weight: 65.8 kg (145 lb)  SpO2: 99 %  Physical Exam  Vitals and nursing note reviewed.   Constitutional:       General: She is not in acute distress.     Appearance: She is well-developed. She is not ill-appearing or diaphoretic.   HENT:      Head: Normocephalic and atraumatic.      Nose: Nose normal.      Mouth/Throat:      Mouth: Mucous membranes are moist.   Eyes:      General: No scleral icterus.     Conjunctiva/sclera: Conjunctivae normal.   Cardiovascular:      " Rate and Rhythm: Normal rate.   Pulmonary:      Effort: Pulmonary effort is normal. No respiratory distress.      Breath sounds: No stridor.   Abdominal:      General: There is no distension.      Palpations: Abdomen is soft.      Tenderness: There is abdominal tenderness in the right upper quadrant and epigastric area. There is no guarding or rebound. Negative signs include Jackson's sign and McBurney's sign.   Musculoskeletal:         General: No deformity or signs of injury. Normal range of motion.      Cervical back: Normal range of motion and neck supple. No rigidity.   Skin:     General: Skin is warm and dry.      Coloration: Skin is not jaundiced or pale.   Neurological:      General: No focal deficit present.      Mental Status: She is alert and oriented to person, place, and time.   Psychiatric:         Mood and Affect: Mood normal.         Behavior: Behavior normal.           ED Course, Procedures, & Data    11:32 PM  The patient was seen and examined by Nikole Storey MD. in Room [facility].    Procedures            EKG Interpretation:      Interpreted by Nikole Storey MD  Time reviewed: 1:21PM  Symptoms at time of EKG: abd pain   Rhythm: paced  Rate: Normal  Axis: Normal  Ectopy: premature atrial contraction  Conduction: nonspecific interventricular conduction block  ST Segments/ T Waves: No acute ischemic changes and Non-specific ST-T wave changes  Q Waves: none  Comparison to prior: no significant change from previous    Clinical Impression: no acute changes and paced rhythm                  Results for orders placed or performed during the hospital encounter of 01/13/25   US Abdomen Limited (RUQ)     Status: None    Narrative    EXAM: US ABDOMEN LIMITED  LOCATION: [facility]  DATE: 01/14/2025    INDICATION: RUQ pain, status post cholecystectomy.  COMPARISON: Abdominal ultrasound on 08/02/2024.  TECHNIQUE: Limited abdominal ultrasound.    FINDINGS:  GALLBLADDER:  Surgically absent.    BILE DUCTS: No biliary dilatation. The common duct measures 6 mm.    LIVER: Normal parenchyma with smooth contour. No focal mass.    RIGHT KIDNEY: No hydronephrosis.    PANCREAS: The visualized portions are normal.    No ascites.      Impression    IMPRESSION:  1.  Postsurgical changes of cholecystectomy.  2.  The common bile duct measures approximately 6 mm in diameter, previously measured 4 mm on 08/02/2024 exam, with the increase in dilatation, likely related to post-cholecystectomy reservoir effect. No biliary duct stone visualized.       Comprehensive metabolic panel     Status: Abnormal   Result Value Ref Range    Sodium 137 135 - 145 mmol/L    Potassium 3.7 3.4 - 5.3 mmol/L    Carbon Dioxide (CO2) 25 22 - 29 mmol/L    Anion Gap 10 7 - 15 mmol/L    Urea Nitrogen 10.6 6.0 - 20.0 mg/dL    Creatinine 0.70 0.51 - 0.95 mg/dL    GFR Estimate >90 >60 mL/min/1.73m2    Calcium 9.8 8.8 - 10.4 mg/dL    Chloride 102 98 - 107 mmol/L    Glucose 106 (H) 70 - 99 mg/dL    Alkaline Phosphatase 83 40 - 150 U/L    AST 48 (H) 0 - 45 U/L    ALT 26 0 - 50 U/L    Protein Total 7.8 6.4 - 8.3 g/dL    Albumin 4.5 3.5 - 5.2 g/dL    Bilirubin Total 0.7 <=1.2 mg/dL   Lipase     Status: Normal   Result Value Ref Range    Lipase 18 13 - 60 U/L   Troponin T, High Sensitivity     Status: Abnormal   Result Value Ref Range    Troponin T, High Sensitivity 46 (H) <=14 ng/L   HCG qualitative urine (UPT)     Status: Normal   Result Value Ref Range    hCG Urine Qualitative Negative Negative   UA with Microscopic reflex to Culture     Status: Abnormal    Specimen: Urine, Clean Catch   Result Value Ref Range    Color Urine Light Yellow Colorless, Straw, Light Yellow, Yellow    Appearance Urine Clear Clear    Glucose Urine >=1000 (A) Negative mg/dL    Bilirubin Urine Negative Negative    Ketones Urine Negative Negative mg/dL    Specific Gravity Urine 1.012 1.003 - 1.035    Blood Urine Negative Negative    pH Urine 5.5 5.0 - 7.0     Protein Albumin Urine Negative Negative mg/dL    Urobilinogen Urine Normal Normal, 2.0 mg/dL    Nitrite Urine Negative Negative    Leukocyte Esterase Urine Negative Negative    RBC Urine <1 <=2 /HPF    WBC Urine 2 <=5 /HPF    Squamous Epithelials Urine 5 (H) <=1 /HPF    Narrative    Urine Culture not indicated   CBC with platelets and differential     Status: Abnormal   Result Value Ref Range    WBC Count 4.5 4.0 - 11.0 10e3/uL    RBC Count 5.86 (H) 3.80 - 5.20 10e6/uL    Hemoglobin 15.8 (H) 11.7 - 15.7 g/dL    Hematocrit 47.8 (H) 35.0 - 47.0 %    MCV 82 78 - 100 fL    MCH 27.0 26.5 - 33.0 pg    MCHC 33.1 31.5 - 36.5 g/dL    RDW 15.9 (H) 10.0 - 15.0 %    Platelet Count 264 150 - 450 10e3/uL    % Neutrophils 38 %    % Lymphocytes 54 %    % Monocytes 6 %    % Eosinophils 0 %    % Basophils 1 %    % Immature Granulocytes 0 %    NRBCs per 100 WBC 0 <1 /100    Absolute Neutrophils 1.7 1.6 - 8.3 10e3/uL    Absolute Lymphocytes 2.4 0.8 - 5.3 10e3/uL    Absolute Monocytes 0.3 0.0 - 1.3 10e3/uL    Absolute Eosinophils 0.0 0.0 - 0.7 10e3/uL    Absolute Basophils 0.1 0.0 - 0.2 10e3/uL    Absolute Immature Granulocytes 0.0 <=0.4 10e3/uL    Absolute NRBCs 0.0 10e3/uL   Troponin T, High Sensitivity     Status: Abnormal   Result Value Ref Range    Troponin T, High Sensitivity 43 (H) <=14 ng/L   EKG 12-lead, tracing only     Status: None (Preliminary result)   Result Value Ref Range    Systolic Blood Pressure  mmHg    Diastolic Blood Pressure  mmHg    Ventricular Rate 62 BPM    Atrial Rate 62 BPM    SD Interval 184 ms    QRS Duration 124 ms     ms    QTc 412 ms    P Axis 27 degrees    R AXIS 45 degrees    T Axis 250 degrees    Interpretation ECG       Atrial-paced rhythm with Premature atrial complexes  Non-specific intra-ventricular conduction delay  ST & T wave abnormality, consider inferior ischemia  ST & T wave abnormality, consider anterolateral ischemia  Abnormal ECG     CBC with platelets differential     Status:  Abnormal    Narrative    The following orders were created for panel order CBC with platelets differential.  Procedure                               Abnormality         Status                     ---------                               -----------         ------                     CBC with platelets and d...[[id number]]  Abnormal            Final result                 Please view results for these tests on the individual orders.     Medications   ondansetron (ZOFRAN) injection 4 mg (4 mg Intravenous $Given 1/14/25 0125)   metoclopramide (REGLAN) injection 5 mg (5 mg Intravenous Not Given 1/14/25 0148)   oxyCODONE (ROXICODONE) tablet 5 mg (5 mg Oral $Given 1/13/25 4932)     Labs Ordered and Resulted from Time of ED Arrival to Time of ED Departure   COMPREHENSIVE METABOLIC PANEL - Abnormal       Result Value    Sodium 137      Potassium 3.7      Carbon Dioxide (CO2) 25      Anion Gap 10      Urea Nitrogen 10.6      Creatinine 0.70      GFR Estimate >90      Calcium 9.8      Chloride 102      Glucose 106 (*)     Alkaline Phosphatase 83      AST 48 (*)     ALT 26      Protein Total 7.8      Albumin 4.5      Bilirubin Total 0.7     TROPONIN T, HIGH SENSITIVITY - Abnormal    Troponin T, High Sensitivity 46 (*)    ROUTINE UA WITH MICROSCOPIC REFLEX TO CULTURE - Abnormal    Color Urine Light Yellow      Appearance Urine Clear      Glucose Urine >=1000 (*)     Bilirubin Urine Negative      Ketones Urine Negative      Specific Gravity Urine 1.012      Blood Urine Negative      pH Urine 5.5      Protein Albumin Urine Negative      Urobilinogen Urine Normal      Nitrite Urine Negative      Leukocyte Esterase Urine Negative      RBC Urine <1      WBC Urine 2      Squamous Epithelials Urine 5 (*)    CBC WITH PLATELETS AND DIFFERENTIAL - Abnormal    WBC Count 4.5      RBC Count 5.86 (*)     Hemoglobin 15.8 (*)     Hematocrit 47.8 (*)     MCV 82      MCH 27.0      MCHC 33.1      RDW 15.9 (*)     Platelet Count 264      %  Neutrophils 38      % Lymphocytes 54      % Monocytes 6      % Eosinophils 0      % Basophils 1      % Immature Granulocytes 0      NRBCs per 100 WBC 0      Absolute Neutrophils 1.7      Absolute Lymphocytes 2.4      Absolute Monocytes 0.3      Absolute Eosinophils 0.0      Absolute Basophils 0.1      Absolute Immature Granulocytes 0.0      Absolute NRBCs 0.0     TROPONIN T, HIGH SENSITIVITY - Abnormal    Troponin T, High Sensitivity 43 (*)    LIPASE - Normal    Lipase 18     HCG QUALITATIVE URINE - Normal    hCG Urine Qualitative Negative       US Abdomen Limited (RUQ)   Final Result   IMPRESSION:   1.  Postsurgical changes of cholecystectomy.   2.  The common bile duct measures approximately 6 mm in diameter, previously measured 4 mm on 08/02/2024 exam, with the increase in dilatation, likely related to post-cholecystectomy reservoir effect. No biliary duct stone visualized.                       Assessment & Plan    Given zofran, reglan, oxycodone for symptoms. WBC nl. HGb 15. Trops flat and similar to baseline. Preg neg. UA w/o infection. AST 48 but CMP otherwise nl. EKG stable. RUQ with findings as above.    Discussed with GI fellow who did not think AST of 48 with otherwise normal liver enzymes was concerning in correlation with patient's common bile duct of 6 mm following a cholecystectomy.  He did not think dedicated GI follow-up or additional diagnostic imaging such as an MRCP was indicated.  Unclear cause for patient's pain but recommended close follow-up with primary care and a trial of empiric treatment for acid reflux/gastritis/peptic ulcer disease with over-the-counter omeprazole.  Detailed return precautions provided.    I have reviewed the nursing notes. I have reviewed the findings, diagnosis, plan and need for follow up with the patient.    New Prescriptions    No medications on file       Final diagnoses:   RUQ abdominal pain   I, ARMIDA CURTIS, am serving as a trained medical scribe to document  services personally performed by Nikole Storey MD, based on the provider's statements to me.     I, Nikole Storey MD, was physically present and have reviewed and verified the accuracy of this note documented by ARMIDA CURTIS.     Nikole Storey MD.  [facility] EMERGENCY DEPARTMENT  1/13/2025     Nikole Storey MD  01/15/25 0089     14-Nov-2017 14:07

## 2025-01-14 NOTE — DISCHARGE INSTRUCTIONS
Please make an appointment to follow up with Your Primary Care Provider in 2-3 days if not improving.    Return to the ED for worsening pain, recurrent vomiting or diarrhea, blood in our stool, dehydration, fever, or abdominal distention and inability to pass gas from below.

## 2025-01-16 NOTE — PROGRESS NOTES
Health Psychology Outpatient Follow Up  [facility]     Patient: Mary Shaikh   Date of Service: 01/16/25    Diagnosis:  Adjustment disorder with mixed anxiety and depressed mood (ICD-10: F43.20)  Specific learning disability     Patient Demographics (per chart):   Age: 24 year old  Biological Sex: female  Race: Black or   Ethnicity: Choose not to answer    Patient Medications (per chart):  Current Outpatient Medications   Medication Sig Dispense Refill    acetaminophen (TYLENOL) 325 MG tablet Take 325-650 mg by mouth every 6 hours as needed for mild pain.      albuterol (PROAIR HFA/PROVENTIL HFA/VENTOLIN HFA) 108 (90 Base) MCG/ACT inhaler Inhale 1-2 puffs into the lungs      digoxin (LANOXIN) 250 MCG tablet Take 1 tablet (250 mcg) by mouth daily. 90 tablet 3    empagliflozin (JARDIANCE) 25 MG TABS tablet Take 1 tablet (25 mg) by mouth daily. 90 tablet 1    furosemide (LASIX) 20 MG tablet Take 1 tablet (20 mg) by mouth daily as needed (Take daily if your weight increases by more than 3 lbs in one day or  5 lbs in three days.). 90 tablet 3    loperamide (IMODIUM) 2 MG capsule Take 4 mg by mouth as needed for diarrhea      LORazepam (ATIVAN) 1 MG tablet Take 1 tablet (1 mg) by mouth every 6 hours as needed for anxiety. 20 tablet 0    melatonin 3 MG tablet Take 3 mg by mouth nightly as needed      metoprolol succinate ER (TOPROL XL) 50 MG 24 hr tablet Take 1 tablet (50 mg) by mouth 2 times daily. 180 tablet 3    metoprolol tartrate (LOPRESSOR) 25 MG tablet Take 1 tablet (25 mg) by mouth daily as needed (take for increased fluttering/palpitations and for HR greater than 110 bpm). 90 tablet 1    nitroGLYcerin (NITROSTAT) 0.4 MG sublingual tablet For chest pain place 1 tablet under the tongue every 5 minutes for 3 doses. If symptoms persist 5 minutes after 1st dose call 911. 10 tablet 1    ondansetron (ZOFRAN ODT) 4 MG ODT tab Place 4 mg under the tongue as  "needed      sacubitril-valsartan (ENTRESTO)  MG per tablet Take 1 tablet by mouth 2 times daily. 180 tablet 3    spironolactone (ALDACTONE) 25 MG tablet Take 0.5 tablets (12.5 mg) by mouth daily. 90 tablet 3     No current facility-administered medications for this visit.     Weight History (per chart):  Wt Readings from Last 4 Encounters:   01/13/25 65.8 kg (145 lb)   01/06/25 69.4 kg (153 lb)   12/18/24 70.6 kg (155 lb 10.3 oz)   12/04/24 73 kg (161 lb)      Estimated body mass index is 24.89 kg/m  as calculated from the following:    Height as of 1/13/25: 1.626 m (5' 4\").    Weight as of 1/13/25: 65.8 kg (145 lb).     Subjective:  Reviewed updates to physical and emotional health since previous session. Reviewed treatment plan and goals.  Patient reported treatment goals including increasing anxiety and depression coping skills related to physical health and ongoing chronic medical illness. Patient was provided basic psychoeducation on CBT for mood sxs and coping with chronic illness. Patient reported agreement with treatment plan.    Pt agreed to the following assignment(s) to complete by next follow up:   - Begin practicing breathing and journaling activities     Objective   Appearance:   Appropriate   Eye Contact:   Good   Psychomotor Behavior:  Normal   Attitude:   Cooperative   Orientation:   All  Speech   Rate / Production: Normal    Volume:  Normal   Mood:    Euthymic  Affect:    Appropriate   Thought Content:   Clear  Thought Form:   Coherent  Logical     Insight:    Did not formally assess at this time.     Depression Symptoms (Patient Health Questionnaire 9; PHQ-9)  The PHQ-9 is a measure of depressive symptoms.  Scores on this measure range from 0 to 27 with higher scores reflecting greater levels and frequency of depressive symptoms. Typical symptom ranges include: 0-4 minimal, 5-9 mild, 10-14 moderate, 15-19 moderately severe, 20-27 severe.       11/20/2024     1:11 PM 12/25/2024     3:23 PM " 1/3/2025     9:12 AM   PHQ-9 SCORE   PHQ-9 Total Score MyChart 17 (Moderately severe depression) 18 (Moderately severe depression) 9 (Mild depression)   PHQ-9 Total Score 17  18  9        Patient-reported     Anxiety Symptoms (Generalized Anxiety Disorder 7; JOSESITO-7)  The JOSESITO-7 is a measure of anxiety and panic symptoms.  Scores on this measure range from 0 to 21 with higher scores reflecting greater levels and frequency of anxiety symptoms. Typical symptom ranges include: 0-4 minimal, 5-9 mild, 10-14 moderate, 15-21 severe.       11/19/2024     9:12 AM 12/25/2024     3:23 PM 1/9/2025     9:12 AM   JOSESITO-7 SCORE   Total Score 14 (moderate anxiety) 14 (moderate anxiety) 15 (severe anxiety)   Total Score 14  14  15        Patient-reported     Alcohol and Drug Abuse (CAGE - Adapted to Include Drugs; CAGE-AID)  The CAGE-AID is a screening tool to assess for symptoms of alcohol or drug abuse or dependence. Scores on this measure range from 0 to 4, with higher scores reflecting experiences consistent with problem use.  CAGE-AID score  > 1 is a positive screen, suggesting further discussion is needed to determine if evaluation for alcohol or substance abuse is appropriate.  A score > 2 is considered clinically significant, suggesting further evaluation of alcohol or substance-related problems is indicated.      11/4/2024     7:18 PM   CAGE-AID Total Score   Total Score 0    Total Score MyChart 0 (A total score of 2 or greater is considered clinically significant)       Patient-reported     Global Health (Patient-Reported Outcomes Measurement Information System; PROMIS-10)  The PROMIS-10 is a measure of global physical and mental health. Total scores on this measure range from 8 to 40, with higher scores reflecting greater levels of perceived health.       11/4/2024     7:17 PM 11/20/2024     1:11 PM   PROMIS-10 Scores Only   Global Mental Health Score 4  9    Global Physical Health Score 7  11    PROMIS TOTAL - SUBSCORES 11   20        Patient-reported     Suicidality ([place] Suicide Severity Rating Scale Screener Past Month)      1/13/2025    10:18 PM   [place] Suicide Severity Rating (Short Version)   Q1 Wished to be Dead (Past Month) 0-->no   Q2 Suicidal Thoughts (Past Month) 0-->no   Q6 Suicide Behavior (Lifetime) 0-->no     Assessment:  Patient was engaged in treatment. Patient appears to be experiencing depression symptoms in the mild range and anxiety symptoms in the severe range. Currently, the patient appears to be coping with minimal success. Progress towards treatment goals: ongoing.     Plan:  Return for therapy sessions.     Session Length:  Start Time: 9:00am  End Time: 9:38am    Modality: Telemedicine Information:  Type of service:   Telemedicine psychotherapy  Patient location:   Patient home in [place]    Patient telephone number: [phone]  Provider location:  At home office in [place]  Mode of Communication:   Video Conference via [employer]   Patient consent to telemedicine services? Yes  It has been determined that telemedicine service is appropriate and effective for this patient.   The patient has been notified that: Video visits will be conducted via a call with their psychologist to provide the care they need with a video conversation. Video visits may be billed at different rates depending on insurance coverage.  Patients are advised to contact their insurance provider with any questions about their health insurance coverage. If during the course of a call the psychologist feels a video visit is not appropriate, patients will not be charged for this service.    Baldomero Torres, PhD,   Clinical Health Psychologist    *This note was completed using Dragon voice recognition software. Although reviewed after completion, some word and grammatical errors may occur.    Outpatient Treatment Plan Summary     Date of Treatment Plan: 01/16/25  Date of Initial Service: 12/2/24  90-Day Review Date: NA    DSM-V  Diagnosis:   Adjustment disorder with mixed anxiety and depressed mood (ICD-10: F43.20)  Specific learning disability     Current symptoms and circumstances that substantiate the diagnosis: See diagnostic assessment.   How symptoms and/or behaviors are affecting level of functioning: See diagnostic assessment.    Risk Assessment:   Suicide:  Assessed Level of Immediate Risk: None  Ideation: No   Plan:  No  Means: No  Intent: No     Homicide/Violence:  Assessed Level of Immediate Risk: None  Ideation: No  Plan:  No  Means: No  Intent: No    If on a medication, please include name and dosage:  See chart.     Symptom/Problem Measurable Goals Interventions  Gains Made   High anxiety sxs related to medical illness  Objective measure of goal progress will include: self reported increase in anxiety coping skills  CBT for anxiety  NA   High depression sxs related to medical illness  Objective measure of goal progress will include: self reported increase in depression coping skills  CBT for depression  NA     Frequency of Sessions: ~1x a week   Discharge and Aftercare Goals: TBD  Expected duration of treatment:  TBD  Participants in therapy plan (family, friends, support network): TBD    Discussed and created this plan with the patient who has access to this treatment plan via [employer].     Regulatory Guidelines for Updating Treatment Plan  [place] Medical Assistance: Reviewed & signed at least every 90days  Medicare:  Update per policy

## 2025-01-21 ENCOUNTER — MYC MEDICAL ADVICE (OUTPATIENT)
Dept: CARDIOLOGY | Facility: CLINIC | Age: 25
End: 2025-01-21
Payer: COMMERCIAL

## 2025-01-22 ENCOUNTER — PRE VISIT (OUTPATIENT)
Dept: NEUROLOGY | Facility: CLINIC | Age: 25
End: 2025-01-22

## 2025-01-27 ENCOUNTER — TELEPHONE (OUTPATIENT)
Dept: CARDIOLOGY | Facility: CLINIC | Age: 25
End: 2025-01-27
Payer: COMMERCIAL

## 2025-01-27 NOTE — TELEPHONE ENCOUNTER
[facility] Call Center    Phone Message    May a detailed message be left on voicemail: yes     Reason for Call: Other: Rivera from [facility] would like a call back as the provider there is wondering if ot has any restriction for back pain PT      Action Taken: Other: Cardio    Travel Screening: Not Applicable     Date of Service:

## 2025-01-28 ENCOUNTER — VIRTUAL VISIT (OUTPATIENT)
Dept: PSYCHOLOGY | Facility: CLINIC | Age: 25
End: 2025-01-28
Payer: COMMERCIAL

## 2025-01-28 DIAGNOSIS — F43.23 ADJUSTMENT DISORDER WITH MIXED ANXIETY AND DEPRESSED MOOD: Primary | ICD-10-CM

## 2025-01-28 ASSESSMENT — ANXIETY QUESTIONNAIRES
8. IF YOU CHECKED OFF ANY PROBLEMS, HOW DIFFICULT HAVE THESE MADE IT FOR YOU TO DO YOUR WORK, TAKE CARE OF THINGS AT HOME, OR GET ALONG WITH OTHER PEOPLE?: SOMEWHAT DIFFICULT
IF YOU CHECKED OFF ANY PROBLEMS ON THIS QUESTIONNAIRE, HOW DIFFICULT HAVE THESE PROBLEMS MADE IT FOR YOU TO DO YOUR WORK, TAKE CARE OF THINGS AT HOME, OR GET ALONG WITH OTHER PEOPLE: SOMEWHAT DIFFICULT
7. FEELING AFRAID AS IF SOMETHING AWFUL MIGHT HAPPEN: SEVERAL DAYS
1. FEELING NERVOUS, ANXIOUS, OR ON EDGE: SEVERAL DAYS
7. FEELING AFRAID AS IF SOMETHING AWFUL MIGHT HAPPEN: SEVERAL DAYS
6. BECOMING EASILY ANNOYED OR IRRITABLE: SEVERAL DAYS
5. BEING SO RESTLESS THAT IT IS HARD TO SIT STILL: SEVERAL DAYS
4. TROUBLE RELAXING: SEVERAL DAYS
2. NOT BEING ABLE TO STOP OR CONTROL WORRYING: SEVERAL DAYS
GAD7 TOTAL SCORE: 7
GAD7 TOTAL SCORE: 7
3. WORRYING TOO MUCH ABOUT DIFFERENT THINGS: SEVERAL DAYS
GAD7 TOTAL SCORE: 7

## 2025-01-28 NOTE — PROGRESS NOTES
"Health Psychology Outpatient Follow Up  [facility]     Patient: Mary Shaikh   Date of Service: 01/28/25  Treatment Plan Due: 4/14/25    Diagnosis:  Adjustment disorder with mixed anxiety and depressed mood (ICD-10: F43.20)  Specific learning disability     Patient Demographics (per chart):   Age: 24 year old  Biological Sex: female  Race: Black or   Ethnicity: Choose not to answer    Subjective:  Engaged pt in psychotherapy (CBT) for coping with chronic medical illness. Reviewed updates to physical and emotional health since previous session and progress with treatment plan goals. Patient reported that her mood has been \"okay\" since last appointment, citing \"some highs and lows,\" but otherwise \"feeling fine overall.\" Engaged patient in supportive listening and cognitive processing of ongoing sxs and stressors. Additionally checked in with pt about completed assignments. Pt reported that she was successful in practicing daily deep breathing and affirmation practice, but was unsuccessful in processing emotions with her partner citing lack of time. Engaged pt in goal setting, planning, and problem solving around continuing to complete goals over next several weeks. Additionally engaged pt in skills training related to leaves on a stream mindful meditation.     Objective   Appearance:   Appropriate   Eye Contact:   Good   Psychomotor Behavior:  Normal   Attitude:   Cooperative   Orientation:   All  Speech   Rate / Production: Normal    Volume:  Normal   Mood:    Euthymic  Affect:    Appropriate   Thought Content:   Clear  Thought Form:   Coherent  Logical     Insight:    Did not formally assess at this time.   Safety:    No safety concerns at this time.     Weight (per chart):  Wt Readings from Last 4 Encounters:   01/13/25 65.8 kg (145 lb)   01/06/25 69.4 kg (153 lb)   12/18/24 70.6 kg (155 lb 10.3 oz)   12/04/24 73 kg (161 lb)        BMI (per " "chart):  Estimated body mass index is 24.89 kg/m  as calculated from the following:    Height as of 1/13/25: 1.626 m (5' 4\").    Weight as of 1/13/25: 65.8 kg (145 lb).     Depression Symptoms (Patient Health Questionnaire 9; PHQ-9)  The PHQ-9 is a measure of depressive symptoms.  Scores on this measure range from 0 to 27 with higher scores reflecting greater levels and frequency of depressive symptoms. Typical symptom ranges include: 0-4 minimal, 5-9 mild, 10-14 moderate, 15-19 moderately severe, 20-27 severe.       11/20/2024     1:11 PM 12/25/2024     3:23 PM 1/3/2025     9:12 AM   PHQ-9 SCORE   PHQ-9 Total Score MyChart 17 (Moderately severe depression) 18 (Moderately severe depression) 9 (Mild depression)   PHQ-9 Total Score 17  18  9        Patient-reported     Anxiety Symptoms (Generalized Anxiety Disorder 7; JOSESITO-7)  The JOSESITO-7 is a measure of anxiety and panic symptoms.  Scores on this measure range from 0 to 21 with higher scores reflecting greater levels and frequency of anxiety symptoms. Typical symptom ranges include: 0-4 minimal, 5-9 mild, 10-14 moderate, 15-21 severe.       12/25/2024     3:23 PM 1/9/2025     9:12 AM 1/28/2025     9:46 AM   JOSESITO-7 SCORE   Total Score 14 (moderate anxiety) 15 (severe anxiety) 7 (mild anxiety)   Total Score 14  15  7        Patient-reported     Alcohol and Drug Abuse (CAGE - Adapted to Include Drugs; CAGE-AID)  The CAGE-AID is a screening tool to assess for symptoms of alcohol or drug abuse or dependence. Scores on this measure range from 0 to 4, with higher scores reflecting experiences consistent with problem use.  CAGE-AID score  > 1 is a positive screen, suggesting further discussion is needed to determine if evaluation for alcohol or substance abuse is appropriate.  A score > 2 is considered clinically significant, suggesting further evaluation of alcohol or substance-related problems is indicated.      11/4/2024     7:18 PM   CAGE-AID Total Score   Total Score 0  "   Total Score MyChart 0 (A total score of 2 or greater is considered clinically significant)       Patient-reported     Global Health (Patient-Reported Outcomes Measurement Information System; PROMIS-10)  The PROMIS-10 is a measure of global physical and mental health. Total scores on this measure range from 8 to 40, with higher scores reflecting greater levels of perceived health.       11/4/2024     7:17 PM 11/20/2024     1:11 PM   PROMIS-10 Scores Only   Global Mental Health Score 4  9    Global Physical Health Score 7  11    PROMIS TOTAL - SUBSCORES 11  20        Patient-reported     Suicidality ([place] Suicide Severity Rating Scale Screener Past Month)      1/13/2025    10:18 PM   [place] Suicide Severity Rating (Short Version)   Q1 Wished to be Dead (Past Month) 0-->no   Q2 Suicidal Thoughts (Past Month) 0-->no   Q6 Suicide Behavior (Lifetime) 0-->no   Level of Risk per Screen no risks indicated       Assessment:  Patient was engaged in treatment. Patient appears to be experiencing depression symptoms in the mild range and anxiety symptoms in the mild range. Currently, the patient appears to be coping with some success. Progress towards treatment goals: ongoing.     Plan:  Return for therapy sessions.     Session Length:  Start Time: 10:00am  End Time: 10:42am    Modality: Telemedicine Information:  Type of service:   Telemedicine psychotherapy  Patient location:   Patient home in [place]    Patient telephone number: [phone]  Provider location:  At home office in [place]  Mode of Communication:   Video Conference via [employer]   Patient consent to telemedicine services? Yes  It has been determined that telemedicine service is appropriate and effective for this patient.   The patient has been notified that: Video visits will be conducted via a call with their psychologist to provide the care they need with a video conversation. Video visits may be billed at different rates depending on insurance  coverage.  Patients are advised to contact their insurance provider with any questions about their health insurance coverage. If during the course of a call the psychologist feels a video visit is not appropriate, patients will not be charged for this service.    Baldomero Torres, PhD,   Clinical Health Psychologist  Phone: [phone]    *This note was completed using Dragon voice recognition software. Although reviewed after completion, some word and grammatical errors may occur.

## 2025-01-28 NOTE — TELEPHONE ENCOUNTER
I called Rivera back and left a message that there are no restrictions for Mary's PT other than stopping if she is symptomatic.    Martha Lawton RN

## 2025-02-05 DIAGNOSIS — I50.23 ACUTE ON CHRONIC SYSTOLIC HEART FAILURE (H): Primary | ICD-10-CM

## 2025-02-05 DIAGNOSIS — E74.05 DANON DISEASE IN HETEROZYGOUS FEMALE (H): ICD-10-CM

## 2025-02-23 DIAGNOSIS — F43.23 ADJUSTMENT DISORDER WITH MIXED ANXIETY AND DEPRESSED MOOD: ICD-10-CM

## 2025-02-24 ENCOUNTER — LAB (OUTPATIENT)
Dept: LAB | Facility: CLINIC | Age: 25
End: 2025-02-24
Attending: INTERNAL MEDICINE
Payer: COMMERCIAL

## 2025-02-24 ENCOUNTER — ANCILLARY PROCEDURE (OUTPATIENT)
Dept: CARDIOLOGY | Facility: CLINIC | Age: 25
End: 2025-02-24
Attending: INTERNAL MEDICINE
Payer: COMMERCIAL

## 2025-02-24 VITALS
SYSTOLIC BLOOD PRESSURE: 109 MMHG | HEART RATE: 60 BPM | WEIGHT: 147.7 LBS | DIASTOLIC BLOOD PRESSURE: 63 MMHG | OXYGEN SATURATION: 100 % | BODY MASS INDEX: 25.35 KG/M2

## 2025-02-24 DIAGNOSIS — Z45.02 FITTING AND ADJUSTMENT OF AUTOMATIC IMPLANTABLE CARDIOVERTER-DEFIBRILLATOR: ICD-10-CM

## 2025-02-24 DIAGNOSIS — F41.9 ANXIETY: Primary | ICD-10-CM

## 2025-02-24 DIAGNOSIS — I50.23 ACUTE ON CHRONIC SYSTOLIC HEART FAILURE (H): ICD-10-CM

## 2025-02-24 DIAGNOSIS — E74.05 DANON DISEASE IN HETEROZYGOUS FEMALE (H): ICD-10-CM

## 2025-02-24 LAB
ALBUMIN SERPL BCG-MCNC: 4.6 G/DL (ref 3.5–5.2)
ALP SERPL-CCNC: 82 U/L (ref 40–150)
ALT SERPL W P-5'-P-CCNC: 25 U/L (ref 0–50)
ANION GAP SERPL CALCULATED.3IONS-SCNC: 10 MMOL/L (ref 7–15)
AST SERPL W P-5'-P-CCNC: 49 U/L (ref 0–45)
BILIRUB SERPL-MCNC: 0.6 MG/DL
BUN SERPL-MCNC: 9.8 MG/DL (ref 6–20)
CALCIUM SERPL-MCNC: 9.5 MG/DL (ref 8.8–10.4)
CHLORIDE SERPL-SCNC: 104 MMOL/L (ref 98–107)
CK SERPL-CCNC: 84 U/L (ref 26–192)
CREAT SERPL-MCNC: 0.65 MG/DL (ref 0.51–0.95)
EGFRCR SERPLBLD CKD-EPI 2021: >90 ML/MIN/1.73M2
ERYTHROCYTE [DISTWIDTH] IN BLOOD BY AUTOMATED COUNT: 15.5 % (ref 10–15)
GLUCOSE SERPL-MCNC: 79 MG/DL (ref 70–99)
HCO3 SERPL-SCNC: 27 MMOL/L (ref 22–29)
HCT VFR BLD AUTO: 46.8 % (ref 35–47)
HGB BLD-MCNC: 14.9 G/DL (ref 11.7–15.7)
MAGNESIUM SERPL-MCNC: 2.6 MG/DL (ref 1.7–2.3)
MCH RBC QN AUTO: 27.2 PG (ref 26.5–33)
MCHC RBC AUTO-ENTMCNC: 31.8 G/DL (ref 31.5–36.5)
MCV RBC AUTO: 85 FL (ref 78–100)
NT-PROBNP SERPL-MCNC: 1097 PG/ML (ref 0–450)
PLATELET # BLD AUTO: 240 10E3/UL (ref 150–450)
POTASSIUM SERPL-SCNC: 4.4 MMOL/L (ref 3.4–5.3)
PROT SERPL-MCNC: 7.8 G/DL (ref 6.4–8.3)
RBC # BLD AUTO: 5.48 10E6/UL (ref 3.8–5.2)
SODIUM SERPL-SCNC: 141 MMOL/L (ref 135–145)
TROPONIN T SERPL HS-MCNC: 35 NG/L
WBC # BLD AUTO: 3.6 10E3/UL (ref 4–11)

## 2025-02-24 PROCEDURE — 82550 ASSAY OF CK (CPK): CPT | Performed by: PATHOLOGY

## 2025-02-24 PROCEDURE — 36415 COLL VENOUS BLD VENIPUNCTURE: CPT | Performed by: PATHOLOGY

## 2025-02-24 PROCEDURE — 83735 ASSAY OF MAGNESIUM: CPT | Performed by: PATHOLOGY

## 2025-02-24 PROCEDURE — 93283 PRGRMG EVAL IMPLANTABLE DFB: CPT | Performed by: INTERNAL MEDICINE

## 2025-02-24 PROCEDURE — 83880 ASSAY OF NATRIURETIC PEPTIDE: CPT | Performed by: PATHOLOGY

## 2025-02-24 PROCEDURE — 84484 ASSAY OF TROPONIN QUANT: CPT | Performed by: PATHOLOGY

## 2025-02-24 PROCEDURE — 85027 COMPLETE CBC AUTOMATED: CPT | Performed by: PATHOLOGY

## 2025-02-24 PROCEDURE — 80053 COMPREHEN METABOLIC PANEL: CPT | Performed by: PATHOLOGY

## 2025-02-24 PROCEDURE — 99211 OFF/OP EST MAY X REQ PHY/QHP: CPT | Performed by: INTERNAL MEDICINE

## 2025-02-24 RX ORDER — LIDOCAINE 40 MG/G
CREAM TOPICAL
OUTPATIENT
Start: 2025-02-24

## 2025-02-24 ASSESSMENT — PAIN SCALES - GENERAL: PAINLEVEL_OUTOF10: NO PAIN (0)

## 2025-02-24 NOTE — NURSING NOTE
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       Vitals were taken, medications reconciled.     Robert Soares, Clinic Assistant    2:31 PM

## 2025-02-24 NOTE — LETTER
2025      RE: Mary Shaikh  [address]  [place] [address]       Dear Colleague,    Thank you for the opportunity to participate in the care of your patient, Mary Shaikh, at the [facility] [place] [facility]. Please see a copy of my visit note below.    Neurocardiomyopathy Clinic Progress Note    Name: Mary Shaikh  : 2000  MRN: [id number]    2025    Dear Dr. Swift,    I had the pleasure of seeing Mary Shaikh, a 24 year old woman today in the [facility] for a virtual follow up evaluation of Danon disease. She is accompanied by her mother and father.     As you know, Ms. 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 She was seen in the emergency department at [facility] and was found to have prexcitation on her ECG in , and this lead to an exercise ECG test which she achieved 10.8 METs, exercise duration of 9 min and 16 seconds, had no exercised induced arrhythmias, she did have a delta wave and repolarization abnormalities. Subsequently, she had an echocardiogram which showed an LVEF of 51% with asymetric LVH (septum 1.5cm). She has been following with Dr. Swift in the [facility] cardiology clinic and she obtained a CMR which showed and LVEF of 43%, LVEDD 4.9cm, IV septum 1.7cm,  normal RV function but RVH noted, no LOU, harman LGE with subendocardial, mid myocardial, and subendocardial enhancement not associated with a vascular territory.     I saw her in clinic  2024. She was admitted after her RHC from -. Her RHC showed an RA of 20, PA 51/23, 36, PCWP 29, Juan CO/CI 3.13/1.68, Thermo CO/CI 2.9/1.55 and a PVR of 1.9. She was vasodilated with nitroprusside and transitioned to entresto. Her weight on discharge was 157 lbs. The advanced heart failures therapies evaluation was initiated in  the hospital and she has continued it as an outpatient.  She had an admission in December 2024 for palpitations and her metoprolol was increased. Her RHC 12/16/2024 showed RA 8, PA 32/9, PCWP 10, Juan 4.5/2.5.     She has been seen in [facility] clinic by Ms. Pichardo most recently on 1/06/2024. She also had an ED visit for abdominal pain in the interim.     She has dyspnea when going up and down the stairs. No chest pain. She has fluttering in her chest around 2-3pm several times per week and she takes an extra dose of metoprolol at that time. Her weight Is stable at 140lbs and she has not needed any diuretics. No edema, orthopnea, or PND. She has lightheadedness when standing up fast, but no presyncope, syncope, or ICD shocks. He energy levels fluctuate and her appetite is reduced. She took ativan a few weeks ago. She feels her anxiety is getting better and she is seeing health psych. He parents are concerned that she does not leave the house. Her QOL is good, but impacted by palpitations and fatigue.  She specifically denies tobacco, alcohol, or illicit substance use.     REVIEW OF SYSTEMS: 10 point ROS neg other than the symptoms noted above in the HPI.    PAST MEDICAL HISTORY:   1. 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 NSVT s/p ICD 6/2024  3. WPW   4. CTI ablation 6/2024  5. Biliary dyskinesia s/p cholecystectomy 7/2024    ALLERGIES:  No Known Allergies    MEDICATIONS:   Current Outpatient Medications   Medication Sig Dispense Refill     acetaminophen (TYLENOL) 325 MG tablet Take 325-650 mg by mouth every 6 hours as needed for mild pain.       albuterol (PROAIR HFA/PROVENTIL HFA/VENTOLIN HFA) 108 (90 Base) MCG/ACT inhaler Inhale 1-2 puffs into the lungs       digoxin (LANOXIN) 250 MCG tablet Take 1 tablet (250 mcg) by mouth daily. 90 tablet 3     empagliflozin (JARDIANCE) 25 MG TABS tablet Take 1 tablet (25 mg) by mouth daily. 90 tablet 1     furosemide (LASIX) 20 MG tablet Take 1 tablet (20 mg) by mouth daily as  needed (Take daily if your weight increases by more than 3 lbs in one day or  5 lbs in three days.). 90 tablet 3     loperamide (IMODIUM) 2 MG capsule Take 4 mg by mouth as needed for diarrhea       LORazepam (ATIVAN) 1 MG tablet Take 1 tablet (1 mg) by mouth every 6 hours as needed for anxiety. 20 tablet 0     melatonin 3 MG tablet Take 3 mg by mouth nightly as needed       metoprolol succinate ER (TOPROL XL) 50 MG 24 hr tablet Take 1 tablet (50 mg) by mouth 2 times daily. 180 tablet 3     metoprolol tartrate (LOPRESSOR) 25 MG tablet Take 1 tablet (25 mg) by mouth daily as needed (take for increased fluttering/palpitations and for HR greater than 110 bpm). 90 tablet 1     nitroGLYcerin (NITROSTAT) 0.4 MG sublingual tablet For chest pain place 1 tablet under the tongue every 5 minutes for 3 doses. If symptoms persist 5 minutes after 1st dose call 911. 10 tablet 1     ondansetron (ZOFRAN ODT) 4 MG ODT tab Place 4 mg under the tongue as needed       sacubitril-valsartan (ENTRESTO)  MG per tablet Take 1 tablet by mouth 2 times daily. 180 tablet 3     spironolactone (ALDACTONE) 25 MG tablet Take 0.5 tablets (12.5 mg) by mouth daily. 90 tablet 3     SOCIAL HISTORY: She lives in [place], with her family   Tobacco: Never smoker  Alcohol: rare  Illicit: prior marijuana use     FAMILY HISTORY: . Her maternal grandfather had a stroke in 2017, but otherwise she has no significant family history of past cardiovascular history. There is no past family history of sudden cardiac death.    PHYSICAL EXAM:   /63 (BP Location: Right arm, Patient Position: Chair, Cuff Size: Adult Regular)   Pulse 60   Wt 67 kg (147 lb 11.2 oz)   SpO2 100%   BMI 25.35 kg/m    General: comfortable, conversant, NAD  HEENT: normocephalic, atraumatic, anicteric  Neck: Estimate JVP <8  CV: RRR, nl s1 and s2, no murmurs, gallops, or rubs   Lungs: CTAB, no crackles or wheezes, normal work of breathing  Abdomen: BS+, soft, non tender, non  distended  Extremities: warm and well perfused, no edema    DIAGNOSTIC TESTING: personally reviewed     Latest Reference Range & Units 02/24/25 14:01   Sodium 135 - 145 mmol/L 141   Potassium 3.4 - 5.3 mmol/L 4.4   Chloride 98 - 107 mmol/L 104   Carbon Dioxide (CO2) 22 - 29 mmol/L 27   Urea Nitrogen 6.0 - 20.0 mg/dL 9.8   Creatinine 0.51 - 0.95 mg/dL 0.65   GFR Estimate >60 mL/min/1.73m2 >90   Calcium 8.8 - 10.4 mg/dL 9.5   Anion Gap 7 - 15 mmol/L 10   Magnesium 1.7 - 2.3 mg/dL 2.6 (H)   Albumin 3.5 - 5.2 g/dL 4.6   Protein Total 6.4 - 8.3 g/dL 7.8   Alkaline Phosphatase 40 - 150 U/L 82   ALT 0 - 50 U/L 25   AST 0 - 45 U/L 49 (H)   Bilirubin Total <=1.2 mg/dL 0.6   CK Total 26 - 192 U/L 84   Glucose 70 - 99 mg/dL 79   N-Terminal Pro Bnp 0 - 450 pg/mL 1,097 (H)   Troponin T, High Sensitivity <=14 ng/L 35 (H)   WBC 4.0 - 11.0 10e3/uL 3.6 (L)   Hemoglobin 11.7 - 15.7 g/dL 14.9   Hematocrit 35.0 - 47.0 % 46.8   Platelet Count 150 - 450 10e3/uL 240   RBC Count 3.80 - 5.20 10e6/uL 5.48 (H)   MCV 78 - 100 fL 85   MCH 26.5 - 33.0 pg 27.2   MCHC 31.5 - 36.5 g/dL 31.8   RDW 10.0 - 15.0 % 15.5 (H)   (H): Data is abnormally high  (L): Data is abnormally low      ICD interrogation  Device: [employer] D533 RESONATE HF ICD  Normal device function.  Mode: DDD  bpm  AP: 77%  : 7%  Intrinsic rhythm: SR 62 bpm  Lead Trends Appear Stable.  Estimated battery longevity to RRT = 10.5 years.  Atrial Arrhythmia: 10 ATR episodes lasting 1-3 sec. Avg V rates 59-98 bpm.  AF [place]: <1%  Anticoagulant: None.  Ventricular Arrhythmia: None.      [facility] 7/2024:   BSA 1.87 m2  /74/89 mmHg  RA 21/25/20 mmHg  RV 51/20mmHg  PA 51/29/36 mmHg  PCWP 30/40/29 mmHg  TD CO/CI 2.9/1.55   Juan CO/CI 3.13/1.68   PVR 1.9   SVR 1762  PA sat 43.5%     Cardiopulmonary exercise stress test 7/24/23  Peak VO2 (ml/kg/min) VE/VCO2  [place] RER   18.70       32.13       1.09           Predicted VO2 (ml/kg/min) Predicted VO2 %   35.50       53            Resting Supine BP (mmHg) Resting Standing BP (mmHg) Final Stress BP (mmHg)   109/85       114/83       166/62         Resting Supine HR (bpm) Resting Standing HR (bpm)    59       59            Max HR (bpm) Max Predicted HR (bpm) Max Perdicted HR %   136       198       69           Exercise time (min) Exercise time (sec) Estimated workload (METS)   8       17       5.3             Echo [facility] 11/2022: Decreased left ventricular systolic performance-mild.   The estimated left ventricular ejection fraction is 40-45%.   Left ventricular diastolic pattern suggest elevated filling pressure.   Normal right ventricular size and function.   Right ventricular hypertrophy, probable.   TR jet is not adequate to assess PA pressure.   Based on IVC geometry, the RA pressure is probably normal.       I have personally reviewed all of the cardiovascular testing reports performed at [facility]    exercise ECG test 10/2021:  10.8 METs, exercise duration of 9 min and 16 seconds, had no exercised induced arrhythmias, she did have a delta wave and repolarization abnormalities.    Ziopatch monitor 10/2021: 11 VT episodes 68 SVT episodes    Echocardiogram 11/2021:  LVEF of 51% with asymettric LVH (septum 1.5cm).     CMR 11/2021:  LVEF of 43%, LVEDD 4.9cm, IV septum 1.7cm,  normal RV function but RVH noted, no LOU, harman LGE with subendocardial, mid myocardial, and subendocardial enhancement not associated with a vascular territory.     CMR 2024: LVEF 26%, asymmetrical septal hypertrophy, multiple regional WMA, LGE in the mid-apical anterior wall, mid-apical inferior an near transmural involvement in the apical anterior and apex.     ASSESSMENT AND PLAN: Ms. 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     She has had a signficant reduction in her LVEF to 26% on CMR with extensive LGE that is consistent with Dannon disease. 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 She has a normal CK but has not yet seen neuromuscular neurology regarding the skeletal myopathy. She had an ICD as secondary SCD prevention after syncope and SVT.     She has had an admission for ambulatory cardiogenic shock after her right heart catheterization showed high biventricular filling pressures and ambulatory cardiogenic shock. She is now tolerating metoprolol succinate, sacubitril-valsartan, SGLT2i, and spironolactone. She is euvolemic on exam and well compensated. Her end organ function is normal though her AST is very mildly elevated  Her troponin and NT pro BNP remain elevated which are signs of advanced heart failure. She has a caregiver and no substance use. She should see neuromuscular neurology and have neuromuscular PFTs as part of her candidacy evaluation. Repeat RHC and CPEX in 3-4 months to reasses.     She is having palpitations. Her ICD check does not show any arrhythmias. She is on metoprolol succinate and she takes metoprolol tartrate when she feels palpitations. She has EP follow up.     I would like her to see psychiatry for her anxiety. Continue to see health psych. Referral for PCP.     PLAN:   -referral for PCP   -psychiatry referral for anxiety   -referral to neuromuscular neurology   -neuromuscular PFTs  -follow up with me in 3-4 months with CPEX and RHC   -[facility] in 6 months      41 minutes on DOS for chart review, patient history and exam, counseling, review of diagnostic testing, coordination of care and documentation.     The longitudinal plan of care for the  diagnosis(es)/condition(s) as documented were addressed during this visit. Due to the added complexity in care, I will continue to support Mary in the subsequent management and with ongoing continuity of care.    Thank you for allowing me to participate in the care of your patient. Please do not hesitate to contact me if you have any questions.     Sincerely,   Hipolito Fuentes MD, PhD, [facility]  Advanced Heart Failure/Transplantation/MCS  [facility]      CC: Ameena Swift MD [facility]    Please do not hesitate to contact me if you have any questions/concerns.     Sincerely,     Hipolito Fuentes MD

## 2025-02-24 NOTE — PATIENT INSTRUCTIONS
"You were seen today in the Cardiovascular Clinic at the [facility].      Cardiology Providers you saw during your visit:  Dr. Hipolito Fuentes     Recommendations:   Referral for Primary Care - they will call you to schedule ([phone] if you want to call and schedule).  I will fill out your disability paperwork and send to your e-mail.  Scheduled with Jocelyn Pichardo in [facility] on 4/2. For future [facility], patient would prefer CORE visits in [place].  Follow up with Dr. Fuentes in 3-4 months with labs, right heart catheterization, and cardiopulmonary stress test prior. (Okay to use held spot on 6/23 at 4:00 PM).       Thank you for your visit today!   Please MyChart message or call if you have any questions or concerns.      During Business Hours:  [phone], option # 1 \"To leave a message for your care team\"     After hours, weekends or holidays:   [phone], Option #4  Ask to speak to the On-Call Cardiologist. Inform them you are a heart failure patient at the [place].      Martha Lawton RN BSN   Cardiology Nurse Coordinator - Heart Failure/C.O.R.E. [facility]  [phone] option 1 to schedule an appointment or leave a message for your care team    Pre-procedure instructions - Right Heart Cath and/or Biopsy and/or Pulmonary Angiogram  Patient Education    Your arrival time is __________.  Location is [address] Waiting Room  Please plan on being at the hospital all day. If you are on dialysis, DO NOT schedule on a dialysis day.  At any time, emergencies and/or urgent cases may come up which could delay the start of your procedure.    Pre-procedure instructions - Right heart catheterization  No solid food for 8 hours prior  Nothing to drink 2 hours prior to arrival time  You can take your morning medications (except diabetic and blood thinners) with sips of water  We " recommend you arrange for a ride to drop you off and pick you up, in the instance, you are unable to drive home, however you should be able to function as you normally would after the procedure     Diabetic Medication Instructions  Hold oral diabetic medication in morning of your procedure and for 48 hours after IV contrast is given  Typical instructions for insulin diabetic medication holding are below. However, please reach out to your Primary Care Provider or Endocrinologist for specific instructions  DO NOT take any oral diabetic medication, short-acting diabetes medications/insulin, humalog or regular insulin the morning of your test  Take   dose of long-acting insulin (Lantus, Levemir) the day of your test  Remember to bring your glucometer and insulin with you to take after your test if needed  GLP-1 Agonists Instructions  DO NOT take injectable GLP-1 agonists semaglutide (Ozempic, Wegovy), dulaglutide (Trulicity), exenatide ER (Bydureon), tirzepatide (Mounjaro), or oral semaglutide (Rybelsus) for 7 days prior your procedure  Hold once daily injectable GLP-1 agonists exenatide (Byetta), liraglutide (Saxenda, Victoza), lixisenatide (Soligua) the day before and day of your procedure                Anticoagulation Medication Instructions   NA  Nurse to write N/A if not currently taking    You will need to follow up with one of our cardiology APPs 1-2 weeks after your procedure. If you need help scheduling or rescheduling your appointment, please call [phone]    CardioPulmonary Stress Test (CPX)  CPX is a maximal (meaning you exercise to exhaustion, not to achieve a heart rate) exercise test where we measure how well your heart/body uses oxygen or energy. It is the gold standard for measuring functional capacity and helps us differentiate limitations due to lungs, heart, or fitness.   A CPX is NOT a typical stress test. You will NOT be asked to hold your Beta Blocker medication.     You will be scheduled for  a CardioPulmonary Stress Test at the [facility] ([address], [facility] [address], [phone]).       Follow these instructions:    1. Report to the GOLD waiting room in the [facility] hospital on: 6/23/25    2. Nothing to eat for 3 hours prior to your test. You may have clear liquids up to the time of your test    3. Please wear loose, two-piece clothing and comfortable, rubber soled shoes for walking     For Patients with Diabetes: Your RN Coordinator will give you instructions on adjusting your diabetic medications for the day of your test                           If you have questions please contact your diabetic care team                           Remember to  bring your glucometer & insulin with you to take after your test if needed

## 2025-02-24 NOTE — TELEPHONE ENCOUNTER
Last Written Prescription:  LORazepam (ATIVAN) 1 MG tablet 20 tablet 0 1/6/2025 -- No   Sig - Route: Take 1 tablet (1 mg) by mouth every 6 hours as needed for anxiety. - Oral   Class: Local Print     ----------------------  Last Visit Date: 1-6-25  Future Visit Date: 6-2-25  ----------------------    [x]  Refill decision: Medication unable to be refilled by RN due to: Controlled medication        Request from pharmacy:  Requested Prescriptions   Pending Prescriptions Disp Refills    LORazepam (ATIVAN) 1 MG tablet 20 tablet 0     Sig: Take 1 tablet (1 mg) by mouth every 6 hours as needed for anxiety.       There is no refill protocol information for this order

## 2025-02-24 NOTE — NURSING NOTE
Cardiac Testing: Patient given instructions regarding RHC, CPX. Discussed purpose, preparation, procedure and when to expect results reported back to the patient. Patient demonstrated understanding of this information and agreed to call with further questions or concerns.    Labs: Patient was given results of the laboratory testing obtained today. Patient demonstrated understanding of this information and agreed to call with further questions or concerns.     Med Reconcile: Reviewed and verified all current medications with the patient. The updated medication list was printed and given to the patient.    Return Appointment: Patient given instructions regarding scheduling next clinic visit. Patient demonstrated understanding of this information and agreed to call with further questions or concerns. CORE with Jocelyn on 4/2, Dr. Fuentes 6/23 with labs, device check, RHC, CPX prior.    Patient stated she understood all health information given and agreed to call with further questions or concerns.    Disability paperwork filled out and sent to patient. Medical Opinion filled out and sent to [facility].    Martha Lawton RN

## 2025-02-24 NOTE — PROGRESS NOTES
Neurocardiomyopathy Clinic Progress Note    Name: Mary Shaikh  : 2000  MRN: [id number]    2025    Dear Dr. Swift,    I had the pleasure of seeing Mary Shaikh, a 24 year old woman today in the [facility] Neuromuscular Cardiomyopathy Clinic for a virtual follow up evaluation of Danon disease. She is accompanied by her mother in person and her father via video.     As you know, Ms. 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 She was seen in the emergency department at [facility] and was found to have prexcitation on her ECG in , and this lead to an exercise ECG test which she achieved 10.8 METs, exercise duration of 9 min and 16 seconds, had no exercised induced arrhythmias, she did have a delta wave and repolarization abnormalities. Subsequently, she had an echocardiogram which showed an LVEF of 51% with asymetric LVH (septum 1.5cm). She has been following with Dr. Swift in the [facility] cardiology clinic and she obtained a CMR which showed and LVEF of 43%, LVEDD 4.9cm, IV septum 1.7cm,  normal RV function but RVH noted, no LOU, harman LGE with subendocardial, mid myocardial, and subendocardial enhancement not associated with a vascular territory.     I saw her in clinic  2024. She was admitted after her RHC from -. Her RHC showed an RA of 20, PA 51/23, 36, PCWP 29, Juan CO/CI 3.13/1.68, Thermo CO/CI 2.9/1.55 and a PVR of 1.9. She was vasodilated with nitroprusside and transitioned to entresto. Her weight on discharge was 157 lbs. The advanced heart failures therapies evaluation was initiated in the hospital and she has continued it as an outpatient.  She had an admission in 2024 for palpitations and her metoprolol was increased. Her RHC 2024 showed RA 8, PA 32/9, PCWP 10, Juan 4.5/2.5.   She has been seen in CORE clinic by Ms. Pichardo most recently on 2024.     Dyspnea when going up and down  the stairs  No chest pain   Fluttering around 2-3pm, and per week she is taking 2-3 extra doses of metoprolol  Ativan a few weeks ago   Anxiety is getting better  Seeing health psych  Weight is stable and has not needed any diuretics 140lbs  Her appetite is reduced   ER visit for abdominal pain   Dizzy and lightheaded when standing up fast  No ICD shocks no prescyope  Energy levels are fluctuating   QOL is good, but impacted by palpitations and fatigue       Overall, she is still having dyspnea after walking about 15 min which is worse with heat or walking faster. She has not had chest pain. She is not weighing herself daily. She denies edema, orthopnea, or PND. She is adherent to her medication regimen and diet. Has lightheadedness daily especially when standing up. She has palpitations several times per week which is unchanged. She had an ICD shock on 10/3 for VT with rate of 237 bpm. She also had 105 NSVT episodes. Her carvedilol was increased to 6.25mg BID. Her appetite is ok and her energy level is reduced. She has anxiety associated with her medical condition and she is adjusting to not being able to work. She is not seeing anyone regarding her anxiety.  Her mom and dad are working with her towards SSDI. She has back pain and she is seeing PT, chiropractor, and getting accupuncture. She specifically denies tobacco, alcohol, or illicit substance use.     REVIEW OF SYSTEMS: 10 point ROS neg other than the symptoms noted above in the HPI.    PAST MEDICAL HISTORY:   1. 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 NSVT s/p ICD 6/2024  3. WPW   4. CTI ablation 6/2024  5. Biliary dyskinesia s/p cholecystectomy 7/2024    ALLERGIES:  No Known Allergies    MEDICATIONS:   Current Outpatient Medications   Medication Sig Dispense Refill    acetaminophen (TYLENOL) 325 MG tablet Take 325-650 mg by mouth every 6 hours as needed for mild pain.      albuterol (PROAIR HFA/PROVENTIL HFA/VENTOLIN HFA) 108 (90 Base) MCG/ACT inhaler  Inhale 1-2 puffs into the lungs      digoxin (LANOXIN) 250 MCG tablet Take 1 tablet (250 mcg) by mouth daily. 90 tablet 3    empagliflozin (JARDIANCE) 25 MG TABS tablet Take 1 tablet (25 mg) by mouth daily. 90 tablet 1    furosemide (LASIX) 20 MG tablet Take 1 tablet (20 mg) by mouth daily as needed (Take daily if your weight increases by more than 3 lbs in one day or  5 lbs in three days.). 90 tablet 3    loperamide (IMODIUM) 2 MG capsule Take 4 mg by mouth as needed for diarrhea      LORazepam (ATIVAN) 1 MG tablet Take 1 tablet (1 mg) by mouth every 6 hours as needed for anxiety. 20 tablet 0    melatonin 3 MG tablet Take 3 mg by mouth nightly as needed      metoprolol succinate ER (TOPROL XL) 50 MG 24 hr tablet Take 1 tablet (50 mg) by mouth 2 times daily. 180 tablet 3    metoprolol tartrate (LOPRESSOR) 25 MG tablet Take 1 tablet (25 mg) by mouth daily as needed (take for increased fluttering/palpitations and for HR greater than 110 bpm). 90 tablet 1    nitroGLYcerin (NITROSTAT) 0.4 MG sublingual tablet For chest pain place 1 tablet under the tongue every 5 minutes for 3 doses. If symptoms persist 5 minutes after 1st dose call 911. 10 tablet 1    ondansetron (ZOFRAN ODT) 4 MG ODT tab Place 4 mg under the tongue as needed      sacubitril-valsartan (ENTRESTO)  MG per tablet Take 1 tablet by mouth 2 times daily. 180 tablet 3    spironolactone (ALDACTONE) 25 MG tablet Take 0.5 tablets (12.5 mg) by mouth daily. 90 tablet 3     SOCIAL HISTORY: She lives in [place], with her family   Tobacco: Never smoker  Alcohol: rare  Illicit: prior marijuana use     FAMILY HISTORY: . Her maternal grandfather had a stroke in 2017, but otherwise she has no significant family history of past cardiovascular history. There is no past family history of sudden cardiac death.    PHYSICAL EXAM:   There were no vitals taken for this visit.  General: comfortable, conversant, NAD  HEENT: normocephalic, atraumatic, anicteric  Neck:  Estimate JVP <8  CV: RRR, nl s1 and s2, no murmurs, gallops, or rubs   Lungs: CTAB, no crackles or wheezes, normal work of breathing  Abdomen: BS+, soft, non tender, non distended  Extremities: warm and well perfused, no edema    DIAGNOSTIC TESTING: personally reviewed            ICD check: Device: [employer] D533 RESONATE HF ICD  Normal device function  Mode: DDI 40 bpm  AP: <1%  : <1%  Intrinsic rhythm: NSR 62 bpm  Lead Trends Appear Stable: Yes  Estimated battery longevity to RRT = 13 years.   Atrial Arrhythmia: 0  Ventricular Arrhythmia: 2 episodes recorded as NSVT - 212-218 bpm, 15-30 sec. Stored EGMs reveal what appears to be atrial in origination and not true ventricular arrhythmias.   Setting Changes: None  Patient has an appointment to see Dr. Fuentes today.   Plan: Device follow-up every 3 months.      [facility] 7/2024:   BSA 1.87 m2  /74/89 mmHg  RA 21/25/20 mmHg  RV 51/20mmHg  PA 51/29/36 mmHg  PCWP 30/40/29 mmHg  TD CO/CI 2.9/1.55   Juan CO/CI 3.13/1.68   PVR 1.9   SVR 1762  PA sat 43.5%     Cardiopulmonary exercise stress test 7/24/23  Peak VO2 (ml/kg/min) VE/VCO2  [place] RER   18.70       32.13       1.09           Predicted VO2 (ml/kg/min) Predicted VO2 %   35.50       53           Resting Supine BP (mmHg) Resting Standing BP (mmHg) Final Stress BP (mmHg)   109/85       114/83       166/62         Resting Supine HR (bpm) Resting Standing HR (bpm)    59       59            Max HR (bpm) Max Predicted HR (bpm) Max Perdicted HR %   136       198       69           Exercise time (min) Exercise time (sec) Estimated workload (METS)   8       17       5.3             [facility] 11/2022: Decreased left ventricular systolic performance-mild.   The estimated left ventricular ejection fraction is 40-45%.   Left ventricular diastolic pattern suggest elevated filling pressure.   Normal right ventricular size and function.   Right ventricular hypertrophy, probable.   TR jet is not adequate to assess PA  pressure.   Based on IVC geometry, the RA pressure is probably normal.       I have personally reviewed all of the cardiovascular testing reports performed at [facility]    exercise ECG test 10/2021:  10.8 METs, exercise duration of 9 min and 16 seconds, had no exercised induced arrhythmias, she did have a delta wave and repolarization abnormalities.    Ziopatch monitor 10/2021: 11 VT episodes 68 SVT episodes    Echocardiogram 11/2021:  LVEF of 51% with asymettric LVH (septum 1.5cm).     CMR 11/2021:  LVEF of 43%, LVEDD 4.9cm, IV septum 1.7cm,  normal RV function but RVH noted, no LOU, harman LGE with subendocardial, mid myocardial, and subendocardial enhancement not associated with a vascular territory.     CMR 2024: LVEF 26%, asymmetrical septal hypertrophy, multiple regional WMA, LGE in the mid-apical anterior wall, mid-apical inferior an near transmural involvement in the apical anterior and apex.     ASSESSMENT AND PLAN: Ms. 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     She has had a signficant reduction in her LVEF to 26% on CMR with extensive LGE that is consistent with Dannon disease. 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 She has a normal CK but has not yet seen neuromuscular neurology regarding the skeletal myopathy. She had an ICD as secondary SCD prevention after syncope and SVT.     She was admitted after her right heart catheterization showed high biventricular filling pressures and ambulatory cardiogenic shock.  She is tolerating carvedilol, sacubitril-valsartan, SGLT2i, and spironolactone. While her blood pressure is lower, she has normal renal function and her AST has improved. She is euvolemic. She continues to be symptomatic and I would like to repeat her RHC to reassess her hemodynamics. We discussed optimizating her nutrition. She has a caregiver and no substance use. She should see neuromuscular neurology and have neuromuscular PFTs as part of her candidacy evaluation.     She had had a shock for VT. Her carvediolol was increased and she had 2 NSVTs on her ICD interrogation. She has a visit scheduled with Dr. Levi.     She also is adjusting to her functional limitations and diagnosis and has anxiety. She is open to seeing health psychology.     PLAN:   -referral for PCP   -psychiatry referral for anxiety   -referral to neuromuscular neurology   -neuromuscular PFTs  -follow up with me in 3-4 months with CPEX and RHC   CORE in 6 months      57 minutes on DOS for chart review, patient history and exam, counseling, review of diagnostic testing, coordination of care and documentation.     The longitudinal plan of care for the diagnosis(es)/condition(s) as documented were addressed during this visit. Due to the added complexity in care, I will continue to support Mary in the subsequent management and with ongoing continuity of care.    Thank you for allowing me to participate in the care of your patient. Please do not hesitate to contact me if you have any questions.     Sincerely,   Forum     Forum MD Gina, PhD, [facility]  Advanced Heart Failure/Transplantation/MCS  [facility]      CC: Ameena Swift MD [facility]     Forum     Forum MD Gina, PhD, FACC  Advanced Heart Failure/Transplantation/MCS  [facility]      CC: Ameena Swift MD [facility]

## 2025-02-25 ENCOUNTER — MYC REFILL (OUTPATIENT)
Dept: CARDIOLOGY | Facility: CLINIC | Age: 25
End: 2025-02-25
Payer: COMMERCIAL

## 2025-02-25 ENCOUNTER — VIRTUAL VISIT (OUTPATIENT)
Dept: PSYCHOLOGY | Facility: CLINIC | Age: 25
End: 2025-02-25
Payer: COMMERCIAL

## 2025-02-25 DIAGNOSIS — F43.23 ADJUSTMENT DISORDER WITH MIXED ANXIETY AND DEPRESSED MOOD: Primary | ICD-10-CM

## 2025-02-25 DIAGNOSIS — F43.23 ADJUSTMENT DISORDER WITH MIXED ANXIETY AND DEPRESSED MOOD: ICD-10-CM

## 2025-02-25 LAB
MDC_IDC_LEAD_CONNECTION_STATUS: NORMAL
MDC_IDC_LEAD_CONNECTION_STATUS: NORMAL
MDC_IDC_LEAD_IMPLANT_DT: NORMAL
MDC_IDC_LEAD_IMPLANT_DT: NORMAL
MDC_IDC_LEAD_LOCATION: NORMAL
MDC_IDC_LEAD_LOCATION: NORMAL
MDC_IDC_LEAD_LOCATION_DETAIL_1: NORMAL
MDC_IDC_LEAD_LOCATION_DETAIL_1: NORMAL
MDC_IDC_LEAD_MFG: NORMAL
MDC_IDC_LEAD_MFG: NORMAL
MDC_IDC_LEAD_MODEL: NORMAL
MDC_IDC_LEAD_MODEL: NORMAL
MDC_IDC_LEAD_POLARITY_TYPE: NORMAL
MDC_IDC_LEAD_POLARITY_TYPE: NORMAL
MDC_IDC_LEAD_SERIAL: NORMAL
MDC_IDC_LEAD_SERIAL: NORMAL
MDC_IDC_MSMT_BATTERY_DTM: NORMAL
MDC_IDC_MSMT_BATTERY_REMAINING_LONGEVITY: 126 MO
MDC_IDC_MSMT_BATTERY_REMAINING_PERCENTAGE: 100 %
MDC_IDC_MSMT_BATTERY_STATUS: NORMAL
MDC_IDC_MSMT_CAP_CHARGE_DTM: NORMAL
MDC_IDC_MSMT_CAP_CHARGE_DTM: NORMAL
MDC_IDC_MSMT_CAP_CHARGE_ENERGY: 41 J
MDC_IDC_MSMT_CAP_CHARGE_TIME: 10.3 S
MDC_IDC_MSMT_CAP_CHARGE_TIME: 7.6 S
MDC_IDC_MSMT_CAP_CHARGE_TYPE: NORMAL
MDC_IDC_MSMT_CAP_CHARGE_TYPE: NORMAL
MDC_IDC_MSMT_LEADCHNL_RA_IMPEDANCE_VALUE: 758 OHM
MDC_IDC_MSMT_LEADCHNL_RA_PACING_THRESHOLD_AMPLITUDE: 0.8 V
MDC_IDC_MSMT_LEADCHNL_RA_PACING_THRESHOLD_PULSEWIDTH: 0.4 MS
MDC_IDC_MSMT_LEADCHNL_RV_IMPEDANCE_VALUE: 377 OHM
MDC_IDC_MSMT_LEADCHNL_RV_PACING_THRESHOLD_AMPLITUDE: 0.8 V
MDC_IDC_MSMT_LEADCHNL_RV_PACING_THRESHOLD_PULSEWIDTH: 0.4 MS
MDC_IDC_PG_IMPLANT_DTM: NORMAL
MDC_IDC_PG_MFG: NORMAL
MDC_IDC_PG_MODEL: NORMAL
MDC_IDC_PG_SERIAL: NORMAL
MDC_IDC_PG_TYPE: NORMAL
MDC_IDC_SESS_CLINIC_NAME: NORMAL
MDC_IDC_SESS_DTM: NORMAL
MDC_IDC_SESS_TYPE: NORMAL
MDC_IDC_SET_BRADY_AT_MODE_SWITCH_MODE: NORMAL
MDC_IDC_SET_BRADY_AT_MODE_SWITCH_RATE: 140 {BEATS}/MIN
MDC_IDC_SET_BRADY_LOWRATE: 60 {BEATS}/MIN
MDC_IDC_SET_BRADY_MAX_TRACKING_RATE: 130 {BEATS}/MIN
MDC_IDC_SET_BRADY_MODE: NORMAL
MDC_IDC_SET_BRADY_PAV_DELAY_HIGH: 180 MS
MDC_IDC_SET_BRADY_PAV_DELAY_LOW: 240 MS
MDC_IDC_SET_BRADY_SAV_DELAY_HIGH: 135 MS
MDC_IDC_SET_BRADY_SAV_DELAY_LOW: 180 MS
MDC_IDC_SET_LEADCHNL_RA_PACING_AMPLITUDE: 3.5 V
MDC_IDC_SET_LEADCHNL_RA_PACING_CAPTURE_MODE: NORMAL
MDC_IDC_SET_LEADCHNL_RA_PACING_POLARITY: NORMAL
MDC_IDC_SET_LEADCHNL_RA_PACING_PULSEWIDTH: 0.4 MS
MDC_IDC_SET_LEADCHNL_RA_SENSING_ADAPTATION_MODE: NORMAL
MDC_IDC_SET_LEADCHNL_RA_SENSING_POLARITY: NORMAL
MDC_IDC_SET_LEADCHNL_RA_SENSING_SENSITIVITY: 0.25 MV
MDC_IDC_SET_LEADCHNL_RV_PACING_AMPLITUDE: 3.5 V
MDC_IDC_SET_LEADCHNL_RV_PACING_CAPTURE_MODE: NORMAL
MDC_IDC_SET_LEADCHNL_RV_PACING_POLARITY: NORMAL
MDC_IDC_SET_LEADCHNL_RV_PACING_PULSEWIDTH: 0.4 MS
MDC_IDC_SET_LEADCHNL_RV_SENSING_ADAPTATION_MODE: NORMAL
MDC_IDC_SET_LEADCHNL_RV_SENSING_POLARITY: NORMAL
MDC_IDC_SET_LEADCHNL_RV_SENSING_SENSITIVITY: 0.6 MV
MDC_IDC_SET_ZONE_DETECTION_INTERVAL: 240 MS
MDC_IDC_SET_ZONE_DETECTION_INTERVAL: 300 MS
MDC_IDC_SET_ZONE_DETECTION_INTERVAL: 333 MS
MDC_IDC_SET_ZONE_STATUS: NORMAL
MDC_IDC_SET_ZONE_TYPE: NORMAL
MDC_IDC_SET_ZONE_VENDOR_TYPE: NORMAL
MDC_IDC_STAT_AT_BURDEN_PERCENT: 1 %
MDC_IDC_STAT_AT_DTM_END: NORMAL
MDC_IDC_STAT_AT_DTM_START: NORMAL
MDC_IDC_STAT_BRADY_DTM_END: NORMAL
MDC_IDC_STAT_BRADY_DTM_START: NORMAL
MDC_IDC_STAT_BRADY_RA_PERCENT_PACED: 77 %
MDC_IDC_STAT_BRADY_RV_PERCENT_PACED: 7 %
MDC_IDC_STAT_EPISODE_RECENT_COUNT: 0
MDC_IDC_STAT_EPISODE_RECENT_COUNT: 1
MDC_IDC_STAT_EPISODE_RECENT_COUNT_DTM_END: NORMAL
MDC_IDC_STAT_EPISODE_RECENT_COUNT_DTM_START: NORMAL
MDC_IDC_STAT_EPISODE_TYPE: NORMAL
MDC_IDC_STAT_EPISODE_VENDOR_TYPE: NORMAL
MDC_IDC_STAT_TACHYTHERAPY_ATP_DELIVERED_RECENT: 0
MDC_IDC_STAT_TACHYTHERAPY_ATP_DELIVERED_TOTAL: 4
MDC_IDC_STAT_TACHYTHERAPY_RECENT_DTM_END: NORMAL
MDC_IDC_STAT_TACHYTHERAPY_RECENT_DTM_START: NORMAL
MDC_IDC_STAT_TACHYTHERAPY_SHOCKS_ABORTED_RECENT: 0
MDC_IDC_STAT_TACHYTHERAPY_SHOCKS_ABORTED_TOTAL: 3
MDC_IDC_STAT_TACHYTHERAPY_SHOCKS_DELIVERED_RECENT: 0
MDC_IDC_STAT_TACHYTHERAPY_SHOCKS_DELIVERED_TOTAL: 4
MDC_IDC_STAT_TACHYTHERAPY_TOTAL_DTM_END: NORMAL
MDC_IDC_STAT_TACHYTHERAPY_TOTAL_DTM_START: NORMAL

## 2025-02-25 ASSESSMENT — ANXIETY QUESTIONNAIRES
5. BEING SO RESTLESS THAT IT IS HARD TO SIT STILL: MORE THAN HALF THE DAYS
3. WORRYING TOO MUCH ABOUT DIFFERENT THINGS: MORE THAN HALF THE DAYS
GAD7 TOTAL SCORE: 14
GAD7 TOTAL SCORE: 14
1. FEELING NERVOUS, ANXIOUS, OR ON EDGE: MORE THAN HALF THE DAYS
GAD7 TOTAL SCORE: 14
7. FEELING AFRAID AS IF SOMETHING AWFUL MIGHT HAPPEN: MORE THAN HALF THE DAYS
6. BECOMING EASILY ANNOYED OR IRRITABLE: MORE THAN HALF THE DAYS
IF YOU CHECKED OFF ANY PROBLEMS ON THIS QUESTIONNAIRE, HOW DIFFICULT HAVE THESE PROBLEMS MADE IT FOR YOU TO DO YOUR WORK, TAKE CARE OF THINGS AT HOME, OR GET ALONG WITH OTHER PEOPLE: VERY DIFFICULT
4. TROUBLE RELAXING: MORE THAN HALF THE DAYS
7. FEELING AFRAID AS IF SOMETHING AWFUL MIGHT HAPPEN: MORE THAN HALF THE DAYS
2. NOT BEING ABLE TO STOP OR CONTROL WORRYING: MORE THAN HALF THE DAYS
8. IF YOU CHECKED OFF ANY PROBLEMS, HOW DIFFICULT HAVE THESE MADE IT FOR YOU TO DO YOUR WORK, TAKE CARE OF THINGS AT HOME, OR GET ALONG WITH OTHER PEOPLE?: VERY DIFFICULT

## 2025-02-26 RX ORDER — LORAZEPAM 1 MG/1
1 TABLET ORAL EVERY 6 HOURS PRN
Qty: 20 TABLET | Refills: 0 | OUTPATIENT
Start: 2025-02-26

## 2025-02-26 NOTE — TELEPHONE ENCOUNTER
Reviewed refill request with Jocelyn Pichardo. Was a one time fill in January, should request future refills from either PCP or mental health. Message sent to patient with this information.

## 2025-02-26 NOTE — PROGRESS NOTES
"Health Psychology Outpatient Follow Up  [facility]     Patient: Mary Shaikh   Date of Service: 2/25/25  Treatment Plan Due: 4/14/25    Diagnosis:  Adjustment disorder with mixed anxiety and depressed mood (ICD-10: F43.20)  Specific learning disability     Patient Demographics (per chart):   Age: 24 year old  Biological Sex: female  Race: Black or   Ethnicity: Choose not to answer    Subjective:  Engaged pt in psychotherapy (CBT) for coping with chronic medical illness. Reviewed updates to physical and emotional health since previous session and progress with treatment plan goals. Patient reported that her mood has been \"pretty good\" since last appointment citing an increase in engagement in self care activities including utilizing affirmations and journaling. Engaged pt in supportive listening and cognitive processing of ongoing sxs and stressors. Pt reported feeling \"good\" about her general progress in coping with mood sxs. Additionally engaged pt in psychoeducation and skills training in value identification and basics of behavioral activation. Pt identified self care, family, and utilizing her imagination as important values. Pt agreed to engage in value exploration and pleasurable activity list making as homework for next appointment.     Objective   Appearance:   Appropriate   Eye Contact:   Good   Psychomotor Behavior:  Normal   Attitude:   Cooperative   Orientation:   All  Speech   Rate / Production: Normal    Volume:  Normal   Mood:    Euthymic  Affect:    Appropriate   Thought Content:   Clear  Thought Form:   Coherent  Logical     Insight:    Did not formally assess at this time.   Safety:    No safety concerns at this time.     Weight (per chart):  Wt Readings from Last 4 Encounters:   02/24/25 67 kg (147 lb 11.2 oz)   01/13/25 65.8 kg (145 lb)   01/06/25 69.4 kg (153 lb)   12/18/24 70.6 kg (155 lb 10.3 oz)        BMI (per " "chart):  Estimated body mass index is 25.35 kg/m  as calculated from the following:    Height as of 1/13/25: 1.626 m (5' 4\").    Weight as of 2/24/25: 67 kg (147 lb 11.2 oz).     Depression Symptoms (Patient Health Questionnaire 9; PHQ-9)  The PHQ-9 is a measure of depressive symptoms.  Scores on this measure range from 0 to 27 with higher scores reflecting greater levels and frequency of depressive symptoms. Typical symptom ranges include: 0-4 minimal, 5-9 mild, 10-14 moderate, 15-19 moderately severe, 20-27 severe.       11/20/2024     1:11 PM 12/25/2024     3:23 PM 1/3/2025     9:12 AM   PHQ-9 SCORE   PHQ-9 Total Score MyChart 17 (Moderately severe depression) 18 (Moderately severe depression) 9 (Mild depression)   PHQ-9 Total Score 17  18  9        Patient-reported     Anxiety Symptoms (Generalized Anxiety Disorder 7; JOSESITO-7)  The JOSESITO-7 is a measure of anxiety and panic symptoms.  Scores on this measure range from 0 to 21 with higher scores reflecting greater levels and frequency of anxiety symptoms. Typical symptom ranges include: 0-4 minimal, 5-9 mild, 10-14 moderate, 15-21 severe.       1/9/2025     9:12 AM 1/28/2025     9:46 AM 2/25/2025     9:42 AM   JOSESITO-7 SCORE   Total Score 15 (severe anxiety) 7 (mild anxiety) 14 (moderate anxiety)   Total Score 15  7  14        Patient-reported     Alcohol and Drug Abuse (CAGE - Adapted to Include Drugs; CAGE-AID)  The CAGE-AID is a screening tool to assess for symptoms of alcohol or drug abuse or dependence. Scores on this measure range from 0 to 4, with higher scores reflecting experiences consistent with problem use.  CAGE-AID score  > 1 is a positive screen, suggesting further discussion is needed to determine if evaluation for alcohol or substance abuse is appropriate.  A score > 2 is considered clinically significant, suggesting further evaluation of alcohol or substance-related problems is indicated.      11/4/2024     7:18 PM   CAGE-AID Total Score   Total Score 0 "    Total Score MyChart 0 (A total score of 2 or greater is considered clinically significant)       Patient-reported     Global Health (Patient-Reported Outcomes Measurement Information System; PROMIS-10)  The PROMIS-10 is a measure of global physical and mental health. Total scores on this measure range from 8 to 40, with higher scores reflecting greater levels of perceived health.       11/4/2024     7:17 PM 11/20/2024     1:11 PM 2/25/2025     9:43 AM   PROMIS-10 Scores Only   Global Mental Health Score 4  9  8    Global Physical Health Score 7  11  9    PROMIS TOTAL - SUBSCORES 11  20  17        Patient-reported     Suicidality ([place] Suicide Severity Rating Scale Screener Past Month)      1/13/2025    10:18 PM   [place] Suicide Severity Rating (Short Version)   Q1 Wished to be Dead (Past Month) 0-->no   Q2 Suicidal Thoughts (Past Month) 0-->no   Q6 Suicide Behavior (Lifetime) 0-->no   Level of Risk per Screen no risks indicated       Assessment:  Patient was engaged in treatment. Patient appears to be experiencing depression symptoms in the mild range and anxiety symptoms in the mild range. Currently, the patient appears to be coping with some success. Progress towards treatment goals: ongoing.     Plan:  Return for therapy sessions.     Session Length:  Start Time: 10:00am  End Time: 10:38am    Modality: Telemedicine Information:  Type of service:   Telemedicine psychotherapy  Patient location:   Patient home in [place]    Patient telephone number: [phone]  Provider location:  At home office in [place]  Mode of Communication:   Video Conference via Rosalind   Patient consent to telemedicine services? Yes  It has been determined that telemedicine service is appropriate and effective for this patient.   The patient has been notified that: Video visits will be conducted via a call with their psychologist to provide the care they need with a video conversation. Video visits may be billed at  different rates depending on insurance coverage.  Patients are advised to contact their insurance provider with any questions about their health insurance coverage. If during the course of a call the psychologist feels a video visit is not appropriate, patients will not be charged for this service.    Baldomero Torres, PhD,   Clinical Health Psychologist  Phone: [phone]    *This note was completed using Dragon voice recognition software. Although reviewed after completion, some word and grammatical errors may occur.

## 2025-02-28 RX ORDER — LORAZEPAM 1 MG/1
1 TABLET ORAL EVERY 6 HOURS PRN
Qty: 20 TABLET | Refills: 0 | OUTPATIENT
Start: 2025-02-28

## 2025-02-28 NOTE — TELEPHONE ENCOUNTER
Lorazepam was filled one time by Cardiology, all future refills need to be by PCP or mental health team.

## 2025-03-06 ASSESSMENT — ASTHMA QUESTIONNAIRES
QUESTION_3 LAST FOUR WEEKS HOW OFTEN DID YOUR ASTHMA SYMPTOMS (WHEEZING, COUGHING, SHORTNESS OF BREATH, CHEST TIGHTNESS OR PAIN) WAKE YOU UP AT NIGHT OR EARLIER THAN USUAL IN THE MORNING: ONCE OR TWICE
ACT_TOTALSCORE: 12
QUESTION_1 LAST FOUR WEEKS HOW MUCH OF THE TIME DID YOUR ASTHMA KEEP YOU FROM GETTING AS MUCH DONE AT WORK, SCHOOL OR AT HOME: A LITTLE OF THE TIME
QUESTION_2 LAST FOUR WEEKS HOW OFTEN HAVE YOU HAD SHORTNESS OF BREATH: MORE THAN ONCE A DAY
QUESTION_5 LAST FOUR WEEKS HOW WOULD YOU RATE YOUR ASTHMA CONTROL: POORLY CONTROLLED
QUESTION_4 LAST FOUR WEEKS HOW OFTEN HAVE YOU USED YOUR RESCUE INHALER OR NEBULIZER MEDICATION (SUCH AS ALBUTEROL): THREE OR MORE TIMES PER DAY
ACT_TOTALSCORE: 12

## 2025-03-09 ENCOUNTER — HEALTH MAINTENANCE LETTER (OUTPATIENT)
Age: 25
End: 2025-03-09

## 2025-03-10 ASSESSMENT — PATIENT HEALTH QUESTIONNAIRE - PHQ9
SUM OF ALL RESPONSES TO PHQ QUESTIONS 1-9: 19
SUM OF ALL RESPONSES TO PHQ QUESTIONS 1-9: 19
10. IF YOU CHECKED OFF ANY PROBLEMS, HOW DIFFICULT HAVE THESE PROBLEMS MADE IT FOR YOU TO DO YOUR WORK, TAKE CARE OF THINGS AT HOME, OR GET ALONG WITH OTHER PEOPLE: NOT DIFFICULT AT ALL

## 2025-03-11 ENCOUNTER — MYC MEDICAL ADVICE (OUTPATIENT)
Dept: CARDIOLOGY | Facility: CLINIC | Age: 25
End: 2025-03-11

## 2025-03-11 ENCOUNTER — OFFICE VISIT (OUTPATIENT)
Dept: FAMILY MEDICINE | Facility: CLINIC | Age: 25
End: 2025-03-11
Attending: INTERNAL MEDICINE
Payer: COMMERCIAL

## 2025-03-11 VITALS
WEIGHT: 146 LBS | OXYGEN SATURATION: 100 % | DIASTOLIC BLOOD PRESSURE: 70 MMHG | SYSTOLIC BLOOD PRESSURE: 104 MMHG | HEIGHT: 64 IN | HEART RATE: 54 BPM | TEMPERATURE: 97.2 F | BODY MASS INDEX: 24.92 KG/M2 | RESPIRATION RATE: 16 BRPM

## 2025-03-11 DIAGNOSIS — Z01.00 EYE EXAM, ROUTINE: ICD-10-CM

## 2025-03-11 DIAGNOSIS — Z23 NEED FOR PNEUMOCOCCAL VACCINATION: ICD-10-CM

## 2025-03-11 DIAGNOSIS — E74.05 DANON DISEASE IN HETEROZYGOUS FEMALE (H): ICD-10-CM

## 2025-03-11 DIAGNOSIS — R11.0 NAUSEA: ICD-10-CM

## 2025-03-11 DIAGNOSIS — R19.7 DIARRHEA, UNSPECIFIED TYPE: ICD-10-CM

## 2025-03-11 DIAGNOSIS — F41.9 ANXIETY: ICD-10-CM

## 2025-03-11 DIAGNOSIS — I50.22 CHRONIC SYSTOLIC HEART FAILURE (H): ICD-10-CM

## 2025-03-11 DIAGNOSIS — I47.29 NSVT (NONSUSTAINED VENTRICULAR TACHYCARDIA) (H): ICD-10-CM

## 2025-03-11 DIAGNOSIS — E11.9 TYPE 2 DIABETES MELLITUS WITHOUT COMPLICATION, WITHOUT LONG-TERM CURRENT USE OF INSULIN (H): ICD-10-CM

## 2025-03-11 DIAGNOSIS — G89.29 CHRONIC LEFT-SIDED THORACIC BACK PAIN: ICD-10-CM

## 2025-03-11 DIAGNOSIS — N91.2 AMENORRHEA: ICD-10-CM

## 2025-03-11 DIAGNOSIS — Z11.3 SCREENING FOR STDS (SEXUALLY TRANSMITTED DISEASES): ICD-10-CM

## 2025-03-11 DIAGNOSIS — F33.1 MODERATE EPISODE OF RECURRENT MAJOR DEPRESSIVE DISORDER (H): ICD-10-CM

## 2025-03-11 DIAGNOSIS — M54.6 CHRONIC LEFT-SIDED THORACIC BACK PAIN: ICD-10-CM

## 2025-03-11 DIAGNOSIS — Z76.89 ENCOUNTER TO ESTABLISH CARE: Primary | ICD-10-CM

## 2025-03-11 DIAGNOSIS — Z23 NEED FOR TETANUS BOOSTER: ICD-10-CM

## 2025-03-11 DIAGNOSIS — Z23 NEED FOR MENINGOCOCCAL VACCINATION: ICD-10-CM

## 2025-03-11 DIAGNOSIS — J45.40 MODERATE PERSISTENT ASTHMA WITHOUT COMPLICATION: ICD-10-CM

## 2025-03-11 PROBLEM — Z95.810 ICD (IMPLANTABLE CARDIOVERTER-DEFIBRILLATOR) IN PLACE: Status: ACTIVE | Noted: 2024-07-12

## 2025-03-11 LAB
C TRACH DNA SPEC QL PROBE+SIG AMP: NEGATIVE
CREAT UR-MCNC: 76.9 MG/DL
EST. AVERAGE GLUCOSE BLD GHB EST-MCNC: 114 MG/DL
FSH SERPL IRP2-ACNC: 11.9 MIU/ML
HBA1C MFR BLD: 5.6 % (ref 0–5.6)
MICROALBUMIN UR-MCNC: <12 MG/L
MICROALBUMIN/CREAT UR: NORMAL MG/G{CREAT}
N GONORRHOEA DNA SPEC QL NAA+PROBE: NEGATIVE
PROLACTIN SERPL 3RD IS-MCNC: 16 NG/ML (ref 5–23)
SPECIMEN TYPE: NORMAL

## 2025-03-11 PROCEDURE — 90472 IMMUNIZATION ADMIN EACH ADD: CPT | Performed by: NURSE PRACTITIONER

## 2025-03-11 PROCEDURE — 82043 UR ALBUMIN QUANTITATIVE: CPT | Performed by: NURSE PRACTITIONER

## 2025-03-11 PROCEDURE — 1125F AMNT PAIN NOTED PAIN PRSNT: CPT | Performed by: NURSE PRACTITIONER

## 2025-03-11 PROCEDURE — 96127 BRIEF EMOTIONAL/BEHAV ASSMT: CPT | Performed by: NURSE PRACTITIONER

## 2025-03-11 PROCEDURE — 82570 ASSAY OF URINE CREATININE: CPT | Performed by: NURSE PRACTITIONER

## 2025-03-11 PROCEDURE — 3074F SYST BP LT 130 MM HG: CPT | Performed by: NURSE PRACTITIONER

## 2025-03-11 PROCEDURE — 87491 CHLMYD TRACH DNA AMP PROBE: CPT | Performed by: NURSE PRACTITIONER

## 2025-03-11 PROCEDURE — 3078F DIAST BP <80 MM HG: CPT | Performed by: NURSE PRACTITIONER

## 2025-03-11 PROCEDURE — 90620 MENB-4C VACCINE IM: CPT | Performed by: NURSE PRACTITIONER

## 2025-03-11 PROCEDURE — 99204 OFFICE O/P NEW MOD 45 MIN: CPT | Mod: 25 | Performed by: NURSE PRACTITIONER

## 2025-03-11 PROCEDURE — 90471 IMMUNIZATION ADMIN: CPT | Performed by: NURSE PRACTITIONER

## 2025-03-11 PROCEDURE — 36415 COLL VENOUS BLD VENIPUNCTURE: CPT | Performed by: NURSE PRACTITIONER

## 2025-03-11 PROCEDURE — 90677 PCV20 VACCINE IM: CPT | Performed by: NURSE PRACTITIONER

## 2025-03-11 PROCEDURE — 87591 N.GONORRHOEAE DNA AMP PROB: CPT | Performed by: NURSE PRACTITIONER

## 2025-03-11 PROCEDURE — 90715 TDAP VACCINE 7 YRS/> IM: CPT | Performed by: NURSE PRACTITIONER

## 2025-03-11 PROCEDURE — 84146 ASSAY OF PROLACTIN: CPT | Performed by: NURSE PRACTITIONER

## 2025-03-11 PROCEDURE — 83036 HEMOGLOBIN GLYCOSYLATED A1C: CPT | Performed by: NURSE PRACTITIONER

## 2025-03-11 PROCEDURE — 83001 ASSAY OF GONADOTROPIN (FSH): CPT | Performed by: NURSE PRACTITIONER

## 2025-03-11 PROCEDURE — G2211 COMPLEX E/M VISIT ADD ON: HCPCS | Performed by: NURSE PRACTITIONER

## 2025-03-11 RX ORDER — OMEPRAZOLE 20 MG/1
20 CAPSULE, DELAYED RELEASE ORAL 2 TIMES DAILY
Qty: 180 CAPSULE | Refills: 2 | COMMUNITY
Start: 2025-03-11

## 2025-03-11 RX ORDER — ALBUTEROL SULFATE 90 UG/1
1-2 INHALANT RESPIRATORY (INHALATION) EVERY 6 HOURS PRN
Qty: 18 G | Refills: 1 | Status: SHIPPED | OUTPATIENT
Start: 2025-03-11

## 2025-03-11 RX ORDER — ONDANSETRON 4 MG/1
4 TABLET, ORALLY DISINTEGRATING ORAL PRN
Qty: 30 TABLET | Refills: 0 | Status: SHIPPED | OUTPATIENT
Start: 2025-03-11

## 2025-03-11 RX ORDER — OMEPRAZOLE 20 MG/1
20 CAPSULE, DELAYED RELEASE ORAL 2 TIMES DAILY
COMMUNITY
Start: 2025-01-31 | End: 2025-03-11

## 2025-03-11 RX ORDER — TIZANIDINE 2 MG/1
2 TABLET ORAL 3 TIMES DAILY PRN
Qty: 90 TABLET | Refills: 2 | Status: SHIPPED | OUTPATIENT
Start: 2025-03-11

## 2025-03-11 RX ORDER — LOPERAMIDE HYDROCHLORIDE 2 MG/1
4 CAPSULE ORAL PRN
Qty: 90 CAPSULE | Refills: 3 | Status: SHIPPED | OUTPATIENT
Start: 2025-03-11

## 2025-03-11 ASSESSMENT — PAIN SCALES - GENERAL: PAINLEVEL_OUTOF10: SEVERE PAIN (10)

## 2025-03-11 NOTE — PROGRESS NOTES
Assessment & Plan     Encounter to establish care  Has been getting care through [facility]. She is on the transplant list for for heart transplant. Hx of Danon disease, non ischemic cardiomyopathy  - REVIEW OF HEALTH MAINTENANCE PROTOCOL ORDERS    Type 2 diabetes mellitus without complication, without long-term current use of insulin (H)  Diet controlled at this time. Activity as tolerated.  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  She is following with Cardiology and transplant as above.  - Primary Care Referral    Moderate episode of recurrent major depressive disorder (H)  Anxiety  She currently follows with a therapist and psychiatry, has Ativan PRN.   She reports a lot of stress related to medical issues but reports she has a lot of support.   - Primary Care Referral    Chronic left-sided thoracic back pain  She has followed with PT, chiropractor and has had acupuncture. She takes tylenol as needed.  - tiZANidine (ZANAFLEX) 2 MG tablet; Take 1 tablet (2 mg) by mouth 3 times daily as needed for muscle spasms.    Moderate persistent asthma without complication  She reports her symptoms are well controlled  - albuterol (PROAIR HFA/PROVENTIL HFA/VENTOLIN HFA) 108 (90 Base) MCG/ACT inhaler; Inhale 1-2 puffs into the lungs every 6 hours as needed for shortness of breath, wheezing or cough.    NSVT (nonsustained ventricular tachycardia) (H)  Noted, rate controlled. She follows with cardiology    Diarrhea, unspecified type  - loperamide (IMODIUM) 2 MG capsule; Take 2 capsules (4 mg) by mouth as needed for diarrhea.    Nausea  - ondansetron (ZOFRAN  "ODT) 4 MG ODT tab; Place 1 tablet (4 mg) under the tongue as needed for nausea.    Amenorrhea  She reports she stopped her birth control almost a year ago, has not gotten her period.  - Follicle stimulating hormone; Future  - Prolactin; Future    Screening for STDs (sexually transmitted diseases)  - Chlamydia trachomatis/Neisseria gonorrhoeae by PCR- VAGINAL SELF-SWAB    Need for tetanus booster  - TDAP 10-64Y (ADACEL,BOOSTRIX)    Need for pneumococcal vaccination  - Pneumococcal 20 Valent Conjugate (Prevnar 20)    Need for meningococcal vaccination  - MENINGOCOCCAL B 10-25Y (BEXSERO )    Eye exam, routine  - Adult Eye  Referral; Future    BMI  Estimated body mass index is 25.06 kg/m  as calculated from the following:    Height as of this encounter: 1.626 m (5' 4\").    Weight as of this encounter: 66.2 kg (146 lb).       Depression Screening Follow Up        3/10/2025     7:35 AM   PHQ   PHQ-9 Total Score 19    Q9: Thoughts of better off dead/self-harm past 2 weeks More than half the days   F/U: Thoughts of suicide or self-harm No   F/U: Safety concerns No       Patient-reported                     Follow Up Actions Taken  Crisis resource information provided in the After Visit Summary  Referred patient back to mental health provider    Discussed the following ways the patient can remain in a safe environment:  be around others        Cristy Hernandez is a 24 year old, presenting for the following health issues:  Establish Care      3/11/2025     8:27 AM   Additional Questions   Roomed by cordelia     History of Present Illness       Reason for visit:  NA She is missing 7 dose(s) of medications per week.  She is not taking prescribed medications regularly due to other.                  Review of Systems  Constitutional, HEENT, cardiovascular, pulmonary, gi and gu systems are negative, except as otherwise noted.      Objective    There were no vitals taken for this visit.  There is no height or weight on file " to calculate BMI.  Physical Exam   GENERAL: alert and no distress  RESP: lungs clear to auscultation - no rales, rhonchi or wheezes  CV: regular rate and rhythm, normal S1 S2, no S3 or S4, no murmur, click or rub, no peripheral edema  ABDOMEN: soft, nontender, no hepatosplenomegaly, no masses and bowel sounds normal  MS: no gross musculoskeletal defects noted, no edema  NEURO: Normal strength and tone, mentation intact and speech normal            Signed Electronically by: ZITA Montenegro CNP

## 2025-03-12 ENCOUNTER — ANCILLARY PROCEDURE (OUTPATIENT)
Dept: CARDIOLOGY | Facility: CLINIC | Age: 25
End: 2025-03-12
Attending: INTERNAL MEDICINE
Payer: COMMERCIAL

## 2025-03-12 DIAGNOSIS — I42.8 NONISCHEMIC CARDIOMYOPATHY (H): ICD-10-CM

## 2025-03-13 ENCOUNTER — APPOINTMENT (OUTPATIENT)
Dept: GENERAL RADIOLOGY | Facility: CLINIC | Age: 25
End: 2025-03-13
Attending: EMERGENCY MEDICINE
Payer: COMMERCIAL

## 2025-03-13 ENCOUNTER — TELEPHONE (OUTPATIENT)
Dept: CARDIOLOGY | Facility: CLINIC | Age: 25
End: 2025-03-13
Payer: COMMERCIAL

## 2025-03-13 ENCOUNTER — ANCILLARY PROCEDURE (OUTPATIENT)
Dept: CARDIOLOGY | Facility: CLINIC | Age: 25
End: 2025-03-13
Attending: EMERGENCY MEDICINE
Payer: COMMERCIAL

## 2025-03-13 ENCOUNTER — HOSPITAL ENCOUNTER (EMERGENCY)
Facility: CLINIC | Age: 25
Discharge: HOME | End: 2025-03-13
Attending: EMERGENCY MEDICINE
Payer: COMMERCIAL

## 2025-03-13 VITALS
HEART RATE: 71 BPM | RESPIRATION RATE: 18 BRPM | SYSTOLIC BLOOD PRESSURE: 107 MMHG | OXYGEN SATURATION: 100 % | WEIGHT: 144 LBS | BODY MASS INDEX: 24.59 KG/M2 | HEIGHT: 64 IN | DIASTOLIC BLOOD PRESSURE: 67 MMHG | TEMPERATURE: 98.6 F

## 2025-03-13 DIAGNOSIS — R00.2 PALPITATIONS: ICD-10-CM

## 2025-03-13 DIAGNOSIS — I42.8 NON-ISCHEMIC CARDIOMYOPATHY (H): ICD-10-CM

## 2025-03-13 LAB
ATRIAL RATE - MUSE: 68 BPM
BASOPHILS # BLD AUTO: 0 10E3/UL (ref 0–0.2)
BASOPHILS NFR BLD AUTO: 1 %
DIASTOLIC BLOOD PRESSURE - MUSE: NORMAL MMHG
EOSINOPHIL # BLD AUTO: 0 10E3/UL (ref 0–0.7)
EOSINOPHIL NFR BLD AUTO: 0 %
ERYTHROCYTE [DISTWIDTH] IN BLOOD BY AUTOMATED COUNT: 15 % (ref 10–15)
HCG SERPL QL: NEGATIVE
HCT VFR BLD AUTO: 46.6 % (ref 35–47)
HGB BLD-MCNC: 14.8 G/DL (ref 11.7–15.7)
IMM GRANULOCYTES # BLD: 0 10E3/UL
IMM GRANULOCYTES NFR BLD: 0 %
INTERPRETATION ECG - MUSE: NORMAL
LACTATE SERPL-SCNC: 1.3 MMOL/L (ref 0.7–2)
LYMPHOCYTES # BLD AUTO: 1.4 10E3/UL (ref 0.8–5.3)
LYMPHOCYTES NFR BLD AUTO: 26 %
MAGNESIUM SERPL-MCNC: 2.3 MG/DL (ref 1.7–2.3)
MCH RBC QN AUTO: 27.5 PG (ref 26.5–33)
MCHC RBC AUTO-ENTMCNC: 31.8 G/DL (ref 31.5–36.5)
MCV RBC AUTO: 87 FL (ref 78–100)
MDC_IDC_EPISODE_DTM: NORMAL
MDC_IDC_EPISODE_DURATION: 1 S
MDC_IDC_EPISODE_DURATION: 2 S
MDC_IDC_EPISODE_DURATION: 3 S
MDC_IDC_EPISODE_DURATION: 4 S
MDC_IDC_EPISODE_DURATION: 4 S
MDC_IDC_EPISODE_DURATION: 45 S
MDC_IDC_EPISODE_DURATION: 47 S
MDC_IDC_EPISODE_DURATION: 48 S
MDC_IDC_EPISODE_DURATION: 49 S
MDC_IDC_EPISODE_DURATION: 5 S
MDC_IDC_EPISODE_DURATION: 5 S
MDC_IDC_EPISODE_DURATION: 50 S
MDC_IDC_EPISODE_DURATION: 51 S
MDC_IDC_EPISODE_DURATION: 52 S
MDC_IDC_EPISODE_DURATION: 53 S
MDC_IDC_EPISODE_DURATION: 54 S
MDC_IDC_EPISODE_DURATION: 55 S
MDC_IDC_EPISODE_DURATION: 56 S
MDC_IDC_EPISODE_DURATION: 57 S
MDC_IDC_EPISODE_DURATION: 58 S
MDC_IDC_EPISODE_DURATION: 6 S
MDC_IDC_EPISODE_DURATION: 67 S
MDC_IDC_EPISODE_DURATION: 7 S
MDC_IDC_EPISODE_DURATION: 8 S
MDC_IDC_EPISODE_ID: NORMAL
MDC_IDC_EPISODE_TYPE: NORMAL
MDC_IDC_LEAD_CONNECTION_STATUS: NORMAL
MDC_IDC_LEAD_IMPLANT_DT: NORMAL
MDC_IDC_LEAD_LOCATION: NORMAL
MDC_IDC_LEAD_LOCATION_DETAIL_1: NORMAL
MDC_IDC_LEAD_MFG: NORMAL
MDC_IDC_LEAD_MODEL: NORMAL
MDC_IDC_LEAD_POLARITY_TYPE: NORMAL
MDC_IDC_LEAD_SERIAL: NORMAL
MDC_IDC_MSMT_BATTERY_DTM: NORMAL
MDC_IDC_MSMT_BATTERY_DTM: NORMAL
MDC_IDC_MSMT_BATTERY_REMAINING_LONGEVITY: 126 MO
MDC_IDC_MSMT_BATTERY_REMAINING_LONGEVITY: 126 MO
MDC_IDC_MSMT_BATTERY_REMAINING_PERCENTAGE: 100 %
MDC_IDC_MSMT_BATTERY_REMAINING_PERCENTAGE: 100 %
MDC_IDC_MSMT_BATTERY_STATUS: NORMAL
MDC_IDC_MSMT_BATTERY_STATUS: NORMAL
MDC_IDC_MSMT_CAP_CHARGE_DTM: NORMAL
MDC_IDC_MSMT_CAP_CHARGE_ENERGY: 41 J
MDC_IDC_MSMT_CAP_CHARGE_ENERGY: 41 J
MDC_IDC_MSMT_CAP_CHARGE_TIME: 10.3 S
MDC_IDC_MSMT_CAP_CHARGE_TIME: 10.3 S
MDC_IDC_MSMT_CAP_CHARGE_TIME: 7.6 S
MDC_IDC_MSMT_CAP_CHARGE_TIME: 7.6 S
MDC_IDC_MSMT_CAP_CHARGE_TYPE: NORMAL
MDC_IDC_MSMT_LEADCHNL_RA_IMPEDANCE_VALUE: 717 OHM
MDC_IDC_MSMT_LEADCHNL_RA_IMPEDANCE_VALUE: 769 OHM
MDC_IDC_MSMT_LEADCHNL_RA_PACING_THRESHOLD_AMPLITUDE: 0.6 V
MDC_IDC_MSMT_LEADCHNL_RA_PACING_THRESHOLD_AMPLITUDE: 0.6 V
MDC_IDC_MSMT_LEADCHNL_RA_PACING_THRESHOLD_PULSEWIDTH: 0.4 MS
MDC_IDC_MSMT_LEADCHNL_RA_PACING_THRESHOLD_PULSEWIDTH: 0.4 MS
MDC_IDC_MSMT_LEADCHNL_RV_IMPEDANCE_VALUE: 359 OHM
MDC_IDC_MSMT_LEADCHNL_RV_IMPEDANCE_VALUE: 363 OHM
MDC_IDC_MSMT_LEADCHNL_RV_PACING_THRESHOLD_AMPLITUDE: 1 V
MDC_IDC_MSMT_LEADCHNL_RV_PACING_THRESHOLD_AMPLITUDE: 1 V
MDC_IDC_MSMT_LEADCHNL_RV_PACING_THRESHOLD_PULSEWIDTH: 0.4 MS
MDC_IDC_MSMT_LEADCHNL_RV_PACING_THRESHOLD_PULSEWIDTH: 0.4 MS
MDC_IDC_PG_IMPLANT_DTM: NORMAL
MDC_IDC_PG_IMPLANT_DTM: NORMAL
MDC_IDC_PG_MFG: NORMAL
MDC_IDC_PG_MFG: NORMAL
MDC_IDC_PG_MODEL: NORMAL
MDC_IDC_PG_MODEL: NORMAL
MDC_IDC_PG_SERIAL: NORMAL
MDC_IDC_PG_SERIAL: NORMAL
MDC_IDC_PG_TYPE: NORMAL
MDC_IDC_PG_TYPE: NORMAL
MDC_IDC_SESS_CLINIC_NAME: NORMAL
MDC_IDC_SESS_CLINIC_NAME: NORMAL
MDC_IDC_SESS_DTM: NORMAL
MDC_IDC_SESS_DTM: NORMAL
MDC_IDC_SESS_TYPE: NORMAL
MDC_IDC_SESS_TYPE: NORMAL
MDC_IDC_SET_BRADY_AT_MODE_SWITCH_MODE: NORMAL
MDC_IDC_SET_BRADY_AT_MODE_SWITCH_MODE: NORMAL
MDC_IDC_SET_BRADY_AT_MODE_SWITCH_RATE: 140 {BEATS}/MIN
MDC_IDC_SET_BRADY_AT_MODE_SWITCH_RATE: 140 {BEATS}/MIN
MDC_IDC_SET_BRADY_LOWRATE: 60 {BEATS}/MIN
MDC_IDC_SET_BRADY_LOWRATE: 60 {BEATS}/MIN
MDC_IDC_SET_BRADY_MAX_TRACKING_RATE: 130 {BEATS}/MIN
MDC_IDC_SET_BRADY_MAX_TRACKING_RATE: 130 {BEATS}/MIN
MDC_IDC_SET_BRADY_MODE: NORMAL
MDC_IDC_SET_BRADY_MODE: NORMAL
MDC_IDC_SET_BRADY_PAV_DELAY_HIGH: 180 MS
MDC_IDC_SET_BRADY_PAV_DELAY_HIGH: 180 MS
MDC_IDC_SET_BRADY_PAV_DELAY_LOW: 240 MS
MDC_IDC_SET_BRADY_PAV_DELAY_LOW: 240 MS
MDC_IDC_SET_BRADY_SAV_DELAY_HIGH: 135 MS
MDC_IDC_SET_BRADY_SAV_DELAY_HIGH: 135 MS
MDC_IDC_SET_BRADY_SAV_DELAY_LOW: 180 MS
MDC_IDC_SET_BRADY_SAV_DELAY_LOW: 180 MS
MDC_IDC_SET_LEADCHNL_RA_PACING_AMPLITUDE: 3.5 V
MDC_IDC_SET_LEADCHNL_RA_PACING_AMPLITUDE: 3.5 V
MDC_IDC_SET_LEADCHNL_RA_PACING_CAPTURE_MODE: NORMAL
MDC_IDC_SET_LEADCHNL_RA_PACING_CAPTURE_MODE: NORMAL
MDC_IDC_SET_LEADCHNL_RA_PACING_POLARITY: NORMAL
MDC_IDC_SET_LEADCHNL_RA_PACING_POLARITY: NORMAL
MDC_IDC_SET_LEADCHNL_RA_PACING_PULSEWIDTH: 0.4 MS
MDC_IDC_SET_LEADCHNL_RA_PACING_PULSEWIDTH: 0.4 MS
MDC_IDC_SET_LEADCHNL_RA_SENSING_ADAPTATION_MODE: NORMAL
MDC_IDC_SET_LEADCHNL_RA_SENSING_ADAPTATION_MODE: NORMAL
MDC_IDC_SET_LEADCHNL_RA_SENSING_POLARITY: NORMAL
MDC_IDC_SET_LEADCHNL_RA_SENSING_POLARITY: NORMAL
MDC_IDC_SET_LEADCHNL_RA_SENSING_SENSITIVITY: 0.25 MV
MDC_IDC_SET_LEADCHNL_RA_SENSING_SENSITIVITY: 0.25 MV
MDC_IDC_SET_LEADCHNL_RV_PACING_AMPLITUDE: 3.5 V
MDC_IDC_SET_LEADCHNL_RV_PACING_AMPLITUDE: 3.5 V
MDC_IDC_SET_LEADCHNL_RV_PACING_CAPTURE_MODE: NORMAL
MDC_IDC_SET_LEADCHNL_RV_PACING_CAPTURE_MODE: NORMAL
MDC_IDC_SET_LEADCHNL_RV_PACING_POLARITY: NORMAL
MDC_IDC_SET_LEADCHNL_RV_PACING_POLARITY: NORMAL
MDC_IDC_SET_LEADCHNL_RV_PACING_PULSEWIDTH: 0.4 MS
MDC_IDC_SET_LEADCHNL_RV_PACING_PULSEWIDTH: 0.4 MS
MDC_IDC_SET_LEADCHNL_RV_SENSING_ADAPTATION_MODE: NORMAL
MDC_IDC_SET_LEADCHNL_RV_SENSING_ADAPTATION_MODE: NORMAL
MDC_IDC_SET_LEADCHNL_RV_SENSING_POLARITY: NORMAL
MDC_IDC_SET_LEADCHNL_RV_SENSING_POLARITY: NORMAL
MDC_IDC_SET_LEADCHNL_RV_SENSING_SENSITIVITY: 0.6 MV
MDC_IDC_SET_LEADCHNL_RV_SENSING_SENSITIVITY: 0.6 MV
MDC_IDC_SET_ZONE_DETECTION_INTERVAL: 240 MS
MDC_IDC_SET_ZONE_DETECTION_INTERVAL: 240 MS
MDC_IDC_SET_ZONE_DETECTION_INTERVAL: 300 MS
MDC_IDC_SET_ZONE_DETECTION_INTERVAL: 300 MS
MDC_IDC_SET_ZONE_DETECTION_INTERVAL: 333 MS
MDC_IDC_SET_ZONE_DETECTION_INTERVAL: 333 MS
MDC_IDC_SET_ZONE_STATUS: NORMAL
MDC_IDC_SET_ZONE_TYPE: NORMAL
MDC_IDC_SET_ZONE_VENDOR_TYPE: NORMAL
MDC_IDC_STAT_AT_BURDEN_PERCENT: 1 %
MDC_IDC_STAT_AT_BURDEN_PERCENT: 1 %
MDC_IDC_STAT_AT_DTM_END: NORMAL
MDC_IDC_STAT_AT_DTM_END: NORMAL
MDC_IDC_STAT_AT_DTM_START: NORMAL
MDC_IDC_STAT_AT_DTM_START: NORMAL
MDC_IDC_STAT_BRADY_DTM_END: NORMAL
MDC_IDC_STAT_BRADY_DTM_END: NORMAL
MDC_IDC_STAT_BRADY_DTM_START: NORMAL
MDC_IDC_STAT_BRADY_DTM_START: NORMAL
MDC_IDC_STAT_BRADY_RA_PERCENT_PACED: 70 %
MDC_IDC_STAT_BRADY_RA_PERCENT_PACED: 75 %
MDC_IDC_STAT_BRADY_RV_PERCENT_PACED: 3 %
MDC_IDC_STAT_BRADY_RV_PERCENT_PACED: 3 %
MDC_IDC_STAT_EPISODE_RECENT_COUNT: 0
MDC_IDC_STAT_EPISODE_RECENT_COUNT_DTM_END: NORMAL
MDC_IDC_STAT_EPISODE_RECENT_COUNT_DTM_START: NORMAL
MDC_IDC_STAT_EPISODE_TYPE: NORMAL
MDC_IDC_STAT_EPISODE_VENDOR_TYPE: NORMAL
MDC_IDC_STAT_TACHYTHERAPY_ATP_DELIVERED_RECENT: 0
MDC_IDC_STAT_TACHYTHERAPY_ATP_DELIVERED_RECENT: 0
MDC_IDC_STAT_TACHYTHERAPY_ATP_DELIVERED_TOTAL: 4
MDC_IDC_STAT_TACHYTHERAPY_ATP_DELIVERED_TOTAL: 4
MDC_IDC_STAT_TACHYTHERAPY_RECENT_DTM_END: NORMAL
MDC_IDC_STAT_TACHYTHERAPY_RECENT_DTM_END: NORMAL
MDC_IDC_STAT_TACHYTHERAPY_RECENT_DTM_START: NORMAL
MDC_IDC_STAT_TACHYTHERAPY_RECENT_DTM_START: NORMAL
MDC_IDC_STAT_TACHYTHERAPY_SHOCKS_ABORTED_RECENT: 0
MDC_IDC_STAT_TACHYTHERAPY_SHOCKS_ABORTED_RECENT: 0
MDC_IDC_STAT_TACHYTHERAPY_SHOCKS_ABORTED_TOTAL: 3
MDC_IDC_STAT_TACHYTHERAPY_SHOCKS_ABORTED_TOTAL: 3
MDC_IDC_STAT_TACHYTHERAPY_SHOCKS_DELIVERED_RECENT: 0
MDC_IDC_STAT_TACHYTHERAPY_SHOCKS_DELIVERED_RECENT: 0
MDC_IDC_STAT_TACHYTHERAPY_SHOCKS_DELIVERED_TOTAL: 4
MDC_IDC_STAT_TACHYTHERAPY_SHOCKS_DELIVERED_TOTAL: 4
MDC_IDC_STAT_TACHYTHERAPY_TOTAL_DTM_END: NORMAL
MDC_IDC_STAT_TACHYTHERAPY_TOTAL_DTM_END: NORMAL
MDC_IDC_STAT_TACHYTHERAPY_TOTAL_DTM_START: NORMAL
MDC_IDC_STAT_TACHYTHERAPY_TOTAL_DTM_START: NORMAL
MONOCYTES # BLD AUTO: 0.5 10E3/UL (ref 0–1.3)
MONOCYTES NFR BLD AUTO: 10 %
NEUTROPHILS # BLD AUTO: 3.3 10E3/UL (ref 1.6–8.3)
NEUTROPHILS NFR BLD AUTO: 63 %
NRBC # BLD AUTO: 0 10E3/UL
NRBC BLD AUTO-RTO: 0 /100
NT-PROBNP SERPL-MCNC: 1366 PG/ML (ref 0–450)
P AXIS - MUSE: 51 DEGREES
PLATELET # BLD AUTO: 235 10E3/UL (ref 150–450)
PR INTERVAL - MUSE: 136 MS
QRS DURATION - MUSE: 120 MS
QT - MUSE: 382 MS
QTC - MUSE: 406 MS
R AXIS - MUSE: 37 DEGREES
RBC # BLD AUTO: 5.39 10E6/UL (ref 3.8–5.2)
SYSTOLIC BLOOD PRESSURE - MUSE: NORMAL MMHG
T AXIS - MUSE: 218 DEGREES
TROPONIN T SERPL HS-MCNC: 38 NG/L
TSH SERPL DL<=0.005 MIU/L-ACNC: 2.19 UIU/ML (ref 0.3–4.2)
VENTRICULAR RATE- MUSE: 68 BPM
WBC # BLD AUTO: 5.3 10E3/UL (ref 4–11)

## 2025-03-13 PROCEDURE — 96374 THER/PROPH/DIAG INJ IV PUSH: CPT | Performed by: EMERGENCY MEDICINE

## 2025-03-13 PROCEDURE — 71046 X-RAY EXAM CHEST 2 VIEWS: CPT

## 2025-03-13 PROCEDURE — 99284 EMERGENCY DEPT VISIT MOD MDM: CPT | Performed by: EMERGENCY MEDICINE

## 2025-03-13 PROCEDURE — 83605 ASSAY OF LACTIC ACID: CPT | Performed by: EMERGENCY MEDICINE

## 2025-03-13 PROCEDURE — 250N000013 HC RX MED GY IP 250 OP 250 PS 637: Performed by: EMERGENCY MEDICINE

## 2025-03-13 PROCEDURE — 85004 AUTOMATED DIFF WBC COUNT: CPT | Performed by: EMERGENCY MEDICINE

## 2025-03-13 PROCEDURE — 99285 EMERGENCY DEPT VISIT HI MDM: CPT | Mod: 25 | Performed by: EMERGENCY MEDICINE

## 2025-03-13 PROCEDURE — 83735 ASSAY OF MAGNESIUM: CPT | Performed by: EMERGENCY MEDICINE

## 2025-03-13 PROCEDURE — 999N000248 HC STATISTIC IV INSERT WITH US BY RN

## 2025-03-13 PROCEDURE — 83880 ASSAY OF NATRIURETIC PEPTIDE: CPT | Performed by: EMERGENCY MEDICINE

## 2025-03-13 PROCEDURE — 84155 ASSAY OF PROTEIN SERUM: CPT | Performed by: EMERGENCY MEDICINE

## 2025-03-13 PROCEDURE — 84703 CHORIONIC GONADOTROPIN ASSAY: CPT | Performed by: EMERGENCY MEDICINE

## 2025-03-13 PROCEDURE — 84484 ASSAY OF TROPONIN QUANT: CPT | Performed by: EMERGENCY MEDICINE

## 2025-03-13 PROCEDURE — 36415 COLL VENOUS BLD VENIPUNCTURE: CPT | Performed by: EMERGENCY MEDICINE

## 2025-03-13 PROCEDURE — 250N000011 HC RX IP 250 OP 636: Performed by: EMERGENCY MEDICINE

## 2025-03-13 PROCEDURE — 82040 ASSAY OF SERUM ALBUMIN: CPT | Performed by: EMERGENCY MEDICINE

## 2025-03-13 PROCEDURE — 93005 ELECTROCARDIOGRAM TRACING: CPT | Performed by: EMERGENCY MEDICINE

## 2025-03-13 PROCEDURE — 85018 HEMOGLOBIN: CPT | Performed by: EMERGENCY MEDICINE

## 2025-03-13 PROCEDURE — 84443 ASSAY THYROID STIM HORMONE: CPT | Performed by: EMERGENCY MEDICINE

## 2025-03-13 PROCEDURE — 71046 X-RAY EXAM CHEST 2 VIEWS: CPT | Mod: 26 | Performed by: RADIOLOGY

## 2025-03-13 PROCEDURE — 99207 CARDIAC DEVICE CHECK - REMOTE: CPT | Performed by: INTERNAL MEDICINE

## 2025-03-13 PROCEDURE — 250N000009 HC RX 250: Performed by: EMERGENCY MEDICINE

## 2025-03-13 PROCEDURE — 93010 ELECTROCARDIOGRAM REPORT: CPT | Performed by: EMERGENCY MEDICINE

## 2025-03-13 RX ORDER — MAGNESIUM HYDROXIDE/ALUMINUM HYDROXICE/SIMETHICONE 120; 1200; 1200 MG/30ML; MG/30ML; MG/30ML
15 SUSPENSION ORAL ONCE
Status: COMPLETED | OUTPATIENT
Start: 2025-03-13 | End: 2025-03-13

## 2025-03-13 RX ORDER — LIDOCAINE HYDROCHLORIDE 20 MG/ML
10 SOLUTION OROPHARYNGEAL ONCE
Status: COMPLETED | OUTPATIENT
Start: 2025-03-13 | End: 2025-03-13

## 2025-03-13 RX ADMIN — PANTOPRAZOLE SODIUM 40 MG: 40 INJECTION, POWDER, FOR SOLUTION INTRAVENOUS at 20:20

## 2025-03-13 RX ADMIN — LIDOCAINE HYDROCHLORIDE 10 ML: 20 SOLUTION ORAL at 19:39

## 2025-03-13 RX ADMIN — ALUMINUM HYDROXIDE, MAGNESIUM HYDROXIDE, AND DIMETHICONE 15 ML: 200; 20; 200 SUSPENSION ORAL at 19:39

## 2025-03-13 ASSESSMENT — ACTIVITIES OF DAILY LIVING (ADL)
ADLS_ACUITY_SCORE: 58

## 2025-03-13 ASSESSMENT — COLUMBIA-SUICIDE SEVERITY RATING SCALE - C-SSRS
2. HAVE YOU ACTUALLY HAD ANY THOUGHTS OF KILLING YOURSELF IN THE PAST MONTH?: NO
1. IN THE PAST MONTH, HAVE YOU WISHED YOU WERE DEAD OR WISHED YOU COULD GO TO SLEEP AND NOT WAKE UP?: NO
6. HAVE YOU EVER DONE ANYTHING, STARTED TO DO ANYTHING, OR PREPARED TO DO ANYTHING TO END YOUR LIFE?: NO

## 2025-03-13 NOTE — TELEPHONE ENCOUNTER
Received a phone call from Ms. Shaikh, she tells me that her heart rate is 140s for around 2 hours.  She is symptomatic with palpitations.  Given her history of heart disease, I suggested that she presents to the emergency department.  Ms. Shaikh demonstrated understanding and was agreeable

## 2025-03-13 NOTE — ED PROVIDER NOTES
[place] EMERGENCY DEPARTMENT ([facility])    3/13/25       History     Chief Complaint   Patient presents with    Chest Pain    Palpitations    Dizziness    Abdominal Pain    Nausea     HPI    Mary Shaikh is a 24-year-old female with a very complex cardiac history who presents to the emergency department today with multiple issues.  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  She does have a previous history of SVT status post ICD placement in June 2024, CTI ablation in June 2024, previous WPW, as well as biliary dyskinesia status postcholecystectomy July 2024.  She follows with the congenital cardiology clinic with Dr. Fuentes.  Patient states that she has palpitations on a daily basis, but generally they are brief.  Today she noted palpitations/fast heart rate that seems to persist for a couple of hours.  She states that her heart rate was up to 140.  She was resting and eating at the time of onset.  She is having some generalized chest discomfort particularly over the left side, which accompanied this, though this has now improved.  She denies shortness of breath or cough.  She has felt dizzy today as well.  This concerned her and so after he called the cardiology clinic she was instructed to come to the emergency department.    Patient denies fevers or chills, she has had some nasal congestion but denies any sore throat.  No cough.  She has had some chronic upper abdominal pain which has been going on for months it sounds like.  She has been started on a PPI but does not feel that this has been helpful.  She has had some chronic nausea but no vomiting.  Denies any change in her abdominal symptoms.  She has had some ongoing chronic diarrhea which also is unchanged.  No dysuria or hematuria.  LMP was April of last year, she does not know why she does not get her period.  Denies possibility of pregnancy.  Patient states she is taking all of her regular medications which  include digoxin, furosemide, Jardiance, metoprolol, Entresto, renal lactone.  She started tizanidine yesterday for some back muscle spasms.  Patient does have a history does have a prescription for nitro for chest pain as needed, she has never taken it and did not take it today.     Record review shows that she was hospitalized in December 2024 for ventricular tachycardia requiring shocks.  She did receive an ICD shock back in December which was the last time she has felt her device shocked her.  During that hospitalization she had GDMT and carvedilol was changed to metoprolol.    Past Medical History  History reviewed. No pertinent past medical history.  Past Surgical History:   Procedure Laterality Date    CV RIGHT HEART CATH MEASUREMENTS RECORDED N/A 7/31/2024    Procedure: Heart Cath Right Heart Cath;  Surgeon: Tanmay Brice MD;  Location:  HEART CARDIAC CATH LAB    CV RIGHT HEART CATH MEASUREMENTS RECORDED N/A 11/15/2024    Procedure: Heart Cath Right Heart Cath;  Surgeon: Tanmay Brice MD;  Location:  HEART CARDIAC CATH LAB    CV RIGHT HEART CATH MEASUREMENTS RECORDED N/A 12/16/2024    Procedure: Right Heart Catheterization;  Surgeon: Evens Rodrigues MD;  Location:  HEART CARDIAC CATH LAB     acetaminophen (TYLENOL) 325 MG tablet  albuterol (PROAIR HFA/PROVENTIL HFA/VENTOLIN HFA) 108 (90 Base) MCG/ACT inhaler  digoxin (LANOXIN) 250 MCG tablet  empagliflozin (JARDIANCE) 25 MG TABS tablet  furosemide (LASIX) 20 MG tablet  loperamide (IMODIUM) 2 MG capsule  LORazepam (ATIVAN) 1 MG tablet  melatonin 3 MG tablet  metoprolol succinate ER (TOPROL XL) 50 MG 24 hr tablet  metoprolol tartrate (LOPRESSOR) 25 MG tablet  nitroGLYcerin (NITROSTAT) 0.4 MG sublingual tablet  omeprazole (PRILOSEC) 20 MG DR capsule  ondansetron (ZOFRAN ODT) 4 MG ODT tab  sacubitril-valsartan (ENTRESTO)  MG per tablet  spironolactone (ALDACTONE) 25 MG tablet  tiZANidine (ZANAFLEX) 2 MG tablet      No Known Allergies  Family  "History  No family history on file.  Social History   Social History     Tobacco Use    Smoking status: Never     Passive exposure: Never (per pt)    Smokeless tobacco: Never   Substance Use Topics    Alcohol use: Never    Drug use: Never      Past medical history, past surgical history, medications, allergies, family history, and social history were reviewed with the patient. No additional pertinent items.   A complete review of systems was performed with pertinent positives and negatives noted in the HPI, and all other systems negative.    Physical Exam   BP: 111/75  Pulse: 96  Temp: 99.6  F (37.6  C)  Resp: 17  Height: 162.6 cm (5' 4\")  Weight: 65.3 kg (144 lb)  SpO2: 96 %  Physical Exam  Vitals and nursing note reviewed.   Constitutional:       General: She is not in acute distress.     Appearance: She is not diaphoretic.      Comments: Adult female, alert, cooperative, appears to have a bit of a flat affect   HENT:      Head: Atraumatic.      Mouth/Throat:      Mouth: Mucous membranes are moist.      Pharynx: Oropharynx is clear. No oropharyngeal exudate.   Eyes:      General: No scleral icterus.     Pupils: Pupils are equal, round, and reactive to light.   Cardiovascular:      Rate and Rhythm: Normal rate.      Pulses: Normal pulses.      Heart sounds: Normal heart sounds. No murmur heard.  Pulmonary:      Effort: No respiratory distress.      Breath sounds: Normal breath sounds.   Abdominal:      Palpations: Abdomen is soft.      Tenderness: There is abdominal tenderness. There is guarding. There is no rebound.      Comments: TTP in epigastrium with some voluntary guarding, no lower abdominal TTP, slightly hypoactive bowel sounds.    Musculoskeletal:         General: No tenderness.   Skin:     General: Skin is warm.      Findings: No rash.   Neurological:      Mental Status: She is alert.         ED Course, Procedures, & Data      Procedures              EKG Interpretation:      Interpreted by Shayy SORIANO" MD Jeniffer  Time reviewed:1815   Symptoms at time of EKG: chest pain   Rhythm: Normal sinus   Rate: Normal  Axis: Normal  Ectopy: None  Conduction: Nonspecific interventricular conduction block  ST Segments/ T Waves: Some ST depression noted in lead I, lead II, aVF, inverted T wave noted in lead I, lead II, aVL, aVF, V3 through V6 slight ST elevation noted in lead aVR only,  Q Waves: None  Comparison to prior: Unchanged from 1/14/2025    Clinical Impression: no acute changes            Results for orders placed or performed during the hospital encounter of 03/13/25   Chest XR,  PA & LAT     Status: None    Narrative    EXAM: XR CHEST 2 VIEWS  LOCATION: [facility]  DATE: 3/13/2025    INDICATION: palpitations, chest pain  COMPARISON: 8/2/2024      Impression    IMPRESSION: Lungs and pleural spaces are clear. Unchanged enlarged cardiac silhouette. Left chest ICD/pacemaker with lead tips in the right atrial appendage and right ventricle.   [place] Draw     Status: None (In process)    Narrative    The following orders were created for panel order [place] Draw.  Procedure                               Abnormality         Status                     ---------                               -----------         ------                     Extra Blue Top Tube[[id number]]                             In process                 Extra Red Top Tube[[id number]]                              In process                 Extra Green Top (Lithiu...[[id number]]                      In process                 Extra Green Top (Lithiu...[[id number]]                      In process                 Extra Purple Top Tube[[id number]]                           In process                   Please view results for these tests on the individual orders.   Comprehensive metabolic panel     Status: Abnormal (Preliminary result)   Result Value Ref Range    Sodium 137 135 - 145 mmol/L    Potassium 5.2 3.4 -  5.3 mmol/L    Carbon Dioxide (CO2) 19 (L) 22 - 29 mmol/L    Anion Gap 14 7 - 15 mmol/L    Urea Nitrogen 12.0 6.0 - 20.0 mg/dL    Creatinine 0.69 0.51 - 0.95 mg/dL    GFR Estimate >90 >60 mL/min/1.73m2    Calcium      Chloride 104 98 - 107 mmol/L    Glucose 85 70 - 99 mg/dL    Alkaline Phosphatase 90 40 - 150 U/L    AST      ALT      Protein Total 7.9 6.4 - 8.3 g/dL    Albumin 4.2 3.5 - 5.2 g/dL    Bilirubin Total 0.6 <=1.2 mg/dL   Magnesium     Status: Normal   Result Value Ref Range    Magnesium 2.3 1.7 - 2.3 mg/dL   TSH with free T4 reflex     Status: Normal   Result Value Ref Range    TSH 2.19 0.30 - 4.20 uIU/mL   Troponin T, High Sensitivity     Status: Abnormal   Result Value Ref Range    Troponin T, High Sensitivity 38 (H) <=14 ng/L   Nt probnp inpatient     Status: Abnormal   Result Value Ref Range    N terminal Pro BNP Inpatient 1,366 (H) 0 - 450 pg/mL   HCG qualitative pregnancy (blood)     Status: Normal   Result Value Ref Range    hCG Serum Qualitative Negative Negative   Lactic acid whole blood     Status: Normal   Result Value Ref Range    Lactic Acid 1.3 0.7 - 2.0 mmol/L   CBC with platelets and differential     Status: Abnormal   Result Value Ref Range    WBC Count 5.3 4.0 - 11.0 10e3/uL    RBC Count 5.39 (H) 3.80 - 5.20 10e6/uL    Hemoglobin 14.8 11.7 - 15.7 g/dL    Hematocrit 46.6 35.0 - 47.0 %    MCV 87 78 - 100 fL    MCH 27.5 26.5 - 33.0 pg    MCHC 31.8 31.5 - 36.5 g/dL    RDW 15.0 10.0 - 15.0 %    Platelet Count 235 150 - 450 10e3/uL    % Neutrophils 63 %    % Lymphocytes 26 %    % Monocytes 10 %    % Eosinophils 0 %    % Basophils 1 %    % Immature Granulocytes 0 %    NRBCs per 100 WBC 0 <1 /100    Absolute Neutrophils 3.3 1.6 - 8.3 10e3/uL    Absolute Lymphocytes 1.4 0.8 - 5.3 10e3/uL    Absolute Monocytes 0.5 0.0 - 1.3 10e3/uL    Absolute Eosinophils 0.0 0.0 - 0.7 10e3/uL    Absolute Basophils 0.0 0.0 - 0.2 10e3/uL    Absolute Immature Granulocytes 0.0 <=0.4 10e3/uL    Absolute NRBCs 0.0  10e3/uL   EKG 12 lead     Status: None (Preliminary result)   Result Value Ref Range    Systolic Blood Pressure  mmHg    Diastolic Blood Pressure  mmHg    Ventricular Rate 68 BPM    Atrial Rate 68 BPM    VA Interval 136 ms    QRS Duration 120 ms     ms    QTc 406 ms    P Axis 51 degrees    R AXIS 37 degrees    T Axis 218 degrees    Interpretation ECG       Sinus rhythm  Non-specific intra-ventricular conduction delay  ST & T wave abnormality, consider inferior ischemia  ST & T wave abnormality, consider anterolateral ischemia  Abnormal ECG     Cardiac Device Check - Remote     Status: None (In process)   Result Value Ref Range    Date Time Interrogation Session [id number]     Implantable Pulse Generator  [employer]     Implantable Pulse Generator Model D533 RESONATE HF ICD     Implantable Pulse Generator Serial Number [id number]     Type Interrogation Session Remote     Clinic Name [facility]     Implantable Pulse Generator Type Defibrillator     Implantable Pulse Generator Implant Date 20240620     Implantable Lead  [employer]     Implantable Lead Model 0672 [place] 4-Front S     Implantable Lead Serial Number [id number]     Implantable Lead Implant Date 20240620     Implantable Lead Polarity Type Bipolar Lead     Implantable Lead Location Detail 1 UNKNOWN     Implantable Lead Location Right Ventricle     Implantable Lead Connection Status Connected     Implantable Lead  [employer]     Implantable Lead Model 7840 Ingevity+ MRI     Implantable Lead Serial Number [id number]     Implantable Lead Implant Date 20240620     Implantable Lead Polarity Type Bipolar Lead     Implantable Lead Location Detail 1 UNKNOWN     Implantable Lead Location Right Atrium     Implantable Lead Connection Status Connected     Ralph Setting Mode (NBG Code) DDD     Ralph Setting Lower Rate Limit 60 [beats]/min    Ralph Setting Maximum Tracking Rate 130  [beats]/min    Ralph Setting BECKY Delay Low 180 ms    Ralph Setting PAV Delay Low 240 ms    Ralph Setting PAV Delay High 180 ms    Ralph Setting BECKY Delay High 135 ms    Ralph Setting AT Mode Switch Rate 140 [beats]/min    Ralph Setting AT Mode Switch Mode VDI     Lead Channel Setting Sensing Polarity Bipolar     Lead Channel Setting Sensing Sensitivity 0.25 mV    Lead Channel Setting Sensing Adaptation Mode Adaptive     Lead Channel Setting Sensing Polarity Bipolar     Lead Channel Setting Sensing Sensitivity 0.6 mV    Lead Channel Setting Sensing Adaptation Mode Adaptive     Lead Channel Setting Pacing Polarity Bipolar     Lead Channel Setting Pacing Pulse Width 0.4 ms    Lead Channel Setting Pacing Amplitude 3.5 V    Lead Channel Setting Pacing Capture Mode Monitor     Lead Channel Setting Pacing Polarity Bipolar     Lead Channel Setting Pacing Pulse Width 0.4 ms    Lead Channel Setting Pacing Amplitude 3.5 V    Lead Channel Setting Pacing Capture Mode Monitor     Zone Setting Type Category VF     Zone Setting Vendor Type Category VF     Zone Setting Status Active     Zone Setting Detection Interval 240 ms    Zone Setting Type Category VT     Zone Setting Vendor Type Category VT     Zone Setting Status Active     Zone Setting Detection Interval 300 ms    Zone Setting Type Category VT     Zone Setting Vendor Type Category VT-1     Zone Setting Status Monitor     Zone Setting Detection Interval 333 ms    Lead Channel Impedance Value 769 ohm    Lead Channel Pacing Threshold Amplitude 0.6 V    Lead Channel Pacing Threshold Pulse Width 0.4 ms    Lead Channel Impedance Value 363 ohm    Lead Channel Pacing Threshold Amplitude 1.0 V    Lead Channel Pacing Threshold Pulse Width 0.4 ms    Battery Date Time of Measurements [id number]     Battery Status Beginning of Service     Battery Remaining Longevity 126 mo    Battery Remaining Percentage 100 %    Capacitor Charge Type Reformation     Capacitor Last Charge Date Time  [id number]     Capacitor Charge Time 10.3 s    Capacitor Charge Type Shock     Capacitor Last Charge Date Time 55167833958352     Capacitor Charge Time 7.6 s    Capacitor Charge Energy 41 J    Ralph Statistic Date Time Start 13144754069975     Ralph Statistic Date Time End 06222808995658     Ralph Statistic RA Percent Paced 70 %    Ralph Statistic RV Percent Paced 3 %    Atrial Tachy Statistic Date Time Start 35594316211779     Atrial Tachy Statistic Date Time End 44470805868380     Atrial Tachy Statistic AT/AF [place] Percent 1 %    Therapy Statistic Recent Shocks Delivered 0     Therapy Statistic Recent Shocks Aborted 0     Therapy Statistic Recent ATP Delivered 0     Therapy Statistic Recent Date Time Start 87092473558115     Therapy Statistic Recent Date Time End 91088327632755     Therapy Statistic Total Shocks Delivered 4     Therapy Statistic Total Shocks Aborted 3     Therapy Statistic Total ATP Delivered 4     Therapy Statistic Total  Date Time Start 40392986887513     Therapy Statistic Total  Date Time End 44621302415833     Episode Statistic Recent Count 0     Episode Statistic Type Category Other     Episode Statistic Recent Count 0     Episode Statistic Type Category VT     Episode Statistic Vendor Type Category NSVT     Episode Statistic Recent Count 0     Episode Statistic Type Category VT     Episode Statistic Vendor Type Category VT     Episode Statistic Recent Count 0     Episode Statistic Type Category VT     Episode Statistic Vendor Type Category VT-1     Episode Statistic Recent Date Time Start 29441568813354     Episode Statistic Recent Date Time End 28499017679059     Episode Statistic Recent Date Time Start 36766776931853     Episode Statistic Recent Date Time End 97189978444443     Episode Statistic Recent Date Time Start 94463130429997     Episode Statistic Recent Date Time End 96785490053770     Episode Statistic Recent Date Time Start 57146121087680     Episode Statistic Recent Date  Time End 04580848413888     Episode Identifier [id number]     Episode Type Category Other     Episode Date Time [id number]     Episode Duration 45 s    Narrative    Remote ICD transmission received and reviewed. Device transmission sent per MD orders.     Device: [employer] D533 RESONATE HF ICD  Normal Device Function  Mode: DDD  bpm  AP: 70%  : 3%  Presenting EGM: AS-VS 68 bpm w/ frequent PVC's.  Thoracic Impedance:  Near reference line.   Lead Trends Appear Stable.  Estimated battery longevity to RRT = 10.5 years.   Atrial Arrhythmia: 45 AT/AF episodes detected since 2/24/25, V rates Avg V rates 146 bpm.  Longest episode lasting 1 min 7 sec.  AF [place]: <1%  Anticoagulant: None  Ventricular Arrhythmia: None  PVC burden: 355/ hour over the past 17 days.  Pt Notified of Transmission Results: Report called to the ED [facility]    Plan: Follow patient as needed during hospital stay.  Device follow-up every 3 months.  Nubia Lee RN    Remote ICD Transmission    I have reviewed and interpreted the device interrogation, settings, programming and nurse's summary. The device is functioning within normal device parameters. I agree with the current findings, assessment and plan.   CBC with Platelets & Differential     Status: Abnormal    Narrative    The following orders were created for panel order CBC with Platelets & Differential.  Procedure                               Abnormality         Status                     ---------                               -----------         ------                     CBC with platelets and ...[[id number]]  Abnormal            Final result                 Please view results for these tests on the individual orders.     Medications   alum & mag hydroxide-simethicone (MAALOX) suspension 15 mL (15 mLs Oral $Given 3/13/25 1939)   lidocaine (viscous) (XYLOCAINE) 2 % solution 10 mL (10 mLs Mouth/Throat $Given 3/13/25 1939)   pantoprazole (PROTONIX) IV push injection 40 mg  (40 mg Intravenous $Given 3/13/25 2020)     Labs Ordered and Resulted from Time of ED Arrival to Time of ED Departure   COMPREHENSIVE METABOLIC PANEL - Abnormal       Result Value    Sodium 137      Potassium 5.2      Carbon Dioxide (CO2) 19 (*)     Anion Gap 14      Urea Nitrogen 12.0      Creatinine 0.69      GFR Estimate >90      Calcium        Chloride 104      Glucose 85      Alkaline Phosphatase 90      AST        ALT        Protein Total 7.9      Albumin 4.2      Bilirubin Total 0.6     TROPONIN T, HIGH SENSITIVITY - Abnormal    Troponin T, High Sensitivity 38 (*)    NT PROBNP INPATIENT - Abnormal    N terminal Pro BNP Inpatient 1,366 (*)    CBC WITH PLATELETS AND DIFFERENTIAL - Abnormal    WBC Count 5.3      RBC Count 5.39 (*)     Hemoglobin 14.8      Hematocrit 46.6      MCV 87      MCH 27.5      MCHC 31.8      RDW 15.0      Platelet Count 235      % Neutrophils 63      % Lymphocytes 26      % Monocytes 10      % Eosinophils 0      % Basophils 1      % Immature Granulocytes 0      NRBCs per 100 WBC 0      Absolute Neutrophils 3.3      Absolute Lymphocytes 1.4      Absolute Monocytes 0.5      Absolute Eosinophils 0.0      Absolute Basophils 0.0      Absolute Immature Granulocytes 0.0      Absolute NRBCs 0.0     MAGNESIUM - Normal    Magnesium 2.3     TSH WITH FREE T4 REFLEX - Normal    TSH 2.19     HCG QUALITATIVE PREGNANCY - Normal    hCG Serum Qualitative Negative     LACTIC ACID WHOLE BLOOD - Normal    Lactic Acid 1.3       Cardiac Device Check - Remote         Chest XR,  PA & LAT   Final Result   IMPRESSION: Lungs and pleural spaces are clear. Unchanged enlarged cardiac silhouette. Left chest ICD/pacemaker with lead tips in the right atrial appendage and right ventricle.             Critical care was not performed.     Medical Decision Making  The patient's presentation was of high complexity (a chronic illness severe exacerbation, progression, or side effect of treatment).    The patient's evaluation  involved:  review of external note(s) from 2 sources (see separate area of note for details)  review of 3+ test result(s) ordered prior to this encounter (see separate area of note for details)  ordering and/or review of 3+ test(s) in this encounter (see separate area of note for details)    The patient's management necessitated moderate risk (prescription drug management including medications given in the ED).    Assessment & Plan    Patient presents for the above complaints.  On my evaluation she is alert, cooperative, no acute distress.  She has a rather flat affect.  Vital signs are normal, blood pressure is 107/56, heart rate was 73.    Given her symptoms, need to consider the possibility of intermittent arrhythmia in this complex congenital heart disease patient.  She does have a defibrillator, most recent interrogation of his defibrillator was yesterday.  It appears that presenting rhythm at that point was sinus tach.  She had several atrial tachycardia episodes lasting between 3 and 67 seconds (this is over a 15-day time period).  There were no ventricular arrhythmias.    Cardiac device interrogation today reveals 45 atrial tach or tachycardia/atrial fibrillation episodes detected since 2/20/2025, ventricular rates average 146 during these episodes, longest episode  lasting 1 minute and 7 seconds. here have been no ventricular arrhythmias.  She does have frequent PVCs.  This is consistent with previous cardiac device interrogations. We did establish IV access and we did draw blood for laboratory analysis.  CBC shows a normal white count of 5.3, normal hemoglobin of 14.8, normal platelet count.  Comprehensive metabolic panel demonstrates normal electrolytes, bicarb is 19, creatinine is normal.  Magnesium is normal at 2.3 and TSH is normal at 2.19.  BNP is elevated at 1366, consistent with previous values.  hCG is negative and lactate is normal at 1.3.  Troponin is 38 which is at her baseline.  Chest x-ray was  read by radiology as clear.    Patient was given IV Protonix as well as a GI cocktail here in the emergency department which did seem to help her abdominal symptoms somewhat.  She does not have any chest pain and has not had palpitations since she has been here.    At this point I believe it is reasonable to discharge her.  I have advised her to follow-up with her cardiologist and continue taking her medications as prescribed, she does have upcoming further cardiology studies including a right heart cath scheduled in a few months.    With regard to her chronic abdominal pain, this appears to have been an issue for her ever since last summer since she had a cholecystectomy.  Her exam was really fairly unremarkable here today.  She had a previous right upper quadrant ultrasound in January 2025 which showed postsurgical changes of cholecystectomy.  She may benefit from outpatient referral to GI for EGD, I have instructed her to talk with her primary clinic about this, she states she actually just saw her primary clinic this week.  Patient should continue taking her PPI and follow-up with clinic.  Patient verbalizes understanding.    I have reviewed the nursing notes. I have reviewed the findings, diagnosis, plan and need for follow up with the patient.    New Prescriptions    No medications on file       Final diagnoses:   Palpitations   Non-ischemic cardiomyopathy (H)   This part of the medical record was transcribed by Neri Ohara Scribe, from a dictation done by Shayy Swift MD.      Shayy Swift MD  [facility] EMERGENCY DEPARTMENT  3/13/2025     Shayy Swift MD  03/13/25 3156

## 2025-03-13 NOTE — ED TRIAGE NOTES
"Pt presents to the ED for multiple complaints including chest pain, palpitations, nausea, abdominal pain and dizziness. Pt reports a rapid HR \"in the 140's\" earlier this afternoon.      Triage Assessment (Adult)       Row Name 03/13/25 5607          Triage Assessment    Airway WDL WDL        Respiratory WDL    Respiratory WDL WDL        Skin Circulation/Temperature WDL    Skin Circulation/Temperature WDL WDL        Cardiac WDL    Cardiac WDL WDL        Peripheral/Neurovascular WDL    Peripheral Neurovascular WDL WDL        Cognitive/Neuro/Behavioral WDL    Cognitive/Neuro/Behavioral WDL WDL                     "

## 2025-03-14 LAB
ALBUMIN SERPL BCG-MCNC: 4.2 G/DL (ref 3.5–5.2)
ALP SERPL-CCNC: 90 U/L (ref 40–150)
ALT SERPL W P-5'-P-CCNC: ABNORMAL U/L
ANION GAP SERPL CALCULATED.3IONS-SCNC: 14 MMOL/L (ref 7–15)
AST SERPL W P-5'-P-CCNC: ABNORMAL U/L
ATRIAL RATE - MUSE: 68 BPM
BILIRUB SERPL-MCNC: 0.6 MG/DL
BUN SERPL-MCNC: 12 MG/DL (ref 6–20)
CALCIUM SERPL-MCNC: 9.7 MG/DL (ref 8.8–10.4)
CHLORIDE SERPL-SCNC: 104 MMOL/L (ref 98–107)
CREAT SERPL-MCNC: 0.69 MG/DL (ref 0.51–0.95)
DIASTOLIC BLOOD PRESSURE - MUSE: NORMAL MMHG
EGFRCR SERPLBLD CKD-EPI 2021: >90 ML/MIN/1.73M2
GLUCOSE SERPL-MCNC: 85 MG/DL (ref 70–99)
HCO3 SERPL-SCNC: 19 MMOL/L (ref 22–29)
INTERPRETATION ECG - MUSE: NORMAL
P AXIS - MUSE: 51 DEGREES
POTASSIUM SERPL-SCNC: 5.2 MMOL/L (ref 3.4–5.3)
PR INTERVAL - MUSE: 136 MS
PROT SERPL-MCNC: 7.9 G/DL (ref 6.4–8.3)
QRS DURATION - MUSE: 120 MS
QT - MUSE: 382 MS
QTC - MUSE: 406 MS
R AXIS - MUSE: 37 DEGREES
SODIUM SERPL-SCNC: 137 MMOL/L (ref 135–145)
SYSTOLIC BLOOD PRESSURE - MUSE: NORMAL MMHG
T AXIS - MUSE: 218 DEGREES
VENTRICULAR RATE- MUSE: 68 BPM

## 2025-03-14 NOTE — DISCHARGE INSTRUCTIONS
You have been seen in the emergency department today for palpitations, chest discomfort, and abdominal discomfort.  Your labs today are normal/baseline for you.  Your defibrillator interrogation showed that you had several episodes of fast heart rate/atrial tachycardia, which is expected with your heart condition.  You did not have any serious abnormal rhythm problems with regard to your ventricles (which you had back in December).    We recommend that you continue to take your regular medications and follow-up with your cardiologist.    You may wish to discuss with your primary care clinic your ongoing abdominal discomfort.  They can potentially refer you to a GI specialist who can potentially do an EGD/camera study of the inside lining of the stomach to see if this is causing problems for you.  In the meantime please continue to take your antacid medication.

## 2025-03-19 LAB
MDC_IDC_EPISODE_DTM: NORMAL
MDC_IDC_EPISODE_DURATION: 45 S
MDC_IDC_EPISODE_ID: NORMAL
MDC_IDC_EPISODE_TYPE: NORMAL
MDC_IDC_LEAD_CONNECTION_STATUS: NORMAL
MDC_IDC_LEAD_CONNECTION_STATUS: NORMAL
MDC_IDC_LEAD_IMPLANT_DT: NORMAL
MDC_IDC_LEAD_IMPLANT_DT: NORMAL
MDC_IDC_LEAD_LOCATION: NORMAL
MDC_IDC_LEAD_LOCATION: NORMAL
MDC_IDC_LEAD_LOCATION_DETAIL_1: NORMAL
MDC_IDC_LEAD_LOCATION_DETAIL_1: NORMAL
MDC_IDC_LEAD_MFG: NORMAL
MDC_IDC_LEAD_MFG: NORMAL
MDC_IDC_LEAD_MODEL: NORMAL
MDC_IDC_LEAD_MODEL: NORMAL
MDC_IDC_LEAD_POLARITY_TYPE: NORMAL
MDC_IDC_LEAD_POLARITY_TYPE: NORMAL
MDC_IDC_LEAD_SERIAL: NORMAL
MDC_IDC_LEAD_SERIAL: NORMAL
MDC_IDC_MSMT_BATTERY_DTM: NORMAL
MDC_IDC_MSMT_BATTERY_REMAINING_LONGEVITY: 126 MO
MDC_IDC_MSMT_BATTERY_REMAINING_PERCENTAGE: 100 %
MDC_IDC_MSMT_BATTERY_STATUS: NORMAL
MDC_IDC_MSMT_CAP_CHARGE_DTM: NORMAL
MDC_IDC_MSMT_CAP_CHARGE_DTM: NORMAL
MDC_IDC_MSMT_CAP_CHARGE_ENERGY: 41 J
MDC_IDC_MSMT_CAP_CHARGE_TIME: 10.3 S
MDC_IDC_MSMT_CAP_CHARGE_TIME: 7.6 S
MDC_IDC_MSMT_CAP_CHARGE_TYPE: NORMAL
MDC_IDC_MSMT_CAP_CHARGE_TYPE: NORMAL
MDC_IDC_MSMT_LEADCHNL_RA_IMPEDANCE_VALUE: 769 OHM
MDC_IDC_MSMT_LEADCHNL_RA_PACING_THRESHOLD_AMPLITUDE: 0.6 V
MDC_IDC_MSMT_LEADCHNL_RA_PACING_THRESHOLD_PULSEWIDTH: 0.4 MS
MDC_IDC_MSMT_LEADCHNL_RV_IMPEDANCE_VALUE: 363 OHM
MDC_IDC_MSMT_LEADCHNL_RV_PACING_THRESHOLD_AMPLITUDE: 1 V
MDC_IDC_MSMT_LEADCHNL_RV_PACING_THRESHOLD_PULSEWIDTH: 0.4 MS
MDC_IDC_PG_IMPLANT_DTM: NORMAL
MDC_IDC_PG_MFG: NORMAL
MDC_IDC_PG_MODEL: NORMAL
MDC_IDC_PG_SERIAL: NORMAL
MDC_IDC_PG_TYPE: NORMAL
MDC_IDC_SESS_CLINIC_NAME: NORMAL
MDC_IDC_SESS_DTM: NORMAL
MDC_IDC_SESS_TYPE: NORMAL
MDC_IDC_SET_BRADY_AT_MODE_SWITCH_MODE: NORMAL
MDC_IDC_SET_BRADY_AT_MODE_SWITCH_RATE: 140 {BEATS}/MIN
MDC_IDC_SET_BRADY_LOWRATE: 60 {BEATS}/MIN
MDC_IDC_SET_BRADY_MAX_TRACKING_RATE: 130 {BEATS}/MIN
MDC_IDC_SET_BRADY_MODE: NORMAL
MDC_IDC_SET_BRADY_PAV_DELAY_HIGH: 180 MS
MDC_IDC_SET_BRADY_PAV_DELAY_LOW: 240 MS
MDC_IDC_SET_BRADY_SAV_DELAY_HIGH: 135 MS
MDC_IDC_SET_BRADY_SAV_DELAY_LOW: 180 MS
MDC_IDC_SET_LEADCHNL_RA_PACING_AMPLITUDE: 3.5 V
MDC_IDC_SET_LEADCHNL_RA_PACING_CAPTURE_MODE: NORMAL
MDC_IDC_SET_LEADCHNL_RA_PACING_POLARITY: NORMAL
MDC_IDC_SET_LEADCHNL_RA_PACING_PULSEWIDTH: 0.4 MS
MDC_IDC_SET_LEADCHNL_RA_SENSING_ADAPTATION_MODE: NORMAL
MDC_IDC_SET_LEADCHNL_RA_SENSING_POLARITY: NORMAL
MDC_IDC_SET_LEADCHNL_RA_SENSING_SENSITIVITY: 0.25 MV
MDC_IDC_SET_LEADCHNL_RV_PACING_AMPLITUDE: 3.5 V
MDC_IDC_SET_LEADCHNL_RV_PACING_CAPTURE_MODE: NORMAL
MDC_IDC_SET_LEADCHNL_RV_PACING_POLARITY: NORMAL
MDC_IDC_SET_LEADCHNL_RV_PACING_PULSEWIDTH: 0.4 MS
MDC_IDC_SET_LEADCHNL_RV_SENSING_ADAPTATION_MODE: NORMAL
MDC_IDC_SET_LEADCHNL_RV_SENSING_POLARITY: NORMAL
MDC_IDC_SET_LEADCHNL_RV_SENSING_SENSITIVITY: 0.6 MV
MDC_IDC_SET_ZONE_DETECTION_INTERVAL: 240 MS
MDC_IDC_SET_ZONE_DETECTION_INTERVAL: 300 MS
MDC_IDC_SET_ZONE_DETECTION_INTERVAL: 333 MS
MDC_IDC_SET_ZONE_STATUS: NORMAL
MDC_IDC_SET_ZONE_TYPE: NORMAL
MDC_IDC_SET_ZONE_VENDOR_TYPE: NORMAL
MDC_IDC_STAT_AT_BURDEN_PERCENT: 1 %
MDC_IDC_STAT_AT_DTM_END: NORMAL
MDC_IDC_STAT_AT_DTM_START: NORMAL
MDC_IDC_STAT_BRADY_DTM_END: NORMAL
MDC_IDC_STAT_BRADY_DTM_START: NORMAL
MDC_IDC_STAT_BRADY_RA_PERCENT_PACED: 70 %
MDC_IDC_STAT_BRADY_RV_PERCENT_PACED: 3 %
MDC_IDC_STAT_EPISODE_RECENT_COUNT: 0
MDC_IDC_STAT_EPISODE_RECENT_COUNT_DTM_END: NORMAL
MDC_IDC_STAT_EPISODE_RECENT_COUNT_DTM_START: NORMAL
MDC_IDC_STAT_EPISODE_TYPE: NORMAL
MDC_IDC_STAT_EPISODE_VENDOR_TYPE: NORMAL
MDC_IDC_STAT_TACHYTHERAPY_ATP_DELIVERED_RECENT: 0
MDC_IDC_STAT_TACHYTHERAPY_ATP_DELIVERED_TOTAL: 4
MDC_IDC_STAT_TACHYTHERAPY_RECENT_DTM_END: NORMAL
MDC_IDC_STAT_TACHYTHERAPY_RECENT_DTM_START: NORMAL
MDC_IDC_STAT_TACHYTHERAPY_SHOCKS_ABORTED_RECENT: 0
MDC_IDC_STAT_TACHYTHERAPY_SHOCKS_ABORTED_TOTAL: 3
MDC_IDC_STAT_TACHYTHERAPY_SHOCKS_DELIVERED_RECENT: 0
MDC_IDC_STAT_TACHYTHERAPY_SHOCKS_DELIVERED_TOTAL: 4
MDC_IDC_STAT_TACHYTHERAPY_TOTAL_DTM_END: NORMAL
MDC_IDC_STAT_TACHYTHERAPY_TOTAL_DTM_START: NORMAL

## 2025-03-22 DIAGNOSIS — I42.8 NONISCHEMIC CARDIOMYOPATHY (H): Primary | ICD-10-CM

## 2025-03-24 ASSESSMENT — ANXIETY QUESTIONNAIRES
6. BECOMING EASILY ANNOYED OR IRRITABLE: SEVERAL DAYS
4. TROUBLE RELAXING: SEVERAL DAYS
GAD7 TOTAL SCORE: 7
7. FEELING AFRAID AS IF SOMETHING AWFUL MIGHT HAPPEN: SEVERAL DAYS
8. IF YOU CHECKED OFF ANY PROBLEMS, HOW DIFFICULT HAVE THESE MADE IT FOR YOU TO DO YOUR WORK, TAKE CARE OF THINGS AT HOME, OR GET ALONG WITH OTHER PEOPLE?: SOMEWHAT DIFFICULT
1. FEELING NERVOUS, ANXIOUS, OR ON EDGE: SEVERAL DAYS
GAD7 TOTAL SCORE: 7
GAD7 TOTAL SCORE: 7
IF YOU CHECKED OFF ANY PROBLEMS ON THIS QUESTIONNAIRE, HOW DIFFICULT HAVE THESE PROBLEMS MADE IT FOR YOU TO DO YOUR WORK, TAKE CARE OF THINGS AT HOME, OR GET ALONG WITH OTHER PEOPLE: SOMEWHAT DIFFICULT
7. FEELING AFRAID AS IF SOMETHING AWFUL MIGHT HAPPEN: SEVERAL DAYS
3. WORRYING TOO MUCH ABOUT DIFFERENT THINGS: SEVERAL DAYS
5. BEING SO RESTLESS THAT IT IS HARD TO SIT STILL: SEVERAL DAYS
2. NOT BEING ABLE TO STOP OR CONTROL WORRYING: SEVERAL DAYS

## 2025-03-25 ENCOUNTER — TELEPHONE (OUTPATIENT)
Dept: GASTROENTEROLOGY | Facility: CLINIC | Age: 25
End: 2025-03-25

## 2025-03-25 ENCOUNTER — APPOINTMENT (OUTPATIENT)
Dept: LAB | Facility: CLINIC | Age: 25
End: 2025-03-25
Payer: COMMERCIAL

## 2025-03-25 ENCOUNTER — OFFICE VISIT (OUTPATIENT)
Dept: OPTOMETRY | Facility: CLINIC | Age: 25
End: 2025-03-25
Attending: NURSE PRACTITIONER
Payer: COMMERCIAL

## 2025-03-25 DIAGNOSIS — H52.223 REGULAR ASTIGMATISM OF BOTH EYES: ICD-10-CM

## 2025-03-25 DIAGNOSIS — E11.9 TYPE 2 DIABETES MELLITUS WITHOUT COMPLICATION, WITHOUT LONG-TERM CURRENT USE OF INSULIN (H): Primary | ICD-10-CM

## 2025-03-25 DIAGNOSIS — H52.13 MYOPIA, BILATERAL: ICD-10-CM

## 2025-03-25 PROCEDURE — 92015 DETERMINE REFRACTIVE STATE: CPT | Performed by: OPTOMETRIST

## 2025-03-25 PROCEDURE — 92004 COMPRE OPH EXAM NEW PT 1/>: CPT | Performed by: OPTOMETRIST

## 2025-03-25 ASSESSMENT — VISUAL ACUITY
OD_CC: 20/20
OD_CC: 20/20
OS_CC: 20/20
METHOD: SNELLEN - LINEAR
CORRECTION_TYPE: GLASSES
OS_CC: 20/20
OS_CC+: -2
OD_CC+: -2

## 2025-03-25 ASSESSMENT — REFRACTION_MANIFEST
OS_SPHERE: -9.25
OS_SPHERE: -9.25
OS_CYLINDER: +0.40
OS_AXIS: 088
OS_CYLINDER: +0.75
OD_CYLINDER: +1.25
OD_CYLINDER: +0.75
OD_AXIS: 100
METHOD_AUTOREFRACTION: 1
OD_SPHERE: -8.25
OS_AXIS: 065
OD_SPHERE: -9.00
OD_AXIS: 110

## 2025-03-25 ASSESSMENT — KERATOMETRY
OS_AXISANGLE_DEGREES: 88
OD_AXISANGLE_DEGREES: 96
OS_K2POWER_DIOPTERS: 42.50
OS_AXISANGLE2_DEGREES: 178
OS_K1POWER_DIOPTERS: 41.25
OD_AXISANGLE2_DEGREES: 6
OD_K1POWER_DIOPTERS: 41.25
OD_K2POWER_DIOPTERS: 42.25

## 2025-03-25 ASSESSMENT — CONF VISUAL FIELD
OD_INFERIOR_TEMPORAL_RESTRICTION: 0
OS_SUPERIOR_TEMPORAL_RESTRICTION: 0
OS_NORMAL: 1
OD_SUPERIOR_NASAL_RESTRICTION: 0
OD_NORMAL: 1
OS_INFERIOR_NASAL_RESTRICTION: 0
OD_SUPERIOR_TEMPORAL_RESTRICTION: 0
OD_INFERIOR_NASAL_RESTRICTION: 0
OS_INFERIOR_TEMPORAL_RESTRICTION: 0
OS_SUPERIOR_NASAL_RESTRICTION: 0

## 2025-03-25 ASSESSMENT — REFRACTION_WEARINGRX
OD_AXIS: 120
OD_CYLINDER: +0.50
OS_AXIS: 057
OD_SPHERE: -8.00
OS_CYLINDER: +0.75
OS_SPHERE: -8.75

## 2025-03-25 ASSESSMENT — EXTERNAL EXAM - LEFT EYE: OS_EXAM: NORMAL

## 2025-03-25 ASSESSMENT — EXTERNAL EXAM - RIGHT EYE: OD_EXAM: NORMAL

## 2025-03-25 ASSESSMENT — SLIT LAMP EXAM - LIDS
COMMENTS: NORMAL
COMMENTS: NORMAL

## 2025-03-25 ASSESSMENT — CUP TO DISC RATIO
OS_RATIO: 0.25
OD_RATIO: 0.25

## 2025-03-25 ASSESSMENT — TONOMETRY: IOP_METHOD: APPLANATION

## 2025-03-25 NOTE — PATIENT INSTRUCTIONS
Patient Education   Diabetes weakens the blood vessels all over the body, including the eyes. Damage to the blood vessels in the eyes can cause swelling or bleeding into part of the eye (called the retina). This is called diabetic retinopathy (CLEOPATRA-tin-AH-pu-thee). If not treated, this disease can cause vision loss or blindness.   Symptoms may include blurred or distorted vision, but many people have no symptoms. It's important to see your eye doctor regularly to check for problems.   Early treatment and good control can help protect your vision. Here are the things you can do to help prevent vision loss:      1. Keep your blood sugar levels under tight control.      2. Bring high blood pressure under control.      3. No smoking.      4. Have yearly dilated eye exams.       Myopia is a result of long eyes. It is commonly referred to as near-sightedness. Seeing clearly in the distance is the main challenge.    Astigmatism results from curvature differential in the cornea and crystalline lens which can cause a distorted image, as light rays are prevented from meeting at a common focus.    Eyeglass prescription given.      The affects of the dilating drops last for 4- 6 hours.  You will be more sensitive to light and vision will be blurry up close.  Do not drive if you do not feel comfortable.  Mydriatic sunglasses were given if needed.    Recommend annual eye exams.    Sara Soares O.D.  [address] [address]    [phone]

## 2025-03-25 NOTE — TELEPHONE ENCOUNTER
[facility] Call Center    Phone Message    May a detailed message be left on voicemail: Yes    Reason for Call: Other: Patient is currently scheduled on 5/14/25, as visit type New GI Urgent. This is outside the expected timeline for this referral. Patient has been added to the waitlist.      Action Taken: Message routed to:  Other: GI REFERRAL TRIAGE POOL     Travel Screening: Not Applicable

## 2025-03-25 NOTE — LETTER
3/25/2025      Mary Shaikh  [address]  [place] [address]      Dear Colleague,    Thank you for referring your patient, Mary Shaikh, to the [facility]. Please see a copy of my visit note below.    Chief Complaint   Patient presents with     Annual Eye Exam     Denies having diabetes        Chief Complaint(s) and History of Present Illness(es)       Annual Eye Exam              Comments: Denies having diabetes                   Lab Results   Component Value Date    A1C 5.6 03/11/2025    A1C 7.3 08/02/2024    A1C 6.1 07/31/2024            Last Eye Exam: few years ago   Dilated Previously: Yes    What are you currently using to see?  glasses    Distance Vision Acuity: Satisfied with vision    Near Vision Acuity: Satisfied with vision while reading and using computer with glasses     Eye Comfort: Every once in a while right eye gets flashes. Last time it happened yesterday, time before that was a couple months ago-  lasts for a few minutes  Do you use eye drops? : No  Occupation or Hobbies: traveling, cooking     Jennifer Swift - Optometric Assistant     Medical, surgical and family histories reviewed and updated 3/25/2025.       OBJECTIVE: See Ophthalmology exam    ASSESSMENT:    ICD-10-CM    1. Type 2 diabetes mellitus without complication, without long-term current use of insulin (H)  E11.9 Adult Eye  Referral    no DM retinopathy OU      2. Myopia, bilateral  H52.13       3. Regular astigmatism of both eyes  H52.223           PLAN:    Mary Shaikh aware  eye exam results will be sent to Clinic - [facility].  Patient Instructions   Patient Education  Diabetes weakens the blood vessels all over the body, including the eyes. Damage to the blood vessels in the eyes can cause swelling or bleeding into part of the eye (called the retina). This is called diabetic retinopathy (CLEOPATRA-tin-AH-puh-thee). If not treated, this disease can cause  vision loss or blindness.   Symptoms may include blurred or distorted vision, but many people have no symptoms. It's important to see your eye doctor regularly to check for problems.   Early treatment and good control can help protect your vision. Here are the things you can do to help prevent vision loss:      1. Keep your blood sugar levels under tight control.      2. Bring high blood pressure under control.      3. No smoking.      4. Have yearly dilated eye exams.       Myopia is a result of long eyes. It is commonly referred to as near-sightedness. Seeing clearly in the distance is the main challenge.    Astigmatism results from curvature differential in the cornea and crystalline lens which can cause a distorted image, as light rays are prevented from meeting at a common focus.    Eyeglass prescription given.      The affects of the dilating drops last for 4- 6 hours.  You will be more sensitive to light and vision will be blurry up close.  Do not drive if you do not feel comfortable.  Mydriatic sunglasses were given if needed.    Recommend annual eye exams.    Sara Soares O.D.  [address] [address]    [phone]             Again, thank you for allowing me to participate in the care of your patient.        Sincerely,        Sara Soares OD    Electronically signed

## 2025-03-25 NOTE — PROGRESS NOTES
Chief Complaint   Patient presents with    Annual Eye Exam     Denies having diabetes        Chief Complaint(s) and History of Present Illness(es)       Annual Eye Exam              Comments: Denies having diabetes                   Lab Results   Component Value Date    A1C 5.6 03/11/2025    A1C 7.3 08/02/2024    A1C 6.1 07/31/2024            Last Eye Exam: few years ago   Dilated Previously: Yes    What are you currently using to see?  glasses    Distance Vision Acuity: Satisfied with vision    Near Vision Acuity: Satisfied with vision while reading and using computer with glasses     Eye Comfort: Every once in a while right eye gets flashes. Last time it happened yesterday, time before that was a couple months ago-  lasts for a few minutes  Do you use eye drops? : No  Occupation or Hobbies: traveling, cooking     Jennifer Swift - Optometric Assistant     Medical, surgical and family histories reviewed and updated 3/25/2025.       OBJECTIVE: See Ophthalmology exam    ASSESSMENT:    ICD-10-CM    1. Type 2 diabetes mellitus without complication, without long-term current use of insulin (H)  E11.9 Adult Eye  Referral    no DM retinopathy OU      2. Myopia, bilateral  H52.13       3. Regular astigmatism of both eyes  H52.223           PLAN:    Mary Shaikh aware  eye exam results will be sent to Clinic - [facility].  Patient Instructions   Patient Education  Diabetes weakens the blood vessels all over the body, including the eyes. Damage to the blood vessels in the eyes can cause swelling or bleeding into part of the eye (called the retina). This is called diabetic retinopathy (CLEOPATRA-tin-AH-puh-thee). If not treated, this disease can cause vision loss or blindness.   Symptoms may include blurred or distorted vision, but many people have no symptoms. It's important to see your eye doctor regularly to check for problems.   Early treatment and good control can help protect your vision. Here  are the things you can do to help prevent vision loss:      1. Keep your blood sugar levels under tight control.      2. Bring high blood pressure under control.      3. No smoking.      4. Have yearly dilated eye exams.       Myopia is a result of long eyes. It is commonly referred to as near-sightedness. Seeing clearly in the distance is the main challenge.    Astigmatism results from curvature differential in the cornea and crystalline lens which can cause a distorted image, as light rays are prevented from meeting at a common focus.    Eyeglass prescription given.      The affects of the dilating drops last for 4- 6 hours.  You will be more sensitive to light and vision will be blurry up close.  Do not drive if you do not feel comfortable.  Mydriatic sunglasses were given if needed.    Recommend annual eye exams.    Sara Soares O.D.  [address] [address]    [phone]

## 2025-03-27 ENCOUNTER — VIRTUAL VISIT (OUTPATIENT)
Dept: PSYCHOLOGY | Facility: CLINIC | Age: 25
End: 2025-03-27
Payer: COMMERCIAL

## 2025-03-27 DIAGNOSIS — F43.23 ADJUSTMENT DISORDER WITH MIXED ANXIETY AND DEPRESSED MOOD: Primary | ICD-10-CM

## 2025-03-27 PROCEDURE — 90834 PSYTX W PT 45 MINUTES: CPT | Mod: 95 | Performed by: COUNSELOR

## 2025-03-27 NOTE — PROGRESS NOTES
"Health Psychology Outpatient Follow Up  [facility]     Patient: Mary Shaikh   Date of Service: 3/27/25  Treatment Plan Due: 4/14/25    Diagnosis:  Adjustment disorder with mixed anxiety and depressed mood (ICD-10: F43.20)  Specific learning disability     Patient Demographics (per chart):   Age: 24 year old  Biological Sex: female  Race: Black or   Ethnicity: Choose not to answer    Subjective:  Engaged pt in psychotherapy (CBT) for coping with chronic medical illness. Reviewed updates to physical and emotional health since previous session and progress with treatment plan goals. Patient reported that her mood has been \"pretty good\" since last appointment. Reviewed behavioral activation homework from last appointment. Pt reported that she spent a great deal of time with family since last appointment with her brother coming into town which made pt feel positively. Reflected on the benefit of this activity and engaged pt in continued goal setting and planning for future plans including spending time focusing on self care activities. Also engaged pt in supportive listening and cognitive processing of ongoing stressors and sxs including ongoing medical sxs and her grandmother's health concerns.     Objective   Appearance:   Appropriate   Eye Contact:   Good   Psychomotor Behavior:  Normal   Attitude:   Cooperative   Orientation:   All  Speech   Rate / Production: Normal    Volume:  Normal   Mood:    Euthymic  Affect:    Appropriate   Thought Content:   Clear  Thought Form:   Coherent  Logical     Insight:    Did not formally assess at this time.   Safety:    No safety concerns at this time.     Weight (per chart):  Wt Readings from Last 4 Encounters:   03/21/25 67.4 kg (148 lb 9.6 oz)   03/13/25 65.3 kg (144 lb)   03/11/25 66.2 kg (146 lb)   02/24/25 67 kg (147 lb 11.2 oz)        BMI (per chart):  Estimated body mass index is 25.11 kg/m  as calculated " "from the following:    Height as of 3/21/25: 1.638 m (5' 4.5\").    Weight as of 3/21/25: 67.4 kg (148 lb 9.6 oz).     Depression Symptoms (Patient Health Questionnaire 9; PHQ-9)  The PHQ-9 is a measure of depressive symptoms.  Scores on this measure range from 0 to 27 with higher scores reflecting greater levels and frequency of depressive symptoms. Typical symptom ranges include: 0-4 minimal, 5-9 mild, 10-14 moderate, 15-19 moderately severe, 20-27 severe.       1/3/2025     9:12 AM 3/10/2025     7:35 AM 3/10/2025     7:36 AM   PHQ-9 SCORE   PHQ-9 Total Score MyChart 9 (Mild depression)  19 (Moderately severe depression)   PHQ-9 Total Score 9  19         Patient-reported     Anxiety Symptoms (Generalized Anxiety Disorder 7; JOSESITO-7)  The JOSESITO-7 is a measure of anxiety and panic symptoms.  Scores on this measure range from 0 to 21 with higher scores reflecting greater levels and frequency of anxiety symptoms. Typical symptom ranges include: 0-4 minimal, 5-9 mild, 10-14 moderate, 15-21 severe.       2/25/2025     9:42 AM 3/10/2025     7:36 AM 3/24/2025     9:16 PM   JOSESITO-7 SCORE   Total Score 14 (moderate anxiety) 14 (moderate anxiety) 7 (mild anxiety)   Total Score 14  14  7        Patient-reported     Alcohol and Drug Abuse (CAGE - Adapted to Include Drugs; CAGE-AID)  The CAGE-AID is a screening tool to assess for symptoms of alcohol or drug abuse or dependence. Scores on this measure range from 0 to 4, with higher scores reflecting experiences consistent with problem use.  CAGE-AID score  > 1 is a positive screen, suggesting further discussion is needed to determine if evaluation for alcohol or substance abuse is appropriate.  A score > 2 is considered clinically significant, suggesting further evaluation of alcohol or substance-related problems is indicated.      11/4/2024     7:18 PM   CAGE-AID Total Score   Total Score 0    Total Score MyChart 0 (A total score of 2 or greater is considered clinically significant) "       Patient-reported     Global Health (Patient-Reported Outcomes Measurement Information System; PROMIS-10)  The PROMIS-10 is a measure of global physical and mental health. Total scores on this measure range from 8 to 40, with higher scores reflecting greater levels of perceived health.       11/4/2024     7:17 PM 11/20/2024     1:11 PM 2/25/2025     9:43 AM   PROMIS-10 Scores Only   Global Mental Health Score 4  9  8    Global Physical Health Score 7  11  9    PROMIS TOTAL - SUBSCORES 11  20  17        Patient-reported     Suicidality ([place] Suicide Severity Rating Scale Screener Past Month)      3/13/2025     5:53 PM   [place] Suicide Severity Rating (Short Version)   Q1 Wished to be Dead (Past Month) 0-->no   Q2 Suicidal Thoughts (Past Month) 0-->no   Q6 Suicide Behavior (Lifetime) 0-->no   Level of Risk per Screen no risks indicated       Assessment:  Patient was engaged in treatment. Patient appears to be experiencing depression symptoms in the moderately severe range and anxiety symptoms in the moderate range. Currently, the patient appears to be coping with some success. Progress towards treatment goals: ongoing.     Plan:  Return for therapy sessions.     Session Length:  Start Time: 10:00am  End Time: 10:47am    Modality: Telemedicine Information:  Type of service:   Telemedicine psychotherapy  Patient location:   Patient home in [place]    Patient telephone number: [phone]  Provider location:  At home office in [place]  Mode of Communication:   Video Conference via [employer]   Patient consent to telemedicine services? Yes  It has been determined that telemedicine service is appropriate and effective for this patient.   The patient has been notified that: Video visits will be conducted via a call with their psychologist to provide the care they need with a video conversation. Video visits may be billed at different rates depending on insurance coverage.  Patients are advised to contact  their insurance provider with any questions about their health insurance coverage. If during the course of a call the psychologist feels a video visit is not appropriate, patients will not be charged for this service.    Baldomero Torres, PhD,   Clinical Health Psychologist  Phone: [phone]    *This note was completed using Dragon voice recognition software. Although reviewed after completion, some word and grammatical errors may occur.

## 2025-03-29 NOTE — PROGRESS NOTES
[facility]   CORE Clinic      HPI:   Ms. 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 She presents to cardiology clinic for CORE follow up.      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 Per chart review from recent visit with Dr. Fuentes, patient has had a signficant reduction in her LVEF to 26% on CMR with extensive LGE that is consistent with Danon disease. 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 She has a normal CK but has not yet seen neuromuscular neurology regarding the skeletal myopathy. She had an ICD as secondary SCD prevention after syncope and SVT. Her exercise tolerance has decreased. Her CPEX demonstrates an exercise duration of 8 min and 17 seconds. She achieved an RER of 1.09 and had a peak V02 of 18.7 which is a reduction from past CPEX, and a VE/VC02 slope of 32. Her HR and BP responses were normal.     Underwent RHC 7/31/24 which revealed patient in ambulatory cardiogenic shock. She was therefore admitted for GDMT optimization and to start advance therapies evaluation. Her body weight at admission 170 lbs, discharge weight 157 lbs.      Seen by Dr. Fuentes on 10/28/24. At this visit she reported dyspnea after walking about 15 minutes. She had an ICD shock on 10/3 for VT with rate of 237 bpm. She also had 105 NSVT episodes. Her carvedilol was increased to 6.25mg BID. Following this visit she was referred to neuromuscular neurology with neuromuscular PFTs. Also referred to mental health.  Underwent repeat RHC on 12/16/24 which showed normal right and left filling pressures.     Most recently admitted 12/15-12/18 following ICD shocks. Reported having palpitations with dizziness. Device interrogation showed 1 VT zone lasting 5 mins s/p 4 failed ATP then 3 shocks, suggested AF with RVR. 6 NSVTs, suggest atrial in origin. Echo (12/16) showed LVEF 20-30% with moderate diffuse hypokinesia, normal IVC. No significant change from 7/2024 echo.     Most recently seen in clinic by Dr. Fuentes on 2/24/25. Continued to have BUI with minimal exertion. Continued to have considerable anxiety related to previous ICD shock and concern for another. Referred to psychiatry. Also referred to neuromuscular neurology with plan for neuromuscular PFTs. CPEX and RHC in ~ 3-4 months.     Seen in the ED 3/13/25 for chest pain, shortness of breath, and GI upset. ICD interrogation showed 45 episodes of atrial tachycardia episodes lasting between 3 and 67 seconds and no ventricular arrhythmias, frequent PVCs. Laboratory evaluation unremarkable.      Today, Ms. Shaikh presents to clinic with her father. She reports feeling okay. Seen by GI on 4/1 for stomach pain/cramping. Plan to proceed with CT and EGD. Anxiety has been better controlled. Continues to follow with health psych, but is also interested in establishing with psychiatry. Continues to have stable/unchanged dyspnea on exertion, chest tightness, and palpitations. No recent shocks. She has been trying to focus more on therapy taught coping mechanisms to help manage stress/anxiety related to palpitations and has found this to be helpful. On occasion will take ativan and or extra metoprolol and has also found these medications to be helpful. Denies lightheaded/dizzy episodes. No syncope or near syncope. Denies orthopnea, PND, LE edema.     Cardiac Medications  - Digoxin 250 mcg daily   - Jardiance 10 mg daily   - Metoprolol succinate 50 mg BID  - Lasix 20 mg daily PRN  -  Entresto  mg BID  - Spironolactone 12.5 mg daily     PAST MEDICAL HISTORY:  No past medical history on file.    FAMILY HISTORY:  No family history on file.    SOCIAL HISTORY:  Social History     Socioeconomic History    Marital status: Single   Tobacco Use    Smoking status: Never    Smokeless tobacco: Never   Substance and Sexual Activity    Alcohol use: Never    Drug use: Never     Social Determinants of Health     Financial Resource Strain: Low Risk  (7/10/2024)    Received from [facility]    Financial Resource Strain     Difficulty of Paying Living Expenses: 3   Food Insecurity: No Food Insecurity (7/10/2024)    Received from [facility]    Food Insecurity     Worried About Running Out of Food in the Last Year: 1   Transportation Needs: No Transportation Needs (7/10/2024)    Received from [facility]    Transportation Needs     Lack of Transportation (Medical): 1   Social Connections: Socially Integrated (7/10/2024)    [facility]    Social Connections     Frequency of Communication with Friends and Family: 0   Interpersonal Safety: Not At Risk (6/20/2024)    Received from [facility]    Humiliation, Afraid, Rape, and Kick questionnaire     Fear of Current or Ex-Partner: No     Emotionally Abused: No     Physically Abused: No     Sexually Abused: No   Housing Stability: Low Risk  (7/10/2024)    Received from [facility]    Housing Stability     Unable to Pay for Housing in the Last Year: 1       CURRENT MEDICATIONS:  Current Outpatient Medications   Medication Sig Dispense Refill    acetaminophen (TYLENOL) 325 MG tablet Take 325-650 mg by mouth every 6 hours as needed for mild pain.      albuterol (PROAIR HFA/PROVENTIL HFA/VENTOLIN HFA) 108 (90 Base) MCG/ACT inhaler Inhale 1-2 puffs into the lungs every 6 hours as needed  for shortness of breath, wheezing or cough. 18 g 1    digoxin (LANOXIN) 250 MCG tablet Take 1 tablet (250 mcg) by mouth daily. 90 tablet 3    empagliflozin (JARDIANCE) 25 MG TABS tablet Take 1 tablet (25 mg) by mouth daily. 90 tablet 1    fluocinonide (LIDEX) 0.05 % external solution Apply topically 2 times daily as needed (scalp irritation). 60 mL 1    furosemide (LASIX) 20 MG tablet Take 1 tablet (20 mg) by mouth daily as needed (Take daily if your weight increases by more than 3 lbs in one day or  5 lbs in three days.). 90 tablet 3    loperamide (IMODIUM) 2 MG capsule Take 2 capsules (4 mg) by mouth as needed for diarrhea. 90 capsule 3    LORazepam (ATIVAN) 1 MG tablet Take 1 tablet (1 mg) by mouth every 6 hours as needed for anxiety. 20 tablet 0    melatonin 3 MG tablet Take 3 mg by mouth nightly as needed      metoprolol succinate ER (TOPROL XL) 50 MG 24 hr tablet Take 1 tablet (50 mg) by mouth 2 times daily. 180 tablet 3    metoprolol tartrate (LOPRESSOR) 25 MG tablet Take 1 tablet (25 mg) by mouth daily as needed (take for increased fluttering/palpitations and for HR greater than 110 bpm). 90 tablet 1    nitroGLYcerin (NITROSTAT) 0.4 MG sublingual tablet For chest pain place 1 tablet under the tongue every 5 minutes for 3 doses. If symptoms persist 5 minutes after 1st dose call 911. 10 tablet 1    ondansetron (ZOFRAN ODT) 4 MG ODT tab Place 1 tablet (4 mg) under the tongue as needed for nausea. 30 tablet 0    pantoprazole (PROTONIX) 20 MG EC tablet Take 1 tablet (20 mg) by mouth daily. 90 tablet 0    sacubitril-valsartan (ENTRESTO)  MG per tablet Take 1 tablet by mouth 2 times daily. 180 tablet 3    spironolactone (ALDACTONE) 25 MG tablet Take 0.5 tablets (12.5 mg) by mouth daily. 90 tablet 3    tiZANidine (ZANAFLEX) 2 MG tablet Take 1 tablet (2 mg) by mouth 3 times daily as needed for muscle spasms. 90 tablet 2     No current facility-administered medications for this visit.       ROS:   Refer to  HPI    EXAM:  LMP  (LMP Unknown)    LMP  (LMP Unknown)    /65 (BP Location: Right arm, Patient Position: Chair, Cuff Size: Adult Regular)   Pulse 60   Wt 68.1 kg (150 lb 3.2 oz)   LMP  (LMP Unknown)   SpO2 100%   BMI 25.38 kg/m      GENERAL: Appears comfortable, in no acute distress.   HEENT: Eye symmetrical, no discharge or icterus bilaterally. Mucous membranes moist and without lesions.  CV: bradycardic, +S1S2, no murmur, rub, or gallop. JVP < 6 cm at 90 degrees.   RESPIRATORY: Respirations regular, even, and unlabored. Lungs CTA throughout  GI: Soft and non distended with normoactive bowel sounds present in all quadrants. No tenderness, rebound, guarding. No hepatomegaly.   EXTREMITIES: no peripheral edema. 2+ bilateral pedal pulses.   NEUROLOGIC: Alert and oriented x 3. No focal deficits.   MUSCULOSKELETAL: No joint swelling or tenderness.   SKIN: No jaundice. No rashes or lesions.     Labs, reviewed with patient in clinic today:  CBC RESULTS:  Lab Results   Component Value Date    WBC 5.3 03/13/2025    RBC 5.39 (H) 03/13/2025    HGB 14.8 03/13/2025    HCT 46.6 03/13/2025    MCV 87 03/13/2025    MCH 27.5 03/13/2025    MCHC 31.8 03/13/2025    RDW 15.0 03/13/2025     03/13/2025       CMP RESULTS:  Lab Results   Component Value Date     03/13/2025    POTASSIUM 5.2 03/13/2025    POTASSIUM 4.0 05/09/2022    CHLORIDE 104 03/13/2025    CHLORIDE 107 05/09/2022    CO2 19 (L) 03/13/2025    CO2 29 05/09/2022    ANIONGAP 14 03/13/2025    ANIONGAP 5 05/09/2022    GLC 85 03/13/2025    GLC 96 08/01/2024    GLC 86 05/09/2022    BUN 12.0 03/13/2025    BUN 8 05/09/2022    CR 0.69 03/13/2025    GFRESTIMATED >90 03/13/2025    WHITNEY 9.7 03/13/2025    BILITOTAL 0.4 03/21/2025    ALBUMIN 4.7 03/21/2025    ALBUMIN 3.9 05/09/2022    ALKPHOS 81 03/21/2025    ALT 23 03/21/2025    AST 46 (H) 03/21/2025        INR RESULTS:  Lab Results   Component Value Date    INR 1.18 (H) 12/15/2024       Lab Results   Component  Value Date    MAG 2.3 03/13/2025     Lab Results   Component Value Date    NTBNPI 1,366 (H) 03/13/2025     Lab Results   Component Value Date    NTBNP 1,097 (H) 02/24/2025       Cardiac Diagnostics:    3/13/25 Device Interrogation   Device: Xplornet D533 RESONATE HF ICD  Normal Device Function  Mode: DDD  bpm  AP: 70%  : 3%  Presenting EGM: AS-VS 68 bpm w/ frequent PVC's.  Thoracic Impedance:  Near reference line.   Lead Trends Appear Stable.  Estimated battery longevity to RRT = 10.5 years.   Atrial Arrhythmia: 45 AT/AF episodes detected since 2/24/25, V rates Avg V rates 146 bpm.  Longest episode lasting 1 min 7 sec.  AF [place]: <1%  Anticoagulant: None  Ventricular Arrhythmia: None  PVC burden: 355/ hour over the past 17 days.    12/16/2024 RHC    Right sided filling pressures are normal.    Left sided filling pressures are normal.    Normal PA pressures.    Normal cardiac output level.  RA 8/--/--  RV 34/6  PA 32/9 (19)  PCWP --/--/10  TDCO 4.03, TDCI 2.29  Nohelia CO 4.46, nohelia CI 2.53     12/16/2024 Echo  Interpretation Summary  Left ventricular function is decreased. The ejection fraction is 20-30%  (severely reduced).  Moderate diffuse hypokinesis is present.  Global right ventricular function is moderately reduced  IVC diameter <2.1 cm collapsing >50% with sniff suggests a normal RA pressure  of 3 mmHg.  No pericardial effusion is present.    7/31/2024 Echo  Interpretation Summary  Left ventricular function is decreased. The ejection fraction is 20-25%  (severely reduced).  Global right ventricular function is moderately reduced.  Moderate biatrial enlargement is present.  IVC diameter >2.1 cm collapsing <50% with sniff suggests a high RA pressure  estimated at 15 mmHg or greater.  Trivial pericardial effusion is present.  There is no prior study for direct comparison.  _____________________________________    7/31/2024 RHC  RIGHT HEART CATHETERIZATION:    === Baseline ====    BSA 1.87 m2  BP  114/74/89 mmHg  RA 21/25/20 mmHg  RV 51/20mmHg  PA 51/29/36 mmHg  PCWP 30/40/29 mmHg  TD CO/CI 2.9/1.55   Juan CO/CI 3.13/1.68   PVR 1.9   SVR 1762  PA sat 43.5%   PCW Oximeter sat 98% Right sided filling pressures are severely elevated. Left sided filling pressures are severely elevated. Moderately elevated pulmonary artery hypertension. Reduced cardiac output level.       5/15/2024 CPX    A cardiopulmonary stress test was performed following a Vivek ramp protocol with the patient exercising for 8 minutes and 17 seconds under the supervision of Dr. SURAJ Martinez.  Heart rate demonstrated a normal response to exercise.  Blood pressure demonstrated a blunted response to exercise. The patient's exercise capacity is severely impaired. The test was stopped due to 8/10 dyspnea. The patient reported dyspnea during the stress test. Functional capacity response is Class II: 50-74%. The patient experienced no angina during the test.    Prior Study: A prior study was conducted on 7/24/2023. This study has changes noted with the prior study. The patients peak VO2 decreased by 13% compared to previous test.      Assessment and Plan:   Ms. 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 She presents to cardiology clinic for CORE follow up.     She has had a signficant reduction in her LVEF to 26% on CMR with extensive LGE that is consistent with Dannon disease. 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 She has a normal CK but has not yet seen neuromuscular neurology regarding the skeletal myopathy. She had an ICD as secondary SCD prevention after syncope and SVT.      Scheduled to see  "neuromuscular neurology on 4/30/25.  She should have have neuromuscular PFTs as part of her candidacy evaluation.      She also is adjusting to her functional limitations and diagnosis and has anxiety. She regularly sees health psychology. She has a prescription for ativan PRN. We have previously discussed that I think she would benefit from additional medication management. Recommended that she discuss starting an selective serotonin reuptake inhibitor/SNRI with her PCP, however given this has yet to happen, we will refer to psychiatry for further management.     Appears grossly euvolemic by exam. Warm and compensated. She is having episodes of palpitations/fluttering that are associated with shortness of breath and lightheadedness. By report, HRs have been well controlled, mostly in 60s-90s, and up to 120 with exertion. She is currently well optimized on GDMT. BPs are controlled. Review of labs with normal lytes and stable renal function. We will make no medication changes today. Okay to use extra beta blocker on a sparingly basis for palpitations.       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  No signs of organ dysfunction and or hypoperfusion. Making adequate UO. S/p IV nitroprusside for afterload reduction and IV diuretics. BW at admission 170 lbs, discharge weight 157 lbs. GDMT optimization and start advance therapies evaluation.  GDMT:   - BB: Metoprolol succinate 50 mg BID  - ACEI/ARB/ARNI: Entresto 97/103 BID  - SGLT2i: Jardiance 10mg  - MRA: Spirolactone 12.5 mg daily    - Diuretics : Furosemide 20 mg daily PRN  - BMP WNL today, will repeat prior to next CORE appt (prefers [facility] location)  - Continue outpatient LVAD/transplant evaluation: neuropsych consult (8/27)  - Contraception: on OCP     #History of " "SVT with preexitation   #S/P ICD sudden cardiac death prevention  Brief episodes of sypmtomatic SVT while in the hospital, responsive to vagal maneuvers. 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 EP was consulted and recommended starting oral digoxin and low-dose beta-blocker.  Per EP note, \"if SVT increases in frequency or duration she may require another EP study in the future to further characterize the arrhythmia and determine whether it would be amenable to an ablation\".   - Digoxin 250 mcg daily   - Beta blocker as above       Follow up:  - EP 4/29/25  - CPX/RHC 6/1925  - Dr. Fuentes 6/23/25  - CORE 6 months      A total of 45 minutes was spent on the day of the visit, which includes preparation for the visit (reviewing previous medical records, laboratories and investigations), in conjunction with the actual clinic visit with the patient, which includes obtaining a history and physical exam, creating and reviewing the care plan, patient education (and family if present), counseling, documenting clinical information in the electronic health record and care coordination.   The longitudinal plan of care for the diagnosis(es)/condition(s) as documented were addressed during this visit. Due to the added complexity in care, I will continue to support Mary in the subsequent management and with ongoing continuity of care.        Jocelyn Pichardo, KEVON, NP-C  Advance Heart Failure  4/2/2025    "

## 2025-03-31 NOTE — TELEPHONE ENCOUNTER
Homar called to help get Pt scheduled for a sooner GI appointment.  offered Pt an appointment with Dr. Dwight Hathaway on 4/1/2025 at 4:20 PM. Pt will have to call and check with Pt's transportation first. Homar left call back number for the Pt and informed Pt that Writer will be in the office until 4:30 PM. If Writer does not hear back from Pt by today, 3/31/2025 at 4:30 PM to confirm the appointment, Writer will reach back out when there is another cancellation on the schedule. Pt verbalized understanding of the plan.

## 2025-03-31 NOTE — TELEPHONE ENCOUNTER
Pt called back. Pt accepted an in-person clinic visit with Dr. Dwight Hathaway on 4/1/2025 at 4:20 PM. The clinic location was confirmed and verified with the Pt.

## 2025-04-01 ENCOUNTER — PRE VISIT (OUTPATIENT)
Dept: GASTROENTEROLOGY | Facility: CLINIC | Age: 25
End: 2025-04-01

## 2025-04-01 ENCOUNTER — VIRTUAL VISIT (OUTPATIENT)
Dept: GASTROENTEROLOGY | Facility: CLINIC | Age: 25
End: 2025-04-01
Attending: NURSE PRACTITIONER
Payer: COMMERCIAL

## 2025-04-01 DIAGNOSIS — R10.84 ABDOMINAL PAIN, GENERALIZED: ICD-10-CM

## 2025-04-01 DIAGNOSIS — E74.05 DANON DISEASE IN HETEROZYGOUS FEMALE (H): ICD-10-CM

## 2025-04-01 NOTE — LETTER
4/1/2025      Mary Shaikh  [address]  [place] [address]      Dear Colleague,    Thank you for referring your patient, Mary Shaikh, to the [facility] [place]. Please see a copy of my visit note below.    [facility]:     Virtual Visit Details    Type of service:  Video Visit     Originating Location (pt. Location): Home    Distant Location (provider location):  On-site  Platform used for Video Visit: QuickProNotes    Video start: 4:15PM   Video end: 4:45PM    New Consult  abdominal pain  Referring provider: Gaye Combs APRN CNP    CC: abdominal pain     ASSESSMENT AND PLAN:  Mary Shaikh is a 24 year old with Danon Disease (glycogen storage disease) complicated by cardiomyopathy who is referred to GI clinic for abdominal pain.     # Abdominal pain   Patient notes daily upper abdominal pain which is sharp and exacerbated by eating along with movement (twisting, bending forward). Improved with heating pad and sometimes with bowel movements. DDx includes gastritis, PUD, functional dyspepsia, MSK pain, constipation. No improvement with PPI therapy. Tried muscle relaxant but woke up with chills on the one night she tried it. Unlikely choledocholithiasis with normal LFTs and no biliary dilation on US. We will evaluate with CT imaging and EGD to evaluate for gastrointestinal causes of pain.  If these are negative, would recommend evaluation of MSK related abdominal wall pain given pain is exacerbated by movement and improves with heating pad.     -- CT abdomen/pelvis   -- Upper endoscopy with MAC     - Asked in procedure order for provider to assess Carnett's sign (abdominal wall pain) on day of procedure.   -- Trial of FDgard (over the counter) medication       Return to Clinic: 3 months after CT and EGD complete     Dwight Hathaway MD  GI fellow       Patient discussed and seen with Gastroenterology staff Dr. Haddad, who is in  agreement with the above.     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     Notes daily upper abdominal pain which is sharp and worse with eating and movement. Started in December 2024. During this time she did get shocks from her ICD but otherwise does not recall other illnesses that started the symptoms. Pain is exacerbated by eating, walking around, twisting or reaching down for things. Feels better with heating pad. Pain also improves after bowel movements. No improvement with tylenol or pantoprazole. No nausea or vomiting. Has bowel movement 2-3 times per day after eating. Can be loose or formed. Also feels bloated a few times per week.     Recall similar pain before gallbladder was removed last summer but was not having abdominal bloating/distension symptoms she is having now.     Mild AST elevation but otherwise normal LFTs and lipase at recent PCP visit. Negative H. Pylori stool test. Abdomen US was normal 1/2025. No cross sectional imaging since symptoms started.       Targeted GI review of systems complete and is otherwise negative.     No past medical history on file.    Past Surgical History:   Procedure Laterality Date     CV RIGHT HEART CATH MEASUREMENTS RECORDED N/A 7/31/2024    Procedure: Heart Cath Right Heart Cath;  Surgeon: Tanmay Brice MD;  Location:  HEART CARDIAC CATH LAB     CV RIGHT HEART CATH MEASUREMENTS RECORDED N/A 11/15/2024    Procedure: Heart Cath Right Heart Cath;  Surgeon: Tanmay Brice MD;  [facility]: [facility]     CV RIGHT HEART CATH MEASUREMENTS RECORDED N/A 12/16/2024    Procedure: Right Heart Catheterization;  Surgeon: Evens Rodrigues MD;  Location: [facility] C[facility]        No family history on file.    Social History     Tobacco Use     Smoking status: Never     Passive exposure: Never (per pt)     Smokeless tobacco: Never   Substance Use Topics     Alcohol use: Never       Physical exam:   GENERAL: alert and no distress  EYES: Eyes grossly normal to inspection.  No discharge or erythema, or obvious scleral/conjunctival abnormalities.  RESP: No audible wheeze, cough, or visible cyanosis.    SKIN: Visible skin clear. No significant rash, abnormal pigmentation or lesions.  NEURO: Cranial nerves grossly intact.  Mentation and speech appropriate for age.  PSYCH: Appropriate affect, tone, and pace of words        Labs:   MIld AST elevation (chronic). Normal lipase. Negative h.pylori stool.     Relevant imaging:   Abdominal US 1/2025 - post cholecystomy changes, no biliary dilation.     Endoscopy:  none               Attestation signed by Ilan Haddad MD at 4/2/2025  5:29 PM:  ATTENDING ATTESTATION:     DATE SEEN: 4/1/25    Patient was discussed, seen, and examined by me, Ilan Haddad. The plan of care and pertinent data/imaging were reviewed with Dr. Hathaway. I agree with the assessment and plan as delineated above with the following additions:     Epigastric abdominal pain. Has some features of dyspepsia and others of musculoskeletal etiology. Will evaluate for GI cause with CT and EGD with gastric biopsies. Can start FD guard. Agree with retrial of her muscle relaxer given by PCP at lower dose. Will do physical exam at time of EGD to evaluate for Carnett sign.    Discussed the plan with the patient who agrees. All questions answered.    Thank you for this consultation.  It was a pleasure to participate in the care of this patient; please contact us with any further questions.  I spent a total of 30 minutes during the day of encounter performed chart review, meeting with patient, patient counseling, care coordination, and documentation.       Please contact me with any  further questions.    Ilan Haddad MD  [facility]  Division of Gastroenterology, Hepatology and Nutrition      Again, thank you for allowing me to participate in the care of your patient.        Sincerely,        Dwight Hathaway MD    Electronically signed

## 2025-04-01 NOTE — NURSING NOTE
Current patient location: [address] [place] [id number]    Is the patient currently in the state of [place]? YES    Visit mode: VIDEO    If the visit is dropped, the patient can be reconnected by:VIDEO VISIT: Text to cell phone:   Telephone Information:   Mobile [phone]       Will anyone else be joining the visit? NO  (If patient encounters technical issues they should call [phone]    Are changes needed to the allergy or medication list? No    Are refills needed on medications prescribed by this physician? NO    Rooming Documentation:  Questionnaire(s) completed    Reason for visit: Consult    Mracus AMOS

## 2025-04-01 NOTE — PATIENT INSTRUCTIONS
It was a pleasure meeting with you today and discussing your healthcare plan. Below is a summary of what we covered:    -- CT scan of abdomen to further assess symptoms (please call to schedule [phone].)  - Upper endoscopy to look for stomach inflammation, ulcers (you will get a call to schedule this)  -- Recommend trying over the counter medication - FDgard  which can help with bloating and abdominal discomfort. You can take this 30-60 before eating. Start once a day and can increase to twice a day if you notice it is helping       Please see below for any additional questions and scheduling guidelines.    Sign up for RotaryView: RotaryView patient portal serves as a secure platform for accessing your medical records from the [facility]. Additionally, RotaryView facilitates easy, timely, and secure messaging with your care team. If you have not signed up, you may do so by using the provided code or calling [phone].    Coordinating your care after your visit:  There are multiple options for scheduling your follow-up care based on your provider's recommendation.    How do I schedule a follow-up clinic appointment:   After your appointment, you may receive scheduling assistance with the Clinic Coordinators by having a seat in the waiting room and a Clinic Coordinator will call you up to schedule.  Virtual visits or after you leave the clinic:  Your provider has placed a follow-up order in the RotaryView portal for scheduling your return appointment. A member of the scheduling team will contact you to schedule.  [facility] Scheduling: Timely scheduling through RotaryView is advised to ensure appointment availability.   Call to schedule: You may schedule your follow-up appointment(s) by calling [phone], option 1.    How do I schedule my endoscopy or colonoscopy procedure:  If a procedure, such as a colonoscopy or upper endoscopy was ordered by your provider, the scheduling team will contact you to schedule  this procedure. Or you may choose to call to schedule at   [phone], option 2.  Please allow 20-30 minutes when scheduling a procedure.    How do I get my blood work done? To get your blood work done, you need to schedule a lab appointment at an [facility]. There are multiple ways to schedule:   At the clinic: The Clinic Coordinator you meet after your visit can help you schedule a lab appointment.   [facility] scheduling: [facility] offers online lab scheduling at all [facility] laboratory locations.   Call to schedule: You can call [phone] to schedule your lab appointment.    How do I schedule my imaging study: To schedule imaging studies, such as CT scans, ultrasounds, MRIs, or X-rays, contact Imaging Services at [phone].    How do I schedule a referral to another doctor: If your provider recommended a referral to another specialist(s), the referral order was placed by your provider. You will receive a phone call to schedule this referral, or you may choose to call the number attached to the referral to self-schedule.    For Post-Visit Question(s):  For any inquiries following today's visit:  Please utilize [facility] messaging and allow 48 hours for reply or contact the Call Center during normal business hours at [phone], option 3.  For Emergent After-hours questions, contact the On-Call GI Fellow through the [facility]  at [phone].  In addition, you may contact your Nurse directly using the provided contact information.    Test Results:  Test results will be accessible via [facility] in compliance with the 21st Century Cures Act. This means that your results will be available to you at the same time as your provider. Often you may see your results before your provider does. Results are reviewed by staff within two weeks with communication follow-up. Results may be released in the patient portal prior to your care team review.    Prescription Refill(s):   Medication prescribed by your provider will be addressed during your visit. For future refills, please coordinate with your pharmacy. If you have not had a recent clinic visit or routine labs, for your safety, your provider may not be able to refill your prescription.

## 2025-04-01 NOTE — PROGRESS NOTES
[facility]:     Virtual Visit Details    Type of service:  Video Visit     Originating Location (pt. 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     # Abdominal pain   Patient notes daily upper abdominal pain which is sharp and exacerbated by eating along with movement (twisting, bending forward). Improved with heating pad and sometimes with bowel movements. DDx includes gastritis, PUD, functional dyspepsia, MSK pain, constipation. No improvement with PPI therapy. Tried muscle relaxant but woke up with chills on the one night she tried it. Unlikely choledocholithiasis with normal LFTs and no biliary dilation on US. We will evaluate with CT imaging and EGD to evaluate for gastrointestinal causes of pain.  If these are negative, would recommend evaluation of MSK related abdominal wall pain given pain is exacerbated by movement and improves with heating pad.     -- CT abdomen/pelvis   -- Upper endoscopy with MAC     - Asked in procedure order for provider to assess Carnett's sign (abdominal wall pain) on day of procedure.   -- Trial of FDgard (over the counter) medication       Return to Clinic: 3 months after CT and EGD complete     Dwight Hathaway MD  GI fellow       Patient discussed and seen with Gastroenterology staff Dr. Haddad, who is in agreement with the above.     ====================================================================================================================================  HPI:   Mary Shaikh is a 24 year old with Danon Disease (glycogen storage disease) complicated by  cardiomyopathy, s/p cholecystectomy with ICD who is referred to GI clinic for abdominal pain.     Notes daily upper abdominal pain which is sharp and worse with eating and movement. Started in December 2024. During this time she did get shocks from her ICD but otherwise does not recall other illnesses that started the symptoms. Pain is exacerbated by eating, walking around, twisting or reaching down for things. Feels better with heating pad. Pain also improves after bowel movements. No improvement with tylenol or pantoprazole. No nausea or vomiting. Has bowel movement 2-3 times per day after eating. Can be loose or formed. Also feels bloated a few times per week.     Recall similar pain before gallbladder was removed last summer but was not having abdominal bloating/distension symptoms she is having now.     Mild AST elevation but otherwise normal LFTs and lipase at recent PCP visit. Negative H. Pylori stool test. Abdomen US was normal 1/2025. No cross sectional imaging since symptoms started.       Targeted GI review of systems complete and is otherwise negative.     No past medical history on file.    Past Surgical History:   Procedure Laterality Date    CV RIGHT HEART CATH MEASUREMENTS RECORDED N/A 7/31/2024    Procedure: Heart Cath Right Heart Cath;  Surgeon: Tanmay Brice MD;  Location:  HEART CARDIAC CATH LAB    CV RIGHT HEART CATH MEASUREMENTS RECORDED N/A 11/15/2024    Procedure: Heart Cath Right Heart Cath;  Surgeon: Tanmay Brice MD;  Location: [facility] CARDIAC CATH LAB    CV RIGHT HEART CATH MEASUREMENTS RECORDED N/A 12/16/2024    Procedure: Right Heart Catheterization;  Surgeon: Evens Rodrigues MD;  Location:  HEART CARDIAC CATH LAB       No family history on file.    Social History     Tobacco Use    Smoking status: Never     Passive exposure: Never (per pt)    Smokeless tobacco: Never   Substance Use Topics    Alcohol use: Never       Physical exam:   GENERAL: alert and no distress  EYES:  Eyes grossly normal to inspection.  No discharge or erythema, or obvious scleral/conjunctival abnormalities.  RESP: No audible wheeze, cough, or visible cyanosis.    SKIN: Visible skin clear. No significant rash, abnormal pigmentation or lesions.  NEURO: Cranial nerves grossly intact.  Mentation and speech appropriate for age.  PSYCH: Appropriate affect, tone, and pace of words        Labs:   MIld AST elevation (chronic). Normal lipase. Negative h.pylori stool.     Relevant imaging:   Abdominal US 1/2025 - post cholecystomy changes, no biliary dilation.     Endoscopy:  none

## 2025-04-01 NOTE — TELEPHONE ENCOUNTER
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)        FUTURE VISIT INFORMATION:  Appointment Date: 4/1/25     NOTES STATUS DETAILS   OFFICE NOTE from Referring Provider Internal 3/21/25   OFFICE NOTE from Other Specialist Care Everywhere [facility]:  1/30/25-Sylvia [facility]UMADA [facility]   HOSPITAL DISCHARGE SUMMARY/  ED VISITS Internal ED 3/13/25-[facility]  ED 1/13/25-[facility]    [facility]:  ED 6/19/24-[facility]  ED 6/1/24-[facility]   OPERATIVE REPORT Care Everywhere [facility]:  7/17/24-laparoscopic cholecystectomy    MEDICATION LIST Internal    PROCEDURES     STOOL TESTING     H. PYLORI Internal 3/24/25   LABS     PERTINENT LABS Internal    IMAGES     CT Internal 8/1/24-CT chest/abdomen   ULTRASOUND Internal 1/13/25, 8/2/24-US abdomen    [facility]: Received  7/10/24, 6/1/24-US abdomen    [facility]:  6/20/24-ULT Gallbladder   XRAY  NM Internal 3/13/25-XR chest    [facility]: Received  7/11/24, 6/5/24-NM Hepatobiliary     Records Requested   April 1, 2025 6:58 AM  [email or web address]   Facility  [facility]   Outcome Requested images

## 2025-04-02 ENCOUNTER — PRE VISIT (OUTPATIENT)
Dept: PSYCHIATRY | Facility: CLINIC | Age: 25
End: 2025-04-02

## 2025-04-02 ENCOUNTER — MYC MEDICAL ADVICE (OUTPATIENT)
Dept: FAMILY MEDICINE | Facility: CLINIC | Age: 25
End: 2025-04-02

## 2025-04-02 ENCOUNTER — LAB (OUTPATIENT)
Dept: LAB | Facility: CLINIC | Age: 25
End: 2025-04-02
Payer: COMMERCIAL

## 2025-04-02 ENCOUNTER — OFFICE VISIT (OUTPATIENT)
Dept: CARDIOLOGY | Facility: CLINIC | Age: 25
End: 2025-04-02
Attending: NURSE PRACTITIONER
Payer: COMMERCIAL

## 2025-04-02 VITALS
OXYGEN SATURATION: 100 % | BODY MASS INDEX: 25.38 KG/M2 | WEIGHT: 150.2 LBS | SYSTOLIC BLOOD PRESSURE: 104 MMHG | HEART RATE: 60 BPM | DIASTOLIC BLOOD PRESSURE: 65 MMHG

## 2025-04-02 DIAGNOSIS — R00.2 PALPITATIONS: ICD-10-CM

## 2025-04-02 DIAGNOSIS — I50.20 HEART FAILURE WITH REDUCED EJECTION FRACTION (H): Primary | ICD-10-CM

## 2025-04-02 DIAGNOSIS — I42.8 NONISCHEMIC CARDIOMYOPATHY (H): ICD-10-CM

## 2025-04-02 DIAGNOSIS — I50.23 ACUTE ON CHRONIC SYSTOLIC HEART FAILURE (H): ICD-10-CM

## 2025-04-02 DIAGNOSIS — F43.23 ADJUSTMENT DISORDER WITH MIXED ANXIETY AND DEPRESSED MOOD: ICD-10-CM

## 2025-04-02 LAB
ANION GAP SERPL CALCULATED.3IONS-SCNC: 9 MMOL/L (ref 7–15)
BUN SERPL-MCNC: 10.2 MG/DL (ref 6–20)
CALCIUM SERPL-MCNC: 9.5 MG/DL (ref 8.8–10.4)
CHLORIDE SERPL-SCNC: 103 MMOL/L (ref 98–107)
CREAT SERPL-MCNC: 0.72 MG/DL (ref 0.51–0.95)
EGFRCR SERPLBLD CKD-EPI 2021: >90 ML/MIN/1.73M2
GLUCOSE SERPL-MCNC: 85 MG/DL (ref 70–99)
HCO3 SERPL-SCNC: 26 MMOL/L (ref 22–29)
POTASSIUM SERPL-SCNC: 4.3 MMOL/L (ref 3.4–5.3)
SODIUM SERPL-SCNC: 138 MMOL/L (ref 135–145)

## 2025-04-02 PROCEDURE — 99213 OFFICE O/P EST LOW 20 MIN: CPT | Performed by: NURSE PRACTITIONER

## 2025-04-02 PROCEDURE — 36415 COLL VENOUS BLD VENIPUNCTURE: CPT | Performed by: PATHOLOGY

## 2025-04-02 PROCEDURE — 80048 BASIC METABOLIC PNL TOTAL CA: CPT | Performed by: PATHOLOGY

## 2025-04-02 RX ORDER — LORAZEPAM 0.5 MG/1
1 TABLET ORAL EVERY 6 HOURS PRN
Qty: 15 TABLET | Refills: 0 | Status: SHIPPED | OUTPATIENT
Start: 2025-04-02

## 2025-04-02 ASSESSMENT — PAIN SCALES - GENERAL: PAINLEVEL_OUTOF10: NO PAIN (0)

## 2025-04-02 NOTE — PATIENT INSTRUCTIONS
CORE: Cardiomyopathy, Optimization, Rehabilitation, Education      Take your medicines every day, as directed    Changes/Recommendations made today:  Referral to psychiatry     Monitor Your Weight and Symptoms    Contact us if you:    Gain 2 pounds in one day or 5 pounds in one week  Feel more short of breath  Notice more leg swelling  Feel lightheadeded   Change your lifestyle    Limit Salt or Sodium:  2000 mg  Limit Fluids:  2000 mL or approximately 64 ounces  Eat a Heart Healthy Diet  Low in saturated fats  Stay Active:  Aim to move at least 150 minutes every  week         To Contact us    During Business Hours:  [phone], option # 1      After hours, weekends or holidays:   [phone], Option #4  Ask to speak to the On-Call Cardiologist. Inform them you are a CORE/heart failure patient at the [facility].     Use Tapvalue allows you to communicate directly with your heart team through secure messaging.  [employer] can be accessed any time on your phone, computer, or tablet.  If you need assistance, we'd be happy to help!         Keep your Heart Appointments:    - EP 4/29/25  - CPX/RHC 6/19/25  - Dr. Fuentes 6/23/25  - CORE 6 months

## 2025-04-02 NOTE — TELEPHONE ENCOUNTER
PSYCHIATRY CLINIC PHONE INTAKE     SERVICES REQUESTED / INTERESTED IN          Med Management    Presenting Problem and Brief History                              What would you like to be seen for? (brief description):  Patient stated she would like to see a psychiatrist to receive a mental health diagnoses.  Have you received a mental health diagnosis? No   Which one (s): N/A  Is there any history of developmental delay?  No   Are you currently seeing a mental health provider?  Yes            Who / month last seen:  Patient is seeing therapist (Baldomero Torres) through [facility].     Patient last saw therapist on 3/26/2025  Do you have mental health records elsewhere?  No  Will you sign a release so we can obtain them?   N/A    Have you ever been hospitalized for psychiatric reasons?  No  Describe:  N/A    Do you have current thoughts of self-harm?  No    Do you currently have thoughts of harming others?  No    Do you have any safety concerns? No   If yes to these, offer to reach out to a  for follow up.      Substance Use History     Do you have any history of alcohol  or other substance use?  No  Describe:  N/A   Have you ever received treatment for this?  No    Describe:  N/A     Social History     Who is the patient's a guardian?   Patient is her own Guardian     Name / number: N/A   Have you had an ACT team in last 12 months?  No  Describe: N/A    OK to leave a detailed voicemail?  Yes    Would you be interested in learning more about research opportunities for which you or your child may qualify? We can connect you with a team member for more information.  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)    Anxiety    Biliary dyskinesia    Chronic cholecystitis    Heart failure with reduced  ejection fraction (H)    Hypertrophic nonobstructive cardiomyopathy (H)    ICD (implantable cardioverter-defibrillator) in place    Moderate asthma without complication    Moderate episode of recurrent major depressive disorder (H)    NSVT (nonsustained ventricular tachycardia) (H)    Paroxysmal SVT (supraventricular tachycardia)    Josselyn Parkinson White pattern seen on electrocardiogram    Diabetes mellitus, type 2 (H)    Adjustment disorder with mixed anxiety and depressed mood    VT (ventricular tachycardia) (H)    Chronic left-sided thoracic back pain          Medications             Have you taken >3 psychiatric medications in your past?  No  Do you currently take 5 or more medications, including prescriptions, supplements, and other over the counter products?  Yes     If YES to at least one of these questions:   As part of your evaluation in our clinic, we have specially trained pharmacists as part of your care team. Your provider would like for you to meet with one of our pharmacists to review your current and past medications, ensure your med list is up to date, and queue up any questions or concerns you have about medications. They will review all of your medications, not just for mental health, to help ensure you know what you re taking and that everything is working together.     Please schedule patient in [facility] PSYCHIATRY (Vanessa Owen or Inés Soto) for 60m MTM in any green space as virtual (video), telephone, or in person (designated in person days per Epic templates).  -Appt notes can say  Psych eval on xx/xx   -Route telephone encounter to the pharmacist who will be seeing the patient.  If patient has questions about insurance coverage or billing, please still schedule the visit and refer them to call the [facility] coordinators at [phone].    Current Outpatient Medications   Medication Sig Dispense Refill    acetaminophen (TYLENOL) 325 MG tablet Take 325-650 mg by mouth every 6 hours as  needed for mild pain.      albuterol (PROAIR HFA/PROVENTIL HFA/VENTOLIN HFA) 108 (90 Base) MCG/ACT inhaler Inhale 1-2 puffs into the lungs every 6 hours as needed for shortness of breath, wheezing or cough. 18 g 1    digoxin (LANOXIN) 250 MCG tablet Take 1 tablet (250 mcg) by mouth daily. 90 tablet 3    empagliflozin (JARDIANCE) 25 MG TABS tablet Take 1 tablet (25 mg) by mouth daily. 90 tablet 1    fluocinonide (LIDEX) 0.05 % external solution Apply topically 2 times daily as needed (scalp irritation). 60 mL 1    furosemide (LASIX) 20 MG tablet Take 1 tablet (20 mg) by mouth daily as needed (Take daily if your weight increases by more than 3 lbs in one day or  5 lbs in three days.). 90 tablet 3    loperamide (IMODIUM) 2 MG capsule Take 2 capsules (4 mg) by mouth as needed for diarrhea. 90 capsule 3    LORazepam (ATIVAN) 1 MG tablet Take 1 tablet (1 mg) by mouth every 6 hours as needed for anxiety. 20 tablet 0    melatonin 3 MG tablet Take 3 mg by mouth nightly as needed      metoprolol succinate ER (TOPROL XL) 50 MG 24 hr tablet Take 1 tablet (50 mg) by mouth 2 times daily. 180 tablet 3    metoprolol tartrate (LOPRESSOR) 25 MG tablet Take 1 tablet (25 mg) by mouth daily as needed (take for increased fluttering/palpitations and for HR greater than 110 bpm). 90 tablet 1    nitroGLYcerin (NITROSTAT) 0.4 MG sublingual tablet For chest pain place 1 tablet under the tongue every 5 minutes for 3 doses. If symptoms persist 5 minutes after 1st dose call 911. 10 tablet 1    ondansetron (ZOFRAN ODT) 4 MG ODT tab Place 1 tablet (4 mg) under the tongue as needed for nausea. 30 tablet 0    pantoprazole (PROTONIX) 20 MG EC tablet Take 1 tablet (20 mg) by mouth daily. 90 tablet 0    sacubitril-valsartan (ENTRESTO)  MG per tablet Take 1 tablet by mouth 2 times daily. 180 tablet 3    spironolactone (ALDACTONE) 25 MG tablet Take 0.5 tablets (12.5 mg) by mouth daily. 90 tablet 3    tiZANidine (ZANAFLEX) 2 MG tablet Take 1 tablet  (2 mg) by mouth 3 times daily as needed for muscle spasms. 90 tablet 2         DISPOSITION      4/2/2025 Intake Complete (Appointment scheduled with Maxine Ceron on 5/5/2025 at 2:00 PM)    Cody Reyes (Complex )

## 2025-04-02 NOTE — NURSING NOTE
Chief Complaint   Patient presents with    Follow Up      Return CORE, labs prior, recent admission for palpitations.         Vitals were taken, medications reconciled     Mk Deutsch, Clinic Assistant     8:15 AM

## 2025-04-02 NOTE — TELEPHONE ENCOUNTER
Seen in office 3/11 and discussed anxiety, however last ativan prescription was prescribed by cards provider (however per [facility] refill request 2/25, they recommended following up with PCP or mental health for future refills), please advise if able to fill this or what you recommend - thank you!      Va Douglas, RN, BSN  [facility]  [place], [place] and [place]

## 2025-04-02 NOTE — PATIENT INSTRUCTIONS
If you have labs or imaging done, the results will automatically release in Truist without an interpretation.  Your health care professional will review those results and send an interpretation with recommendations as soon as possible, but this may be 1-3 business days.    If you have any questions regarding your visit, please contact your care team.     [email or web address] Access Services: [phone]  [facility] CLINIC HOURS TELEPHONE NUMBER   Kristen Angel, KEVON Frias-MARBIN Henry-MARBIN Nelson-Surgery Scheduler  Blanka-       Monday- [place]  8:00 am-4:00 pm    Tuesday- [place]  8:00 am-4:00 pm    Wednesday- [place] 8:00 am-4:00 pm    Thursday- [place] 8:00 am-4:00 pm    Friday- [place]  8:00 am-4:00 pm [facility]  [address] [place]  [place] [address]  PH: [phone]  Fax: [phone]    Imaging Scheduling all locations  PH: [phone]     [facility]  [address] [place] [address] [address].  [place], [place] [address] [phone]    [facility]  [address] [place] [address]  PH: [phone]     **Surgeries** Our Surgery Schedulers will contact you to schedule. If you do not receive a call within 3 business days, please call [phone].  Urgent Care locations:  [facility]       Monday-Friday   10 am - 8 pm    Saturday and Sunday   9 am - 5 pm   [phone]   If you need a medication refill, please contact your pharmacy. Please allow 3 business days for your refill to be completed.  As always, Thank you for trusting us with your healthcare needs!

## 2025-04-02 NOTE — LETTER
4/2/2025      RE: Mary Shaikh  [address]  [place] [address]       Dear Colleague,    Thank you for the opportunity to participate in the care of your patient, Mary Shaikh, at the [facility] [place] at [facility]. Please see a copy of my visit note below.      [facility]   [facility]      HPI:   Ms. 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 She presents to cardiology clinic for CORE follow up.      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 Per chart review from recent visit with Dr. Fuentes, patient has had a signficant reduction in her LVEF to 26% on CMR with extensive LGE that is consistent with Danon disease. 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 She has a normal CK but has not yet seen neuromuscular neurology regarding the skeletal myopathy. She had an ICD as secondary SCD prevention after syncope and SVT. Her exercise tolerance has decreased. Her CPEX demonstrates an exercise duration of 8 min and 17 seconds. She achieved an RER of 1.09 and had a peak V02 of 18.7 which is a reduction from past CPEX, and a VE/VC02 slope of 32. Her HR and BP responses were normal.     Underwent RHC 7/31/24 which revealed patient in ambulatory cardiogenic shock. She was therefore admitted for GDMT optimization and to start advance therapies evaluation. Her body weight at admission 170 lbs, discharge weight 157 lbs.      Seen by   Gina on 10/28/24. At this visit she reported dyspnea after walking about 15 minutes. She had an ICD shock on 10/3 for VT with rate of 237 bpm. She also had 105 NSVT episodes. Her carvedilol was increased to 6.25mg BID. Following this visit she was referred to neuromuscular neurology with neuromuscular PFTs. Also referred to mental health. Underwent repeat RHC on 12/16/24 which showed normal right and left filling pressures.     Most recently admitted 12/15-12/18 following ICD shocks. Reported having palpitations with dizziness. Device interrogation showed 1 VT zone lasting 5 mins s/p 4 failed ATP then 3 shocks, suggested AF with RVR. 6 NSVTs, suggest atrial in origin. Echo (12/16) showed LVEF 20-30% with moderate diffuse hypokinesia, normal IVC. No significant change from 7/2024 echo.     Most recently seen in clinic by Dr. Fuentes on 2/24/25. Continued to have BUI with minimal exertion. Continued to have considerable anxiety related to previous ICD shock and concern for another. Referred to psychiatry. Also referred to neuromuscular neurology with plan for neuromuscular PFTs. CPEX and RHC in ~ 3-4 months.     Seen in the ED 3/13/25 for chest pain, shortness of breath, and GI upset. ICD interrogation showed 45 episodes of atrial tachycardia episodes lasting between 3 and 67 seconds and no ventricular arrhythmias, frequent PVCs. Laboratory evaluation unremarkable.      Today, Ms. Shiakh presents to clinic with her father. She reports feeling okay. Seen by GI on 4/1 for stomach pain/cramping. Plan to proceed with CT and EGD. Anxiety has been better controlled. Continues to follow with health psych, but is also interested in establishing with psychiatry. Continues to have stable/unchanged dyspnea on exertion, chest tightness, and palpitations. No recent shocks. She has been trying to focus more on therapy taught coping mechanisms to help manage stress/anxiety related to palpitations and has found this to be helpful.  On occasion will take ativan and or extra metoprolol and has also found these medications to be helpful. Denies lightheaded/dizzy episodes. No syncope or near syncope. Denies orthopnea, PND, LE edema.     Cardiac Medications  - Digoxin 250 mcg daily   - Jardiance 10 mg daily   - Metoprolol succinate 50 mg BID  - Lasix 20 mg daily PRN  - Entresto  mg BID  - Spironolactone 12.5 mg daily     PAST MEDICAL HISTORY:  No past medical history on file.    FAMILY HISTORY:  No family history on file.    SOCIAL HISTORY:  Social History     Socioeconomic History     Marital status: Single   Tobacco Use     Smoking status: Never     Smokeless tobacco: Never   Substance and Sexual Activity     Alcohol use: Never     Drug use: Never     Social Determinants of Health     Financial Resource Strain: Low Risk  (7/10/2024)    Received from [facility]    Financial Resource Strain      Difficulty of Paying Living Expenses: 3   Food Insecurity: No Food Insecurity (7/10/2024)    Received from [facility]    Food Insecurity      Worried About Running Out of Food in the Last Year: 1   Transportation Needs: No Transportation Needs (7/10/2024)    Received from [facility]    Transportation Needs      Lack of Transportation (Medical): 1   Social Connections: Socially Integrated (7/10/2024)    Received from [facility]    Social Connections      Frequency of Communication with Friends and Family: 0   Interpersonal Safety: Not At Risk (6/20/2024)    Received from [facility]    Humiliation, Afraid, Rape, and Kick questionnaire      Fear of Current or Ex-Partner: No      Emotionally Abused: No      Physically Abused: No      Sexually Abused: No   Housing Stability: Low Risk  (7/10/2024)    Received from [facility]    Housing Stability      Unable to Pay for Housing in  the Last Year: 1       CURRENT MEDICATIONS:  Current Outpatient Medications   Medication Sig Dispense Refill     acetaminophen (TYLENOL) 325 MG tablet Take 325-650 mg by mouth every 6 hours as needed for mild pain.       albuterol (PROAIR HFA/PROVENTIL HFA/VENTOLIN HFA) 108 (90 Base) MCG/ACT inhaler Inhale 1-2 puffs into the lungs every 6 hours as needed for shortness of breath, wheezing or cough. 18 g 1     digoxin (LANOXIN) 250 MCG tablet Take 1 tablet (250 mcg) by mouth daily. 90 tablet 3     empagliflozin (JARDIANCE) 25 MG TABS tablet Take 1 tablet (25 mg) by mouth daily. 90 tablet 1     fluocinonide (LIDEX) 0.05 % external solution Apply topically 2 times daily as needed (scalp irritation). 60 mL 1     furosemide (LASIX) 20 MG tablet Take 1 tablet (20 mg) by mouth daily as needed (Take daily if your weight increases by more than 3 lbs in one day or  5 lbs in three days.). 90 tablet 3     loperamide (IMODIUM) 2 MG capsule Take 2 capsules (4 mg) by mouth as needed for diarrhea. 90 capsule 3     LORazepam (ATIVAN) 1 MG tablet Take 1 tablet (1 mg) by mouth every 6 hours as needed for anxiety. 20 tablet 0     melatonin 3 MG tablet Take 3 mg by mouth nightly as needed       metoprolol succinate ER (TOPROL XL) 50 MG 24 hr tablet Take 1 tablet (50 mg) by mouth 2 times daily. 180 tablet 3     metoprolol tartrate (LOPRESSOR) 25 MG tablet Take 1 tablet (25 mg) by mouth daily as needed (take for increased fluttering/palpitations and for HR greater than 110 bpm). 90 tablet 1     nitroGLYcerin (NITROSTAT) 0.4 MG sublingual tablet For chest pain place 1 tablet under the tongue every 5 minutes for 3 doses. If symptoms persist 5 minutes after 1st dose call 911. 10 tablet 1     ondansetron (ZOFRAN ODT) 4 MG ODT tab Place 1 tablet (4 mg) under the tongue as needed for nausea. 30 tablet 0     pantoprazole (PROTONIX) 20 MG EC tablet Take 1 tablet (20 mg) by mouth daily. 90 tablet 0     sacubitril-valsartan (ENTRESTO)  MG per  tablet Take 1 tablet by mouth 2 times daily. 180 tablet 3     spironolactone (ALDACTONE) 25 MG tablet Take 0.5 tablets (12.5 mg) by mouth daily. 90 tablet 3     tiZANidine (ZANAFLEX) 2 MG tablet Take 1 tablet (2 mg) by mouth 3 times daily as needed for muscle spasms. 90 tablet 2     No current facility-administered medications for this visit.       ROS:   Refer to HPI    EXAM:  LMP  (LMP Unknown)    LMP  (LMP Unknown)    /65 (BP Location: Right arm, Patient Position: Chair, Cuff Size: Adult Regular)   Pulse 60   Wt 68.1 kg (150 lb 3.2 oz)   LMP  (LMP Unknown)   SpO2 100%   BMI 25.38 kg/m      GENERAL: Appears comfortable, in no acute distress.   HEENT: Eye symmetrical, no discharge or icterus bilaterally. Mucous membranes moist and without lesions.  CV: bradycardic, +S1S2, no murmur, rub, or gallop. JVP < 6 cm at 90 degrees.   RESPIRATORY: Respirations regular, even, and unlabored. Lungs CTA throughout  GI: Soft and non distended with normoactive bowel sounds present in all quadrants. No tenderness, rebound, guarding. No hepatomegaly.   EXTREMITIES: no peripheral edema. 2+ bilateral pedal pulses.   NEUROLOGIC: Alert and oriented x 3. No focal deficits.   MUSCULOSKELETAL: No joint swelling or tenderness.   SKIN: No jaundice. No rashes or lesions.     Labs, reviewed with patient in clinic today:  CBC RESULTS:  Lab Results   Component Value Date    WBC 5.3 03/13/2025    RBC 5.39 (H) 03/13/2025    HGB 14.8 03/13/2025    HCT 46.6 03/13/2025    MCV 87 03/13/2025    MCH 27.5 03/13/2025    MCHC 31.8 03/13/2025    RDW 15.0 03/13/2025     03/13/2025       CMP RESULTS:  Lab Results   Component Value Date     03/13/2025    POTASSIUM 5.2 03/13/2025    POTASSIUM 4.0 05/09/2022    CHLORIDE 104 03/13/2025    CHLORIDE 107 05/09/2022    CO2 19 (L) 03/13/2025    CO2 29 05/09/2022    ANIONGAP 14 03/13/2025    ANIONGAP 5 05/09/2022    GLC 85 03/13/2025    GLC 96 08/01/2024    GLC 86 05/09/2022    BUN 12.0  03/13/2025    BUN 8 05/09/2022    CR 0.69 03/13/2025    GFRESTIMATED >90 03/13/2025    WHITNEY 9.7 03/13/2025    BILITOTAL 0.4 03/21/2025    ALBUMIN 4.7 03/21/2025    ALBUMIN 3.9 05/09/2022    ALKPHOS 81 03/21/2025    ALT 23 03/21/2025    AST 46 (H) 03/21/2025        INR RESULTS:  Lab Results   Component Value Date    INR 1.18 (H) 12/15/2024       Lab Results   Component Value Date    MAG 2.3 03/13/2025     Lab Results   Component Value Date    NTBNPI 1,366 (H) 03/13/2025     Lab Results   Component Value Date    NTBNP 1,097 (H) 02/24/2025       Cardiac Diagnostics:    3/13/25 Device Interrogation   Device: conXt [id number] RESONATE HF ICD  Normal Device Function  Mode: DDD  bpm  AP: 70%  : 3%  Presenting EGM: AS-VS 68 bpm w/ frequent PVC's.  Thoracic Impedance:  Near reference line.   Lead Trends Appear Stable.  Estimated battery longevity to RRT = 10.5 years.   Atrial Arrhythmia: 45 AT/AF episodes detected since 2/24/25, V rates Avg V rates 146 bpm.  Longest episode lasting 1 min 7 sec.  AF Mun[place]: <1%  Anticoagulant: None  Ventricular Arrhythmia: None  PVC burden: 355/ hour over the past 17 days.    12/16/2024 RHC     Right sided filling pressures are normal.     Left sided filling pressures are normal.     Normal PA pressures.     Normal cardiac output level.  RA 8/--/--  RV 34/6  PA 32/9 (19)  PCWP --/--/10  TDCO 4.03, TDCI 2.29  Nohelia CO 4.46, nohelia CI 2.53     12/16/2024 Echo  Interpretation Summary  Left ventricular function is decreased. The ejection fraction is 20-30%  (severely reduced).  Moderate diffuse hypokinesis is present.  Global right ventricular function is moderately reduced  IVC diameter <2.1 cm collapsing >50% with sniff suggests a normal RA pressure  of 3 mmHg.  No pericardial effusion is present.    7/31/2024 Echo  Interpretation Summary  Left ventricular function is decreased. The ejection fraction is 20-25%  (severely reduced).  Global right ventricular function is moderately  reduced.  Moderate biatrial enlargement is present.  IVC diameter >2.1 cm collapsing <50% with sniff suggests a high RA pressure  estimated at 15 mmHg or greater.  Trivial pericardial effusion is present.  There is no prior study for direct comparison.  _____________________________________    7/31/2024 RHC  RIGHT HEART CATHETERIZATION:    === Baseline ====    BSA 1.87 m2  /74/89 mmHg  RA 21/25/20 mmHg  RV 51/20mmHg  PA 51/29/36 mmHg  PCWP 30/40/29 mmHg  TD CO/CI 2.9/1.55   Juan CO/CI 3.13/1.68   PVR 1.9   SVR 1762  PA sat 43.5%   PCW Oximeter sat 98% Right sided filling pressures are severely elevated. Left sided filling pressures are severely elevated. Moderately elevated pulmonary artery hypertension. Reduced cardiac output level.       5/15/2024 CPX     A cardiopulmonary stress test was performed following a Vivek ramp protocol with the patient exercising for 8 minutes and 17 seconds under the supervision of Dr. SURAJ Martinez.  Heart rate demonstrated a normal response to exercise.  Blood pressure demonstrated a blunted response to exercise. The patient's exercise capacity is severely impaired. The test was stopped due to 8/10 dyspnea. The patient reported dyspnea during the stress test. Functional capacity response is Class II: 50-74%. The patient experienced no angina during the test.     Prior Study: A prior study was conducted on 7/24/2023. This study has changes noted with the prior study. The patients peak VO2 decreased by 13% compared to previous test.      Assessment and Plan:   Ms. 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 She presents to cardiology clinic for CORE follow up.     She has had a signficant reduction in her LVEF to 26% on CMR with extensive LGE that is consistent with Dannon disease.  "Dannon disease is multisystemic including retinopathy, cognitive impairment, and skeletal myopathy, though these findings are variable in women who are carriers based on X inactivation. She has a normal CK but has not yet seen neuromuscular neurology regarding the skeletal myopathy. She had an ICD as secondary SCD prevention after syncope and SVT.      Scheduled to see neuromuscular neurology on 4/30/25.  She should have have neuromuscular PFTs as part of her candidacy evaluation.      She also is adjusting to her functional limitations and diagnosis and has anxiety. She regularly sees health psychology. She has a prescription for ativan PRN. We have previously discussed that I think she would benefit from additional medication management. Recommended that she discuss starting an selective serotonin reuptake inhibitor/SNRI with her PCP, however given this has yet to happen, we will refer to psychiatry for further management.     Appears grossly euvolemic by exam. Warm and compensated. She is having episodes of palpitations/fluttering that are associated with shortness of breath and lightheadedness. By report, HRs have been well controlled, mostly in 60s-90s, and up to 120 with exertion. She is currently well optimized on GDMT. BPs are controlled. Review of labs with normal lytes and stable renal function. We will make no medication changes today. Okay to use extra beta blocker on a sparingly basis for palpitations.       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  No signs of organ dysfunction and or hypoperfusion. Making adequate UO. S/p IV nitroprusside for afterload reduction and IV diuretics. BW at admission 170 lbs, discharge weight 157 lbs. GDMT optimization and start advance therapies evaluation.  GDMT:   - BB: " "Metoprolol succinate 50 mg BID  - ACEI/ARB/ARNI: Entresto 97/103 BID  - SGLT2i: Jardiance 10mg  - MRA: Spirolactone 12.5 mg daily    - Diuretics : Furosemide 20 mg daily PRN  - BMP WNL today, will repeat prior to next CORE appt (prefers [facility])  - Continue outpatient LVAD/transplant evaluation: neuropsych consult (8/27)  - Contraception: on OCP     #History of SVT with preexitation   #S/P ICD sudden cardiac death prevention  Brief episodes of sypmtomatic SVT while in the hospital, responsive to vagal maneuvers. 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 EP was consulted and recommended starting oral digoxin and low-dose beta-blocker.  Per EP note, \"if SVT increases in frequency or duration she may require another EP study in the future to further characterize the arrhythmia and determine whether it would be amenable to an ablation\".   - Digoxin 250 mcg daily   - Beta blocker as above       Follow up:  - EP 4/29/25  - CPX/RHC 6/1925  - Dr. Fuentes 6/23/25  - CORE 6 months      A total of 45 minutes was spent on the day of the visit, which includes preparation for the visit (reviewing previous medical records, laboratories and investigations), in conjunction with the actual clinic visit with the patient, which includes obtaining a history and physical exam, creating and reviewing the care plan, patient education (and family if present), counseling, documenting clinical information in the electronic health record and care coordination.   The longitudinal plan of care for the diagnosis(es)/condition(s) as documented were addressed during this visit. Due to the added complexity in care, I will continue to support Mary in the subsequent management and with ongoing continuity of care.        Jocelyn Pichardo, KEVON, NP-C  Advance Heart Failure  4/2/2025      Please do not hesitate to contact me if you have any questions/concerns.     Sincerely,     Jocelyn Pichardo, ZITA CNP  "

## 2025-04-03 ENCOUNTER — OFFICE VISIT (OUTPATIENT)
Dept: OBGYN | Facility: CLINIC | Age: 25
End: 2025-04-03
Attending: NURSE PRACTITIONER
Payer: COMMERCIAL

## 2025-04-03 VITALS
HEIGHT: 65 IN | HEART RATE: 61 BPM | OXYGEN SATURATION: 99 % | DIASTOLIC BLOOD PRESSURE: 64 MMHG | WEIGHT: 145.8 LBS | SYSTOLIC BLOOD PRESSURE: 104 MMHG | BODY MASS INDEX: 24.29 KG/M2

## 2025-04-03 DIAGNOSIS — N91.2 AMENORRHEA: ICD-10-CM

## 2025-04-03 LAB
ESTRADIOL SERPL-MCNC: 48 PG/ML
FSH SERPL IRP2-ACNC: 9.2 MIU/ML
LH SERPL-ACNC: 24.2 MIU/ML
MIS SERPL-MCNC: 13.8 NG/ML (ref 1.2–12)
PROGEST SERPL-MCNC: 0.5 NG/ML

## 2025-04-03 RX ORDER — PROGESTERONE 200 MG/1
200 CAPSULE ORAL DAILY
Qty: 10 CAPSULE | Refills: 0 | Status: SHIPPED | OUTPATIENT
Start: 2025-04-03 | End: 2025-04-03

## 2025-04-03 RX ORDER — PROGESTERONE 200 MG/1
200 CAPSULE ORAL DAILY
Qty: 10 CAPSULE | Refills: 0 | Status: SHIPPED | OUTPATIENT
Start: 2025-04-03 | End: 2025-04-13

## 2025-04-03 ASSESSMENT — ASTHMA QUESTIONNAIRES
QUESTION_3 LAST FOUR WEEKS HOW OFTEN DID YOUR ASTHMA SYMPTOMS (WHEEZING, COUGHING, SHORTNESS OF BREATH, CHEST TIGHTNESS OR PAIN) WAKE YOU UP AT NIGHT OR EARLIER THAN USUAL IN THE MORNING: ONCE A WEEK
QUESTION_4 LAST FOUR WEEKS HOW OFTEN HAVE YOU USED YOUR RESCUE INHALER OR NEBULIZER MEDICATION (SUCH AS ALBUTEROL): ONCE A WEEK OR LESS
QUESTION_2 LAST FOUR WEEKS HOW OFTEN HAVE YOU HAD SHORTNESS OF BREATH: MORE THAN ONCE A DAY
QUESTION_1 LAST FOUR WEEKS HOW MUCH OF THE TIME DID YOUR ASTHMA KEEP YOU FROM GETTING AS MUCH DONE AT WORK, SCHOOL OR AT HOME: SOME OF THE TIME
ACT_TOTALSCORE: 14
ACT_TOTALSCORE: 14
QUESTION_5 LAST FOUR WEEKS HOW WOULD YOU RATE YOUR ASTHMA CONTROL: SOMEWHAT CONTROLLED

## 2025-04-03 NOTE — PROGRESS NOTES
"Subjective:  Mary is a 24 year old   is here today with the following concerns:    Amenorrhea: she was on OCPs for 3 years and stopped last 2024. She has not had any bleeding since. She does not recall her periods before starting OCPs but believes she would get them on her own. She has a complex cardiac medical history and is on several cardiac medications. She was told that her medications could possibly be the reason she is not having menstrual cycles. BMI 24, no hirsutism, denies excessive exercise or ED. Her PCP recently had TSH, UPT and prolactin completed and they are WNL/negative. Not currently sexually active.    ROS: Pertinent ROS as above.    Medical history  OB History    Para Term  AB Living   0 0 0 0 0 0   SAB IAB Ectopic Multiple Live Births   0 0 0 0 0      No past medical history on file.   Past Surgical History:   Procedure Laterality Date    CV RIGHT HEART CATH MEASUREMENTS RECORDED N/A 2024    Procedure: Heart Cath Right Heart Cath;  Surgeon: Tanmay Brice MD;  [facility]    CV RIGHT HEART CATH MEASUREMENTS RECORDED N/A 11/15/2024    Procedure: Heart Cath Right Heart Cath;  Surgeon: Tanmay Brice MD;  [facility]    CV RIGHT HEART CATH MEASUREMENTS RECORDED N/A 2024    Procedure: Right Heart Catheterization;  Surgeon: Evens Rodrigues MD;  [facility]     ALL/Meds: Her medication and allergy histories were reviewed and are documented in their appropriate chart areas.    SH: Reviewed and documented in the appropriate area of the chart.  FH:  Her family history is reviewed and updated in the chart, today.  PMH: Her past medical, surgical, and obstetric histories were reviewed and updated today in the appropriate chart areas.    Objective:  PE: /64 (BP Location: Right arm, Patient Position: Sitting, Cuff Size: Adult Regular)   Pulse 61   Ht 1.638 m (5' 4.5\")   Wt 66.1 kg " (145 lb 12.8 oz)   LMP  (LMP Unknown)   SpO2 99%   BMI 24.64 kg/m    Body mass index is 24.64 kg/m .    Pertinent Physical exam findings:    GENERAL: Active, alert, in no acute distress.  SKIN: Clear. No significant rash, abnormal pigmentation or lesions  MS: no gross musculoskeletal defects noted, no edema  PSYCH: Age-appropriate alertness and orientation    A/P:  Mary Shaikh is a 24 year old  here today with the following concerns:  (N91.2) Amenorrhea  Comment: We discussed the various etiologies of amenorrhea such as PCOS, pregnancy, POI, thyroid/prolactin dysfunction, adrenal dysfunction. Cause of her missing periods is unknown at this time, however, pregnancy and thyroid and prolactin dysfunction have been ruled out. Will proceed with lab work as ordered below for further evaluation, TVUS, and 10d of Prometrium to induce bleed. Consider referral to endocrinology if imaging and lab work are inconclusive.   Plan: Estradiol, Follicle stimulating hormone,         Luteinizing Hormone, Mullerian Hormone         Antibody, Progesterone, US Pelvic         Transabdominal and Transvaginal, DHEA sulfate,         17 OH progesterone, progesterone (PROMETRIUM)         200 MG capsule    She is agreeable to the plan above and has no further questions or concerns. She did not request a chaperone for the physical exam component of the visit today.     ZITA Zelaya CNP

## 2025-04-04 ENCOUNTER — ANCILLARY PROCEDURE (OUTPATIENT)
Dept: ULTRASOUND IMAGING | Facility: CLINIC | Age: 25
End: 2025-04-04
Payer: COMMERCIAL

## 2025-04-04 DIAGNOSIS — N91.2 AMENORRHEA: ICD-10-CM

## 2025-04-04 PROCEDURE — 76830 TRANSVAGINAL US NON-OB: CPT | Mod: TC | Performed by: RADIOLOGY

## 2025-04-04 PROCEDURE — 76856 US EXAM PELVIC COMPLETE: CPT | Mod: TC | Performed by: RADIOLOGY

## 2025-04-10 LAB — 17OHP SERPL-MCNC: 94 NG/DL

## 2025-04-15 ENCOUNTER — ANCILLARY PROCEDURE (OUTPATIENT)
Dept: CT IMAGING | Facility: CLINIC | Age: 25
End: 2025-04-15
Attending: STUDENT IN AN ORGANIZED HEALTH CARE EDUCATION/TRAINING PROGRAM
Payer: COMMERCIAL

## 2025-04-15 DIAGNOSIS — R10.84 ABDOMINAL PAIN, GENERALIZED: ICD-10-CM

## 2025-04-15 PROCEDURE — 74177 CT ABD & PELVIS W/CONTRAST: CPT | Performed by: RADIOLOGY

## 2025-04-15 RX ORDER — IOPAMIDOL 755 MG/ML
80 INJECTION, SOLUTION INTRAVASCULAR ONCE
Status: COMPLETED | OUTPATIENT
Start: 2025-04-15 | End: 2025-04-15

## 2025-04-15 RX ADMIN — IOPAMIDOL 80 ML: 755 INJECTION, SOLUTION INTRAVASCULAR at 17:50

## 2025-04-15 NOTE — PROGRESS NOTES
ELECTROPHYSIOLOGY CLINIC VISIT    Assessment/Recommendations   Assessment/Plan:    Ms. 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      SVT  Patient developed brief episode of SVT while being in the hospital back in October. 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 She was started on digoxin and coreg. Hasn't had recurrence since then on device interrogations. Could consider EP study/ablation in the future if increases in frequency or duration.   - Continue digoxin and metoprolol, as below.     Paroxysmal Atrial Fibrillation/Flutter:   We discussed the management/treatment options for Afib includin. Stroke Prophylaxis: CHADSVASC 2 (+female, +HF) 2, corresponding to a 2.2% annual stroke / systemic embolism event rate. No indication for long term AC at this time.   2. Rate Control: Continue digoxin 250 mcg and metoprolol XL 50 mg BID.   3. Rhythm Control: Cardioversion, Antiarrhythmics and/or ablation are options for rhythm control. S/p CTI ablation 2024. Device interrogation tracings have historically shown brief episodes of atrial fibrillation lasting less than 1 minute. However, she presented to the ER in 2024 following 3 ICD shocks. EGMs suggestive of AF with RVR inappropriately sensed as VT in the setting of baseline known IVCD. One such episode was recorded as VT lasting about 5 mins, 4 ATP sequences unable to break and so shocked 3 times. There are also multiple episodes recorded as NSVT, which also seemed to be a-fib with RVR. She remained hemodynamically stable. Pacer mode changed to DDD with back-up rate of 60 and VT/VF detection time increased to 30 sec. Coreg was also changed to metoprolol  XL 50 mg BID at this time. AF burden since admission <1%.   4. Risk Factor Management: Statin, BP control, and ASHLEY evaluation as indicated. BP in clinic today 103/68.    Inappropriate ICD shocks  Patient admitted in 12/2024 following inappropriate shocks from her device for Afib with RVR, as explained above. No significant ventricular arrhythmias on recent device interrogations. Patient denies shocks since this admission.     NICM LVEF 20-30%, NYHA III:  Hypertrophic Cardiomyopathy:  Preexcitation Pattern:  Frequent PVCs:   1. ACEi/ARB/ARNi: Continue entresto  mg BID.  2. BB: Continue metoprolol XL 50 mg BID and digoxin 250 mcg daily.   3. Aldosterone antagonist: Continue spironolactone 12.5 mg daily.  4. SGLT2i: Continue jardiance 10 mg daily  5.  SCD prophylaxis: s/p ICD implant 6/2024. Most recent device interrogation 3/13/25 with normal device function, stable lead parameters, AP 70%,  3%, AF burden <1%, no ventricular arrhythmias, PVC burden 355/hr, RRT =10.5 years.   - Atrial pacing percentage has significantly increased (0-1% --> >70%) since mode was switched from DDI to DDD in 12/2024. Will order repeat echo today to ensure increased pacing percentage has not effected her heart function.   - Frequent PVCs on most recent device check. No PVCs on EKG today. Patient endorses palpitations, but states they are manageable. Has metoprolol tartrate 25 mg PRN for palpitations. She rarely uses this.   6. Fluid status: Continue lasix 20 mg daily PRN.   7. 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 Fuentes. Is undergoing LVAD/transplant evaluation.       Follow up with Dr. Levi in June, as scheduled.        History of Present Illness/Subjective    Ms. Mary Shaikh is a 24 year old female who comes in today for EP follow-up of pAF, SVT, ICD.    Ms. 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      She has genetically confirmed Danon disease with a pathogenic LAMP2 mutation (c. 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 She was seen in the emergency department at [facility] and was found to have prexcitation on her ECG in 2021, and this lead to an exercise ECG test which she achieved 10.8 METs, exercise duration of 9 min and 16 seconds, had no exercised induced arrhythmias, she did have a delta wave and repolarization abnormalities. Subsequently, she had an echocardiogram which showed an LVEF of 51% with asymetric LVH (septum 1.5cm). She has been following with Dr. Swift in the [facility] cardiology clinic and she obtained a CMR which showed and LVEF of 43%, LVEDD 4.9cm, IV septum 1.7cm,  normal RV function but RVH noted, no LOU, harman LGE with subendocardial, mid myocardial, and subendocardial enhancement not associated with a vascular territory.      She was seen by Dr. Fuentes on 7/22/2024. She was admitted after her RHC from 07/31-08/05. Her RHC showed an RA of 20, PA 51/23, 36, PCWP 29, Juan CO/CI 3.13/1.68, Thermo CO/CI 2.9/1.55 and a PVR of 1.9.      On 8/1 she was admitted for ambulatory cardiogenic shock with elevated filling pressures and severe biventricular dysfunction on for swan-guided therapies with IV afterload reduction and diuresis with a hospital course that has been complicated by paroxysmal SVT for which EP is consulted. Our colleague Dr. iWll evaluated patient: \"It appears that patient has had brief episodes of symptomatic SVT while in the hospital, the most recent one was responsive to vagal maneuvers.  Etiology for the SVT is most likely secondary to AVNRT especially since the Para-Hisian RV pacing only showed retrograde conduction through the AV node with no evidence for " "accessory pathway conduction so it is less likely AVRT\" She was initiated on oral digoxin. She was also in a low dose of BB carvedilol 6.25 mg BID.      EP Visit 12/4/24: Patient reports doing well this morning. She follows up with Dr. Fuentes. No chest pain, palpitations or SOB. Endorses ongoing compliance with her medications. Has intermittent palpitations.     She was admitted 12/15-12/18 following ICD shocks. Reported having palpitations with dizziness. Device interrogation showed 1 VT zone lasting 5 mins s/p 4 failed ATP then 3 shocks, EGMs suggestive of AF with RVR. 6 NSVTs, suggest atrial in origin. Echo (12/16) showed LVEF 20-30% with moderate diffuse hypokinesia, normal IVC. No significant change from 7/2024 echo.     She was seen in clinic by Dr. Fuentes 2/24/25. Continued to have BUI with minimal exertion. Continued to have considerable anxiety related to previous ICD shock and concern for another. Referred to psychiatry. Also referred to neuromuscular neurology with plan for neuromuscular PFTs. CPEX and RHC in ~ 3-4 months.     Seen in the ED 3/13/25 for chest pain, shortness of breath, and GI upset. ICD interrogation showed 45 episodes of atrial tachycardia episodes lasting between 3 and 67 seconds (EGMs consistent with AF) and no ventricular arrhythmias, frequent PVCs. Laboratory evaluation unremarkable.     She was most recently seen by Jocelyn Pichardo NP in [facility] on 4/2/25. She continued to complain of stable/unchanged dyspnea on exertion, chest tightness, and palpitations. Denied recent shocks. No medication changes made at this time.     EP Visit 4/29/25: She presents today in follow up. She reports feeling well. Denies shocks since 12/2024. She endorses palpitations occurring 1-2x weekly. She has metoprolol tartrate as needed, but states she has been managing it without taking this. Also reports dizziness with positional changes that resolves within a few seconds, consistent with orthostatic " hypotension. She also endorses SOB with walking. Denies worsening at states she feels at baseline. Inhaler helps. She otherwise denies chest discomfort, abdominal fullness/bloating or peripheral edema, paroxysmal nocturnal dyspnea, orthopnea, pre-syncope, or syncope. Presenting 12 lead ECG shows atrial paced rhythm Vent Rate 60 bpm,  ms,  ms, QTc 418 ms. No PVCs on EKG. Device interrogation 3/13 shows normal device function, stable lead parameters, AP 70%,  3%, AF burden <1%, no ventricular arrhythmias, PVC burden 355/hr, RRT =10.5 years. Current cardiac medications include: digoxin, jardiance, lasix, metoprolol XL, metoprolol tartrate PRN, entresto, spironolactone.     I have reviewed and updated the patient's Past Medical History, Social History, Family History and Medication List.     Cardiographics (Personally Reviewed) :   Echo 12/2024:  Interpretation Summary  Left ventricular function is decreased. The ejection fraction is 20-30%  (severely reduced).  Moderate diffuse hypokinesis is present.  Global right ventricular function is moderately reduced  IVC diameter <2.1 cm collapsing >50% with sniff suggests a normal RA pressure  of 3 mmHg.  No pericardial effusion is present.    Echo 7/2024:   Interpretation Summary  Left ventricular function is decreased. The ejection fraction is 20-25%  (severely reduced).  Global right ventricular function is moderately reduced.  Moderate biatrial enlargement is present.  IVC diameter >2.1 cm collapsing <50% with sniff suggests a high RA pressure  estimated at 15 mmHg or greater.  Trivial pericardial effusion is present.  There is no prior study for direct comparison.     CMR 4/2024:  1) Decreased left ventricular systolic function-severe; calculated ejection fraction 26%.   2) Asymmetric septal hypertrophy relative to the posterior wall.   3) Multiple regional wall motion abnormalities-mid to distal anterior wall akinetic, distal inferior wall akinetic, and  "apex akinetic.   4) Extensive late gadolinium hyperenhancement involving the mid-apical anterior wall, mid-apical inferior wall. The apical anterior wall and apex exhibit near transmural hyperenhancement.   5) Borderline elevated extracellular volume on parametric assessment (reported above).   6) Small, circumferential pericardial effusion.     Compared to prior study dated 11-, the left ventricular ejection fraction has decreased; segmental regional dysfunction is now apparent and extensive late gadolinium hyperenhancement continues to be present. In this patient with known diagnosis of Danon's disease, the above findings may suggest disease progression.        Physical Examination   /68 (BP Location: Right arm, Patient Position: Chair, Cuff Size: Adult Regular)   Pulse 60   Ht 1.626 m (5' 4\")   Wt 65.8 kg (145 lb)   LMP  (LMP Unknown)   SpO2 100%   BMI 24.89 kg/m    Wt Readings from Last 3 Encounters:   04/29/25 65.8 kg (145 lb)   04/03/25 66.1 kg (145 lb 12.8 oz)   04/02/25 68.1 kg (150 lb 3.2 oz)     General Appearance:   Alert, well-appearing and in no acute distress.   HEENT: Atraumatic, normocephalic.    Chest/Lungs:   Respirations unlabored.  Lungs are clear to auscultation.   Cardiovascular:   Regular rate and rhythm.  S1/S2. S3 gallop heard on auscultation.    Abdomen:  Soft, nontender, nondistended.   Extremities: No cyanosis or clubbing. No edema.     Musculoskeletal: Moves all extremities.     Skin: Warm, dry, intact.    Neurologic: Mood and affect are appropriate.  Alert and oriented to person, place, time, and situation.          Medications  Allergies   Current Outpatient Medications   Medication Sig Dispense Refill    acetaminophen (TYLENOL) 325 MG tablet Take 325-650 mg by mouth every 6 hours as needed for mild pain.      albuterol (PROAIR HFA/PROVENTIL HFA/VENTOLIN HFA) 108 (90 Base) MCG/ACT inhaler Inhale 1-2 puffs into the lungs every 6 hours as needed for shortness of " breath, wheezing or cough. 18 g 1    digoxin (LANOXIN) 250 MCG tablet Take 1 tablet (250 mcg) by mouth daily. 90 tablet 3    empagliflozin (JARDIANCE) 25 MG TABS tablet Take 1 tablet (25 mg) by mouth daily. 90 tablet 1    fluocinonide (LIDEX) 0.05 % external solution Apply topically 2 times daily as needed (scalp irritation). 60 mL 1    furosemide (LASIX) 20 MG tablet Take 1 tablet (20 mg) by mouth daily as needed (Take daily if your weight increases by more than 3 lbs in one day or  5 lbs in three days.). 90 tablet 3    loperamide (IMODIUM) 2 MG capsule Take 2 capsules (4 mg) by mouth as needed for diarrhea. 90 capsule 3    LORazepam (ATIVAN) 0.5 MG tablet Take 2 tablets (1 mg) by mouth every 6 hours as needed for anxiety. 15 tablet 0    melatonin 3 MG tablet Take 3 mg by mouth nightly as needed      metoprolol succinate ER (TOPROL XL) 50 MG 24 hr tablet Take 1 tablet (50 mg) by mouth 2 times daily. 180 tablet 3    metoprolol tartrate (LOPRESSOR) 25 MG tablet Take 1 tablet (25 mg) by mouth daily as needed (take for increased fluttering/palpitations and for HR greater than 110 bpm). 90 tablet 1    nitroGLYcerin (NITROSTAT) 0.4 MG sublingual tablet For chest pain place 1 tablet under the tongue every 5 minutes for 3 doses. If symptoms persist 5 minutes after 1st dose call 911. 10 tablet 1    ondansetron (ZOFRAN ODT) 4 MG ODT tab Place 1 tablet (4 mg) under the tongue as needed for nausea. 30 tablet 0    pantoprazole (PROTONIX) 20 MG EC tablet Take 1 tablet (20 mg) by mouth daily. 90 tablet 0    sacubitril-valsartan (ENTRESTO)  MG per tablet Take 1 tablet by mouth 2 times daily. 180 tablet 3    spironolactone (ALDACTONE) 25 MG tablet Take 0.5 tablets (12.5 mg) by mouth daily. 90 tablet 3    tiZANidine (ZANAFLEX) 2 MG tablet Take 1 tablet (2 mg) by mouth 3 times daily as needed for muscle spasms. 90 tablet 2    No Known Allergies      Lab Results (Personally Reviewed)    Chemistry/lipid CBC Cardiac  Enzymes/BNP/TSH/INR   Lab Results   Component Value Date    BUN 10.2 04/02/2025     04/02/2025    CO2 26 04/02/2025     Creatinine   Date Value Ref Range Status   04/02/2025 0.72 0.51 - 0.95 mg/dL Final       Lab Results   Component Value Date    CHOL 139 08/02/2024    HDL 50 08/02/2024    LDL 78 08/02/2024      Lab Results   Component Value Date    WBC 5.3 03/13/2025    HGB 14.8 03/13/2025    HCT 46.6 03/13/2025    MCV 87 03/13/2025     03/13/2025    Lab Results   Component Value Date    TSH 2.19 03/13/2025    INR 1.18 (H) 12/15/2024        The patient states understanding and is agreeable with the plan.     Lizbeth Clayton PA-C  [facility]  Electrophysiology Consult Service  Pager #[phone]    I spent a total of 20 minutes face to face with Mary Shaikh during today's office visit. I have spent an additional 15 minutes today on chart review and documentation.

## 2025-04-26 ENCOUNTER — HEALTH MAINTENANCE LETTER (OUTPATIENT)
Age: 25
End: 2025-04-26

## 2025-04-28 ENCOUNTER — ANCILLARY PROCEDURE (OUTPATIENT)
Dept: CARDIOLOGY | Facility: CLINIC | Age: 25
End: 2025-04-28
Attending: INTERNAL MEDICINE
Payer: COMMERCIAL

## 2025-04-28 DIAGNOSIS — Z95.810 ICD (IMPLANTABLE CARDIOVERTER-DEFIBRILLATOR) IN PLACE: ICD-10-CM

## 2025-04-28 PROCEDURE — 93295 DEV INTERROG REMOTE 1/2/MLT: CPT | Performed by: INTERNAL MEDICINE

## 2025-04-28 PROCEDURE — 93296 REM INTERROG EVL PM/IDS: CPT

## 2025-04-29 ENCOUNTER — OFFICE VISIT (OUTPATIENT)
Dept: CARDIOLOGY | Facility: CLINIC | Age: 25
End: 2025-04-29
Payer: COMMERCIAL

## 2025-04-29 VITALS
SYSTOLIC BLOOD PRESSURE: 103 MMHG | DIASTOLIC BLOOD PRESSURE: 68 MMHG | HEART RATE: 60 BPM | OXYGEN SATURATION: 100 % | WEIGHT: 145 LBS | HEIGHT: 64 IN | BODY MASS INDEX: 24.75 KG/M2

## 2025-04-29 DIAGNOSIS — I42.8 NONISCHEMIC CARDIOMYOPATHY (H): ICD-10-CM

## 2025-04-29 DIAGNOSIS — R00.2 PALPITATIONS: Primary | ICD-10-CM

## 2025-04-29 DIAGNOSIS — I50.20 HEART FAILURE WITH REDUCED EJECTION FRACTION (H): ICD-10-CM

## 2025-04-29 NOTE — NURSING NOTE
Cardiac Testing:   -Complete an echocardiogram     Med Reconcile: Reviewed and verified all current medications with the patient. The updated medication list was printed and given to the patient./ No  medication changes.      Return Appointment:   -Follow-up in 1 year.     Patient stated she understood all health information given and agreed to call with further questions or concerns.

## 2025-04-29 NOTE — NURSING NOTE
"Chief Complaint   Patient presents with    Follow Up       Initial /68 (BP Location: Right arm, Patient Position: Chair, Cuff Size: Adult Regular)   Pulse 60   Ht 1.626 m (5' 4\")   Wt 65.8 kg (145 lb)   LMP  (LMP Unknown)   SpO2 100%   BMI 24.89 kg/m   Estimated body mass index is 24.89 kg/m  as calculated from the following:    Height as of this encounter: 1.626 m (5' 4\").    Weight as of this encounter: 65.8 kg (145 lb)..  BP completed using cuff size: regular    Shahana SMITH  "

## 2025-04-29 NOTE — PROGRESS NOTES
CHIEF COMPLAINT / REASON FOR VISIT  Dannon disease and chronic left upper back pain    Referred by Dr. Fuentes    HISTORY OF PRESENT ILLNESS   is a 24 year old female presenting to neuromuscular clinic for evaluation of chronic left upper back pain and Dannon disease. Patient is accompanied by her father today.    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     She was born at term without  complication. She had normal developmental milestones. She had not physical limitations in her school years. After she has been diagnosed with cardiomyopathy, she has limited her physical activities. She started to develop aching pain in the left upper back in the past 2 years. Pain is slightly worse with activities. Coughing/sneezing can aggravate the pain. Pain is typically better in the morning after resting over night but can get worse throughout the day. Ice and heat packs provide some benefits. She has tried PT, chiropractor, and acupuncture but with limited benefit.  There is no focal weakness. No numbness.     Prior investigations:  - CK  (normal range)  - AST mildly elevated at 48-49. Normal ALT, normal ALP.     REVIEW OF SYSTEMS  All negative except for what indicated in the HPI. The following portions of the patient's history were reviewed and updated as appropriate: allergies, current medications, family history, medical history, surgical history, social history, and problem list.     PAST MEDICAL/SURGICAL HISTORY   No past medical history on file.  Past Surgical History:   Procedure Laterality Date    CV RIGHT HEART CATH MEASUREMENTS RECORDED N/A 2024    Procedure: Heart Cath Right Heart Cath;  Surgeon: Tanmay Brice MD;  Location:  HEART CARDIAC CATH LAB    CV RIGHT HEART CATH MEASUREMENTS RECORDED N/A  11/15/2024    Procedure: Heart Cath Right Heart Cath;  Surgeon: Tanmay Brice MD;  Location:  HEART CARDIAC CATH LAB    CV RIGHT HEART CATH MEASUREMENTS RECORDED N/A 12/16/2024    Procedure: Right Heart Catheterization;  Surgeon: Evnes Rodrigues MD;  Location:  HEART CARDIAC CATH LAB        FAMILY HISTORY  There is no family members with similar symptoms or weakness in general. She has 1 sister and 3 brothers. Parents are healthy and do not have cardiac symptoms or physical limitations.     MEDICATIONS    Current Outpatient Medications:     acetaminophen (TYLENOL) 325 MG tablet, Take 325-650 mg by mouth every 6 hours as needed for mild pain., Disp: , Rfl:     albuterol (PROAIR HFA/PROVENTIL HFA/VENTOLIN HFA) 108 (90 Base) MCG/ACT inhaler, Inhale 1-2 puffs into the lungs every 6 hours as needed for shortness of breath, wheezing or cough., Disp: 18 g, Rfl: 1    digoxin (LANOXIN) 250 MCG tablet, Take 1 tablet (250 mcg) by mouth daily., Disp: 90 tablet, Rfl: 3    empagliflozin (JARDIANCE) 25 MG TABS tablet, Take 1 tablet (25 mg) by mouth daily., Disp: 90 tablet, Rfl: 1    fluocinonide (LIDEX) 0.05 % external solution, Apply topically 2 times daily as needed (scalp irritation)., Disp: 60 mL, Rfl: 1    furosemide (LASIX) 20 MG tablet, Take 1 tablet (20 mg) by mouth daily as needed (Take daily if your weight increases by more than 3 lbs in one day or  5 lbs in three days.)., Disp: 90 tablet, Rfl: 3    loperamide (IMODIUM) 2 MG capsule, Take 2 capsules (4 mg) by mouth as needed for diarrhea., Disp: 90 capsule, Rfl: 3    LORazepam (ATIVAN) 0.5 MG tablet, Take 2 tablets (1 mg) by mouth every 6 hours as needed for anxiety., Disp: 15 tablet, Rfl: 0    melatonin 3 MG tablet, Take 3 mg by mouth nightly as needed, Disp: , Rfl:     metoprolol succinate ER (TOPROL XL) 50 MG 24 hr tablet, Take 1 tablet (50 mg) by mouth 2 times daily., Disp: 180 tablet, Rfl: 3    metoprolol tartrate (LOPRESSOR) 25 MG tablet, Take 1 tablet  (25 mg) by mouth daily as needed (take for increased fluttering/palpitations and for HR greater than 110 bpm)., Disp: 90 tablet, Rfl: 1    nitroGLYcerin (NITROSTAT) 0.4 MG sublingual tablet, For chest pain place 1 tablet under the tongue every 5 minutes for 3 doses. If symptoms persist 5 minutes after 1st dose call 911., Disp: 10 tablet, Rfl: 1    ondansetron (ZOFRAN ODT) 4 MG ODT tab, Place 1 tablet (4 mg) under the tongue as needed for nausea., Disp: 30 tablet, Rfl: 0    pantoprazole (PROTONIX) 20 MG EC tablet, Take 1 tablet (20 mg) by mouth daily., Disp: 90 tablet, Rfl: 0    sacubitril-valsartan (ENTRESTO)  MG per tablet, Take 1 tablet by mouth 2 times daily., Disp: 180 tablet, Rfl: 3    spironolactone (ALDACTONE) 25 MG tablet, Take 0.5 tablets (12.5 mg) by mouth daily., Disp: 90 tablet, Rfl: 3    tiZANidine (ZANAFLEX) 2 MG tablet, Take 1 tablet (2 mg) by mouth 3 times daily as needed for muscle spasms., Disp: 90 tablet, Rfl: 2    ALLERGIES:  No Known Allergies      PHYSICAL EXAM  NEUROLOGICAL EXAMINATION  Mental status: normal.  Gait: normal.  Walk on heels: yes, bilaterally.  Walk on toes: yes, bilaterally.    Arise from squatting position: yes.  Getting up from seated position without pushing on chair: yes.  Posture: normal.  Coordination: normal.  Romberg: negative.  Speech: normal.  Extrapyramidal: normal.    Cranial nerves   R Muscle strength L  R  L   5 Frontalis 5       5 Orbicularis oculi 5  3 mm Pupils 3 mm   5 Lower facial muscles 5  nl Light reflex nl   5 Temporal-Masseter 5   Ptosis    5 Palate-Pharynx 5  nl Extraocular muscles nl   5 Sternocleidomastoid 5       5 Trapezius 5  nl Hearing nl   5 Genioglossus 5       Muscle atrophy/ enlargement: no  Fasciculations: no         R Muscle, strength L  R Muscle, strength L    Neck     Trunk    5 Neck flexors 5   Abdominal muscles    5 Neck extensors 5   Paraspinals     Upper Limbs    Lower Limbs    5 Supraspinatus 5  5 Iliopsoas 5   5 Infraspinatus 5   5 Adductors, thigh 5   5 Pectoralis 5  5 Gluteus medius 5   5 Deltoid 5  5 Gluteus max 5   5 Biceps brachii 5  5 Quadriceps 5   5 Brachioradialis 5  5 Hamstrings 5    Supinator/pronator   5 Tibialis anterior 5   5 Triceps 5  5 Peronei 5   5 Wrist extensor 5  5 EHL 5   5 Wrist flexors 5  5 Toe extensors 5   5 Digit extensors 5  5 Tibialis post. 5   5 Digit flexors 5  5 Toe flexors 5   5 Thenar 5  5 Calf muscles 5   5 Hypothenar 5       5 Interossei 5       Trigger point at left trapezius  Muscle atrophy / enlargement: no  Fasciculations: no  Clinical myotonia: no     R Reflexes L   2 Biceps brachii 2   2 Brachioradialis 2   2 Triceps 2   no Scanlon no   2 Quadriceps 2   2 Gastroc-soleus 2   no Clonus (ankle) no   flexion Plantar response flexion        High arched palate: Absent. Elongated face: Absent. Scapular winging: Absent. Pes cavus: Absent. Hammertoes: Absent. Contractures: Absent. Joint hypermobility: Absent.    Sensation  Face: normal.  Upper limbs: normal for all modalities.  Lower limbs: normal for all modalities.    ASSESSMENT / PLAN   #1 Dannon disease with a pathogenic LAMP2 mutation (c. 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 We discussed that the phenotype is spectrum in females with Danon disease is more variable. Only about 12 to 50% of heterozygous females are reported to have some degree of skeletal muscle weakness. She currently abrams snot have any sign of this.     I do not think the chronic left upper back pain is related to her LAMP2 mutation. Description of symptom is most consistent with myofascial pain. She also has trigger point in that area. She has not have significant benefit from PT and accupuncture. Will refer to PM&R for possible trigger point injection versus other management.     Follow-up in 1 year     I  spent a total of 60 minutes on the day of the visit for chart review, face-to-face visit, counseling/coordination of care, and documentation. Please see the note for further information on patient assessment and treatment.       PATIENT EDUCATION  Ready to learn, no apparent learning barriers were identified; learning preferences include listening.  Explained diagnosis and treatment plan; patient expressed understanding of the content.

## 2025-04-29 NOTE — PATIENT INSTRUCTIONS
Take your medicines every day, as directed     Changes made today:  No medication changes  Complete an echocardiogram        Cardiology Care Coordinators:      Elina GARCIA RN     Cardiology Rooming Staff:  Shahana MARS EMT    Phone  [phone]      Fax [phone]    To Contact us     During Business Hours:  [phone]     If you are needing refills please contact your pharmacy.     For urgent after hour care please call the [facility] at [phone] or the [facility] at [phone] and ask to speak to the cardiologist on call.    If you are having a medical emergency, please call 911.            HOW TO CHECK YOUR BLOOD PRESSURE AT HOME:     Avoid eating, smoking, and exercising for at least 30 minutes before taking a reading.     Be sure you have taken your BP medication at least 2-3 hours before you check it.      Sit quietly for 10 minutes before a reading.      Sit in a chair with your feet flat on the floor. Rest your  arm on a table so that the arm cuff is at the same level as your heart.     Remain still during the reading.  Record your blood pressure and pulse in a log and bring to your next appointment.       Use [employer] allows you to communicate directly with your heart team through secure messaging.  [employer] can be accessed any time on your phone, computer, or tablet.  If you need assistance, we'd be happy to help!             Keep your Heart Appointments:     Follow-up in 1 year

## 2025-04-30 ENCOUNTER — OFFICE VISIT (OUTPATIENT)
Dept: NEUROLOGY | Facility: CLINIC | Age: 25
End: 2025-04-30
Attending: INTERNAL MEDICINE
Payer: COMMERCIAL

## 2025-04-30 VITALS
SYSTOLIC BLOOD PRESSURE: 141 MMHG | DIASTOLIC BLOOD PRESSURE: 83 MMHG | HEART RATE: 60 BPM | OXYGEN SATURATION: 100 % | WEIGHT: 145 LBS | BODY MASS INDEX: 24.89 KG/M2

## 2025-04-30 DIAGNOSIS — M79.18 MYOFASCIAL PAIN: Primary | ICD-10-CM

## 2025-04-30 DIAGNOSIS — Z95.810 ICD (IMPLANTABLE CARDIOVERTER-DEFIBRILLATOR) IN PLACE: ICD-10-CM

## 2025-04-30 DIAGNOSIS — E74.05 DANON DISEASE IN HETEROZYGOUS FEMALE (H): ICD-10-CM

## 2025-04-30 LAB
ATRIAL RATE - MUSE: 60 BPM
DIASTOLIC BLOOD PRESSURE - MUSE: NORMAL MMHG
INTERPRETATION ECG - MUSE: NORMAL
MDC_IDC_EPISODE_DTM: NORMAL
MDC_IDC_EPISODE_DURATION: 1 S
MDC_IDC_EPISODE_DURATION: 16 S
MDC_IDC_EPISODE_DURATION: 19 S
MDC_IDC_EPISODE_DURATION: 2 S
MDC_IDC_EPISODE_DURATION: 3 S
MDC_IDC_EPISODE_DURATION: 4 S
MDC_IDC_EPISODE_DURATION: 44 S
MDC_IDC_EPISODE_DURATION: 45 S
MDC_IDC_EPISODE_DURATION: 46 S
MDC_IDC_EPISODE_DURATION: 47 S
MDC_IDC_EPISODE_DURATION: 48 S
MDC_IDC_EPISODE_DURATION: 49 S
MDC_IDC_EPISODE_DURATION: 49 S
MDC_IDC_EPISODE_DURATION: 5 S
MDC_IDC_EPISODE_DURATION: 5 S
MDC_IDC_EPISODE_DURATION: 50 S
MDC_IDC_EPISODE_DURATION: 51 S
MDC_IDC_EPISODE_DURATION: 52 S
MDC_IDC_EPISODE_DURATION: 53 S
MDC_IDC_EPISODE_DURATION: 54 S
MDC_IDC_EPISODE_DURATION: 55 S
MDC_IDC_EPISODE_DURATION: 56 S
MDC_IDC_EPISODE_DURATION: 57 S
MDC_IDC_EPISODE_DURATION: 58 S
MDC_IDC_EPISODE_DURATION: 6 S
MDC_IDC_EPISODE_DURATION: 601 S
MDC_IDC_EPISODE_DURATION: 7 S
MDC_IDC_EPISODE_DURATION: 8 S
MDC_IDC_EPISODE_DURATION: 9 S
MDC_IDC_EPISODE_DURATION: 9 S
MDC_IDC_EPISODE_ID: NORMAL
MDC_IDC_EPISODE_TYPE: NORMAL
MDC_IDC_LEAD_CONNECTION_STATUS: NORMAL
MDC_IDC_LEAD_CONNECTION_STATUS: NORMAL
MDC_IDC_LEAD_IMPLANT_DT: NORMAL
MDC_IDC_LEAD_IMPLANT_DT: NORMAL
MDC_IDC_LEAD_LOCATION: NORMAL
MDC_IDC_LEAD_LOCATION: NORMAL
MDC_IDC_LEAD_LOCATION_DETAIL_1: NORMAL
MDC_IDC_LEAD_LOCATION_DETAIL_1: NORMAL
MDC_IDC_LEAD_MFG: NORMAL
MDC_IDC_LEAD_MFG: NORMAL
MDC_IDC_LEAD_MODEL: NORMAL
MDC_IDC_LEAD_MODEL: NORMAL
MDC_IDC_LEAD_POLARITY_TYPE: NORMAL
MDC_IDC_LEAD_POLARITY_TYPE: NORMAL
MDC_IDC_LEAD_SERIAL: NORMAL
MDC_IDC_LEAD_SERIAL: NORMAL
MDC_IDC_MSMT_BATTERY_DTM: NORMAL
MDC_IDC_MSMT_BATTERY_REMAINING_LONGEVITY: 126 MO
MDC_IDC_MSMT_BATTERY_REMAINING_PERCENTAGE: 100 %
MDC_IDC_MSMT_BATTERY_STATUS: NORMAL
MDC_IDC_MSMT_CAP_CHARGE_DTM: NORMAL
MDC_IDC_MSMT_CAP_CHARGE_DTM: NORMAL
MDC_IDC_MSMT_CAP_CHARGE_ENERGY: 41 J
MDC_IDC_MSMT_CAP_CHARGE_TIME: 10.3 S
MDC_IDC_MSMT_CAP_CHARGE_TIME: 7.6 S
MDC_IDC_MSMT_CAP_CHARGE_TYPE: NORMAL
MDC_IDC_MSMT_CAP_CHARGE_TYPE: NORMAL
MDC_IDC_MSMT_LEADCHNL_RA_IMPEDANCE_VALUE: 722 OHM
MDC_IDC_MSMT_LEADCHNL_RA_PACING_THRESHOLD_AMPLITUDE: 0.5 V
MDC_IDC_MSMT_LEADCHNL_RA_PACING_THRESHOLD_PULSEWIDTH: 0.4 MS
MDC_IDC_MSMT_LEADCHNL_RV_IMPEDANCE_VALUE: 358 OHM
MDC_IDC_MSMT_LEADCHNL_RV_PACING_THRESHOLD_AMPLITUDE: 1 V
MDC_IDC_MSMT_LEADCHNL_RV_PACING_THRESHOLD_PULSEWIDTH: 0.4 MS
MDC_IDC_PG_IMPLANT_DTM: NORMAL
MDC_IDC_PG_MFG: NORMAL
MDC_IDC_PG_MODEL: NORMAL
MDC_IDC_PG_SERIAL: NORMAL
MDC_IDC_PG_TYPE: NORMAL
MDC_IDC_SESS_CLINIC_NAME: NORMAL
MDC_IDC_SESS_DTM: NORMAL
MDC_IDC_SESS_TYPE: NORMAL
MDC_IDC_SET_BRADY_AT_MODE_SWITCH_MODE: NORMAL
MDC_IDC_SET_BRADY_AT_MODE_SWITCH_RATE: 140 {BEATS}/MIN
MDC_IDC_SET_BRADY_LOWRATE: 60 {BEATS}/MIN
MDC_IDC_SET_BRADY_MAX_TRACKING_RATE: 130 {BEATS}/MIN
MDC_IDC_SET_BRADY_MODE: NORMAL
MDC_IDC_SET_BRADY_PAV_DELAY_HIGH: 180 MS
MDC_IDC_SET_BRADY_PAV_DELAY_LOW: 240 MS
MDC_IDC_SET_BRADY_SAV_DELAY_HIGH: 135 MS
MDC_IDC_SET_BRADY_SAV_DELAY_LOW: 180 MS
MDC_IDC_SET_LEADCHNL_RA_PACING_AMPLITUDE: 3.5 V
MDC_IDC_SET_LEADCHNL_RA_PACING_CAPTURE_MODE: NORMAL
MDC_IDC_SET_LEADCHNL_RA_PACING_POLARITY: NORMAL
MDC_IDC_SET_LEADCHNL_RA_PACING_PULSEWIDTH: 0.4 MS
MDC_IDC_SET_LEADCHNL_RA_SENSING_ADAPTATION_MODE: NORMAL
MDC_IDC_SET_LEADCHNL_RA_SENSING_POLARITY: NORMAL
MDC_IDC_SET_LEADCHNL_RA_SENSING_SENSITIVITY: 0.25 MV
MDC_IDC_SET_LEADCHNL_RV_PACING_AMPLITUDE: 3.5 V
MDC_IDC_SET_LEADCHNL_RV_PACING_CAPTURE_MODE: NORMAL
MDC_IDC_SET_LEADCHNL_RV_PACING_POLARITY: NORMAL
MDC_IDC_SET_LEADCHNL_RV_PACING_PULSEWIDTH: 0.4 MS
MDC_IDC_SET_LEADCHNL_RV_SENSING_ADAPTATION_MODE: NORMAL
MDC_IDC_SET_LEADCHNL_RV_SENSING_POLARITY: NORMAL
MDC_IDC_SET_LEADCHNL_RV_SENSING_SENSITIVITY: 0.6 MV
MDC_IDC_SET_ZONE_DETECTION_INTERVAL: 240 MS
MDC_IDC_SET_ZONE_DETECTION_INTERVAL: 300 MS
MDC_IDC_SET_ZONE_DETECTION_INTERVAL: 333 MS
MDC_IDC_SET_ZONE_STATUS: NORMAL
MDC_IDC_SET_ZONE_TYPE: NORMAL
MDC_IDC_SET_ZONE_VENDOR_TYPE: NORMAL
MDC_IDC_STAT_AT_BURDEN_PERCENT: 1 %
MDC_IDC_STAT_AT_DTM_END: NORMAL
MDC_IDC_STAT_AT_DTM_START: NORMAL
MDC_IDC_STAT_BRADY_DTM_END: NORMAL
MDC_IDC_STAT_BRADY_DTM_START: NORMAL
MDC_IDC_STAT_BRADY_RA_PERCENT_PACED: 76 %
MDC_IDC_STAT_BRADY_RV_PERCENT_PACED: 3 %
MDC_IDC_STAT_EPISODE_RECENT_COUNT: 0
MDC_IDC_STAT_EPISODE_RECENT_COUNT: 70
MDC_IDC_STAT_EPISODE_RECENT_COUNT_DTM_END: NORMAL
MDC_IDC_STAT_EPISODE_RECENT_COUNT_DTM_START: NORMAL
MDC_IDC_STAT_EPISODE_TYPE: NORMAL
MDC_IDC_STAT_EPISODE_VENDOR_TYPE: NORMAL
MDC_IDC_STAT_TACHYTHERAPY_ATP_DELIVERED_RECENT: 0
MDC_IDC_STAT_TACHYTHERAPY_ATP_DELIVERED_TOTAL: 4
MDC_IDC_STAT_TACHYTHERAPY_RECENT_DTM_END: NORMAL
MDC_IDC_STAT_TACHYTHERAPY_RECENT_DTM_START: NORMAL
MDC_IDC_STAT_TACHYTHERAPY_SHOCKS_ABORTED_RECENT: 0
MDC_IDC_STAT_TACHYTHERAPY_SHOCKS_ABORTED_TOTAL: 3
MDC_IDC_STAT_TACHYTHERAPY_SHOCKS_DELIVERED_RECENT: 0
MDC_IDC_STAT_TACHYTHERAPY_SHOCKS_DELIVERED_TOTAL: 4
MDC_IDC_STAT_TACHYTHERAPY_TOTAL_DTM_END: NORMAL
MDC_IDC_STAT_TACHYTHERAPY_TOTAL_DTM_START: NORMAL
P AXIS - MUSE: 19 DEGREES
PR INTERVAL - MUSE: 186 MS
QRS DURATION - MUSE: 130 MS
QT - MUSE: 418 MS
QTC - MUSE: 418 MS
R AXIS - MUSE: 40 DEGREES
SYSTOLIC BLOOD PRESSURE - MUSE: NORMAL MMHG
T AXIS - MUSE: 234 DEGREES
VENTRICULAR RATE- MUSE: 60 BPM

## 2025-04-30 ASSESSMENT — ANXIETY QUESTIONNAIRES
GAD7 TOTAL SCORE: 20
7. FEELING AFRAID AS IF SOMETHING AWFUL MIGHT HAPPEN: NEARLY EVERY DAY
8. IF YOU CHECKED OFF ANY PROBLEMS, HOW DIFFICULT HAVE THESE MADE IT FOR YOU TO DO YOUR WORK, TAKE CARE OF THINGS AT HOME, OR GET ALONG WITH OTHER PEOPLE?: NOT DIFFICULT AT ALL
5. BEING SO RESTLESS THAT IT IS HARD TO SIT STILL: NEARLY EVERY DAY
3. WORRYING TOO MUCH ABOUT DIFFERENT THINGS: NEARLY EVERY DAY
GAD7 TOTAL SCORE: 20
6. BECOMING EASILY ANNOYED OR IRRITABLE: NEARLY EVERY DAY
4. TROUBLE RELAXING: NEARLY EVERY DAY
GAD7 TOTAL SCORE: 20
IF YOU CHECKED OFF ANY PROBLEMS ON THIS QUESTIONNAIRE, HOW DIFFICULT HAVE THESE PROBLEMS MADE IT FOR YOU TO DO YOUR WORK, TAKE CARE OF THINGS AT HOME, OR GET ALONG WITH OTHER PEOPLE: NOT DIFFICULT AT ALL
1. FEELING NERVOUS, ANXIOUS, OR ON EDGE: NEARLY EVERY DAY
7. FEELING AFRAID AS IF SOMETHING AWFUL MIGHT HAPPEN: NEARLY EVERY DAY
2. NOT BEING ABLE TO STOP OR CONTROL WORRYING: MORE THAN HALF THE DAYS

## 2025-04-30 NOTE — LETTER
2025       RE: Mary Shaikh  [address]  [place] [address]     Dear Colleague,    Thank you for referring your patient, Mary Shaikh, to the [facility] [place] [facility]. Please see a copy of my visit note below.    CHIEF COMPLAINT / REASON FOR VISIT  Dannon disease and chronic left upper back pain    Referred by Dr. Fuentes    HISTORY OF PRESENT ILLNESS   is a 24 year old female presenting to neuromuscular clinic for evaluation of chronic left upper back pain and Dannon disease. Patient is accompanied by her father today.    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     She was born at term without  complication. She had normal developmental milestones. She had not physical limitations in her school years. After she has been diagnosed with cardiomyopathy, she has limited her physical activities. She started to develop aching pain in the left upper back in the past 2 years. Pain is slightly worse with activities. Coughing/sneezing can aggravate the pain. Pain is typically better in the morning after resting over night but can get worse throughout the day. Ice and heat packs provide some benefits. She has tried PT, chiropractor, and acupuncture but with limited benefit.  There is no focal weakness. No numbness.     Prior investigations:  - CK  (normal range)  - AST mildly elevated at 48-49. Normal ALT, normal ALP.     REVIEW OF SYSTEMS  All negative except for what indicated in the HPI. The following portions of the patient's history were reviewed and updated as appropriate: allergies, current medications, family history, medical history, surgical history, social history, and problem list.     PAST  MEDICAL/SURGICAL HISTORY   No past medical history on file.  Past Surgical History:   Procedure Laterality Date     CV RIGHT HEART CATH MEASUREMENTS RECORDED N/A 7/31/2024    Procedure: Heart Cath Right Heart Cath;  Surgeon: Tanmay Brice MD;  Location:  HEART CARDIAC CATH LAB     CV RIGHT HEART CATH MEASUREMENTS RECORDED N/A 11/15/2024    Procedure: Heart Cath Right Heart Cath;  Surgeon: Tanmay Brice MD;  Location:  HEART CARDIAC CATH LAB     CV RIGHT HEART CATH MEASUREMENTS RECORDED N/A 12/16/2024    Procedure: Right Heart Catheterization;  Surgeon: Evens Rodrigues MD;  Location:  HEART CARDIAC CATH LAB        FAMILY HISTORY  There is no family members with similar symptoms or weakness in general. She has 1 sister and 3 brothers. Parents are healthy and do not have cardiac symptoms or physical limitations.     MEDICATIONS    Current Outpatient Medications:      acetaminophen (TYLENOL) 325 MG tablet, Take 325-650 mg by mouth every 6 hours as needed for mild pain., Disp: , Rfl:      albuterol (PROAIR HFA/PROVENTIL HFA/VENTOLIN HFA) 108 (90 Base) MCG/ACT inhaler, Inhale 1-2 puffs into the lungs every 6 hours as needed for shortness of breath, wheezing or cough., Disp: 18 g, Rfl: 1     digoxin (LANOXIN) 250 MCG tablet, Take 1 tablet (250 mcg) by mouth daily., Disp: 90 tablet, Rfl: 3     empagliflozin (JARDIANCE) 25 MG TABS tablet, Take 1 tablet (25 mg) by mouth daily., Disp: 90 tablet, Rfl: 1     fluocinonide (LIDEX) 0.05 % external solution, Apply topically 2 times daily as needed (scalp irritation)., Disp: 60 mL, Rfl: 1     furosemide (LASIX) 20 MG tablet, Take 1 tablet (20 mg) by mouth daily as needed (Take daily if your weight increases by more than 3 lbs in one day or  5 lbs in three days.)., Disp: 90 tablet, Rfl: 3     loperamide (IMODIUM) 2 MG capsule, Take 2 capsules (4 mg) by mouth as needed for diarrhea., Disp: 90 capsule, Rfl: 3     LORazepam (ATIVAN) 0.5 MG tablet, Take 2 tablets (1 mg) by  mouth every 6 hours as needed for anxiety., Disp: 15 tablet, Rfl: 0     melatonin 3 MG tablet, Take 3 mg by mouth nightly as needed, Disp: , Rfl:      metoprolol succinate ER (TOPROL XL) 50 MG 24 hr tablet, Take 1 tablet (50 mg) by mouth 2 times daily., Disp: 180 tablet, Rfl: 3     metoprolol tartrate (LOPRESSOR) 25 MG tablet, Take 1 tablet (25 mg) by mouth daily as needed (take for increased fluttering/palpitations and for HR greater than 110 bpm)., Disp: 90 tablet, Rfl: 1     nitroGLYcerin (NITROSTAT) 0.4 MG sublingual tablet, For chest pain place 1 tablet under the tongue every 5 minutes for 3 doses. If symptoms persist 5 minutes after 1st dose call 911., Disp: 10 tablet, Rfl: 1     ondansetron (ZOFRAN ODT) 4 MG ODT tab, Place 1 tablet (4 mg) under the tongue as needed for nausea., Disp: 30 tablet, Rfl: 0     pantoprazole (PROTONIX) 20 MG EC tablet, Take 1 tablet (20 mg) by mouth daily., Disp: 90 tablet, Rfl: 0     sacubitril-valsartan (ENTRESTO)  MG per tablet, Take 1 tablet by mouth 2 times daily., Disp: 180 tablet, Rfl: 3     spironolactone (ALDACTONE) 25 MG tablet, Take 0.5 tablets (12.5 mg) by mouth daily., Disp: 90 tablet, Rfl: 3     tiZANidine (ZANAFLEX) 2 MG tablet, Take 1 tablet (2 mg) by mouth 3 times daily as needed for muscle spasms., Disp: 90 tablet, Rfl: 2    ALLERGIES:  No Known Allergies      PHYSICAL EXAM  NEUROLOGICAL EXAMINATION  Mental status: normal.  Gait: normal.  Walk on heels: yes, bilaterally.  Walk on toes: yes, bilaterally.    Arise from squatting position: yes.  Getting up from seated position without pushing on chair: yes.  Posture: normal.  Coordination: normal.  Romberg: negative.  Speech: normal.  Extrapyramidal: normal.    Cranial nerves   R Muscle strength L  R  L   5 Frontalis 5       5 Orbicularis oculi 5  3 mm Pupils 3 mm   5 Lower facial muscles 5  nl Light reflex nl   5 Temporal-[place] 5   Ptosis    5 Palate-Pharynx 5  nl Extraocular muscles nl   5  Sternocleidomastoid 5       5 Trapezius 5  nl Hearing nl   5 Genioglossus 5       Muscle atrophy/ enlargement: no  Fasciculations: no         R Muscle, strength L  R Muscle, strength L    Neck     Trunk    5 Neck flexors 5   Abdominal muscles    5 Neck extensors 5   Paraspinals     Upper Limbs    Lower Limbs    5 Supraspinatus 5  5 Iliopsoas 5   5 Infraspinatus 5  5 Adductors, thigh 5   5 Pectoralis 5  5 Gluteus medius 5   5 Deltoid 5  5 Gluteus max 5   5 Biceps brachii 5  5 Quadriceps 5   5 Brachioradialis 5  5 Hamstrings 5    Supinator/pronator   5 Tibialis anterior 5   5 Triceps 5  5 Peronei 5   5 Wrist extensor 5  5 EHL 5   5 Wrist flexors 5  5 Toe extensors 5   5 Digit extensors 5  5 Tibialis post. 5   5 Digit flexors 5  5 Toe flexors 5   5 Thenar 5  5 Calf muscles 5   5 Hypothenar 5       5 Interossei 5       Trigger point at left trapezius  Muscle atrophy / enlargement: no  Fasciculations: no  Clinical myotonia: no     R Reflexes L   2 Biceps brachii 2   2 Brachioradialis 2   2 Triceps 2   no Scanlon no   2 Quadriceps 2   2 Gastroc-soleus 2   no Clonus (ankle) no   flexion Plantar response flexion        High arched palate: Absent. Elongated face: Absent. Scapular winging: Absent. Pes cavus: Absent. Hammertoes: Absent. Contractures: Absent. Joint hypermobility: Absent.    Sensation  Face: normal.  Upper limbs: normal for all modalities.  Lower limbs: normal for all modalities.    ASSESSMENT / PLAN   #1 Dannon disease with a pathogenic LAMP2 mutation (c. 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 We discussed that the phenotype is spectrum in females with Danon disease is more variable. Only about 12 to 50% of heterozygous females are reported to have some degree of skeletal muscle weakness. She currently abrams snot have any sign of this.      I do not think the chronic left upper back pain is related to her LAMP2 mutation. Description of symptom is most consistent with myofascial pain. She also has trigger point in that area. She has not have significant benefit from PT and accupuncture. Will refer to PM&R for possible trigger point injection versus other management.     Follow-up in 1 year     I spent a total of 60 minutes on the day of the visit for chart review, face-to-face visit, counseling/coordination of care, and documentation. Please see the note for further information on patient assessment and treatment.       PATIENT EDUCATION  Ready to learn, no apparent learning barriers were identified; learning preferences include listening.  Explained diagnosis and treatment plan; patient expressed understanding of the content.      Again, thank you for allowing me to participate in the care of your patient.      Sincerely,    Rahul Aguilar MD

## 2025-05-04 NOTE — PROGRESS NOTES
"  [facility]  NEW PATIENT DIAGNOSTIC ASSESSMENT     Mary Shaikh is a 24 year old referred by Jocelyn Pichardo for evaluation of \"Anxiety/depresssion r/t to heart failure/Danon disease dx, recent ICD shock, poorly controlled anxiety.\" Initial consultation on 05/05/25.   Who referred you to care?: (Patient-Rptd) referring provider  CARE TEAM:   PCP- [facility] - Cathy Nagy  Therapist- Baldomero Torres, PhD,    Cardiology- Dr. Fuentes    Psych pertinent item history includes major medical problems              Chief Complaint   Mary identified the reason for seeking services at this time as: \"(Patient-Rptd) Get rid of back pain\". Reported this as beginning approximately (Patient-Rptd) 01/01/23.              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     Mary is a 24-year-old adult with past psychiatric history of anxiety and depression who presents for psychiatric evaluation. She was referred by cardiology.     -Mary endorses persistent anxiety in the context of chronic medical issues. She notes that anxiety only recently became a problem around 2022 when she was diagnosed with cardiomyopathy. Depression started around middle school and preceded her diagnosis of cardiomyopathy.    -She reports significant medical related trauma, especially flashbacks, pertaining to the time around her diagnosis in 2022 which was triggered by an ED visit for an accidental ingestion/drug exposure.     -She is currently in therapy and reports a solid foundation of coping skills.     -She has PRN Ativan which she uses sparingly and only when her skill fail to adequately control " panic symptoms. She prefers to avoid addition of further medications and chooses to instead focus on psychosocial interventions aimed at lessening social isolation. I will reach out to her therapist and treatment team for resources to assist with this, particularly focusing on virtual support groups for people with chronic health issues.     -We did discuss medication options, including a new selective serotonin reuptake inhibitor trial or gabapentin, however patient declines to make changes today. We will meet again in 3 months and reassess. If mental health medications are pursued in the future, will plan to coordinate with cardiology.     No suicidal ideation, self-injurious behavior/urges, violent ideation, aggression, psychosis, hallucinations, delusions, rosa/hypomania.     PSYCHOTROPIC DRUG INTERACTIONS: **Italicized interactions are for treatment plan options not currently implemented**  N/A    MANAGEMENT:  N/A    [place] was [place] today: indicates taking controlled substances as prescribed              Plan    1) PSYCHOTROPIC MEDICATION RECOMMENDATIONS:  -No medication changes.  -Continue all medications per primary care/other medical providers    2) THERAPY: Currently seeing Baldomero Torres, PhD, LP    3) NEXT DUE:   Labs- N/A   ECG- N/A     4) REFERRALS / COORDINATION: Interested in connecting with support group for people with chronic health co-morbidity.    5) DISPOSITION: 3 months, scheduling to call    Treatment Risk Statement:  The patient understands the risks, benefits, adverse effects and alternatives. Agrees to treatment with the capacity to do so. No medical contraindications to treatment. Agrees to contact provider for any problems. The patient understands to call 911 or go to the nearest ED if urgent or life threatening symptoms occur. Crisis contact numbers are provided routinely in the After Visit Summary.    Suicide Risk Assessment: Mary did not appear to be an imminent safety risk to self or  others.    PROVIDER:  ZITA Naylor CNP              History of Present Illness     Patient reports concerns related to depression and anxiety.     Depression started around elementary school. Mood has been worse for the past 3 months. Current symptoms include social isolation, irritability, low appetite. Symptoms interfere with her ability to leave the home.    Anxiety started around 2022 when she was diagnosed with cardiomyopathy. Frequently feels that something bad is going to happen to her. Lots of fear/worry. Due to her heart condition, she has certain physical limitations and can't do the things she used to. She has an ICD and had an episode in December during which time she received multiple shocks at home and was subsequently hospitalized for several days. She has been getting a R heart cath every 6 months. She endorses some flash backs associated to her medical co-morbidities and initial diagnosis.     Reports issues with sleep maintenance. She has a lot on her mind at bedtime. Meditating, reading, crocheting all help with distraction at bedtime.  She has frequent nightmares (she's in danger, family member's death) but doesn't routinely remember them. She tries to avoid napping during the day due to fear (reports symptoms of sleep paralysis when she sleeps during the day). She typically wakes up between 5-6am no matter what time she falls asleep. On average getting about 5 hours per night, feels fatigued during the middle of the day.     Current psychotropic medication:  Has lorazepam available for anxiety but hasn't needed it for several months    Pertinent Negatives: No suicidal or violent ideation, psychosis, or hypo/rosa.      Pertinent Social Hx:  FINANCIAL SUPPORT- Financially supported by parents.  LIVING SITUATION / RELATIONSHIPS- Lives with mom and dad. Feels safe at home.   SOCIAL/ SPIRITUAL SUPPORT- Close with family    Pertinent Substance Use  Alcohol- None  Nicotine- None  Caffeine-  None  Opioids- None  Narcan Kit- N/A  THC/CBD- None  Other Illicit Drugs- none    Substance Use History  Past Use- Prior cannabis use  Treatment [#, most recent]- None  Medical Consequences [withdrawal, sz etc]- None  Legal Consequences- None              Medical Review of Systems   Dizziness/orthostasis- No history of syncope.   Respiratory- Endorses hx of asthma  Cardiovascular- Intermittent tachycardia, dizziness.  Tics, tremors- Denies  Sexual health concerns- None  A comprehensive review of systems was performed and is negative other than noted above.              Past Psychiatric History     Self injurious behavior [method, most recent]- Endorses history of NSSI via cutting starting in elementary school. Lasted engaged in cutting around 2022. Denies current urges.   Suicide attempt [#, most recent, method]- Denies  Suicidal ideation [passive, active]- Yes, last experienced SI around 2022  History of violence/aggression/HI- Denies   What in the following list protects you from causing harm to yourself or others?: (Patient-Rptd) forward or future oriented thinking;safe and stable environment;regular sleep;sense of belonging;positive social skills, Are there firearms in your home?: (Patient-Rptd) are not    Psychosis hx- None  Psych hosp [ #, most recent, committed]- Notes that she was hospitalized for 1 day as a teen due to SI  ECT/TMS [#, most recent]- None  Eating disorder hx- None    Trauma hx- Reports history of medical trauma related to heart condition which was diagnosed in 2022.   Outpatient programs [Day treatment, DBT, eating disorder tx, etc]- None              Past Psychotropic Medications     Medication Max Dose (mg) Dates / Duration Helpful? DC Reason / Adverse Effects?   fluoxetine 10mg 2021     sertraline 50mg 03/2024-12/2024  She doesn't know why she stopped taking it. Denies ASE from the medication.    lorazepam 0.5mg Taking sparingly Yes    Are you taking/ not taking prescription medications as  they were prescribed?: (Patient-Rptd) taking              Social History                [per patient report]  Financial- What are your current financial sources?: (Patient-Rptd) SSDI disability, Does your finances cause stress?: (Patient-Rptd) does not  Employment- What is your employment status?: (Patient-Rptd) unemployed, If you work in a paying job or as a volunteer, describe the job and how long you have held it: : (Patient-Rptd) NA Did you serve in the ?: (Patient-Rptd) did not  Living situation- What is your housing situation?: (Patient-Rptd) staying with someone, With whom?: (Patient-Rptd) Patents  Feels safe at home- Yes  Household / family- Name: (Patient-Rptd) Emily rizzo, Age: (Patient-Rptd) 46, Relationship: (Patient-Rptd) Mom, Living in same house?: (Patient-Rptd) no  Relationships- What is your current relationship status? : (Patient-Rptd) single, What is your sexual orientation?: (Patient-Rptd) heterosexual  Children- Do you have children?: (Patient-Rptd) no  Social/spiritual support- Who are the most supportive people in your life?  : (Patient-Rptd) parents  Cultural- What is your cultural background? : (Patient-Rptd) , What are ethnic, cultural, or Yazdanism influences that may be useful to know about you (for example history of experiencing discrimination, growing up rural/urban, valuing culturally specific treatments)?  : (Patient-Rptd) NA, What is your preferred language?  : (Patient-Rptd) English  Education- What is your highest education? : (Patient-Rptd) high school graduate  Early history- Where did you grow up?: (Patient-Rptd) [facility], Who took care of you as a child?: (Patient-Rptd) biological parents  Raised by- How would you describe your parent's relationships?: (Patient-Rptd)  / , How old were you when this happened?: (Patient-Rptd) NA  Siblings- Do you have siblings?: (Patient-Rptd) yes, How many full siblings do you have?:  (Patient-Rptd) 3  Quality of family relationships- How would you describe your current family relationships?: (Patient-Rptd) stable and meaningful  Legal- Have you been involved with the legal system (child custody, order for protection, DWI, etc.)?: (Patient-Rptd) have not, Do you have a ?  : (Patient-Rptd) does not              Family History   Denies family history of mental health concerns.               Past Medical History     Medication allergies:  No Known Allergies    Neurologic Hx [head injury, seizures, etc.]: Denies history of seizure or concussion.  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)    Anxiety    Biliary dyskinesia    Chronic cholecystitis    Heart failure with reduced ejection fraction (H)    Hypertrophic nonobstructive cardiomyopathy (H)    ICD (implantable cardioverter-defibrillator) in place    Moderate asthma without complication    Moderate episode of recurrent major depressive disorder (H)    NSVT (nonsustained ventricular tachycardia) (H)    Paroxysmal SVT (supraventricular tachycardia)    Josselyn Parkinson White pattern seen on electrocardiogram    Diabetes mellitus, type 2 (H)    Adjustment disorder with mixed anxiety and depressed mood    VT (ventricular tachycardia) (H)    Chronic left-sided thoracic back pain     No past medical history on file.    Sexually activity with sperm producing partners- Denies              Medications   Current Outpatient Medications   Medication Sig Dispense Refill    acetaminophen (TYLENOL) 325 MG tablet Take 325-650 mg by mouth every 6 hours as needed for mild pain.      albuterol (PROAIR HFA/PROVENTIL HFA/VENTOLIN HFA) 108 (90 Base) MCG/ACT inhaler Inhale 1-2 puffs into the lungs every 6 hours as needed for shortness of breath, wheezing or cough. 18 g 1    digoxin (LANOXIN) 250 MCG tablet Take 1 tablet (250 mcg) by mouth daily. 90 tablet 3     "empagliflozin (JARDIANCE) 25 MG TABS tablet Take 1 tablet (25 mg) by mouth daily. 90 tablet 1    fluocinonide (LIDEX) 0.05 % external solution Apply topically 2 times daily as needed (scalp irritation). 60 mL 1    furosemide (LASIX) 20 MG tablet Take 1 tablet (20 mg) by mouth daily as needed (Take daily if your weight increases by more than 3 lbs in one day or  5 lbs in three days.). 90 tablet 3    loperamide (IMODIUM) 2 MG capsule Take 2 capsules (4 mg) by mouth as needed for diarrhea. 90 capsule 3    LORazepam (ATIVAN) 0.5 MG tablet Take 2 tablets (1 mg) by mouth every 6 hours as needed for anxiety. 15 tablet 0    melatonin 3 MG tablet Take 3 mg by mouth nightly as needed      metoprolol succinate ER (TOPROL XL) 50 MG 24 hr tablet Take 1 tablet (50 mg) by mouth 2 times daily. 180 tablet 3    metoprolol tartrate (LOPRESSOR) 25 MG tablet Take 1 tablet (25 mg) by mouth daily as needed (take for increased fluttering/palpitations and for HR greater than 110 bpm). 90 tablet 1    nitroGLYcerin (NITROSTAT) 0.4 MG sublingual tablet For chest pain place 1 tablet under the tongue every 5 minutes for 3 doses. If symptoms persist 5 minutes after 1st dose call 911. 10 tablet 1    ondansetron (ZOFRAN ODT) 4 MG ODT tab Place 1 tablet (4 mg) under the tongue as needed for nausea. 30 tablet 0    pantoprazole (PROTONIX) 20 MG EC tablet Take 1 tablet (20 mg) by mouth daily. 90 tablet 0    sacubitril-valsartan (ENTRESTO)  MG per tablet Take 1 tablet by mouth 2 times daily. 180 tablet 3    spironolactone (ALDACTONE) 25 MG tablet Take 0.5 tablets (12.5 mg) by mouth daily. 90 tablet 3    tiZANidine (ZANAFLEX) 2 MG tablet Take 1 tablet (2 mg) by mouth 3 times daily as needed for muscle spasms. 90 tablet 2                 Physical Exam  (Vitals Only)  Ht 1.626 m (5' 4\")   Wt 66.2 kg (146 lb)   LMP  (LMP Unknown)   BMI 25.06 kg/m      Pulse Readings from Last 5 Encounters:   04/30/25 60   04/29/25 60   04/03/25 61   04/02/25 60 "   03/21/25 54     Wt Readings from Last 5 Encounters:   05/05/25 66.2 kg (146 lb)   04/30/25 65.8 kg (145 lb)   04/29/25 65.8 kg (145 lb)   04/03/25 66.1 kg (145 lb 12.8 oz)   04/02/25 68.1 kg (150 lb 3.2 oz)     BP Readings from Last 5 Encounters:   04/30/25 (!) 141/83   04/29/25 103/68   04/03/25 104/64   04/02/25 104/65   03/21/25 98/62                 Mental Status Exam  Alertness: alert  and oriented  Appearance: adequately groomed  Behavior/Demeanor: cooperative, pleasant, and calm, with fair eye contact   Speech: normal and regular rate and rhythm  Language: intact and no problems  Psychomotor: normal or unremarkable  Mood: anxious  Affect: restricted and appropriate; was congruent to mood  Thought Process/Associations: unremarkable  Thought Content:  Reports none;  Denies suicidal ideation, violent ideation, and delusions  Perception:  Reports none;  Denies auditory hallucinations and visual hallucinations  Insight: intact  Judgment: adequate for safety and intact  Cognition: does  appear grossly intact; formal cognitive testing was not done  Gait and Station:  unable to assess (telehealth)                Data       11/20/2024     1:11 PM 2/25/2025     9:43 AM 4/30/2025     9:14 AM   PROMIS-10 Total Score w/o Sub Scores   PROMIS TOTAL - SUBSCORES 20  17  14        Patient-reported         11/4/2024     7:18 PM   CAGE-AID Total Score   Total Score 0    Total Score MyChart 0 (A total score of 2 or greater is considered clinically significant)       Patient-reported         1/3/2025     9:12 AM 3/10/2025     7:35 AM 5/5/2025     1:54 PM   PHQ   PHQ-9 Total Score 9  19  11    Q9: Thoughts of better off dead/self-harm past 2 weeks Several days More than half the days Not at all   F/U: Thoughts of suicide or self-harm No No    F/U: Safety concerns No No        Patient-reported         3/24/2025     9:16 PM 4/14/2025     6:24 PM 4/30/2025     9:13 AM   JOSESITO-7 SCORE   Total Score 7 (mild anxiety) 7 (mild anxiety) 20  (severe anxiety)   Total Score 7  7  20        Patient-reported         Liver/kidney function Metabolic Blood counts   Recent Labs   Lab Test 25  0754 25  1033 258 25  1401   CR 0.72  --  0.69 0.65   AST  --  46*  --  49*   ALT  --  23  --  25   ALKPHOS  --  81 90 82    Recent Labs   Lab Test 25  1828 25  0959 24  2215 24  0356   CHOL  --   --   --  139   TRIG  --   --   --  57   LDL  --   --   --  78   HDL  --   --   --  50   A1C  --  5.6  --  7.3*   TSH 2.19  --    < > 1.42    < > = values in this interval not displayed.    Recent Labs   Lab Test 25   WBC 5.3   HGB 14.8   HCT 46.6   MCV 87           ECG results from 25   EKG 12-lead complete w/read - Clinics     Value    Systolic Blood Pressure     Diastolic Blood Pressure     Ventricular Rate 60    Atrial Rate 60    CT Interval 186    QRS Duration 130        QTc 418    P Axis 19    R AXIS 40    T Axis 234    Interpretation ECG      Atrial-paced rhythm  Artifact impairs  Non-specific intra-ventricular conduction block  T wave abnormality, consider inferior ischemia  T wave abnormality, consider anterolateral ischemia  Abnormal ECG  When compared with ECG of 13-Mar-2025 18:06,  Electronic atrial pacemaker has replaced Sinus rhythm  Confirmed by MD NIKO, ANGEL () on 2025 12:22:59 PM         Administrative Billin minutes spent on the date of the encounter doing chart review and reviewing referral documents, history and exam of patient, documentation and further activities as noted above.    Video/Phone Start Time: 2:06p   Video/Phone End Time: 2: 49p     The longitudinal plan of care for the diagnosis(es)/condition(s) as documented were addressed during this visit. Due to the added complexity in care, I will continue to support Mary in the subsequent management and with ongoing continuity of care.

## 2025-05-05 ENCOUNTER — VIRTUAL VISIT (OUTPATIENT)
Dept: PSYCHIATRY | Facility: CLINIC | Age: 25
End: 2025-05-05
Payer: COMMERCIAL

## 2025-05-05 VITALS — WEIGHT: 146 LBS | BODY MASS INDEX: 24.92 KG/M2 | HEIGHT: 64 IN

## 2025-05-05 DIAGNOSIS — F43.23 ADJUSTMENT DISORDER WITH MIXED ANXIETY AND DEPRESSED MOOD: Primary | ICD-10-CM

## 2025-05-05 DIAGNOSIS — F43.9 TRAUMA AND STRESSOR-RELATED DISORDER: ICD-10-CM

## 2025-05-05 LAB
MDC_IDC_EPISODE_DTM: NORMAL
MDC_IDC_EPISODE_DURATION: 1 S
MDC_IDC_EPISODE_DURATION: 16 S
MDC_IDC_EPISODE_DURATION: 19 S
MDC_IDC_EPISODE_DURATION: 2 S
MDC_IDC_EPISODE_DURATION: 3 S
MDC_IDC_EPISODE_DURATION: 4 S
MDC_IDC_EPISODE_DURATION: 44 S
MDC_IDC_EPISODE_DURATION: 45 S
MDC_IDC_EPISODE_DURATION: 46 S
MDC_IDC_EPISODE_DURATION: 47 S
MDC_IDC_EPISODE_DURATION: 48 S
MDC_IDC_EPISODE_DURATION: 49 S
MDC_IDC_EPISODE_DURATION: 49 S
MDC_IDC_EPISODE_DURATION: 5 S
MDC_IDC_EPISODE_DURATION: 5 S
MDC_IDC_EPISODE_DURATION: 50 S
MDC_IDC_EPISODE_DURATION: 51 S
MDC_IDC_EPISODE_DURATION: 52 S
MDC_IDC_EPISODE_DURATION: 53 S
MDC_IDC_EPISODE_DURATION: 54 S
MDC_IDC_EPISODE_DURATION: 55 S
MDC_IDC_EPISODE_DURATION: 56 S
MDC_IDC_EPISODE_DURATION: 57 S
MDC_IDC_EPISODE_DURATION: 58 S
MDC_IDC_EPISODE_DURATION: 6 S
MDC_IDC_EPISODE_DURATION: 601 S
MDC_IDC_EPISODE_DURATION: 7 S
MDC_IDC_EPISODE_DURATION: 8 S
MDC_IDC_EPISODE_DURATION: 9 S
MDC_IDC_EPISODE_DURATION: 9 S
MDC_IDC_EPISODE_ID: NORMAL
MDC_IDC_EPISODE_TYPE: NORMAL
MDC_IDC_LEAD_CONNECTION_STATUS: NORMAL
MDC_IDC_LEAD_CONNECTION_STATUS: NORMAL
MDC_IDC_LEAD_IMPLANT_DT: NORMAL
MDC_IDC_LEAD_IMPLANT_DT: NORMAL
MDC_IDC_LEAD_LOCATION: NORMAL
MDC_IDC_LEAD_LOCATION: NORMAL
MDC_IDC_LEAD_LOCATION_DETAIL_1: NORMAL
MDC_IDC_LEAD_LOCATION_DETAIL_1: NORMAL
MDC_IDC_LEAD_MFG: NORMAL
MDC_IDC_LEAD_MFG: NORMAL
MDC_IDC_LEAD_MODEL: NORMAL
MDC_IDC_LEAD_MODEL: NORMAL
MDC_IDC_LEAD_POLARITY_TYPE: NORMAL
MDC_IDC_LEAD_POLARITY_TYPE: NORMAL
MDC_IDC_LEAD_SERIAL: NORMAL
MDC_IDC_LEAD_SERIAL: NORMAL
MDC_IDC_MSMT_BATTERY_DTM: NORMAL
MDC_IDC_MSMT_BATTERY_REMAINING_LONGEVITY: 126 MO
MDC_IDC_MSMT_BATTERY_REMAINING_PERCENTAGE: 100 %
MDC_IDC_MSMT_BATTERY_STATUS: NORMAL
MDC_IDC_MSMT_CAP_CHARGE_DTM: NORMAL
MDC_IDC_MSMT_CAP_CHARGE_DTM: NORMAL
MDC_IDC_MSMT_CAP_CHARGE_ENERGY: 41 J
MDC_IDC_MSMT_CAP_CHARGE_TIME: 10.3 S
MDC_IDC_MSMT_CAP_CHARGE_TIME: 7.6 S
MDC_IDC_MSMT_CAP_CHARGE_TYPE: NORMAL
MDC_IDC_MSMT_CAP_CHARGE_TYPE: NORMAL
MDC_IDC_MSMT_LEADCHNL_RA_IMPEDANCE_VALUE: 722 OHM
MDC_IDC_MSMT_LEADCHNL_RA_PACING_THRESHOLD_AMPLITUDE: 0.5 V
MDC_IDC_MSMT_LEADCHNL_RA_PACING_THRESHOLD_PULSEWIDTH: 0.4 MS
MDC_IDC_MSMT_LEADCHNL_RV_IMPEDANCE_VALUE: 358 OHM
MDC_IDC_MSMT_LEADCHNL_RV_PACING_THRESHOLD_AMPLITUDE: 1 V
MDC_IDC_MSMT_LEADCHNL_RV_PACING_THRESHOLD_PULSEWIDTH: 0.4 MS
MDC_IDC_PG_IMPLANT_DTM: NORMAL
MDC_IDC_PG_MFG: NORMAL
MDC_IDC_PG_MODEL: NORMAL
MDC_IDC_PG_SERIAL: NORMAL
MDC_IDC_PG_TYPE: NORMAL
MDC_IDC_SESS_CLINIC_NAME: NORMAL
MDC_IDC_SESS_DTM: NORMAL
MDC_IDC_SESS_TYPE: NORMAL
MDC_IDC_SET_BRADY_AT_MODE_SWITCH_MODE: NORMAL
MDC_IDC_SET_BRADY_AT_MODE_SWITCH_RATE: 140 {BEATS}/MIN
MDC_IDC_SET_BRADY_LOWRATE: 60 {BEATS}/MIN
MDC_IDC_SET_BRADY_MAX_TRACKING_RATE: 130 {BEATS}/MIN
MDC_IDC_SET_BRADY_MODE: NORMAL
MDC_IDC_SET_BRADY_PAV_DELAY_HIGH: 180 MS
MDC_IDC_SET_BRADY_PAV_DELAY_LOW: 240 MS
MDC_IDC_SET_BRADY_SAV_DELAY_HIGH: 135 MS
MDC_IDC_SET_BRADY_SAV_DELAY_LOW: 180 MS
MDC_IDC_SET_LEADCHNL_RA_PACING_AMPLITUDE: 3.5 V
MDC_IDC_SET_LEADCHNL_RA_PACING_CAPTURE_MODE: NORMAL
MDC_IDC_SET_LEADCHNL_RA_PACING_POLARITY: NORMAL
MDC_IDC_SET_LEADCHNL_RA_PACING_PULSEWIDTH: 0.4 MS
MDC_IDC_SET_LEADCHNL_RA_SENSING_ADAPTATION_MODE: NORMAL
MDC_IDC_SET_LEADCHNL_RA_SENSING_POLARITY: NORMAL
MDC_IDC_SET_LEADCHNL_RA_SENSING_SENSITIVITY: 0.25 MV
MDC_IDC_SET_LEADCHNL_RV_PACING_AMPLITUDE: 3.5 V
MDC_IDC_SET_LEADCHNL_RV_PACING_CAPTURE_MODE: NORMAL
MDC_IDC_SET_LEADCHNL_RV_PACING_POLARITY: NORMAL
MDC_IDC_SET_LEADCHNL_RV_PACING_PULSEWIDTH: 0.4 MS
MDC_IDC_SET_LEADCHNL_RV_SENSING_ADAPTATION_MODE: NORMAL
MDC_IDC_SET_LEADCHNL_RV_SENSING_POLARITY: NORMAL
MDC_IDC_SET_LEADCHNL_RV_SENSING_SENSITIVITY: 0.6 MV
MDC_IDC_SET_ZONE_DETECTION_INTERVAL: 240 MS
MDC_IDC_SET_ZONE_DETECTION_INTERVAL: 300 MS
MDC_IDC_SET_ZONE_DETECTION_INTERVAL: 333 MS
MDC_IDC_SET_ZONE_STATUS: NORMAL
MDC_IDC_SET_ZONE_TYPE: NORMAL
MDC_IDC_SET_ZONE_VENDOR_TYPE: NORMAL
MDC_IDC_STAT_AT_BURDEN_PERCENT: 1 %
MDC_IDC_STAT_AT_DTM_END: NORMAL
MDC_IDC_STAT_AT_DTM_START: NORMAL
MDC_IDC_STAT_BRADY_DTM_END: NORMAL
MDC_IDC_STAT_BRADY_DTM_START: NORMAL
MDC_IDC_STAT_BRADY_RA_PERCENT_PACED: 76 %
MDC_IDC_STAT_BRADY_RV_PERCENT_PACED: 3 %
MDC_IDC_STAT_EPISODE_RECENT_COUNT: 0
MDC_IDC_STAT_EPISODE_RECENT_COUNT: 70
MDC_IDC_STAT_EPISODE_RECENT_COUNT_DTM_END: NORMAL
MDC_IDC_STAT_EPISODE_RECENT_COUNT_DTM_START: NORMAL
MDC_IDC_STAT_EPISODE_TYPE: NORMAL
MDC_IDC_STAT_EPISODE_VENDOR_TYPE: NORMAL
MDC_IDC_STAT_TACHYTHERAPY_ATP_DELIVERED_RECENT: 0
MDC_IDC_STAT_TACHYTHERAPY_ATP_DELIVERED_TOTAL: 4
MDC_IDC_STAT_TACHYTHERAPY_RECENT_DTM_END: NORMAL
MDC_IDC_STAT_TACHYTHERAPY_RECENT_DTM_START: NORMAL
MDC_IDC_STAT_TACHYTHERAPY_SHOCKS_ABORTED_RECENT: 0
MDC_IDC_STAT_TACHYTHERAPY_SHOCKS_ABORTED_TOTAL: 3
MDC_IDC_STAT_TACHYTHERAPY_SHOCKS_DELIVERED_RECENT: 0
MDC_IDC_STAT_TACHYTHERAPY_SHOCKS_DELIVERED_TOTAL: 4
MDC_IDC_STAT_TACHYTHERAPY_TOTAL_DTM_END: NORMAL
MDC_IDC_STAT_TACHYTHERAPY_TOTAL_DTM_START: NORMAL

## 2025-05-05 PROCEDURE — 98003 SYNCH AUDIO-VIDEO NEW HI 60: CPT

## 2025-05-05 PROCEDURE — G2211 COMPLEX E/M VISIT ADD ON: HCPCS | Mod: 95

## 2025-05-05 ASSESSMENT — PATIENT HEALTH QUESTIONNAIRE - PHQ9
SUM OF ALL RESPONSES TO PHQ QUESTIONS 1-9: 11
SUM OF ALL RESPONSES TO PHQ QUESTIONS 1-9: 11
10. IF YOU CHECKED OFF ANY PROBLEMS, HOW DIFFICULT HAVE THESE PROBLEMS MADE IT FOR YOU TO DO YOUR WORK, TAKE CARE OF THINGS AT HOME, OR GET ALONG WITH OTHER PEOPLE: NOT DIFFICULT AT ALL

## 2025-05-05 ASSESSMENT — PAIN SCALES - GENERAL: PAINLEVEL_OUTOF10: NO PAIN (0)

## 2025-05-05 NOTE — PROGRESS NOTES
Virtual Visit Details    Type of service:  Video Visit     Originating Location (pt. Location): Home    Distant Location (provider location):  Off-site  Platform used for Video Visit: Kelsey

## 2025-05-05 NOTE — NURSING NOTE
Current patient location: [address] [place] [id number]    Is the patient currently in the state of [place]? YES    Visit mode: VIDEO    If the visit is dropped, the patient can be reconnected by:VIDEO VISIT: Text to cell phone:   Telephone Information:   Mobile [phone]       Will anyone else be joining the visit? NO  (If patient encounters technical issues they should call [phone]    Are changes needed to the allergy or medication list? No    Are refills needed on medications prescribed by this physician? NO    Rooming Documentation:  Questionnaire(s) completed    Reason for visit: RECHDEBBI CONLEYF

## 2025-05-05 NOTE — PATIENT INSTRUCTIONS
Plan:  -No medication changes.  -Continue all medications per primary care/other medical providers    **For crisis resources, please see the information at the end of this document**   Patient Education    Thank you for coming to the [facility].     Lab Testing:  If you had lab testing today and your results are reassuring or normal they will be mailed to you or sent through [employer] within 7 days. If the lab tests need quick action we will call you with the results. The phone number we will call with results is # [phone]. If this is not the best number please call our clinic and change the number.     Medication Refills:  If you need any refills please call your pharmacy and they will contact us. Our fax number for refills is [phone].   Three business days of notice are needed for general medication refill requests.   Five business days of notice are needed for controlled substance refill requests.   If you need to change to a different pharmacy, please contact the new pharmacy directly. The new pharmacy will help you get your medications transferred.     Contact Us:  Please call [phone] during business hours (8-5:00 M-F).   If you have medication related questions after clinic hours, or on the weekend, please call [phone].     Financial Assistance [phone]   Medical Records [phone]       MENTAL HEALTH CRISIS RESOURCES:  For a emergency help, please call 911 or go to the nearest Emergency Department.     Emergency Walk-In Options:   EmPATH Unit @ [facility] ([place]): [phone] - Specialized mental health emergency area designed to be calming  [facility] ([place]): [phone]  [facility] ([place]): [phone]  [facility]): [phone]    [facility] Crisis Information:   [place]: [phone]  [place]: [phone]  Juanita ([place]) - Adult: [phone]      Child: [phone]  Jayesh - Adult: [phone]     Child: [phone]  [place]: [phone]  List of all [facility] resources:   https://[place].gov/dhs/people-we-serve/adults/health-care/mental-health/resources/crisis-contacts.jsp    National Crisis Information:   Crisis Text Line: Text  [place]  to 871245  Suicide & Crisis Lifeline: 988  National Suicide Prevention Lifeline: 1-769-922-TALK (1-231.134.7238)       For online chat options, visit https://suicidepreventionlifeline.org/chat/  Poison Control Center: 9-796-124-7461  Trans Lifeline: 1-654.178.4348 - Hotline for transgender people of all ages  The [place] Project: 2-054-982-3183 - Hotline for LGBT youth     For Non-Emergency Support:   Fast Tracker: Mental Health & Substance Use Disorder Resources -   https://www.[place].org/

## 2025-05-05 NOTE — Clinical Note
Hello-  I met with Mary today for a psychiatric intake but she does not wish to pursue medications at this time. I am wondering if anyone has any ideas for a virtual support group for individuals with chronic illness she could attend. I think reducing social isolation and increasing support will be helpful and she is open to this.   I know she hasn't received a heart transplant but she mentioned being evaluated for this. I'm not sure if the transplant team has a support group or if this would even be appropriate at this time.  Thanks for helping me brainstorm.  -Maxine

## 2025-05-28 ENCOUNTER — RESULTS FOLLOW-UP (OUTPATIENT)
Dept: CARDIOLOGY | Facility: CLINIC | Age: 25
End: 2025-05-28

## 2025-05-28 ENCOUNTER — ANCILLARY PROCEDURE (OUTPATIENT)
Dept: CARDIOLOGY | Facility: CLINIC | Age: 25
End: 2025-05-28
Payer: COMMERCIAL

## 2025-05-28 DIAGNOSIS — I50.20 HEART FAILURE WITH REDUCED EJECTION FRACTION (H): ICD-10-CM

## 2025-05-28 DIAGNOSIS — R00.2 PALPITATIONS: ICD-10-CM

## 2025-05-28 DIAGNOSIS — I42.8 NONISCHEMIC CARDIOMYOPATHY (H): ICD-10-CM

## 2025-05-28 LAB — LVEF ECHO: NORMAL

## 2025-05-28 PROCEDURE — 93306 TTE W/DOPPLER COMPLETE: CPT | Performed by: INTERNAL MEDICINE

## 2025-06-03 ENCOUNTER — ANCILLARY PROCEDURE (OUTPATIENT)
Dept: CARDIOLOGY | Facility: CLINIC | Age: 25
End: 2025-06-03
Attending: INTERNAL MEDICINE
Payer: COMMERCIAL

## 2025-06-03 DIAGNOSIS — I42.8 NONISCHEMIC CARDIOMYOPATHY (H): ICD-10-CM

## 2025-06-03 PROCEDURE — 99207 CARDIAC DEVICE CHECK - REMOTE: CPT | Performed by: INTERNAL MEDICINE

## 2025-06-04 LAB
MDC_IDC_EPISODE_DTM: NORMAL
MDC_IDC_EPISODE_DURATION: 1 S
MDC_IDC_EPISODE_DURATION: 10 S
MDC_IDC_EPISODE_DURATION: 11 S
MDC_IDC_EPISODE_DURATION: 13 S
MDC_IDC_EPISODE_DURATION: 14 S
MDC_IDC_EPISODE_DURATION: 2 S
MDC_IDC_EPISODE_DURATION: 2510 S
MDC_IDC_EPISODE_DURATION: 3 S
MDC_IDC_EPISODE_DURATION: 36 S
MDC_IDC_EPISODE_DURATION: 4 S
MDC_IDC_EPISODE_DURATION: 45 S
MDC_IDC_EPISODE_DURATION: 46 S
MDC_IDC_EPISODE_DURATION: 47 S
MDC_IDC_EPISODE_DURATION: 48 S
MDC_IDC_EPISODE_DURATION: 48 S
MDC_IDC_EPISODE_DURATION: 49 S
MDC_IDC_EPISODE_DURATION: 5 S
MDC_IDC_EPISODE_DURATION: 5 S
MDC_IDC_EPISODE_DURATION: 50 S
MDC_IDC_EPISODE_DURATION: 51 S
MDC_IDC_EPISODE_DURATION: 51 S
MDC_IDC_EPISODE_DURATION: 52 S
MDC_IDC_EPISODE_DURATION: 53 S
MDC_IDC_EPISODE_DURATION: 54 S
MDC_IDC_EPISODE_DURATION: 55 S
MDC_IDC_EPISODE_DURATION: 56 S
MDC_IDC_EPISODE_DURATION: 57 S
MDC_IDC_EPISODE_DURATION: 59 S
MDC_IDC_EPISODE_DURATION: 6 S
MDC_IDC_EPISODE_DURATION: 60 S
MDC_IDC_EPISODE_DURATION: 7 S
MDC_IDC_EPISODE_DURATION: 8 S
MDC_IDC_EPISODE_ID: NORMAL
MDC_IDC_EPISODE_TYPE: NORMAL
MDC_IDC_EPISODE_TYPE_INDUCED: NO
MDC_IDC_LEAD_CONNECTION_STATUS: NORMAL
MDC_IDC_LEAD_CONNECTION_STATUS: NORMAL
MDC_IDC_LEAD_IMPLANT_DT: NORMAL
MDC_IDC_LEAD_IMPLANT_DT: NORMAL
MDC_IDC_LEAD_LOCATION: NORMAL
MDC_IDC_LEAD_LOCATION: NORMAL
MDC_IDC_LEAD_LOCATION_DETAIL_1: NORMAL
MDC_IDC_LEAD_LOCATION_DETAIL_1: NORMAL
MDC_IDC_LEAD_MFG: NORMAL
MDC_IDC_LEAD_MFG: NORMAL
MDC_IDC_LEAD_MODEL: NORMAL
MDC_IDC_LEAD_MODEL: NORMAL
MDC_IDC_LEAD_POLARITY_TYPE: NORMAL
MDC_IDC_LEAD_POLARITY_TYPE: NORMAL
MDC_IDC_LEAD_SERIAL: NORMAL
MDC_IDC_LEAD_SERIAL: NORMAL
MDC_IDC_MSMT_BATTERY_DTM: NORMAL
MDC_IDC_MSMT_BATTERY_REMAINING_LONGEVITY: 126 MO
MDC_IDC_MSMT_BATTERY_REMAINING_PERCENTAGE: 100 %
MDC_IDC_MSMT_BATTERY_STATUS: NORMAL
MDC_IDC_MSMT_CAP_CHARGE_DTM: NORMAL
MDC_IDC_MSMT_CAP_CHARGE_DTM: NORMAL
MDC_IDC_MSMT_CAP_CHARGE_ENERGY: 41 J
MDC_IDC_MSMT_CAP_CHARGE_TIME: 10.3 S
MDC_IDC_MSMT_CAP_CHARGE_TIME: 7.6 S
MDC_IDC_MSMT_CAP_CHARGE_TYPE: NORMAL
MDC_IDC_MSMT_CAP_CHARGE_TYPE: NORMAL
MDC_IDC_MSMT_LEADCHNL_RA_IMPEDANCE_VALUE: 761 OHM
MDC_IDC_MSMT_LEADCHNL_RA_PACING_THRESHOLD_AMPLITUDE: 0.5 V
MDC_IDC_MSMT_LEADCHNL_RA_PACING_THRESHOLD_PULSEWIDTH: 0.4 MS
MDC_IDC_MSMT_LEADCHNL_RV_IMPEDANCE_VALUE: 366 OHM
MDC_IDC_MSMT_LEADCHNL_RV_PACING_THRESHOLD_AMPLITUDE: 0.9 V
MDC_IDC_MSMT_LEADCHNL_RV_PACING_THRESHOLD_PULSEWIDTH: 0.4 MS
MDC_IDC_PG_IMPLANT_DTM: NORMAL
MDC_IDC_PG_MFG: NORMAL
MDC_IDC_PG_MODEL: NORMAL
MDC_IDC_PG_SERIAL: NORMAL
MDC_IDC_PG_TYPE: NORMAL
MDC_IDC_SESS_CLINIC_NAME: NORMAL
MDC_IDC_SESS_DTM: NORMAL
MDC_IDC_SESS_TYPE: NORMAL
MDC_IDC_SET_BRADY_AT_MODE_SWITCH_MODE: NORMAL
MDC_IDC_SET_BRADY_AT_MODE_SWITCH_RATE: 140 {BEATS}/MIN
MDC_IDC_SET_BRADY_LOWRATE: 60 {BEATS}/MIN
MDC_IDC_SET_BRADY_MAX_TRACKING_RATE: 130 {BEATS}/MIN
MDC_IDC_SET_BRADY_MODE: NORMAL
MDC_IDC_SET_BRADY_PAV_DELAY_HIGH: 180 MS
MDC_IDC_SET_BRADY_PAV_DELAY_LOW: 240 MS
MDC_IDC_SET_BRADY_SAV_DELAY_HIGH: 135 MS
MDC_IDC_SET_BRADY_SAV_DELAY_LOW: 180 MS
MDC_IDC_SET_LEADCHNL_RA_PACING_AMPLITUDE: 3.5 V
MDC_IDC_SET_LEADCHNL_RA_PACING_CAPTURE_MODE: NORMAL
MDC_IDC_SET_LEADCHNL_RA_PACING_POLARITY: NORMAL
MDC_IDC_SET_LEADCHNL_RA_PACING_PULSEWIDTH: 0.4 MS
MDC_IDC_SET_LEADCHNL_RA_SENSING_ADAPTATION_MODE: NORMAL
MDC_IDC_SET_LEADCHNL_RA_SENSING_POLARITY: NORMAL
MDC_IDC_SET_LEADCHNL_RA_SENSING_SENSITIVITY: 0.25 MV
MDC_IDC_SET_LEADCHNL_RV_PACING_AMPLITUDE: 3.5 V
MDC_IDC_SET_LEADCHNL_RV_PACING_CAPTURE_MODE: NORMAL
MDC_IDC_SET_LEADCHNL_RV_PACING_POLARITY: NORMAL
MDC_IDC_SET_LEADCHNL_RV_PACING_PULSEWIDTH: 0.4 MS
MDC_IDC_SET_LEADCHNL_RV_SENSING_ADAPTATION_MODE: NORMAL
MDC_IDC_SET_LEADCHNL_RV_SENSING_POLARITY: NORMAL
MDC_IDC_SET_LEADCHNL_RV_SENSING_SENSITIVITY: 0.6 MV
MDC_IDC_SET_ZONE_DETECTION_INTERVAL: 240 MS
MDC_IDC_SET_ZONE_DETECTION_INTERVAL: 300 MS
MDC_IDC_SET_ZONE_DETECTION_INTERVAL: 333 MS
MDC_IDC_SET_ZONE_STATUS: NORMAL
MDC_IDC_SET_ZONE_TYPE: NORMAL
MDC_IDC_SET_ZONE_VENDOR_TYPE: NORMAL
MDC_IDC_STAT_AT_BURDEN_PERCENT: 1 %
MDC_IDC_STAT_AT_DTM_END: NORMAL
MDC_IDC_STAT_AT_DTM_START: NORMAL
MDC_IDC_STAT_BRADY_DTM_END: NORMAL
MDC_IDC_STAT_BRADY_DTM_START: NORMAL
MDC_IDC_STAT_BRADY_RA_PERCENT_PACED: 76 %
MDC_IDC_STAT_BRADY_RV_PERCENT_PACED: 3 %
MDC_IDC_STAT_EPISODE_RECENT_COUNT: 0
MDC_IDC_STAT_EPISODE_RECENT_COUNT: 1
MDC_IDC_STAT_EPISODE_RECENT_COUNT: 58
MDC_IDC_STAT_EPISODE_RECENT_COUNT_DTM_END: NORMAL
MDC_IDC_STAT_EPISODE_RECENT_COUNT_DTM_START: NORMAL
MDC_IDC_STAT_EPISODE_TYPE: NORMAL
MDC_IDC_STAT_EPISODE_VENDOR_TYPE: NORMAL
MDC_IDC_STAT_TACHYTHERAPY_ATP_DELIVERED_RECENT: 0
MDC_IDC_STAT_TACHYTHERAPY_ATP_DELIVERED_TOTAL: 4
MDC_IDC_STAT_TACHYTHERAPY_RECENT_DTM_END: NORMAL
MDC_IDC_STAT_TACHYTHERAPY_RECENT_DTM_START: NORMAL
MDC_IDC_STAT_TACHYTHERAPY_SHOCKS_ABORTED_RECENT: 0
MDC_IDC_STAT_TACHYTHERAPY_SHOCKS_ABORTED_TOTAL: 3
MDC_IDC_STAT_TACHYTHERAPY_SHOCKS_DELIVERED_RECENT: 0
MDC_IDC_STAT_TACHYTHERAPY_SHOCKS_DELIVERED_TOTAL: 4
MDC_IDC_STAT_TACHYTHERAPY_TOTAL_DTM_END: NORMAL
MDC_IDC_STAT_TACHYTHERAPY_TOTAL_DTM_START: NORMAL

## 2025-06-05 ENCOUNTER — RESULTS FOLLOW-UP (OUTPATIENT)
Dept: TRANSPLANT | Facility: CLINIC | Age: 25
End: 2025-06-05

## 2025-06-12 ENCOUNTER — CARE COORDINATION (OUTPATIENT)
Dept: CARDIOLOGY | Facility: CLINIC | Age: 25
End: 2025-06-12
Payer: COMMERCIAL

## 2025-06-12 NOTE — PROGRESS NOTES
I called Mary to review instructions for her upcoming RHC on 6/19. No anticoagulation or injectables. Left voicemail, instructions also sent through [employer].    Martha Lawton RN    Pre-procedure instructions - Right Heart Cath and/or Biopsy and/or Pulmonary Angiogram  Patient Education    Your arrival time is 8:00 AM.  Location is [address] Waiting Room  Please plan on being at the hospital all day. If you are on dialysis, DO NOT schedule on a dialysis day.  At any time, emergencies and/or urgent cases may come up which could delay the start of your procedure.    Pre-procedure instructions - Right heart catheterization  No solid food for 8 hours prior  Nothing to drink 2 hours prior to arrival time  You can take your morning medications (except diabetic and blood thinners) with sips of water  We recommend you arrange for a ride to drop you off and pick you up, in the instance, you are unable to drive home, however you should be able to function as you normally would after the procedure     Diabetic Medication Instructions  Hold oral diabetic medication in morning of your procedure and for 48 hours after IV contrast is given  Typical instructions for insulin diabetic medication holding are below. However, please reach out to your Primary Care Provider or Endocrinologist for specific instructions  DO NOT take any oral diabetic medication, short-acting diabetes medications/insulin, humalog or regular insulin the morning of your test  Take   dose of long-acting insulin (Lantus, Levemir) the day of your test  Remember to bring your glucometer and insulin with you to take after your test if needed  GLP-1 Agonists Instructions  DO NOT take injectable GLP-1 agonists semaglutide (Ozempic, Wegovy), dulaglutide (Trulicity), exenatide ER (Bydureon), tirzepatide (Mounjaro), or oral semaglutide (Rybelsus) for 7 days prior your  procedure  Hold once daily injectable GLP-1 agonists exenatide (Byetta), liraglutide (Saxenda, Victoza), lixisenatide (Soligua) the day before and day of your procedure                Anticoagulation Medication Instructions   NA  Nurse to write N/A if not currently taking

## 2025-06-16 ENCOUNTER — TELEPHONE (OUTPATIENT)
Dept: CARDIOLOGY | Facility: CLINIC | Age: 25
End: 2025-06-16
Payer: COMMERCIAL

## 2025-06-16 NOTE — TELEPHONE ENCOUNTER
Called pt to review pre-procedure instructions for RHC;also sent pt Speakermixt message. Please call back clinic to review.

## 2025-06-17 ENCOUNTER — VIRTUAL VISIT (OUTPATIENT)
Dept: GASTROENTEROLOGY | Facility: CLINIC | Age: 25
End: 2025-06-17
Attending: STUDENT IN AN ORGANIZED HEALTH CARE EDUCATION/TRAINING PROGRAM
Payer: COMMERCIAL

## 2025-06-17 VITALS — WEIGHT: 145 LBS | BODY MASS INDEX: 24.75 KG/M2 | HEIGHT: 64 IN

## 2025-06-17 DIAGNOSIS — R14.0 BLOATING: ICD-10-CM

## 2025-06-17 DIAGNOSIS — R10.31 RIGHT LOWER QUADRANT PAIN: ICD-10-CM

## 2025-06-17 DIAGNOSIS — R10.84 ABDOMINAL PAIN, GENERALIZED: Primary | ICD-10-CM

## 2025-06-17 DIAGNOSIS — R10.32 ABDOMINAL PAIN, LEFT LOWER QUADRANT: ICD-10-CM

## 2025-06-17 ASSESSMENT — PAIN SCALES - GENERAL: PAINLEVEL_OUTOF10: SEVERE PAIN (10)

## 2025-06-17 NOTE — LETTER
6/17/2025      Mary Shaikh  [address]  [place] [address]      Dear Colleague,    Thank you for referring your patient, Mary Shaikh, to the [facility] [place]. Please see a copy of my visit note below.    Virtual Visit Details    Type of service:  Video Visit     Originating Location (pt. Location): Home    Distant Location (provider location):  Off-site  Platform used for Video Visit: Kelsey    Joined the call at 6/17/2025, 8:34:44 am.  Left the call at 6/17/2025, 8:58:21 am.  You were on the call for 23 minutes 37 seconds.  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     # Abdominal pain   Daily upper abdominal pain which is constant, sharp and exacerbated by eating along with movement (twisting, bending forward). Improved with heating pad and sometimes with bowel movements. No improvement with PPI therapy (pantoprazole; and omeprazole made it worse). Tried muscle relaxant but woke up with chills on the one night she tried it. CTAP W contrast 4/2025 overall unremarkable to explain pain.     DDx includes functional dyspepsia, MSK pain, gastritis, PUD, constipation. Unlikely choledocholithiasis with normal LFTs and no biliary dilation on US.     Today - Dealing with constant daily pain and she is in tears today.  Shares that it affects her concentration, limits her social engagement and negatively impacts her quality of life.  She wants some relief from this pain but admits she is scared to take medications because of her past medical history.  She did not try FD guard, today shares the pain is aggravated with positional changes.  It is no longer improved with bowel movements.  It is no longer worsened with oral intake.  She vomited some stomach acid due to the severity of the pain.  No weight loss.  Stooling  regularly.     Plan  - Celiac serologies, H. pylori ordered  - Recommend trials of IBgard, topical lidocaine patch.  As she is reluctant to take meds, I gently advised that she discuss the potential start of these medications with her care team. She is agreeable   - Physical therapy consultation for evaluation of MSK wall pain  - Pain management consultation to establish care  - Await upper endoscopy in August    Future consideration   -HIDA  -Good candidate for neuromodulator but hold today due to cardiac disease     Return to Clinic: Few weeks after EGD    Indira Lloyd PA-C  Follow up: As planned above. Today, I personally spent 40  minutes spent on the date of the encounter doing chart review, history and exam, documentation and further activities per the note.    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     Initial HPI 4/1/2025 with Dr Hathaway and Boo   Notes daily upper abdominal pain which is sharp and worse with eating and movement. Started in December 2024. During this time she did get shocks from her ICD but otherwise does not recall other illnesses that started the symptoms. Pain is exacerbated by eating, walking around, twisting or reaching down for things. Feels better with heating pad. Pain also improves after bowel movements. No improvement with tylenol or pantoprazole. No nausea or vomiting. Has bowel movement 2-3 times per day after eating. Can be loose or formed. Also feels bloated a few times per week.     Recall similar pain before gallbladder was removed last summer but was not having abdominal bloating/distension symptoms she is having now.     Mild AST elevation but otherwise normal LFTs and lipase at recent PCP  visit. Negative H. Pylori stool test. Abdomen US was normal 1/2025. No cross sectional imaging since symptoms started.     Targeted GI review of systems complete and is otherwise negative.     Today June 17, 2025  CTAP W contrast was NEG, EGD scheduled for 8/2025  -did not try FDGard, scared to take it d/t her PMH  She tried Omeprazole - was given this with primary previously, she stopped taking it as it made her stomach pain worse.   Abdominal pain - upper abdomen, constant, pressure sensation and can be sharp pain  -Omeprazole made it worse, Pantoprazole/APAP did not help  -heating pad helped with pain  -eating is no longer prompting this pain, used to occur 15 min after eating   -she is having some associated nausea/vomiting, just had a recent emesis episode (yellow liquid, sourness/bitter w/o bloody or coffee ground emesis). she thinks the vomiting is due to the severe pain   No associated heartburn   Bowel Movements - 2-3x a day, usually   -when she uses the bathroom, it hurts this pain  -defecation used to help this pain but no longer these days   Appetite is stable, no weight loss     Past Medical History:   Diagnosis Date     Congestive heart failure (H)      Depressive disorder      Uncomplicated asthma        Past Surgical History:   Procedure Laterality Date     CARDIAC SURGERY      Distributor placemnt     CV RIGHT HEART CATH MEASUREMENTS RECORDED N/A 07/31/2024    Procedure: Heart Cath Right Heart Cath;  Surgeon: Tanmay Brice MD;  Location:  HEART CARDIAC CATH LAB     CV RIGHT HEART CATH MEASUREMENTS RECORDED N/A 11/15/2024    Procedure: Heart Cath Right Heart Cath;  Surgeon: Tanmay Brice MD;  Location:  HEART CARDIAC CATH LAB     CV RIGHT HEART CATH MEASUREMENTS RECORDED N/A 12/16/2024    Procedure: Right Heart Catheterization;  Surgeon: Evens Rodrigues MD;  Location:  HEART CARDIAC CATH LAB       Family History   Problem Relation Age of Onset     Asthma Mother        Social History      Tobacco Use     Smoking status: Never     Passive exposure: Never (per pt)     Smokeless tobacco: Never   Substance Use Topics     Alcohol use: Never       Physical exam:   GENERAL: alert and no distress  EYES: Eyes grossly normal to inspection.  No discharge or erythema, or obvious scleral/conjunctival abnormalities.  RESP: No audible wheeze, cough, or visible cyanosis.    SKIN: Visible skin clear. No significant rash, abnormal pigmentation or lesions.  NEURO: Cranial nerves grossly intact.  Mentation and speech appropriate for age.  PSYCH: Appropriate affect, tone, and pace of words. Teary eyed.     Labs:   MIld AST elevation (chronic). Normal lipase. Negative h.pylori stool.     Relevant imaging:   Abdominal US 1/2025 - post cholecystomy changes, no biliary dilation.     Endoscopy:  none               Again, thank you for allowing me to participate in the care of your patient.        Sincerely,        Indira Lloyd PA-C    Electronically signed

## 2025-06-17 NOTE — NURSING NOTE
Current patient location: [address] [place] [id number]    Is the patient currently in the state of [place]? YES    Visit mode: VIDEO    If the visit is dropped, the patient can be reconnected by:VIDEO VISIT: Text to cell phone:   Telephone Information:   Mobile [phone]       Will anyone else be joining the visit? NO  (If patient encounters technical issues they should call [phone]    Are changes needed to the allergy or medication list? No    Are refills needed on medications prescribed by this physician? NO    Rooming Documentation:  Questionnaire(s) completed    Reason for visit: RECHECK (Recheck)    Ailyn AMOS

## 2025-06-17 NOTE — PATIENT INSTRUCTIONS
It was a pleasure taking care of you today.  I've included a brief summary of our discussion and care plan from today's visit below.  Please review this information with your primary care provider.  _______________________________________________________________________    My recommendations are summarized as follows:    Blood work + stool samples ordered, call to make an appt at [facility] lab to get this completed. You can bring the stool kit home for completion and once you have a sample, bring it back to the lab - I know I can't poop on demand!  -I am checking for gluten intolerance, so I need to make sure you are eating gluten regularly before the test (a piece of bread daily for 14 days). It makes sure the test results are accurate   Speak with your care team about their thoughts on you using IBGard and topical lidocaine/Salonpas (both over the counter)  -if they are OK with it, take one each day for 1 week and monitor to see if it helps with the pain  -if they both help, it is OK to take both together going forward  Referral to the pain management team for an evaluation and further guidance in care/ medication recommendations  Referral to physical therapy team for evaluation of abdominal wall pain     Return to GI Clinic in 2 wk after August procedure to review your progress.     Please see below for any additional questions and scheduling guidelines.    Sign up for [employer]: [employer] patient portal serves as a secure platform for accessing your medical records from the [facility]. Additionally, [employer] facilitates easy, timely, and secure messaging with your care team. If you have not signed up, you may do so by using the provided code or calling [phone].    Coordinating your care after your visit:  There are multiple options for scheduling your follow-up care based on your provider's recommendation.    How do I schedule a follow-up clinic appointment:   After your appointment, you  may receive scheduling assistance with the Clinic Coordinators by having a seat in the waiting room and a Clinic Coordinator will call you up to schedule.  Virtual visits or after you leave the clinic:  Your provider has placed a follow-up order in the [facility] portal for scheduling your return appointment. A member of the scheduling team will contact you to schedule.  [facility] Scheduling: Timely scheduling through [facility] is advised to ensure appointment availability.   Call to schedule: You may schedule your follow-up appointment(s) by calling [phone], option 1.    How do I schedule my endoscopy or colonoscopy procedure:  If a procedure, such as a colonoscopy or upper endoscopy was ordered by your provider, the scheduling team will contact you to schedule this procedure. Or you may choose to call to schedule at   [phone], option 1.  Please allow 20-30 minutes when scheduling a procedure.    How do I get my blood work done? To get your blood work done, you need to schedule a lab appointment at an [facility]. There are multiple ways to schedule:   At the clinic: The Clinic Coordinator you meet after your visit can help you schedule a lab appointment.   [facility] scheduling: [facility] offers online lab scheduling at all [facility] laboratory locations.   Call to schedule: You can call [phone] to schedule your lab appointment.    How do I schedule my imaging study: To schedule imaging studies, such as CT scans, ultrasounds, MRIs, or X-rays, contact Imaging Services at [phone].    How do I schedule a referral to another doctor: If your provider recommended a referral to another specialist(s), the referral order was placed by your provider. You will receive a phone call to schedule this referral, or you may choose to call the number attached to the referral to self-schedule.    For Post-Visit Question(s):  For any inquiries following today's visit:  Please utilize MyChart messaging  and allow 48 hours for reply or contact the Call Center during normal business hours at [phone], option 3.  For Emergent After-hours questions, contact the On-Call GI Fellow through the [facility]  at [phone].  In addition, you may contact your Nurse directly using the provided contact information.    Test Results:  Test results will be accessible via [employer] in compliance with the 21st Century Cures Act. This means that your results will be available to you at the same time as your provider. Often you may see your results before your provider does. Results are reviewed by staff within two weeks with communication follow-up. Results may be released in the patient portal prior to your care team review.    Prescription Refill(s):  Medication prescribed by your provider will be addressed during your visit. For future refills, please coordinate with your pharmacy. If you have not had a recent clinic visit or routine labs, for your safety, your provider may not be able to refill your prescription.     Sincerely,    Indira Lloyd PA-C  Gastroenterology

## 2025-06-17 NOTE — PROGRESS NOTES
Virtual Visit Details    Type of service:  Video Visit     Originating Location (pt. Location): Home    Distant Location (provider location):  Off-site  Platform used for Video Visit: Kelsey    Joined the call at 6/17/2025, 8:34:44 am.  Left the call at 6/17/2025, 8:58:21 am.  You were on the call for 23 minutes 37 seconds.  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     # Abdominal pain   Daily upper abdominal pain which is constant, sharp and exacerbated by eating along with movement (twisting, bending forward). Improved with heating pad and sometimes with bowel movements. No improvement with PPI therapy (pantoprazole; and omeprazole made it worse). Tried muscle relaxant but woke up with chills on the one night she tried it. CTAP W contrast 4/2025 overall unremarkable to explain pain.     DDx includes functional dyspepsia, MSK pain, gastritis, PUD, constipation. Unlikely choledocholithiasis with normal LFTs and no biliary dilation on US.     Today - Dealing with constant daily pain and she is in tears today.  Shares that it affects her concentration, limits her social engagement and negatively impacts her quality of life.  She wants some relief from this pain but admits she is scared to take medications because of her past medical history.  She did not try FD guard, today shares the pain is aggravated with positional changes.  It is no longer improved with bowel movements.  It is no longer worsened with oral intake.  She vomited some stomach acid due to the severity of the pain.  No weight loss.  Stooling regularly.     Plan  - Celiac serologies, H. pylori ordered  - Recommend trials of IBgard, topical lidocaine patch.  As she is reluctant to take meds, I gently advised that she discuss the potential start of these medications with her care team. She is agreeable   -  Physical therapy consultation for evaluation of MSK wall pain  - Pain management consultation to establish care  - Await upper endoscopy in August    Future consideration   -HIDA  -Good candidate for neuromodulator but hold today due to cardiac disease     Return to Clinic: Few weeks after EGD    Indira Lloyd PA-C  Follow up: As planned above. Today, I personally spent 40  minutes spent on the date of the encounter doing chart review, history and exam, documentation and further activities per the note.    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     Initial HPI 4/1/2025 with Dr Hathaway and Boo   Notes daily upper abdominal pain which is sharp and worse with eating and movement. Started in December 2024. During this time she did get shocks from her ICD but otherwise does not recall other illnesses that started the symptoms. Pain is exacerbated by eating, walking around, twisting or reaching down for things. Feels better with heating pad. Pain also improves after bowel movements. No improvement with tylenol or pantoprazole. No nausea or vomiting. Has bowel movement 2-3 times per day after eating. Can be loose or formed. Also feels bloated a few times per week.     Recall similar pain before gallbladder was removed last summer but was not having abdominal bloating/distension symptoms she is having now.     Mild AST elevation but otherwise normal LFTs and lipase at recent PCP visit. Negative H. Pylori stool test. Abdomen US was normal 1/2025. No cross sectional imaging since symptoms started.     Targeted GI review of systems complete and is otherwise negative.     Today June 17, 2025  CTAP W contrast was NEG, EGD scheduled for  8/2025  -did not try FDGard, scared to take it d/t her PMH  She tried Omeprazole - was given this with primary previously, she stopped taking it as it made her stomach pain worse.   Abdominal pain - upper abdomen, constant, pressure sensation and can be sharp pain  -Omeprazole made it worse, Pantoprazole/APAP did not help  -heating pad helped with pain  -eating is no longer prompting this pain, used to occur 15 min after eating   -she is having some associated nausea/vomiting, just had a recent emesis episode (yellow liquid, sourness/bitter w/o bloody or coffee ground emesis). she thinks the vomiting is due to the severe pain   No associated heartburn   Bowel Movements - 2-3x a day, usually   -when she uses the bathroom, it hurts this pain  -defecation used to help this pain but no longer these days   Appetite is stable, no weight loss     Past Medical History:   Diagnosis Date    Congestive heart failure (H)     Depressive disorder     Uncomplicated asthma        Past Surgical History:   Procedure Laterality Date    CARDIAC SURGERY      Distributor placemnt    CV RIGHT HEART CATH MEASUREMENTS RECORDED N/A 07/31/2024    Procedure: Heart Cath Right Heart Cath;  Surgeon: Tanmay Brice MD;  Location:  HEART CARDIAC CATH LAB    CV RIGHT HEART CATH MEASUREMENTS RECORDED N/A 11/15/2024    Procedure: Heart Cath Right Heart Cath;  Surgeon: Tanmay Brice MD;  Location:  HEART CARDIAC CATH LAB    CV RIGHT HEART CATH MEASUREMENTS RECORDED N/A 12/16/2024    Procedure: Right Heart Catheterization;  Surgeon: Evens Rodrigues MD;  Location:  HEART CARDIAC CATH LAB       Family History   Problem Relation Age of Onset    Asthma Mother        Social History     Tobacco Use    Smoking status: Never     Passive exposure: Never (per pt)    Smokeless tobacco: Never   Substance Use Topics    Alcohol use: Never       Physical exam:   GENERAL: alert and no distress  EYES: Eyes grossly normal to inspection.  No discharge or  erythema, or obvious scleral/conjunctival abnormalities.  RESP: No audible wheeze, cough, or visible cyanosis.    SKIN: Visible skin clear. No significant rash, abnormal pigmentation or lesions.  NEURO: Cranial nerves grossly intact.  Mentation and speech appropriate for age.  PSYCH: Appropriate affect, tone, and pace of words. Teary eyed.     Labs:   MIld AST elevation (chronic). Normal lipase. Negative h.pylori stool.     Relevant imaging:   Abdominal US 1/2025 - post cholecystomy changes, no biliary dilation.     Endoscopy:  none

## 2025-06-18 ENCOUNTER — PATIENT OUTREACH (OUTPATIENT)
Dept: CARE COORDINATION | Facility: CLINIC | Age: 25
End: 2025-06-18
Payer: COMMERCIAL

## 2025-06-19 ENCOUNTER — SURGERY (OUTPATIENT)
Age: 25
End: 2025-06-19
Payer: COMMERCIAL

## 2025-06-19 ENCOUNTER — APPOINTMENT (OUTPATIENT)
Dept: MEDSURG UNIT | Facility: CLINIC | Age: 25
End: 2025-06-19
Attending: INTERNAL MEDICINE
Payer: COMMERCIAL

## 2025-06-19 ENCOUNTER — HOSPITAL ENCOUNTER (OUTPATIENT)
Facility: CLINIC | Age: 25
Discharge: HOME | End: 2025-06-19
Attending: INTERNAL MEDICINE | Admitting: INTERNAL MEDICINE
Payer: COMMERCIAL

## 2025-06-19 ENCOUNTER — APPOINTMENT (OUTPATIENT)
Dept: LAB | Facility: CLINIC | Age: 25
End: 2025-06-19
Attending: INTERNAL MEDICINE
Payer: COMMERCIAL

## 2025-06-19 VITALS
DIASTOLIC BLOOD PRESSURE: 68 MMHG | SYSTOLIC BLOOD PRESSURE: 105 MMHG | TEMPERATURE: 97.6 F | HEART RATE: 57 BPM | RESPIRATION RATE: 20 BRPM | OXYGEN SATURATION: 100 %

## 2025-06-19 DIAGNOSIS — I50.23 ACUTE ON CHRONIC SYSTOLIC HEART FAILURE (H): ICD-10-CM

## 2025-06-19 DIAGNOSIS — I50.20 HEART FAILURE WITH REDUCED EJECTION FRACTION (H): Primary | ICD-10-CM

## 2025-06-19 DIAGNOSIS — E74.05 DANON DISEASE IN HETEROZYGOUS FEMALE (H): ICD-10-CM

## 2025-06-19 LAB
ALBUMIN SERPL BCG-MCNC: 4.6 G/DL (ref 3.5–5.2)
ALP SERPL-CCNC: 71 U/L (ref 40–150)
ALT SERPL W P-5'-P-CCNC: 17 U/L (ref 0–50)
ANION GAP SERPL CALCULATED.3IONS-SCNC: 9 MMOL/L (ref 7–15)
AST SERPL W P-5'-P-CCNC: 44 U/L (ref 0–45)
BASOPHILS # BLD AUTO: 0 10E3/UL (ref 0–0.2)
BASOPHILS NFR BLD AUTO: 1 %
BILIRUB SERPL-MCNC: 0.9 MG/DL
BUN SERPL-MCNC: 11.1 MG/DL (ref 6–20)
CALCIUM SERPL-MCNC: 9.6 MG/DL (ref 8.8–10.4)
CHLORIDE SERPL-SCNC: 104 MMOL/L (ref 98–107)
CREAT SERPL-MCNC: 0.7 MG/DL (ref 0.51–0.95)
EGFRCR SERPLBLD CKD-EPI 2021: >90 ML/MIN/1.73M2
EOSINOPHIL # BLD AUTO: 0 10E3/UL (ref 0–0.7)
EOSINOPHIL NFR BLD AUTO: 0 %
ERYTHROCYTE [DISTWIDTH] IN BLOOD BY AUTOMATED COUNT: 13.8 % (ref 10–15)
GLUCOSE SERPL-MCNC: 87 MG/DL (ref 70–99)
HCG INTACT+B SERPL-ACNC: <1 MIU/ML
HCO3 SERPL-SCNC: 26 MMOL/L (ref 22–29)
HCT VFR BLD AUTO: 48.3 % (ref 35–47)
HGB BLD-MCNC: 15.3 G/DL (ref 11.7–15.7)
IMM GRANULOCYTES # BLD: 0 10E3/UL
IMM GRANULOCYTES NFR BLD: 0 %
INR PPP: 1.11 (ref 0.85–1.15)
LYMPHOCYTES # BLD AUTO: 1.9 10E3/UL (ref 0.8–5.3)
LYMPHOCYTES NFR BLD AUTO: 57 %
MCH RBC QN AUTO: 27.5 PG (ref 26.5–33)
MCHC RBC AUTO-ENTMCNC: 31.7 G/DL (ref 31.5–36.5)
MCV RBC AUTO: 87 FL (ref 78–100)
MONOCYTES # BLD AUTO: 0.3 10E3/UL (ref 0–1.3)
MONOCYTES NFR BLD AUTO: 8 %
NEUTROPHILS # BLD AUTO: 1.1 10E3/UL (ref 1.6–8.3)
NEUTROPHILS NFR BLD AUTO: 34 %
NRBC # BLD AUTO: 0 10E3/UL
NRBC BLD AUTO-RTO: 0 /100
PLATELET # BLD AUTO: 250 10E3/UL (ref 150–450)
POTASSIUM SERPL-SCNC: 4.1 MMOL/L (ref 3.4–5.3)
PROT SERPL-MCNC: 7.6 G/DL (ref 6.4–8.3)
PROTHROMBIN TIME: 14.6 SECONDS (ref 11.8–14.8)
RBC # BLD AUTO: 5.56 10E6/UL (ref 3.8–5.2)
SODIUM SERPL-SCNC: 139 MMOL/L (ref 135–145)
WBC # BLD AUTO: 3.3 10E3/UL (ref 4–11)

## 2025-06-19 PROCEDURE — 250N000009 HC RX 250: Performed by: INTERNAL MEDICINE

## 2025-06-19 PROCEDURE — C1751 CATH, INF, PER/CENT/MIDLINE: HCPCS | Performed by: INTERNAL MEDICINE

## 2025-06-19 PROCEDURE — 36415 COLL VENOUS BLD VENIPUNCTURE: CPT | Performed by: INTERNAL MEDICINE

## 2025-06-19 PROCEDURE — 93451 RIGHT HEART CATH: CPT | Mod: 26 | Performed by: INTERNAL MEDICINE

## 2025-06-19 PROCEDURE — 85610 PROTHROMBIN TIME: CPT | Performed by: INTERNAL MEDICINE

## 2025-06-19 PROCEDURE — 272N000001 HC OR GENERAL SUPPLY STERILE: Performed by: INTERNAL MEDICINE

## 2025-06-19 PROCEDURE — 84702 CHORIONIC GONADOTROPIN TEST: CPT | Performed by: INTERNAL MEDICINE

## 2025-06-19 PROCEDURE — 999N000142 HC STATISTIC PROCEDURE PREP ONLY

## 2025-06-19 PROCEDURE — 80053 COMPREHEN METABOLIC PANEL: CPT | Performed by: INTERNAL MEDICINE

## 2025-06-19 PROCEDURE — 93451 RIGHT HEART CATH: CPT | Performed by: INTERNAL MEDICINE

## 2025-06-19 PROCEDURE — 85004 AUTOMATED DIFF WBC COUNT: CPT | Performed by: INTERNAL MEDICINE

## 2025-06-19 PROCEDURE — 999N000132 HC STATISTIC PP CARE STAGE 1

## 2025-06-19 RX ORDER — LIDOCAINE HYDROCHLORIDE 20 MG/ML
INJECTION, SOLUTION INFILTRATION; PERINEURAL
Status: DISCONTINUED | OUTPATIENT
Start: 2025-06-19 | End: 2025-06-19 | Stop reason: HOSPADM

## 2025-06-19 RX ORDER — LIDOCAINE 40 MG/G
CREAM TOPICAL
Status: COMPLETED | OUTPATIENT
Start: 2025-06-19 | End: 2025-06-19

## 2025-06-19 RX ADMIN — LIDOCAINE: 40 CREAM TOPICAL at 10:52

## 2025-06-19 RX ADMIN — LIDOCAINE HYDROCHLORIDE 8 ML: 20 INJECTION, SOLUTION INFILTRATION; PERINEURAL at 11:57

## 2025-06-19 ASSESSMENT — ACTIVITIES OF DAILY LIVING (ADL)
ADLS_ACUITY_SCORE: 58

## 2025-06-19 NOTE — DISCHARGE INSTRUCTIONS
[facility]                        Interventional Cardiology  Discharge Instructions   Post Right Heart Cath and/or Heart Biopsy      AFTER YOU GO HOME:  Take it easy for the rest of the day: Do not do strenuous exercise and do not lift, pull, or push anything heavy.  For at least 24 hours, keep the bandage over the spot where the catheter was inserted.   You may shower 24 hours after the procedure. Do not scrub the procedure site vigorously. Pat the incision dry. Do not submerge the site (ie bath, pool) for 48 hours  No lotion or powder to the puncture site for 3 days  You may put an ice or a cold pack on the area for 10 to 20 minutes at a time to help with soreness or swelling.   Resume your regular diet and medications unless otherwise instructed by your Primary Physician    CALL THE PHYSICIAN IF  If you have a fast-growing, painful lump at the catheter site.  If you have symptoms of infection, such as: Increased pain, swelling, warmth, redness, red streaks leading from the area, pus draining from the area, and/or a fever.    ADDITIONAL INSTRUCTIONS: Monitor neck site for bleeding, swelling, or voice changes. If you notice bleeding or swelling immediately apply pressure to the site for 15 minutes, and call number below to speak with Cardiology Fellow.  If you experience any changes in your breathing you should call your doctor immediately or come to the closest Emergency Department.  Do not drive yourself    MEDICATIONS: You are to resume all home medications including anticoagulation therapy unless otherwise advised by your primary cardiologist or nurse coordinator.    Follow Up: Per your primary cardiology team    If you have any questions or concerns regarding your procedure site please call [phone] at any time & press option 4 to speak to the .  Ask for the Cardiology Fellow on call.  They are available 24 hours a day.  You may also contact the Cardiology Clinic after hours  number at [phone].                                                       Telephone Numbers [phone] Monday-Friday 8:00 am to 4:30 pm    [phone] After 4:30 pm Monday-Friday, Weekends & Holidays  Ask for Interventional Cardiologist on call. Someone is on call 24 hours/day   [employer] toll free number [phone] Monday-Friday 8:00 am to 4:30 pm   [facility] [facility] Emergency Dept [phone]

## 2025-06-19 NOTE — PROGRESS NOTES
S/p RHC. Right internal jugular site intact. Denies pain. VSS. Pt eating and drinking. Doctor at bedside to update mom. Reviewed discharge instructions with pt.

## 2025-06-19 NOTE — PROGRESS NOTES
"Prep for RHC. Pt alert and oriented. VSS. Denies pain. Appropriately NPO.   Pt reports needed at \"stronger lidocaine injection\".  Lidocaine cream on right neck.     "

## 2025-06-19 NOTE — PRE-PROCEDURE
Consenting/Education for Cardiology Procedure: Right heart catheterization     Patient understands we would like to perform the listed procedure(s) due to HF.    The patient understands the following:     The procedure was described to the patient in detail.    No sedation is planned for this procedure. Patient understands risks and complications of the procedure which include but are not limited to bruising/swelling around the incision site, infection, bleeding, allergic reaction to local anesthetic, air embolism, arterial puncture, stroke, heart attack, need for emergency heart surgery, death.       Patient verbalized understanding of risks and benefits and has elected to proceed with the procedure or procedures listed above.    ZITA Starr CNP  Cardiology

## 2025-06-20 ENCOUNTER — RESULTS FOLLOW-UP (OUTPATIENT)
Dept: TRANSPLANT | Facility: CLINIC | Age: 25
End: 2025-06-20

## 2025-06-23 ENCOUNTER — OFFICE VISIT (OUTPATIENT)
Dept: CARDIOLOGY | Facility: CLINIC | Age: 25
End: 2025-06-23
Attending: INTERNAL MEDICINE
Payer: COMMERCIAL

## 2025-06-23 VITALS
SYSTOLIC BLOOD PRESSURE: 102 MMHG | DIASTOLIC BLOOD PRESSURE: 66 MMHG | BODY MASS INDEX: 25.18 KG/M2 | OXYGEN SATURATION: 100 % | WEIGHT: 146.7 LBS | HEART RATE: 60 BPM

## 2025-06-23 DIAGNOSIS — I50.23 ACUTE ON CHRONIC SYSTOLIC HEART FAILURE (H): ICD-10-CM

## 2025-06-23 DIAGNOSIS — E74.05 DANON DISEASE IN HETEROZYGOUS FEMALE (H): ICD-10-CM

## 2025-06-23 PROCEDURE — 99213 OFFICE O/P EST LOW 20 MIN: CPT | Performed by: INTERNAL MEDICINE

## 2025-06-23 RX ORDER — LIDOCAINE 40 MG/G
CREAM TOPICAL
OUTPATIENT
Start: 2025-06-23

## 2025-06-23 ASSESSMENT — PAIN SCALES - GENERAL: PAINLEVEL_OUTOF10: NO PAIN (0)

## 2025-06-23 NOTE — PATIENT INSTRUCTIONS
"You were seen today in the Cardiovascular Clinic at the [facility].      Cardiology Providers you saw during your visit:  Dr. Hipolito Fuentes     Recommendations:   CPX and RHC completed last week.  CORE with Jocelyn in October as scheduled.  Follow up with Dr. Fuentes in January with CPX and RHC prior.    Thank you for your visit today!   Please MyChart message or call if you have any questions or concerns.      During Business Hours:  [phone], option # 1 \"To leave a message for your care team\"     After hours, weekends or holidays:   [phone], Option #4  Ask to speak to the On-Call Cardiologist. Inform them you are a heart failure patient at the [facility].      Martha Lawton RN BSN   Cardiology Nurse Coordinator - Heart Failure/C.O.R.E[facility]  [phone] option 1 to schedule an appointment or leave a message for your care team    CardioPulmonary Stress Test (CPX)  CPX is a maximal (meaning you exercise to exhaustion, not to achieve a heart rate) exercise test where we measure how well your heart/body uses oxygen or energy. It is the gold standard for measuring functional capacity and helps us differentiate limitations due to lungs, heart, or fitness.   A CPX is NOT a typical stress test. You will NOT be asked to hold your Beta Blocker medication.     You will be scheduled for a CardioPulmonary Stress Test at the [facility] ([address], [facility] [address], [phone]).       Follow these instructions:    1. Report to the GOLD waiting room in the [facility] hospital on: __________    2. Nothing to eat for 3 hours prior to your test. You may have clear liquids up to the time of your test    3. Please wear loose, two-piece clothing and comfortable, rubber soled shoes for walking     For Patients with Diabetes: Your RN Coordinator will give you instructions on adjusting your diabetic medications for the day of your test                      "      If you have questions please contact your diabetic care team                           Remember to  bring your glucometer & insulin with you to take after your test if needed    Pre-procedure instructions - Right Heart Cath and/or Biopsy and/or Pulmonary Angiogram  Patient Education    Your arrival time is __________.  Location is [address] Waiting Room  Please plan on being at the hospital all day. If you are on dialysis, DO NOT schedule on a dialysis day.  At any time, emergencies and/or urgent cases may come up which could delay the start of your procedure.    Pre-procedure instructions - Right heart catheterization  No solid food for 8 hours prior  Nothing to drink 2 hours prior to arrival time  You can take your morning medications (except diabetic and blood thinners) with sips of water  We recommend you arrange for a ride to drop you off and pick you up, in the instance, you are unable to drive home, however you should be able to function as you normally would after the procedure     Diabetic Medication Instructions  Hold oral diabetic medication in morning of your procedure and for 48 hours after IV contrast is given  Typical instructions for insulin diabetic medication holding are below. However, please reach out to your Primary Care Provider or Endocrinologist for specific instructions  DO NOT take any oral diabetic medication, short-acting diabetes medications/insulin, humalog or regular insulin the morning of your test  Take   dose of long-acting insulin (Lantus, Levemir) the day of your test  Remember to bring your glucometer and insulin with you to take after your test if needed  GLP-1 Agonists Instructions  DO NOT take injectable GLP-1 agonists semaglutide (Ozempic, Wegovy), dulaglutide (Trulicity), exenatide ER (Bydureon), tirzepatide (Mounjaro), or oral semaglutide (Rybelsus) for 7 days prior your  procedure  Hold once daily injectable GLP-1 agonists exenatide (Byetta), liraglutide (Saxenda, Victoza), lixisenatide (Soligua) the day before and day of your procedure                Anticoagulation Medication Instructions   NA  Nurse to write N/A if not currently taking

## 2025-06-23 NOTE — NURSING NOTE
Chief Complaint   Patient presents with    Follow Up     RETURN MUSCULAR DYSTROPHY       Vitals were taken, medications reconciled.    Yoko Wells, Visit Facilitator

## 2025-06-23 NOTE — PROGRESS NOTES
Neurocardiomyopathy Clinic Progress Note    Name: Mary Shaikh  : 2000  MRN: [id number]    2025    Dear Dr. Swift,    I had the pleasure of seeing Mary Shaikh, a 24 year old woman today in the [facility] Neuromuscular Cardiomyopathy Clinic for a virtual follow up evaluation of Danon disease. She is accompanied by her mother and father.     As you know, Ms. 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 She was seen in the emergency department at [facility] and was found to have prexcitation on her ECG in , and this lead to an exercise ECG test which she achieved 10.8 METs, exercise duration of 9 min and 16 seconds, had no exercised induced arrhythmias, she did have a delta wave and repolarization abnormalities. Subsequently, she had an echocardiogram which showed an LVEF of 51% with asymetric LVH (septum 1.5cm). She has been following with Dr. Swift in the [facility] cardiology clinic and she obtained a CMR which showed and LVEF of 43%, LVEDD 4.9cm, IV septum 1.7cm,  normal RV function but RVH noted, no LOU, harman LGE with subendocardial, mid myocardial, and subendocardial enhancement not associated with a vascular territory. She was admitted after her RHC from -. Her RHC showed an RA of 20, PA 51/23, 36, PCWP 29, Juan CO/CI 3.13/1.68, Thermo CO/CI 2.9/1.55 and a PVR of 1.9. She was vasodilated with nitroprusside and transitioned to entresto. Her weight on discharge was 157 lbs. The advanced heart failures therapies evaluation was initiated in the hospital and she has continued it as an outpatient.  She had an admission in 2024 for palpitations and her metoprolol was increased. Her RHC 2024 showed RA 8, PA 32/9, PCWP 10, Juan 4.5/2.5.     I saw her in clinic  2025. She had one ED visit for palpitations in 2025. She saw Ms. Pichardo 2025. MICHAEL 25, and she saw Dr. Aguilar from neuromuscular  neurology and had a normal neurological exam. She had a RHC 6/19/2025 which showed an RA pressure of 2, PA 25/10, 16, PCWP 8, Juan CO/CI: 3.5/2.05 and Thermo CO/CI 4.07/2.38. On her CPEX she exercise for 5 min and 19 seconds and did not achieve anaerobic threshold (RER 0.83) and stopped due to dyspea and lightheadedness. She had a peak V02 of 12.8 (36% predicted) and a blunted heart rate and BP response.     She is limiting going outside and more activity due to epigastric abdominal pain that is worse with activity. She notes her HR also increases when she has epigastric pain. She is seeing Dr. Haddad from GI. Her abdominal CT was negative for pathology and she will be having an EGD in August. Her weight is stable and she has only used diuretics 3 times since December including once last month. She has not had edema, orthopnea, or PND. She is having palpitations though now not every day and she is not using the additional dose of metoprolol much now. Palpitations and racing heart also limit her ability to do activity though she is able to take deep breaths and improve her symptoms. She still has some lightheadedness when standing up. She has not had presyncope, syncope, or ICD shocks. She is fatigued during the day, but trying to get more sleep at night. She has started luis and enjoys this. She continues to have back pain and will see PMR next month. She is seeing Dr. Torres from health psych and has also seen a psychiatry ELISABETH and no medication changes were recommended. She feels her quality of life is limited by abdominal pain, racing heart, and back pain. She has been able to get SSDI and housing coverage. She denies any substance use.       REVIEW OF SYSTEMS: 10 point ROS neg other than the symptoms noted above in the HPI.    PAST MEDICAL HISTORY:   1. 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 NSVT s/p ICD 6/2024  3. WPW   4. CTI ablation 6/2024  5. Biliary dyskinesia s/p cholecystectomy 7/2024    ALLERGIES:  No  Known Allergies    MEDICATIONS:   Current Outpatient Medications   Medication Sig Dispense Refill    acetaminophen (TYLENOL) 325 MG tablet Take 325-650 mg by mouth every 6 hours as needed for mild pain.      albuterol (PROAIR HFA/PROVENTIL HFA/VENTOLIN HFA) 108 (90 Base) MCG/ACT inhaler Inhale 1-2 puffs into the lungs every 6 hours as needed for shortness of breath, wheezing or cough. 18 g 1    digoxin (LANOXIN) 250 MCG tablet Take 1 tablet (250 mcg) by mouth daily. 90 tablet 3    empagliflozin (JARDIANCE) 25 MG TABS tablet Take 1 tablet (25 mg) by mouth daily. 90 tablet 1    fluocinonide (LIDEX) 0.05 % external solution Apply topically 2 times daily as needed (scalp irritation). 60 mL 1    furosemide (LASIX) 20 MG tablet Take 1 tablet (20 mg) by mouth daily as needed (Take daily if your weight increases by more than 3 lbs in one day or  5 lbs in three days.). 90 tablet 3    loperamide (IMODIUM) 2 MG capsule Take 2 capsules (4 mg) by mouth as needed for diarrhea. 90 capsule 3    LORazepam (ATIVAN) 0.5 MG tablet Take 2 tablets (1 mg) by mouth every 6 hours as needed for anxiety. 15 tablet 0    melatonin 3 MG tablet Take 3 mg by mouth nightly as needed      metoprolol succinate ER (TOPROL XL) 50 MG 24 hr tablet Take 1 tablet (50 mg) by mouth 2 times daily. 180 tablet 3    metoprolol tartrate (LOPRESSOR) 25 MG tablet Take 1 tablet (25 mg) by mouth daily as needed (take for increased fluttering/palpitations and for HR greater than 110 bpm). 90 tablet 1    nitroGLYcerin (NITROSTAT) 0.4 MG sublingual tablet For chest pain place 1 tablet under the tongue every 5 minutes for 3 doses. If symptoms persist 5 minutes after 1st dose call 911. 10 tablet 1    ondansetron (ZOFRAN ODT) 4 MG ODT tab Place 1 tablet (4 mg) under the tongue as needed for nausea. 30 tablet 0    pantoprazole (PROTONIX) 20 MG EC tablet Take 1 tablet (20 mg) by mouth daily. 90 tablet 0    sacubitril-valsartan (ENTRESTO)  MG per tablet Take 1 tablet by  mouth 2 times daily. 180 tablet 3    spironolactone (ALDACTONE) 25 MG tablet Take 0.5 tablets (12.5 mg) by mouth daily. 90 tablet 3    tiZANidine (ZANAFLEX) 2 MG tablet Take 1 tablet (2 mg) by mouth 3 times daily as needed for muscle spasms. 90 tablet 2     SOCIAL HISTORY: She lives in [place], with her family   Tobacco: Never smoker  Alcohol: rare  Illicit: prior marijuana use     FAMILY HISTORY: . Her maternal grandfather had a stroke in 2017, but otherwise she has no significant family history of past cardiovascular history. There is no past family history of sudden cardiac death.    PHYSICAL EXAM:   /66 (BP Location: Right arm, Patient Position: Chair, Cuff Size: Adult Regular)   Pulse 60   Wt 66.5 kg (146 lb 11.2 oz)   SpO2 100%   BMI 25.18 kg/m    General: comfortable, conversant, NAD  HEENT: normocephalic, atraumatic, anicteric  Neck: Estimate JVP <8  CV: RRR, nl s1 and s2, no murmurs, gallops, or rubs   Lungs: CTAB, no crackles or wheezes, normal work of breathing  Abdomen: BS+, soft, non tender, non distended  Extremities: warm and well perfused, no edema    DIAGNOSTIC TESTING: personally reviewed   Latest Reference Range & Units 06/19/25 08:08   Sodium 135 - 145 mmol/L 139   Potassium 3.4 - 5.3 mmol/L 4.1   Chloride 98 - 107 mmol/L 104   Carbon Dioxide (CO2) 22 - 29 mmol/L 26   Urea Nitrogen 6.0 - 20.0 mg/dL 11.1   Creatinine 0.51 - 0.95 mg/dL 0.70   GFR Estimate >60 mL/min/1.73m2 >90   Calcium 8.8 - 10.4 mg/dL 9.6   Anion Gap 7 - 15 mmol/L 9   Albumin 3.5 - 5.2 g/dL 4.6   Protein Total 6.4 - 8.3 g/dL 7.6   Alkaline Phosphatase 40 - 150 U/L 71   ALT 0 - 50 U/L 17   AST 0 - 45 U/L 44   Bilirubin Total <=1.2 mg/dL 0.9   Glucose 70 - 99 mg/dL 87   hCG Quantitative <5 mIU/mL <1   WBC 4.0 - 11.0 10e3/uL 3.3 (L)   Hemoglobin 11.7 - 15.7 g/dL 15.3   Hematocrit 35.0 - 47.0 % 48.3 (H)   Platelet Count 150 - 450 10e3/uL 250   RBC Count 3.80 - 5.20 10e6/uL 5.56 (H)   MCV 78 - 100 fL 87   MCH 26.5 -  33.0 pg 27.5   MCHC 31.5 - 36.5 g/dL 31.7   RDW 10.0 - 15.0 % 13.8   % Neutrophils % 34   % Lymphocytes % 57   % Monocytes % 8   % Eosinophils % 0   % Basophils % 1   % Immature Granulocytes % 0   NRBC/W <1 /100 0   Absolute Neutrophil 1.6 - 8.3 10e3/uL 1.1 (L)   Absolute Lymphocytes 0.8 - 5.3 10e3/uL 1.9   Absolute Monocytes 0.0 - 1.3 10e3/uL 0.3   Absolute Eosinophils 0.0 - 0.7 10e3/uL 0.0   Absolute Basophils 0.0 - 0.2 10e3/uL 0.0   Absolute Immature Granulocytes <=0.4 10e3/uL 0.0   Absolute NRBCs 10e3/uL 0.0   INR 0.85 - 1.15  1.11   PT 11.8 - 14.8 Seconds 14.6   (L): Data is abnormally low  (H): Data is abnormally high    RHC 6/19/25:   RA:2  RV:26/3  PA:25.10/16  PCWP:8    Juan; CO/CI:3.5/2.05  Thermodilution; CO/CI:4.07/2.38       CPEX 6/19/25:   Peak VO2 (ml/kg/min) VE/VCO2  Coles RER   12.80       24.40       0.83           Predicted VO2 (ml/kg/min) Predicted VO2 %   35.20       36           Resting Supine BP (mmHg) Resting Standing BP (mmHg) Final Stress BP (mmHg)       102/77       130/65         Resting Supine HR (bpm) Resting Standing HR (bpm)        60            Max HR (bpm) Max Predicted HR (bpm) Max Perdicted HR %   111       197       56           Exercise time (min) Exercise time (sec) Estimated workload (METS)   5       19       3.7             Echo 5/2025: Left ventricular function is decreased. The ejection fraction is 25-30%  (severely reduced). LVIDd 5.5cm.  Right ventricular function, chamber size, wall motion, and thickness are  normal.  No significant valvular abnormalities present.  No pericardial effusion is present.  The inferior vena cava was normal in size with preserved respiratory  variability.      ICD interrogation  Device: Hyperfair D533 RESONATE HF ICD  Normal Device Function  Mode: DDD with RYTHMIQ  bpm  AP: 76%  : 3%  Presenting EGM: AS-VS @ 62 bpm  Lead Trends Appear Stable: Yes  Estimated battery longevity to RRT = 10.5 years.   Atrial Arrhythmia: 58 AT/AF  episodes recorded - 1 sec - 41 min 50 sec. Stored EGMs reveal AT and AFlutter.  AF [place]: <1%  Anticoagulant: None  Ventricular Arrhythmia: 1 episode recorded as NSVT on 6/2/25 @ 1914 - 233 bpm, 14 sec. No stored EGM for review. This episode correlates with an AT/AF episode recorded on 6/2/25 at 1914 lasting 1 minute in duration at rate of 152 bpm. No stored EGM for review.      [facility] 7/2024:   BSA 1.87 m2  /74/89 mmHg  RA 21/25/20 mmHg  RV 51/20mmHg  PA 51/29/36 mmHg  PCWP 30/40/29 mmHg  TD CO/CI 2.9/1.55   Juan CO/CI 3.13/1.68   PVR 1.9   SVR 1762  PA sat 43.5%     Cardiopulmonary exercise stress test 7/24/23  Peak VO2 (ml/kg/min) VE/VCO2  Harrison RER   18.70       32.13       1.09           Predicted VO2 (ml/kg/min) Predicted VO2 %   35.50       53           Resting Supine BP (mmHg) Resting Standing BP (mmHg) Final Stress BP (mmHg)   109/85       114/83       166/62         Resting Supine HR (bpm) Resting Standing HR (bpm)    59       59            Max HR (bpm) Max Predicted HR (bpm) Max Perdicted HR %   136       198       69           Exercise time (min) Exercise time (sec) Estimated workload (METS)   8       17       5.3             Echo [facility] 11/2022: Decreased left ventricular systolic performance-mild.   The estimated left ventricular ejection fraction is 40-45%.   Left ventricular diastolic pattern suggest elevated filling pressure.   Normal right ventricular size and function.   Right ventricular hypertrophy, probable.   TR jet is not adequate to assess PA pressure.   Based on IVC geometry, the RA pressure is probably normal.       I have personally reviewed all of the cardiovascular testing reports performed at [facility]    exercise ECG test 10/2021:  10.8 METs, exercise duration of 9 min and 16 seconds, had no exercised induced arrhythmias, she did have a delta wave and repolarization abnormalities.    Ziopatch monitor 10/2021: 11 VT episodes 68 SVT episodes    Echocardiogram 11/2021:  LVEF of 51% with  asymettric LVH (septum 1.5cm).     CMR 11/2021:  LVEF of 43%, LVEDD 4.9cm, IV septum 1.7cm,  normal RV function but RVH noted, no LOU, harman LGE with subendocardial, mid myocardial, and subendocardial enhancement not associated with a vascular territory.     CMR 2024: LVEF 26%, asymmetrical septal hypertrophy, multiple regional WMA, LGE in the mid-apical anterior wall, mid-apical inferior an near transmural involvement in the apical anterior and apex.     ASSESSMENT AND PLAN: Ms. 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 She has a normal CK and has seen Dr Aguilar in neuromuscular neurology and has no skeletal weakness.     A year ago she had ambulatory cardiogenic shock. Today she is euvolemic and well compensated on exam. Her end organ function is normal. Her RHC demonstrates normal biventricular filling pressures and low normal cardiac index. She had a CPEX and while she did not achieve RER she has functional limitations. She has completed the majority of the evaluation process and I will check with our coordinators to  ensure all testing is complete. Given her Dannon disease carrier status I would like to present her sooner than later. She is on metoprolol succinate 50mg daily, sacubitril valsartan 97/103mg BID, empagloflozin 10mg, and spironolactone 12.5mg. She is taking diuretics on an as needed basis infrequently. I will plan to repeat her RHC and CPEX in 6 months. We discussed that if she has further VT or ICD shocks or change in symptoms that it may warrant earlier evaluation.     She had an ICD as secondary SCD prevention after syncope and SVT. She is having palpitations. Her ICD check showed one episode which correlated to AT/AF at 152 bpm. She is seeing Dr. Levi for EP. She is current on metoprolol succinate and she occasionally takes metoprolol tartrate when she feels palpitations.  Her A pacing increased and repeat echo showed no change in function. She has EP follow up.     She has an EGD scheduled for her epigastric pain.     She will be seeing PM&R for back pain.     She should continue to see health psych and psychiatry     PLAN:   -CORE as scheduled  -check with transplant coordinators regarding transplant evaluation and repeating any studies  -clinic visit in 6 months with me with repeat RHC and CPEX    60 minutes on DOS for chart review, patient history and exam, counseling, review of diagnostic testing, coordination of care and documentation.     The longitudinal plan of care for the diagnosis(es)/condition(s) as documented were addressed during this visit. Due to the added complexity in care, I will continue to support Mary in the subsequent management and with ongoing continuity of care.    Thank you for allowing me to participate in the care of your patient. Please do not hesitate to contact me if you have any questions.     Sincerely,   Forum     Forum MD Gina, PhD, [facility]  Advanced Heart Failure/Transplantation/MCS  [facility]/[employer]      CC: Ameena Swift MD [facility] Cardiology

## 2025-06-23 NOTE — LETTER
2025      RE: Mary Shaikh  [address]  [place] [address]       Dear Colleague,    Thank you for the opportunity to participate in the care of your patient, Mary Shaikh, at the [facility] HEART [facility] [place] at [facility]. Please see a copy of my visit note below.    Neurocardiomyopathy Clinic Progress Note    Name: Mary Shaikh  : 2000  MRN: [id number]    2025    Dear Dr. Swift,    I had the pleasure of seeing Mary Shaikh, a 24 year old woman today in the [facility] Neuromuscular Cardiomyopathy Clinic for a virtual follow up evaluation of Danon disease. She is accompanied by her mother and father.     As you know, Ms. 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 She was seen in the emergency department at [facility] and was found to have prexcitation on her ECG in , and this lead to an exercise ECG test which she achieved 10.8 METs, exercise duration of 9 min and 16 seconds, had no exercised induced arrhythmias, she did have a delta wave and repolarization abnormalities. Subsequently, she had an echocardiogram which showed an LVEF of 51% with asymetric LVH (septum 1.5cm). She has been following with Dr. Swift in the [facility] cardiology clinic and she obtained a CMR which showed and LVEF of 43%, LVEDD 4.9cm, IV septum 1.7cm,  normal RV function but RVH noted, no LOU, harman LGE with subendocardial, mid myocardial, and subendocardial enhancement not associated with a vascular territory. She was admitted after her RHC from -. Her RHC showed an RA of 20, PA 51/23, 36, PCWP 29, Juan CO/CI 3.13/1.68, Thermo CO/CI 2.9/1.55 and a PVR of 1.9. She was vasodilated with nitroprusside and transitioned to entresto. Her weight on discharge was 157 lbs. The advanced heart failures therapies evaluation was initiated in the hospital and she has continued it as an  outpatient.  She had an admission in December 2024 for palpitations and her metoprolol was increased. Her RHC 12/16/2024 showed RA 8, PA 32/9, PCWP 10, Juan 4.5/2.5.     I saw her in clinic  February 24, 2025. She had one ED visit for palpitations in March 2025. She saw Ms. Pichardo 4/2/2025. EP 4/29/25, and she saw Dr. Aguilar from neuromuscular neurology and had a normal neurological exam. She had a RHC 6/19/2025 which showed an RA pressure of 2, PA 25/10, 16, PCWP 8, Juan CO/CI: 3.5/2.05 and Thermo CO/CI 4.07/2.38. On her CPEX she exercise for 5 min and 19 seconds and did not achieve anaerobic threshold (RER 0.83) and stopped due to dyspea and lightheadedness. She had a peak V02 of 12.8 (36% predicted) and a blunted heart rate and BP response.     She is limiting going outside and more activity due to epigastric abdominal pain that is worse with activity. She notes her HR also increases when she has epigastric pain. She is seeing Dr. Haddad from GI. Her abdominal CT was negative for pathology and she will be having an EGD in August. Her weight is stable and she has only used diuretics 3 times since December including once last month. She has not had edema, orthopnea, or PND. She is having palpitations though now not every day and she is not using the additional dose of metoprolol much now. Palpitations and racing heart also limit her ability to do activity though she is able to take deep breaths and improve her symptoms. She still has some lightheadedness when standing up. She has not had presyncope, syncope, or ICD shocks. She is fatigued during the day, but trying to get more sleep at night. She has started luis and enjoys this. She continues to have back pain and will see PMR next month. She is seeing Dr. Torres from [facility] and has also seen a psychiatry ELISABETH and no medication changes were recommended. She feels her quality of life is limited by abdominal pain, racing heart, and back pain. She has been  able to get SSDI and housing coverage. She denies any substance use.       REVIEW OF SYSTEMS: 10 point ROS neg other than the symptoms noted above in the HPI.    PAST MEDICAL HISTORY:   1. 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 NSVT s/p ICD 6/2024  3. WPW   4. CTI ablation 6/2024  5. Biliary dyskinesia s/p cholecystectomy 7/2024    ALLERGIES:  No Known Allergies    MEDICATIONS:   Current Outpatient Medications   Medication Sig Dispense Refill     acetaminophen (TYLENOL) 325 MG tablet Take 325-650 mg by mouth every 6 hours as needed for mild pain.       albuterol (PROAIR HFA/PROVENTIL HFA/VENTOLIN HFA) 108 (90 Base) MCG/ACT inhaler Inhale 1-2 puffs into the lungs every 6 hours as needed for shortness of breath, wheezing or cough. 18 g 1     digoxin (LANOXIN) 250 MCG tablet Take 1 tablet (250 mcg) by mouth daily. 90 tablet 3     empagliflozin (JARDIANCE) 25 MG TABS tablet Take 1 tablet (25 mg) by mouth daily. 90 tablet 1     fluocinonide (LIDEX) 0.05 % external solution Apply topically 2 times daily as needed (scalp irritation). 60 mL 1     furosemide (LASIX) 20 MG tablet Take 1 tablet (20 mg) by mouth daily as needed (Take daily if your weight increases by more than 3 lbs in one day or  5 lbs in three days.). 90 tablet 3     loperamide (IMODIUM) 2 MG capsule Take 2 capsules (4 mg) by mouth as needed for diarrhea. 90 capsule 3     LORazepam (ATIVAN) 0.5 MG tablet Take 2 tablets (1 mg) by mouth every 6 hours as needed for anxiety. 15 tablet 0     melatonin 3 MG tablet Take 3 mg by mouth nightly as needed       metoprolol succinate ER (TOPROL XL) 50 MG 24 hr tablet Take 1 tablet (50 mg) by mouth 2 times daily. 180 tablet 3     metoprolol tartrate (LOPRESSOR) 25 MG tablet Take 1 tablet (25 mg) by mouth daily as needed (take for increased fluttering/palpitations and for HR greater than 110 bpm). 90 tablet 1     nitroGLYcerin (NITROSTAT) 0.4 MG sublingual tablet For chest pain place 1 tablet under the tongue every 5  minutes for 3 doses. If symptoms persist 5 minutes after 1st dose call 911. 10 tablet 1     ondansetron (ZOFRAN ODT) 4 MG ODT tab Place 1 tablet (4 mg) under the tongue as needed for nausea. 30 tablet 0     pantoprazole (PROTONIX) 20 MG EC tablet Take 1 tablet (20 mg) by mouth daily. 90 tablet 0     sacubitril-valsartan (ENTRESTO)  MG per tablet Take 1 tablet by mouth 2 times daily. 180 tablet 3     spironolactone (ALDACTONE) 25 MG tablet Take 0.5 tablets (12.5 mg) by mouth daily. 90 tablet 3     tiZANidine (ZANAFLEX) 2 MG tablet Take 1 tablet (2 mg) by mouth 3 times daily as needed for muscle spasms. 90 tablet 2     SOCIAL HISTORY: She lives in [place], with her family   Tobacco: Never smoker  Alcohol: rare  Illicit: prior marijuana use     FAMILY HISTORY: . Her maternal grandfather had a stroke in 2017, but otherwise she has no significant family history of past cardiovascular history. There is no past family history of sudden cardiac death.    PHYSICAL EXAM:   /66 (BP Location: Right arm, Patient Position: Chair, Cuff Size: Adult Regular)   Pulse 60   Wt 66.5 kg (146 lb 11.2 oz)   SpO2 100%   BMI 25.18 kg/m    General: comfortable, conversant, NAD  HEENT: normocephalic, atraumatic, anicteric  Neck: Estimate JVP <8  CV: RRR, nl s1 and s2, no murmurs, gallops, or rubs   Lungs: CTAB, no crackles or wheezes, normal work of breathing  Abdomen: BS+, soft, non tender, non distended  Extremities: warm and well perfused, no edema    DIAGNOSTIC TESTING: personally reviewed   Latest Reference Range & Units 06/19/25 08:08   Sodium 135 - 145 mmol/L 139   Potassium 3.4 - 5.3 mmol/L 4.1   Chloride 98 - 107 mmol/L 104   Carbon Dioxide (CO2) 22 - 29 mmol/L 26   Urea Nitrogen 6.0 - 20.0 mg/dL 11.1   Creatinine 0.51 - 0.95 mg/dL 0.70   GFR Estimate >60 mL/min/1.73m2 >90   Calcium 8.8 - 10.4 mg/dL 9.6   Anion Gap 7 - 15 mmol/L 9   Albumin 3.5 - 5.2 g/dL 4.6   Protein Total 6.4 - 8.3 g/dL 7.6   Alkaline  Phosphatase 40 - 150 U/L 71   ALT 0 - 50 U/L 17   AST 0 - 45 U/L 44   Bilirubin Total <=1.2 mg/dL 0.9   Glucose 70 - 99 mg/dL 87   hCG Quantitative <5 mIU/mL <1   WBC 4.0 - 11.0 10e3/uL 3.3 (L)   Hemoglobin 11.7 - 15.7 g/dL 15.3   Hematocrit 35.0 - 47.0 % 48.3 (H)   Platelet Count 150 - 450 10e3/uL 250   RBC Count 3.80 - 5.20 10e6/uL 5.56 (H)   MCV 78 - 100 fL 87   MCH 26.5 - 33.0 pg 27.5   MCHC 31.5 - 36.5 g/dL 31.7   RDW 10.0 - 15.0 % 13.8   % Neutrophils % 34   % Lymphocytes % 57   % Monocytes % 8   % Eosinophils % 0   % Basophils % 1   % Immature Granulocytes % 0   NRBC/W <1 /100 0   Absolute Neutrophil 1.6 - 8.3 10e3/uL 1.1 (L)   Absolute Lymphocytes 0.8 - 5.3 10e3/uL 1.9   Absolute Monocytes 0.0 - 1.3 10e3/uL 0.3   Absolute Eosinophils 0.0 - 0.7 10e3/uL 0.0   Absolute Basophils 0.0 - 0.2 10e3/uL 0.0   Absolute Immature Granulocytes <=0.4 10e3/uL 0.0   Absolute NRBCs 10e3/uL 0.0   INR 0.85 - 1.15  1.11   PT 11.8 - 14.8 Seconds 14.6   (L): Data is abnormally low  (H): Data is abnormally high    [facility] 6/19/25:   RA:2  RV:26/3  PA:25.10/16  PCWP:8    Juan; CO/CI:3.5/2.05  Thermodilution; CO/CI:4.07/2.38       CPEX 6/19/25:   Peak VO2 (ml/kg/min) VE/VCO2  [place] RER   12.80       24.40       0.83           Predicted VO2 (ml/kg/min) Predicted VO2 %   35.20       36           Resting Supine BP (mmHg) Resting Standing BP (mmHg) Final Stress BP (mmHg)       102/77       130/65         Resting Supine HR (bpm) Resting Standing HR (bpm)        60            Max HR (bpm) Max Predicted HR (bpm) Max Perdicted HR %   111       197       56           Exercise time (min) Exercise time (sec) Estimated workload (METS)   5       19       3.7             Echo 5/2025: Left ventricular function is decreased. The ejection fraction is 25-30%  (severely reduced). LVIDd 5.5cm.  Right ventricular function, chamber size, wall motion, and thickness are  normal.  No significant valvular abnormalities present.  No pericardial effusion is  present.  The inferior vena cava was normal in size with preserved respiratory  variability.      ICD interrogation  Device: [employer] D533 RESONATE HF ICD  Normal Device Function  Mode: DDD with RYTHMIQ  bpm  AP: 76%  : 3%  Presenting EGM: AS-VS @ 62 bpm  Lead Trends Appear Stable: Yes  Estimated battery longevity to RRT = 10.5 years.   Atrial Arrhythmia: 58 AT/AF episodes recorded - 1 sec - 41 min 50 sec. Stored EGMs reveal AT and AFlutter.  AF Morgan: <1%  Anticoagulant: None  Ventricular Arrhythmia: 1 episode recorded as NSVT on 6/2/25 @ 1914 - 233 bpm, 14 sec. No stored EGM for review. This episode correlates with an AT/AF episode recorded on 6/2/25 at 1914 lasting 1 minute in duration at rate of 152 bpm. No stored EGM for review.      [facility] 7/2024:   BSA 1.87 m2  /74/89 mmHg  RA 21/25/20 mmHg  RV 51/20mmHg  PA 51/29/36 mmHg  PCWP 30/40/29 mmHg  TD CO/CI 2.9/1.55   Juan CO/CI 3.13/1.68   PVR 1.9   SVR 1762  PA sat 43.5%     Cardiopulmonary exercise stress test 7/24/23  Peak VO2 (ml/kg/min) VE/VCO2  [place] RER   18.70       32.13       1.09           Predicted VO2 (ml/kg/min) Predicted VO2 %   35.50       53           Resting Supine BP (mmHg) Resting Standing BP (mmHg) Final Stress BP (mmHg)   109/85       114/83       166/62         Resting Supine HR (bpm) Resting Standing HR (bpm)    59       59            Max HR (bpm) Max Predicted HR (bpm) Max Perdicted HR %   136       198       69           Exercise time (min) Exercise time (sec) Estimated workload (METS)   8       17       5.3             Echo [facility] 11/2022: Decreased left ventricular systolic performance-mild.   The estimated left ventricular ejection fraction is 40-45%.   Left ventricular diastolic pattern suggest elevated filling pressure.   Normal right ventricular size and function.   Right ventricular hypertrophy, probable.   TR jet is not adequate to assess PA pressure.   Based on IVC geometry, the RA pressure is probably normal.        I have personally reviewed all of the cardiovascular testing reports performed at [facility]    exercise ECG test 10/2021:  10.8 METs, exercise duration of 9 min and 16 seconds, had no exercised induced arrhythmias, she did have a delta wave and repolarization abnormalities.    [employer]iop[employer] monitor 10/2021: 11 VT episodes 68 SVT episodes    Echocardiogram 11/2021:  LVEF of 51% with asymettric LVH (septum 1.5cm).     CMR 11/2021:  LVEF of 43%, LVEDD 4.9cm, IV septum 1.7cm,  normal RV function but RVH noted, no LOU, harman LGE with subendocardial, mid myocardial, and subendocardial enhancement not associated with a vascular territory.     CMR 2024: LVEF 26%, asymmetrical septal hypertrophy, multiple regional WMA, LGE in the mid-apical anterior wall, mid-apical inferior an near transmural involvement in the apical anterior and apex.     ASSESSMENT AND PLAN: Ms. 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 She has a normal CK and has seen Dr Aguilar in neuromuscular neurology and has no skeletal weakness.     A year ago she had ambulatory  cardiogenic shock. Today she is euvolemic and well compensated on exam. Her end organ function is normal. Her RHC demonstrates normal biventricular filling pressures and low normal cardiac index. She had a CPEX and while she did not achieve RER she has functional limitations. She has completed the majority of the evaluation process and I will check with our coordinators to ensure all testing is complete. Given her Dannon disease carrier status I would like to present her sooner than later. She is on metoprolol succinate 50mg daily, sacubitril valsartan 97/103mg BID, empagloflozin 10mg, and spironolactone 12.5mg. She is taking diuretics on an as needed basis infrequently. I will plan to repeat her RHC and CPEX in 6 months. We discussed that if she has further VT or ICD shocks or change in symptoms that it may warrant earlier evaluation.     She had an ICD as secondary SCD prevention after syncope and SVT. She is having palpitations. Her ICD check showed one episode which correlated to AT/AF at 152 bpm. She is seeing Dr. Levi for EP. She is current on metoprolol succinate and she occasionally takes metoprolol tartrate when she feels palpitations.  Her A pacing increased and repeat echo showed no change in function. She has EP follow up.     She has an EGD scheduled for her epigastric pain.     She will be seeing PM&R for back pain.     She should continue to see health psych and psychiatry     PLAN:   -CORE as scheduled  -check with transplant coordinators regarding transplant evaluation and repeating any studies  -clinic visit in 6 months with me with repeat RHC and CPEX    60 minutes on DOS for chart review, patient history and exam, counseling, review of diagnostic testing, coordination of care and documentation.     The longitudinal plan of care for the diagnosis(es)/condition(s) as documented were addressed during this visit. Due to the added complexity in care, I will continue to support Mary in the  subsequent management and with ongoing continuity of care.    Thank you for allowing me to participate in the care of your patient. Please do not hesitate to contact me if you have any questions.     Sincerely,   Hipolito Fuentes MD, PhD, [facility]  Advanced Heart Failure/Transplantation/MCS  [facility]      CC: Ameena Swift MD [facility] [place] [facility]    Please do not hesitate to contact me if you have any questions/concerns.     Sincerely,     Hipolito Fuentes MD

## 2025-06-23 NOTE — NURSING NOTE
Cardiac Testing: Patient given instructions regarding CPX, RHC. Discussed purpose, preparation, procedure and when to expect results reported back to the patient. Patient demonstrated understanding of this information and agreed to call with further questions or concerns.    Labs: Patient was given results of the laboratory testing obtained today. Patient demonstrated understanding of this information and agreed to call with further questions or concerns.     Med Reconcile: Reviewed and verified all current medications with the patient. The updated medication list was printed and given to the patient.    Return Appointment: Patient given instructions regarding scheduling next clinic visit. Patient demonstrated understanding of this information and agreed to call with further questions or concerns. CORE in October, Dr. Fuentes in January with testing prior.    Patient stated she understood all health information given and agreed to call with further questions or concerns.    Martha Lawton RN

## 2025-06-25 ENCOUNTER — MYC REFILL (OUTPATIENT)
Dept: FAMILY MEDICINE | Facility: CLINIC | Age: 25
End: 2025-06-25
Payer: COMMERCIAL

## 2025-06-25 DIAGNOSIS — R11.0 NAUSEA: ICD-10-CM

## 2025-06-25 RX ORDER — ONDANSETRON 4 MG/1
4 TABLET, ORALLY DISINTEGRATING ORAL PRN
Qty: 30 TABLET | Refills: 0 | Status: SHIPPED | OUTPATIENT
Start: 2025-06-25

## 2025-06-26 NOTE — PROGRESS NOTES
ELECTROPHYSIOLOGY VIDEO CLINIC VISIT    Assessment/Recommendations     Assessment/Plan:    Ms. 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   The patient was recently admitted with cardiogenic shock and now recovering. She has afib during cardiogenic shock and had inappropriate ICD shocks. She has had no afib recurrences. She is being listed for OHT since her functional status is tenuous. She is currently not on anticoagulation since her CHADS VASC score is low, and furthermore will be in line of her listing to OHT.    Follow-up with EP ELISABETH in one year.     History of Present Illness/Subjective    Ms. 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      Ms. 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 She was seen in the emergency department at [facility] and was found to have prexcitation on her ECG in 2021, and this lead to an exercise ECG test which she achieved 10.8 METs, exercise duration of 9 min and 16 seconds, had no exercised induced arrhythmias, she did have a delta wave and repolarization abnormalities. Subsequently, she had an echocardiogram which showed an LVEF of 51% with asymetric LVH (septum 1.5cm). She has been following with   "Jeniffer in the [facility] cardiology clinic and she obtained a CMR which showed and LVEF of 43%, LVEDD 4.9cm, IV septum 1.7cm,  normal RV function but RVH noted, no LOU, harman LGE with subendocardial, mid myocardial, and subendocardial enhancement not associated with a vascular territory.      She was seen by Dr. Fuentes on July 22, 2024. She was admitted after her RHC from 07/31-08/05. Her RHC showed an RA of 20, PA 51/23, 36, PCWP 29, Juan CO/CI 3.13/1.68, Thermo CO/CI 2.9/1.55 and a PVR of 1.9.     On 8/1 she was admitted for ambulatory cardiogenic shock with elevated filling pressures and severe biventricular dysfunction on for swan-guided therapies with IV afterload reduction and diuresis with a hospital course that has been complicated by paroxysmal SVT for which EP is consulted. Our colleague Dr. Will evaluated patient: \"It appears that patient has had brief episodes of symptomatic SVT while in the hospital, the most recent one was responsive to vagal maneuvers.  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 She is also in a low dose of BB carvedilolg 6.25 mg BID.     Patient reports feeling well, does mostly activity within her home with no limitations. She has no Afib or significant arrhythmias on ICD since discharge. She has been compliant with meds.    I have reviewed and updated the patient's Past Medical History, Social History, Family History and Medication List.     Cardiographics (Personally Reviewed) :   Echo 7/2024:   Interpretation Summary  Left ventricular function is decreased. The ejection fraction is 20-25%  (severely reduced).  Global right ventricular function is moderately reduced.  Moderate biatrial enlargement is present.  IVC diameter >2.1 cm collapsing <50% with sniff suggests " a high RA pressure  estimated at 15 mmHg or greater.  Trivial pericardial effusion is present.  There is no prior study for direct comparison.    Coronary Angiogram 7/2024:    Right sided filling pressures are severely elevated.    Left sided filling pressures are severely elevated.    Moderately elevated pulmonary artery hypertension.    Reduced cardiac output level.     Severely elevated biventricular filling pressures with ambulatory cardiogenic shock       CMR 4/2024:  1) Decreased left ventricular systolic function-severe; calculated ejection fraction 26%.   2) Asymmetric septal hypertrophy relative to the posterior wall.   3) Multiple regional wall motion abnormalities-mid to distal anterior wall akinetic, distal inferior wall akinetic, and apex akinetic.   4) Extensive late gadolinium hyperenhancement involving the mid-apical anterior wall, mid-apical inferior wall. The apical anterior wall and apex exhibit near transmural hyperenhancement.   5) Borderline elevated extracellular volume on parametric assessment (reported above).   6) Small, circumferential pericardial effusion.     Compared to prior study dated 11-, the left ventricular ejection fraction has decreased; segmental regional dysfunction is now apparent and extensive late gadolinium hyperenhancement continues to be present. In this patient with known diagnosis of Danon's disease, the above findings may suggest disease progression.            Physical Examination   There were no vitals taken for this visit.  Wt Readings from Last 3 Encounters:   06/23/25 66.5 kg (146 lb 11.2 oz)   06/17/25 65.8 kg (145 lb)   04/30/25 65.8 kg (145 lb)     VIDEO VISIT       Medications  Allergies   Current Outpatient Medications   Medication Sig Dispense Refill    acetaminophen (TYLENOL) 325 MG tablet Take 325-650 mg by mouth every 6 hours as needed for mild pain.      albuterol (PROAIR HFA/PROVENTIL HFA/VENTOLIN HFA) 108 (90 Base) MCG/ACT inhaler Inhale 1-2  puffs into the lungs every 6 hours as needed for shortness of breath, wheezing or cough. 18 g 1    digoxin (LANOXIN) 250 MCG tablet Take 1 tablet (250 mcg) by mouth daily. 90 tablet 3    empagliflozin (JARDIANCE) 25 MG TABS tablet Take 1 tablet (25 mg) by mouth daily. 90 tablet 1    fluocinonide (LIDEX) 0.05 % external solution Apply topically 2 times daily as needed (scalp irritation). 60 mL 1    furosemide (LASIX) 20 MG tablet Take 1 tablet (20 mg) by mouth daily as needed (Take daily if your weight increases by more than 3 lbs in one day or  5 lbs in three days.). 90 tablet 3    loperamide (IMODIUM) 2 MG capsule Take 2 capsules (4 mg) by mouth as needed for diarrhea. 90 capsule 3    LORazepam (ATIVAN) 0.5 MG tablet TAKE 2 TABLETS(1 MG) BY MOUTH EVERY 6 HOURS AS NEEDED FOR ANXIETY 15 tablet 0    melatonin 3 MG tablet Take 3 mg by mouth nightly as needed      metoprolol succinate ER (TOPROL XL) 50 MG 24 hr tablet Take 1 tablet (50 mg) by mouth 2 times daily. 180 tablet 3    metoprolol tartrate (LOPRESSOR) 25 MG tablet Take 1 tablet (25 mg) by mouth daily as needed (take for increased fluttering/palpitations and for HR greater than 110 bpm). 90 tablet 1    nitroGLYcerin (NITROSTAT) 0.4 MG sublingual tablet For chest pain place 1 tablet under the tongue every 5 minutes for 3 doses. If symptoms persist 5 minutes after 1st dose call 911. 10 tablet 1    ondansetron (ZOFRAN ODT) 4 MG ODT tab Place 1 tablet (4 mg) under the tongue as needed for nausea. 30 tablet 0    pantoprazole (PROTONIX) 20 MG EC tablet Take 1 tablet (20 mg) by mouth daily. (Patient not taking: Reported on 6/23/2025) 90 tablet 0    sacubitril-valsartan (ENTRESTO)  MG per tablet Take 1 tablet by mouth 2 times daily. 180 tablet 3    spironolactone (ALDACTONE) 25 MG tablet Take 0.5 tablets (12.5 mg) by mouth daily. 90 tablet 3    tiZANidine (ZANAFLEX) 2 MG tablet Take 1 tablet (2 mg) by mouth 3 times daily as needed for muscle spasms. (Patient not  taking: Reported on 6/23/2025) 90 tablet 2    No Known Allergies      Lab Results (Personally Reviewed)    Chemistry/lipid CBC Cardiac Enzymes/BNP/TSH/INR   Lab Results   Component Value Date    BUN 11.1 06/19/2025     06/19/2025    CO2 26 06/19/2025     Creatinine   Date Value Ref Range Status   06/19/2025 0.70 0.51 - 0.95 mg/dL Final       Lab Results   Component Value Date    CHOL 139 08/02/2024    HDL 50 08/02/2024    LDL 78 08/02/2024      Lab Results   Component Value Date    WBC 3.3 (L) 06/19/2025    HGB 15.3 06/19/2025    HCT 48.3 (H) 06/19/2025    MCV 87 06/19/2025     06/19/2025    Lab Results   Component Value Date    TSH 2.19 03/13/2025    INR 1.11 06/19/2025        Jorge Reyes Castro, MD, MS  Cardiac Electrophysiology Fellow     EP STAFF NOTE  Patient seen and examined and management plan personally reviewed with the patient. I agree with the note above by the CV/EP fellow.  Phillip Levi MD [facility]  Cardiology - Electrophysiology  Total time spent on patient visit, reviewing notes, imaging, labs, orders, and completing necessary documentation: 45 minutes.  >50% of visit spent on counseling patient and/or coordination of care.

## 2025-06-27 ENCOUNTER — ANCILLARY PROCEDURE (OUTPATIENT)
Dept: CARDIOLOGY | Facility: CLINIC | Age: 25
End: 2025-06-27
Attending: INTERNAL MEDICINE
Payer: COMMERCIAL

## 2025-06-27 VITALS
OXYGEN SATURATION: 99 % | DIASTOLIC BLOOD PRESSURE: 64 MMHG | HEART RATE: 60 BPM | BODY MASS INDEX: 25.23 KG/M2 | SYSTOLIC BLOOD PRESSURE: 100 MMHG | WEIGHT: 147 LBS

## 2025-06-27 DIAGNOSIS — I42.8 NONISCHEMIC CARDIOMYOPATHY (H): ICD-10-CM

## 2025-06-27 DIAGNOSIS — I47.10 PAROXYSMAL SVT (SUPRAVENTRICULAR TACHYCARDIA): ICD-10-CM

## 2025-06-27 DIAGNOSIS — E74.05 DANON DISEASE IN HETEROZYGOUS FEMALE (H): ICD-10-CM

## 2025-06-27 DIAGNOSIS — Z45.02 FITTING AND ADJUSTMENT OF AUTOMATIC IMPLANTABLE CARDIOVERTER-DEFIBRILLATOR: ICD-10-CM

## 2025-06-27 DIAGNOSIS — I50.23 ACUTE ON CHRONIC SYSTOLIC HEART FAILURE (H): Primary | ICD-10-CM

## 2025-06-27 PROCEDURE — 3078F DIAST BP <80 MM HG: CPT | Performed by: INTERNAL MEDICINE

## 2025-06-27 PROCEDURE — 99215 OFFICE O/P EST HI 40 MIN: CPT | Mod: 25 | Performed by: INTERNAL MEDICINE

## 2025-06-27 PROCEDURE — 3074F SYST BP LT 130 MM HG: CPT | Performed by: INTERNAL MEDICINE

## 2025-06-27 PROCEDURE — 99213 OFFICE O/P EST LOW 20 MIN: CPT | Performed by: INTERNAL MEDICINE

## 2025-06-27 PROCEDURE — 1126F AMNT PAIN NOTED NONE PRSNT: CPT | Performed by: INTERNAL MEDICINE

## 2025-06-27 PROCEDURE — 93283 PRGRMG EVAL IMPLANTABLE DFB: CPT | Performed by: INTERNAL MEDICINE

## 2025-06-27 ASSESSMENT — ACTIVITIES OF DAILY LIVING (ADL)
LEFS_RAW_SCORE: 0
SQUATTING: EXTREME DIFFICULTY OR UNABLE TO PERFORM ACTIVITY
STANDING_FOR_1_HOUR: A LITTLE BIT OF DIFFICULTY
PUTTING_ON_YOUR_SHOES_OR_SOCKS: NO DIFFICULTY
ANY_OF_YOUR_USUAL_WORK,_HOUSEWORK_OR_SCHOOL_ACTIVITIES: A LITTLE BIT OF DIFFICULTY
PERFORMING_HEAVY_ACTIVITIES_AROUND_YOUR_HOME: EXTREME DIFFICULTY OR UNABLE TO PERFORM ACTIVITY
RUNNING_ON_EVEN_GROUND: EXTREME DIFFICULTY OR UNABLE TO PERFORM ACTIVITY
PLEASE_INDICATE_YOR_PRIMARY_REASON_FOR_REFERRAL_TO_THERAPY:: FOOT AND/OR ANKLE
GOING_UP_OR_DOWN_10_STAIRS: NO DIFFICULTY
GETTING_INTO_AND_OUT_OF_A_BATH: NO DIFFICULTY
ROLLING_OVER_IN_BED: A LITTLE BIT OF DIFFICULTY
SITTING_FOR_1_HOUR: NO DIFFICULTY
PERFORMING_LIGHT_ACTIVITIES_AROUND_YOUR_HOME: A LITTLE BIT OF DIFFICULTY
MAKING_SHARP_TURNS_WHILE_RUNNING_FAST: EXTREME DIFFICULTY OR UNABLE TO PERFORM ACTIVITY
LIFTING_AN_OBJECT,_LIKE_A_BAG_OF_GROCERIES_FROM_THE_FLOOR: EXTREME DIFFICULTY OR UNABLE TO PERFORM ACTIVITY
WALKING_2_BLOCKS: A LITTLE BIT OF DIFFICULTY
GETTING_INTO_OR_OUT_OF_A_CAR: NO DIFFICULTY
SHOPPING: EXTREME DIFFICULTY OR UNABLE TO PERFORM ACTIVITY
WALKING_BETWEEN_ROOMS: NO DIFFICULTY
WALKING_A_MILE: A LITTLE BIT OF DIFFICULTY
LEFS_SCORE(%): 0
RUNNING_ON_UNEVEN_GROUND: EXTREME DIFFICULTY OR UNABLE TO PERFORM ACTIVITY
YOUR_USUAL_HOBBIES,_RECREATIONAL_OR_SPORTING_ACTIVITIES: EXTREME DIFFICULTY OR UNABLE TO PERFORM ACTIVITY

## 2025-06-27 ASSESSMENT — PAIN SCALES - GENERAL: PAINLEVEL_OUTOF10: NO PAIN (0)

## 2025-06-27 NOTE — NURSING NOTE
Chief Complaint   Patient presents with    Follow Up     ACHD Visit Type: CV Genetics (Gurmeet) 6/27/25: 24 year old female with history of Danon disases, (+) genotype, NSVT s/p ICD disease presenting for follow up         Vitals were taken, medications reconciled.    Vidal Lim, EMT    2:44 PM

## 2025-06-27 NOTE — LETTER
6/27/2025      RE: Mary Shaikh  [address]  [place] [address]       Dear Colleague,    Thank you for the opportunity to participate in the care of your patient, Mary Shaikh, at the [facility]. Please see a copy of my visit note below.          ELECTROPHYSIOLOGY VIDEO CLINIC VISIT    Assessment/Recommendations     Assessment/Plan:    Ms. 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   The patient was recently admitted with cardiogenic shock and now recovering. She has afib during cardiogenic shock and had inappropriate ICD shocks. She has had no afib recurrences. She is being listed for OHT since her functional status is tenuous. She is currently not on anticoagulation since her CHADS VASC score is low, and furthermore will be in line of her listing to OHT.    Follow-up with EP ELISABETH in one year.     History of Present Illness/Subjective    Ms. 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      Ms. 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 She was seen in the emergency department at [facility] and was found to have prexcitation on her ECG in  "2021, and this lead to an exercise ECG test which she achieved 10.8 METs, exercise duration of 9 min and 16 seconds, had no exercised induced arrhythmias, she did have a delta wave and repolarization abnormalities. Subsequently, she had an echocardiogram which showed an LVEF of 51% with asymetric LVH (septum 1.5cm). She has been following with Dr. Swift in the [facility] cardi[place] clinic and she obtained a CMR which showed and LVEF of 43%, LVEDD 4.9cm, IV septum 1.7cm,  normal RV function but RVH noted, no LOU, harman LGE with subendocardial, mid myocardial, and subendocardial enhancement not associated with a vascular territory.      She was seen by Dr. Fuentes on July 22, 2024. She was admitted after her RHC from 07/31-08/05. Her RHC showed an RA of 20, PA 51/23, 36, PCWP 29, Juan CO/CI 3.13/1.68, Thermo CO/CI 2.9/1.55 and a PVR of 1.9.     On 8/1 she was admitted for ambulatory cardiogenic shock with elevated filling pressures and severe biventricular dysfunction on for swan-guided therapies with IV afterload reduction and diuresis with a hospital course that has been complicated by paroxysmal SVT for which EP is consulted. Our colleague Dr. Will evaluated patient: \"It appears that patient has had brief episodes of symptomatic SVT while in the hospital, the most recent one was responsive to vagal maneuvers.  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 She is also in a low dose of BB carvedilolg 6.25 mg BID.     Patient reports feeling well, does mostly activity within her home with no limitations. She has no Afib or significant arrhythmias on ICD since discharge. She has been compliant with meds.    I have reviewed and updated the patient's Past Medical History, Social History, Family History and " Medication List.     Cardiographics (Personally Reviewed) :   Echo 7/2024:   Interpretation Summary  Left ventricular function is decreased. The ejection fraction is 20-25%  (severely reduced).  Global right ventricular function is moderately reduced.  Moderate biatrial enlargement is present.  IVC diameter >2.1 cm collapsing <50% with sniff suggests a high RA pressure  estimated at 15 mmHg or greater.  Trivial pericardial effusion is present.  There is no prior study for direct comparison.    Coronary Angiogram 7/2024:     Right sided filling pressures are severely elevated.     Left sided filling pressures are severely elevated.     Moderately elevated pulmonary artery hypertension.     Reduced cardiac output level.     Severely elevated biventricular filling pressures with ambulatory cardiogenic shock       CMR 4/2024:  1) Decreased left ventricular systolic function-severe; calculated ejection fraction 26%.   2) Asymmetric septal hypertrophy relative to the posterior wall.   3) Multiple regional wall motion abnormalities-mid to distal anterior wall akinetic, distal inferior wall akinetic, and apex akinetic.   4) Extensive late gadolinium hyperenhancement involving the mid-apical anterior wall, mid-apical inferior wall. The apical anterior wall and apex exhibit near transmural hyperenhancement.   5) Borderline elevated extracellular volume on parametric assessment (reported above).   6) Small, circumferential pericardial effusion.     Compared to prior study dated 11-, the left ventricular ejection fraction has decreased; segmental regional dysfunction is now apparent and extensive late gadolinium hyperenhancement continues to be present. In this patient with known diagnosis of Danon's disease, the above findings may suggest disease progression.            Physical Examination   There were no vitals taken for this visit.  Wt Readings from Last 3 Encounters:   06/23/25 66.5 kg (146 lb 11.2 oz)   06/17/25  65.8 kg (145 lb)   04/30/25 65.8 kg (145 lb)     VIDEO VISIT       Medications  Allergies   Current Outpatient Medications   Medication Sig Dispense Refill     acetaminophen (TYLENOL) 325 MG tablet Take 325-650 mg by mouth every 6 hours as needed for mild pain.       albuterol (PROAIR HFA/PROVENTIL HFA/VENTOLIN HFA) 108 (90 Base) MCG/ACT inhaler Inhale 1-2 puffs into the lungs every 6 hours as needed for shortness of breath, wheezing or cough. 18 g 1     digoxin (LANOXIN) 250 MCG tablet Take 1 tablet (250 mcg) by mouth daily. 90 tablet 3     empagliflozin (JARDIANCE) 25 MG TABS tablet Take 1 tablet (25 mg) by mouth daily. 90 tablet 1     fluocinonide (LIDEX) 0.05 % external solution Apply topically 2 times daily as needed (scalp irritation). 60 mL 1     furosemide (LASIX) 20 MG tablet Take 1 tablet (20 mg) by mouth daily as needed (Take daily if your weight increases by more than 3 lbs in one day or  5 lbs in three days.). 90 tablet 3     loperamide (IMODIUM) 2 MG capsule Take 2 capsules (4 mg) by mouth as needed for diarrhea. 90 capsule 3     LORazepam (ATIVAN) 0.5 MG tablet TAKE 2 TABLETS(1 MG) BY MOUTH EVERY 6 HOURS AS NEEDED FOR ANXIETY 15 tablet 0     melatonin 3 MG tablet Take 3 mg by mouth nightly as needed       metoprolol succinate ER (TOPROL XL) 50 MG 24 hr tablet Take 1 tablet (50 mg) by mouth 2 times daily. 180 tablet 3     metoprolol tartrate (LOPRESSOR) 25 MG tablet Take 1 tablet (25 mg) by mouth daily as needed (take for increased fluttering/palpitations and for HR greater than 110 bpm). 90 tablet 1     nitroGLYcerin (NITROSTAT) 0.4 MG sublingual tablet For chest pain place 1 tablet under the tongue every 5 minutes for 3 doses. If symptoms persist 5 minutes after 1st dose call 911. 10 tablet 1     ondansetron (ZOFRAN ODT) 4 MG ODT tab Place 1 tablet (4 mg) under the tongue as needed for nausea. 30 tablet 0     pantoprazole (PROTONIX) 20 MG EC tablet Take 1 tablet (20 mg) by mouth daily. (Patient not  taking: Reported on 6/23/2025) 90 tablet 0     sacubitril-valsartan (ENTRESTO)  MG per tablet Take 1 tablet by mouth 2 times daily. 180 tablet 3     spironolactone (ALDACTONE) 25 MG tablet Take 0.5 tablets (12.5 mg) by mouth daily. 90 tablet 3     tiZANidine (ZANAFLEX) 2 MG tablet Take 1 tablet (2 mg) by mouth 3 times daily as needed for muscle spasms. (Patient not taking: Reported on 6/23/2025) 90 tablet 2    No Known Allergies      Lab Results (Personally Reviewed)    Chemistry/lipid CBC Cardiac Enzymes/BNP/TSH/INR   Lab Results   Component Value Date    BUN 11.1 06/19/2025     06/19/2025    CO2 26 06/19/2025     Creatinine   Date Value Ref Range Status   06/19/2025 0.70 0.51 - 0.95 mg/dL Final       Lab Results   Component Value Date    CHOL 139 08/02/2024    HDL 50 08/02/2024    LDL 78 08/02/2024      Lab Results   Component Value Date    WBC 3.3 (L) 06/19/2025    HGB 15.3 06/19/2025    HCT 48.3 (H) 06/19/2025    MCV 87 06/19/2025     06/19/2025    Lab Results   Component Value Date    TSH 2.19 03/13/2025    INR 1.11 06/19/2025        Jorge Reyes Castro, MD, MS  Cardiac Electrophysiology Fellow     EP STAFF NOTE  Patient seen and examined and management plan personally reviewed with the patient. I agree with the note above by the CV/EP fellow.  Phillip Levi MD [facility]  Cardiology - Electrophysiology  Total time spent on patient visit, reviewing notes, imaging, labs, orders, and completing necessary documentation: 45 minutes.  >50% of visit spent on counseling patient and/or coordination of care.                   Please do not hesitate to contact me if you have any questions/concerns.     Sincerely,     Phillip Levi MD

## 2025-06-27 NOTE — PATIENT INSTRUCTIONS
It was a pleasure to see you in clinic today, please do not hesitate to call us for questions or concerns.  Your next automatic remote ICD check from home is scheduled for 9/30/2025.    Nubia Lee RN    Electrophysiology Nurse Clinician  [facility]    During Business Hours Please Call:  [phone]  After Hours Please Call:  [phone] - select option #4 and ask for job code [phone]

## 2025-06-27 NOTE — PATIENT INSTRUCTIONS
"  Thank you for visiting the Adult Congenital and Cardiovascular Genetics Clinic at the [facility].    Cardiology Providers you saw during your visit:  Phillip Levi MD    Diagnosis:  Danon Disease    Results:  Phillip Levi MD reviewed the results of your device check, right heart catheterization, and cardiopulmonary stress testing today in clinic.    Recommendations for you:    No changes to medications today.       General Cardiac Recommendations:  Continue to eat a heart healthy, low salt diet.  Continue to get 20-30 minutes of aerobic activity, 4-5 days per week.  Examples of aerobic activity include walking, running, swimming, cycling, etc.  Continue to observe good oral hygiene, with regular dental visits.      SBE prophylaxis:   Yes____  No__x__    Exercise restrictions:   Yes__X__  No____         If yes, list restrictions:  Must be allowed to rest if fatigued or SOB      FASTING CHOLESTEROL was checked in the last 5 years YES_x___  NO____ (2022)  If no, please follow up with your primary care physician. You should have a cholesterol screening every 5 years.      Follow-up:  Follow up with EP ELISABETH in 1 year with device check prior.    If you have questions or concerns please contact us at:    Ruslan Reyes RN, BSN     Catalina Dunlap (Scheduling)  Nurse Care Coordinator     Clinic   CV Genetics      Adult Congenital and CV Genetic  [facility]   [facility]  (P) [phone]     (P) [phone]  (F) [phone]     (F) [phone]        For after hours urgent needs, call [phone] and ask to speak to the \"On-Call Cardiologist.\"      For emergencies call 911.    [facility]  [facility]  Mail Code [id number]  [address] [place]  [address]   "

## 2025-06-28 ENCOUNTER — HEALTH MAINTENANCE LETTER (OUTPATIENT)
Age: 25
End: 2025-06-28

## 2025-06-28 LAB
MDC_IDC_LEAD_CONNECTION_STATUS: NORMAL
MDC_IDC_LEAD_CONNECTION_STATUS: NORMAL
MDC_IDC_LEAD_IMPLANT_DT: NORMAL
MDC_IDC_LEAD_IMPLANT_DT: NORMAL
MDC_IDC_LEAD_LOCATION: NORMAL
MDC_IDC_LEAD_LOCATION: NORMAL
MDC_IDC_LEAD_LOCATION_DETAIL_1: NORMAL
MDC_IDC_LEAD_LOCATION_DETAIL_1: NORMAL
MDC_IDC_LEAD_MFG: NORMAL
MDC_IDC_LEAD_MFG: NORMAL
MDC_IDC_LEAD_MODEL: NORMAL
MDC_IDC_LEAD_MODEL: NORMAL
MDC_IDC_LEAD_POLARITY_TYPE: NORMAL
MDC_IDC_LEAD_POLARITY_TYPE: NORMAL
MDC_IDC_LEAD_SERIAL: NORMAL
MDC_IDC_LEAD_SERIAL: NORMAL
MDC_IDC_MSMT_BATTERY_DTM: NORMAL
MDC_IDC_MSMT_BATTERY_REMAINING_LONGEVITY: 126 MO
MDC_IDC_MSMT_BATTERY_REMAINING_PERCENTAGE: 100 %
MDC_IDC_MSMT_BATTERY_STATUS: NORMAL
MDC_IDC_MSMT_CAP_CHARGE_DTM: NORMAL
MDC_IDC_MSMT_CAP_CHARGE_DTM: NORMAL
MDC_IDC_MSMT_CAP_CHARGE_ENERGY: 41 J
MDC_IDC_MSMT_CAP_CHARGE_TIME: 10.5 S
MDC_IDC_MSMT_CAP_CHARGE_TIME: 7.6 S
MDC_IDC_MSMT_CAP_CHARGE_TYPE: NORMAL
MDC_IDC_MSMT_CAP_CHARGE_TYPE: NORMAL
MDC_IDC_MSMT_LEADCHNL_RA_IMPEDANCE_VALUE: 775 OHM
MDC_IDC_MSMT_LEADCHNL_RA_PACING_THRESHOLD_AMPLITUDE: 0.8 V
MDC_IDC_MSMT_LEADCHNL_RA_PACING_THRESHOLD_PULSEWIDTH: 0.4 MS
MDC_IDC_MSMT_LEADCHNL_RV_IMPEDANCE_VALUE: 364 OHM
MDC_IDC_MSMT_LEADCHNL_RV_PACING_THRESHOLD_AMPLITUDE: 0.9 V
MDC_IDC_MSMT_LEADCHNL_RV_PACING_THRESHOLD_PULSEWIDTH: 0.4 MS
MDC_IDC_PG_IMPLANT_DTM: NORMAL
MDC_IDC_PG_MFG: NORMAL
MDC_IDC_PG_MODEL: NORMAL
MDC_IDC_PG_SERIAL: NORMAL
MDC_IDC_PG_TYPE: NORMAL
MDC_IDC_SESS_CLINIC_NAME: NORMAL
MDC_IDC_SESS_DTM: NORMAL
MDC_IDC_SESS_TYPE: NORMAL
MDC_IDC_SET_BRADY_AT_MODE_SWITCH_MODE: NORMAL
MDC_IDC_SET_BRADY_AT_MODE_SWITCH_RATE: 140 {BEATS}/MIN
MDC_IDC_SET_BRADY_LOWRATE: 60 {BEATS}/MIN
MDC_IDC_SET_BRADY_MAX_TRACKING_RATE: 130 {BEATS}/MIN
MDC_IDC_SET_BRADY_MODE: NORMAL
MDC_IDC_SET_BRADY_PAV_DELAY_HIGH: 180 MS
MDC_IDC_SET_BRADY_PAV_DELAY_LOW: 240 MS
MDC_IDC_SET_BRADY_SAV_DELAY_HIGH: 135 MS
MDC_IDC_SET_BRADY_SAV_DELAY_LOW: 180 MS
MDC_IDC_SET_LEADCHNL_RA_PACING_AMPLITUDE: 3.5 V
MDC_IDC_SET_LEADCHNL_RA_PACING_CAPTURE_MODE: NORMAL
MDC_IDC_SET_LEADCHNL_RA_PACING_POLARITY: NORMAL
MDC_IDC_SET_LEADCHNL_RA_PACING_PULSEWIDTH: 0.4 MS
MDC_IDC_SET_LEADCHNL_RA_SENSING_ADAPTATION_MODE: NORMAL
MDC_IDC_SET_LEADCHNL_RA_SENSING_POLARITY: NORMAL
MDC_IDC_SET_LEADCHNL_RA_SENSING_SENSITIVITY: 0.25 MV
MDC_IDC_SET_LEADCHNL_RV_PACING_AMPLITUDE: 3.5 V
MDC_IDC_SET_LEADCHNL_RV_PACING_CAPTURE_MODE: NORMAL
MDC_IDC_SET_LEADCHNL_RV_PACING_POLARITY: NORMAL
MDC_IDC_SET_LEADCHNL_RV_PACING_PULSEWIDTH: 0.4 MS
MDC_IDC_SET_LEADCHNL_RV_SENSING_ADAPTATION_MODE: NORMAL
MDC_IDC_SET_LEADCHNL_RV_SENSING_POLARITY: NORMAL
MDC_IDC_SET_LEADCHNL_RV_SENSING_SENSITIVITY: 0.6 MV
MDC_IDC_SET_ZONE_DETECTION_INTERVAL: 240 MS
MDC_IDC_SET_ZONE_DETECTION_INTERVAL: 300 MS
MDC_IDC_SET_ZONE_DETECTION_INTERVAL: 333 MS
MDC_IDC_SET_ZONE_STATUS: NORMAL
MDC_IDC_SET_ZONE_TYPE: NORMAL
MDC_IDC_SET_ZONE_VENDOR_TYPE: NORMAL
MDC_IDC_STAT_AT_BURDEN_PERCENT: 1 %
MDC_IDC_STAT_AT_DTM_END: NORMAL
MDC_IDC_STAT_AT_DTM_START: NORMAL
MDC_IDC_STAT_BRADY_DTM_END: NORMAL
MDC_IDC_STAT_BRADY_DTM_START: NORMAL
MDC_IDC_STAT_BRADY_RA_PERCENT_PACED: 75 %
MDC_IDC_STAT_BRADY_RV_PERCENT_PACED: 3 %
MDC_IDC_STAT_EPISODE_RECENT_COUNT: 0
MDC_IDC_STAT_EPISODE_RECENT_COUNT: 10
MDC_IDC_STAT_EPISODE_RECENT_COUNT_DTM_END: NORMAL
MDC_IDC_STAT_EPISODE_RECENT_COUNT_DTM_START: NORMAL
MDC_IDC_STAT_EPISODE_TYPE: NORMAL
MDC_IDC_STAT_EPISODE_VENDOR_TYPE: NORMAL
MDC_IDC_STAT_TACHYTHERAPY_ATP_DELIVERED_RECENT: 0
MDC_IDC_STAT_TACHYTHERAPY_ATP_DELIVERED_TOTAL: 4
MDC_IDC_STAT_TACHYTHERAPY_RECENT_DTM_END: NORMAL
MDC_IDC_STAT_TACHYTHERAPY_RECENT_DTM_START: NORMAL
MDC_IDC_STAT_TACHYTHERAPY_SHOCKS_ABORTED_RECENT: 0
MDC_IDC_STAT_TACHYTHERAPY_SHOCKS_ABORTED_TOTAL: 3
MDC_IDC_STAT_TACHYTHERAPY_SHOCKS_DELIVERED_RECENT: 0
MDC_IDC_STAT_TACHYTHERAPY_SHOCKS_DELIVERED_TOTAL: 4
MDC_IDC_STAT_TACHYTHERAPY_TOTAL_DTM_END: NORMAL
MDC_IDC_STAT_TACHYTHERAPY_TOTAL_DTM_START: NORMAL

## 2025-07-01 ENCOUNTER — RESULTS FOLLOW-UP (OUTPATIENT)
Dept: TRANSPLANT | Facility: CLINIC | Age: 25
End: 2025-07-01

## 2025-07-02 ENCOUNTER — CARE COORDINATION (OUTPATIENT)
Dept: TRANSPLANT | Facility: CLINIC | Age: 25
End: 2025-07-02

## 2025-07-02 ENCOUNTER — THERAPY VISIT (OUTPATIENT)
Dept: PHYSICAL THERAPY | Facility: CLINIC | Age: 25
End: 2025-07-02
Attending: PHYSICIAN ASSISTANT
Payer: COMMERCIAL

## 2025-07-02 DIAGNOSIS — M54.9 PAIN, UPPER BACK: Primary | ICD-10-CM

## 2025-07-02 DIAGNOSIS — R10.84 ABDOMINAL PAIN, GENERALIZED: ICD-10-CM

## 2025-07-02 PROCEDURE — 97110 THERAPEUTIC EXERCISES: CPT | Mod: GP

## 2025-07-02 PROCEDURE — 97161 PT EVAL LOW COMPLEX 20 MIN: CPT | Mod: GP

## 2025-07-02 NOTE — PROGRESS NOTES
D: Called pt's mother to follow-up on our discussion last week regarding possible status 4E transplant listing. Pt and parents were able to discuss transplant further with Dr. Fuentes last Friday.   I: Pt's mother states they are ready for pt to be on the heart transplant waitlist. They are going on a trip over the coming weekend, but would be ready for listing after Monday. Discussed possible options for Hep C and DCD donors, and encouraged them to think about these options more over the weekend.   A: Pt's mother verbalized understanding. She states they are not interested in Hep C donors at this time. They are also not interested in DCD donors but will think about it more.   P: Will plan to follow-up with Dr. Fuentes, who will be writing her exception statement. Will plan to list pt for transplant after this weekend, when they have returned from trip.

## 2025-07-02 NOTE — PROGRESS NOTES
PHYSICAL THERAPY EVALUATION  Type of Visit: Evaluation       Fall Risk Screen:  Have you fallen 2 or more times in the past year?: No  Have you fallen and had an injury in the past year?: No    Subjective         Presenting condition or subjective complaint: Upper left back pain  Date of onset: 06/17/25    Relevant medical history:     Dates & types of surgery:      Prior diagnostic imaging/testing results: CT scan     Prior therapy history for the same diagnosis, illness or injury: No      Prior Level of Function  Transfers: Independent  Ambulation: Independent  ADL: Independent  IADL: Driving, Housekeeping, Laundry, pt reports taking her time with these activities    Living Environment  Social support: With family members   Type of home: House   Stairs to enter the home: No       Ramp: No   Stairs inside the home: Yes 7     Help at home: None; Home management tasks (cooking, cleaning)  Equipment owned:       Employment: No    Hobbies/Interests: Crocheting, cooking    Patient goals for therapy: NA        Pain assessment: pt describes pain as sharp and stiff; this pain has been present for 3 years; pt has tried chiropractic, acupuncture, and physical therapy treatment to reduce pain; reports feeling like acupuncture made the pain worse; pt is able to sleep throughout most of the night without waking up d/t pain; pain is better in morning and increases throughout the day; difficulties with walking and sitting to luis for long periods; pain started in lower back 3 years ago but has travelled to mid/upper back, pain is primarily in L scapula but occasionally shoots up to neck and causes headaches       Objective   CERVICAL SPINE EVALUATION  PAIN: Pain Level at Rest: 7/10  Pain Level with Use: 9/10  Pain Location: thoracic spine  Pain Quality: Sharp and stiff  Pain is Exacerbated By: walking; sitting for long periods of time  Pain is Relieved By: cold, heat, stretch, and salt baths  POSTURE: Standing Posture: Rounded  shoulders, Thoracic kyphosis increased  Sitting Posture: Rounded shoulders, Thoracic kyphosis increased  ROM:   (Degrees) Left AROM Right AROM    Cervical Flexion WNL; tightness reported in neck    Cervical Extension WNL    Cervical Side bend WNL     Cervical Rotation WNL     Cervical Protrusion     Cervical Retraction     Thoracic Flexion     Thoracic Extension     Thoracic Rotation       Left AROM Left PROM Right AROM Right PROM   Shoulder Flexion       Shoulder Extension       Shoulder Abduction       Shoulder Adduction       Shoulder IR       Shoulder ER       Shoulder Horiz Abduction       Shoulder Horiz Adduction         PALPATION: tenderness with spring test/PA glides at L1-L3; no tenderness throughout thoracic spine      Assessment & Plan   CLINICAL IMPRESSIONS  Medical Diagnosis: Abdominal pain, generalized    Treatment Diagnosis: Chronic upper back pain   Impression/Assessment: Patient is a 24 year old female with upper back pain complaints.  The following significant findings have been identified: Pain, Decreased activity tolerance, and Impaired posture. These impairments interfere with their ability to perform self care tasks, recreational activities, household chores, driving , and community mobility as compared to previous level of function.     Clinical Decision Making (Complexity):  Clinical Presentation: Stable/Uncomplicated  Clinical Presentation Rationale: based on medical and personal factors listed in PT evaluation  Clinical Decision Making (Complexity): Low complexity    PLAN OF CARE  Treatment Interventions:  Interventions: Manual Therapy, Neuromuscular Re-education, Therapeutic Activity, Therapeutic Exercise    Long Term Goals     PT Goal 1  Goal Description: Pt will be able to sit and luis for >1 hr with pain levels at a 0-1/10  Rationale: to maximize safety and independence with performance of ADLs and functional tasks;to maximize safety and independence within the home;to maximize  safety and independence with transportation;to maximize safety and independence within the community  Target Date: 08/27/25  PT Goal 2  Goal Description: Pt will be able to walk for >15 minutes with limited pain; PL 0-1/10  Rationale: to maximize safety and independence within the community  Target Date: 08/27/25      Frequency of Treatment: 1x/week  Duration of Treatment: 8 weeks    Recommended Referrals to Other Professionals: Physical Therapy  Education Assessment:   Learner/Method: Patient;Demonstration;Pictures/Video    Risks and benefits of evaluation/treatment have been explained.   Patient/Family/caregiver agrees with Plan of Care.     Evaluation Time:     PT Eval, Low Complexity Minutes (39047): 20  Evaluation Only     Signing Clinician: Nubia Blackmon, PT

## 2025-07-03 NOTE — TELEPHONE ENCOUNTER
REASON FOR VISIT: Abdominal pain, generalized    DATE OF APPT: 7/07/2025   NOTES (FOR ALL VISITS) STATUS DETAILS   OFFICE NOTE from referring provider Internal [facility]  Indira Lloyd PA-C 6/17/2025   MEDICATION LIST N/A    IMAGING  (FOR ALL VISITS)     XR N/A    MRI (HEAD, NECK, SPINE) N/A    CT (HEAD, NECK, SPINE) Internal [facility]  CT Abdomen pelvis 4/15/2025

## 2025-07-06 NOTE — PROGRESS NOTES
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 She was seen by neurology on  who thought it was more due to myofascial pain and not related to Dannon disease.  PT, chiropractic therapy, and acupuncture have not been helpful.  She was referred to PM&R for trigger point injection or other management.    She saw physical therapy for pain on , who worked her ability to luis and walk.    She's had the upper back pain for 2-3 years.  She was working a regular job at the time, lifting things.  It started on the left side in the lower third of the back. She went to the chiropractor who just moved the pain up to the mid back.  Sometimes it shoots up her neck causing migraines.  When she leans her neck down she can feel tightness and tenderness between her neck and her internal shoulder blade on the left.    The back pain is every day and is better in the morning and worse when exerting herself sitting up.  It interferes with household activities and hobbies like crocheting - anything that requires her to sit up.  She was taking    Regarding her migraines, she gets lightheaded, can't eat, and it's just throbbing 5/10 pain on the right side of her face around the right temple.  It goes away within an hour if she relaxes, lying down with the lights off.  Tylenol doesn't work and she can't take ibuprofen.  She becomes photophobic but not phonophobic.  Her headaches are very rare, happening when she overstretches, once a month.  No injury to her back.  No weakness. No tingling or burning.    Her exercise program:  She is planning on doing yoga and scapular strengthening exercises every morning and night.  She  will do 2 or 3 sets of 20 minutes of these exercises.    She doesn't do aerobic exercise but she got tired out walking at around 3-4mph for 5 minutes.  She does about 1000 steps per day.  She can walk 1-2 miles, taking 2 - 3 breaks in between.         Past Medical and Surgical History:     Past Medical History:   Diagnosis Date    Congestive heart failure (H)     Depressive disorder     Uncomplicated asthma      Past Surgical History:   Procedure Laterality Date    CARDIAC SURGERY      Distributor placemnt    CV RIGHT HEART CATH MEASUREMENTS RECORDED N/A 07/31/2024    Procedure: Heart Cath Right Heart Cath;  Surgeon: Tanmay Brice MD;  Location:  HEART CARDIAC CATH LAB    CV RIGHT HEART CATH MEASUREMENTS RECORDED N/A 11/15/2024    Procedure: Heart Cath Right Heart Cath;  Surgeon: Tanmay Brice MD;  Location:  HEART CARDIAC CATH LAB    CV RIGHT HEART CATH MEASUREMENTS RECORDED N/A 12/16/2024    Procedure: Right Heart Catheterization;  Surgeon: Evens Rodrigues MD;  Location:  HEART CARDIAC CATH LAB            Social History:   Tobacco: She reports that she has never smoked. She has never been exposed to tobacco smoke. She has never used smokeless tobacco.  Alcohol: She reports no history of alcohol use.  Recreational Drugs: She reports no history of drug use.         Family History:     Family History   Problem Relation Age of Onset    Asthma Mother             Medications:     Current Outpatient Medications   Medication Sig Dispense Refill    acetaminophen (TYLENOL) 325 MG tablet Take 325-650 mg by mouth every 6 hours as needed for mild pain.      albuterol (PROAIR HFA/PROVENTIL HFA/VENTOLIN HFA) 108 (90 Base) MCG/ACT inhaler Inhale 1-2 puffs into the lungs every 6 hours as needed for shortness of breath, wheezing or cough. 18 g 1    capsaicin (ZOSTRIX) 0.025 % external cream Apply 1 Application topically 4 times daily. 120 g 4    digoxin (LANOXIN) 250 MCG tablet Take 1 tablet (250 mcg) by mouth daily.  90 tablet 3    empagliflozin (JARDIANCE) 25 MG TABS tablet Take 1 tablet (25 mg) by mouth daily. 90 tablet 3    fluocinonide (LIDEX) 0.05 % external solution Apply topically 2 times daily as needed (scalp irritation). 60 mL 1    furosemide (LASIX) 20 MG tablet Take 1 tablet (20 mg) by mouth daily as needed (Take daily if your weight increases by more than 3 lbs in one day or  5 lbs in three days.). 90 tablet 3    LORazepam (ATIVAN) 0.5 MG tablet TAKE 2 TABLETS(1 MG) BY MOUTH EVERY 6 HOURS AS NEEDED FOR ANXIETY 15 tablet 0    melatonin 3 MG tablet Take 3 mg by mouth nightly as needed      metoprolol succinate ER (TOPROL XL) 50 MG 24 hr tablet Take 1 tablet (50 mg) by mouth 2 times daily. 180 tablet 3    metoprolol tartrate (LOPRESSOR) 25 MG tablet Take 1 tablet (25 mg) by mouth daily as needed (take for increased fluttering/palpitations and for HR greater than 110 bpm). 90 tablet 1    nitroGLYcerin (NITROSTAT) 0.4 MG sublingual tablet For chest pain place 1 tablet under the tongue every 5 minutes for 3 doses. If symptoms persist 5 minutes after 1st dose call 911. 10 tablet 1    ondansetron (ZOFRAN ODT) 4 MG ODT tab Place 1 tablet (4 mg) under the tongue as needed for nausea. 30 tablet 0    sacubitril-valsartan (ENTRESTO)  MG per tablet Take 1 tablet by mouth 2 times daily. 180 tablet 3    spironolactone (ALDACTONE) 25 MG tablet Take 0.5 tablets (12.5 mg) by mouth daily. 90 tablet 3    loperamide (IMODIUM) 2 MG capsule Take 2 capsules (4 mg) by mouth as needed for diarrhea. 90 capsule 3    pantoprazole (PROTONIX) 20 MG EC tablet Take 1 tablet (20 mg) by mouth daily. (Patient not taking: Reported on 6/23/2025) 90 tablet 0    tiZANidine (ZANAFLEX) 2 MG tablet Take 1 tablet (2 mg) by mouth 3 times daily as needed for muscle spasms. (Patient not taking: Reported on 6/23/2025) 90 tablet 2            Allergies:     No Known Allergies         Physical Examiniation:   VITAL SIGNS: BP 98/63   Pulse 62   Resp 16   SpO2  "99%   BMI: Estimated body mass index is 25.24 kg/m  as calculated from the following:    Height as of 6/17/25: 1.626 m (5' 4\").    Weight as of 6/27/25: 66.7 kg (147 lb 0.8 oz).    Gen: NAD, pleasant and cooperative   Cardio: regular pulse  Pulm: non-labored breathing in room air  Abd: benign  Ext: WWP, no edema in BLE, no tenderness in calves  Back: Tender points in left mid thoracic paraspinals, left inferior rhomboids, left trapezius / scapular levator scapulae.  None are trigger points.  Neuro/MSK:   Moves all extremities volitionally.         Laboratory/Imaging:     Recent Labs   Lab Test 06/19/25  0808   WBC 3.3*   RBC 5.56*   HGB 15.3   HCT 48.3*   MCV 87   MCH 27.5   MCHC 31.7   RDW 13.8         Recent Labs   Lab Test 06/19/25  0808      POTASSIUM 4.1   CHLORIDE 104   CO2 26   ANIONGAP 9   GLC 87   BUN 11.1   CR 0.70   GFRESTIMATED >90   WHITNEY 9.6     Recent Labs   Lab Test 06/19/25  0808   PROTTOTAL 7.6   ALBUMIN 4.6   BILITOTAL 0.9   ALKPHOS 71   AST 44   ALT 17     Recent Labs   Lab Test 08/02/24  0356   CHOL 139   HDL 50   LDL 78   TRIG 57          Assessment/Plan:     Mary Shaikh is a 24 year old female with a relevant PMH of Danon disease, hypertrophic cardiomyopathy, and upper back pain who has a rehabilitation diagnosis of myofascial pain limiting ADL functional independence.    #Upper back myofascial pain  Limitation in function out of proportion to intensity of pain.  Myofascial in nature.   Mechanical myofascial release had a negative effect in the past, making trigger points less likely to be helpful. We discussed the risks/benefits of trigger point injections and she elected not to proceed with them at this time. She still has several non-procedural treatment / lifestyle modifications to try.  Incorporating aerobic activity with patient-modulated exertion should allow her to maintain cardiovascular conditioning without overexertion or worsening of cardiomyopathy.  - patient " declined trigger point injections after discussion of potential benefits  - Reviewed therapy recommended exercise regimen and I suggested progressive strength training with different levels of resistance bands  - reviewed AHA physical activity / aerobic exercise guidelines, graded by respiratory rate  - prescribed capsaicin for pain gating / c-fiber neurotransmitter depletion effect  - asked Dr. Fuentes about the safety of diclofenac, to try if capsaicin not sufficient    #Follow up  - 3 months    Rahul Rubalcava MD    I spent a total of 60 minutes on the date of service doing chart review, history and exam, documentation, and further activities as noted above.

## 2025-07-07 ENCOUNTER — OFFICE VISIT (OUTPATIENT)
Dept: PHYSICAL MEDICINE AND REHAB | Facility: CLINIC | Age: 25
End: 2025-07-07
Payer: COMMERCIAL

## 2025-07-07 ENCOUNTER — PRE VISIT (OUTPATIENT)
Dept: PALLIATIVE MEDICINE | Facility: CLINIC | Age: 25
End: 2025-07-07

## 2025-07-07 VITALS
OXYGEN SATURATION: 99 % | HEART RATE: 62 BPM | DIASTOLIC BLOOD PRESSURE: 63 MMHG | SYSTOLIC BLOOD PRESSURE: 98 MMHG | RESPIRATION RATE: 16 BRPM

## 2025-07-07 DIAGNOSIS — M79.18 MYOFASCIAL PAIN: Primary | ICD-10-CM

## 2025-07-07 RX ORDER — CAPSAICIN 0.025 %
1 CREAM (GRAM) TOPICAL 4 TIMES DAILY
Qty: 120 G | Refills: 4 | Status: SHIPPED | OUTPATIENT
Start: 2025-07-07

## 2025-07-07 NOTE — PATIENT INSTRUCTIONS
For aerobic exercise, figure out a walking pace that increases your breathing rate to prevent you from completing sentences that you can sustain for 30 minutes and do that 5 days a week.  Do your yoga and shoulder range of motion resistance exercises on a daily basis and ask your therapist about increasing resistance bands for your shoulder exercises.  Talk to your physical therapist about myofascial release techniques using the theracane or manually.  I can offer trigger point injections at your convenience.  I will prescribe capsaicin now and will wait on Dr. Fuentes's approval to try voltaren.

## 2025-07-07 NOTE — LETTER
2025       RE: Mary Shaikh  [address]  [place] [address]     Dear Colleague,    Thank you for referring your patient, Mary Shaikh, to the [facility] [place] at [facility]. Please see a copy of my visit note below.           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 She was seen by neurology on  who thought it was more due to myofascial pain and not related to Dannon disease.  PT, chiropractic therapy, and acupuncture have not been helpful.  She was referred to PM&R for trigger point injection or other management.    She saw physical therapy for pain on , who worked her ability to luis and walk.    She's had the upper back pain for 2-3 years.  She was working a regular job at the time, lifting things.  It started on the left side in the lower third of the back. She went to the chiropractor who just moved the pain up to the mid back.  Sometimes it shoots up her neck causing migraines.  When she leans her neck down she can feel tightness and tenderness between her neck and her internal shoulder blade on the left.    The back pain is every day and is better in the morning and worse when exerting herself sitting up.  It interferes with household activities and hobbies like crocheting - anything that requires her to sit up.  She was taking    Regarding her migraines, she gets lightheaded, can't eat, and it's just throbbing 5/10 pain on the right side of her face around the right temple.  It goes away within an hour if she relaxes, lying down with the lights off.  Tylenol doesn't  work and she can't take ibuprofen.  She becomes photophobic but not phonophobic.  Her headaches are very rare, happening when she overstretches, once a month.  No injury to her back.  No weakness. No tingling or burning.    Her exercise program:  She is planning on doing yoga and scapular strengthening exercises every morning and night.  She will do 2 or 3 sets of 20 minutes of these exercises.    She doesn't do aerobic exercise but she got tired out walking at around 3-4mph for 5 minutes.  She does about 1000 steps per day.  She can walk 1-2 miles, taking 2 - 3 breaks in between.         Past Medical and Surgical History:     Past Medical History:   Diagnosis Date     Congestive heart failure (H)      Depressive disorder      Uncomplicated asthma      Past Surgical History:   Procedure Laterality Date     CARDIAC SURGERY      Distributor placemnt     CV RIGHT HEART CATH MEASUREMENTS RECORDED N/A 07/31/2024    Procedure: Heart Cath Right Heart Cath;  Surgeon: Tanmay Brice MD;  Location:  HEART CARDIAC CATH LAB     CV RIGHT HEART CATH MEASUREMENTS RECORDED N/A 11/15/2024    Procedure: Heart Cath Right Heart Cath;  Surgeon: Tanmay Brice MD;  Location:  HEART CARDIAC CATH LAB     CV RIGHT HEART CATH MEASUREMENTS RECORDED N/A 12/16/2024    Procedure: Right Heart Catheterization;  Surgeon: Evens Rodrigues MD;  Location:  HEART CARDIAC CATH LAB            Social History:   Tobacco: She reports that she has never smoked. She has never been exposed to tobacco smoke. She has never used smokeless tobacco.  Alcohol: She reports no history of alcohol use.  Recreational Drugs: She reports no history of drug use.         Family History:     Family History   Problem Relation Age of Onset     Asthma Mother             Medications:     Current Outpatient Medications   Medication Sig Dispense Refill     acetaminophen (TYLENOL) 325 MG tablet Take 325-650 mg by mouth every 6 hours as needed for mild pain.       albuterol  (PROAIR HFA/PROVENTIL HFA/VENTOLIN HFA) 108 (90 Base) MCG/ACT inhaler Inhale 1-2 puffs into the lungs every 6 hours as needed for shortness of breath, wheezing or cough. 18 g 1     capsaicin (ZOSTRIX) 0.025 % external cream Apply 1 Application topically 4 times daily. 120 g 4     digoxin (LANOXIN) 250 MCG tablet Take 1 tablet (250 mcg) by mouth daily. 90 tablet 3     empagliflozin (JARDIANCE) 25 MG TABS tablet Take 1 tablet (25 mg) by mouth daily. 90 tablet 3     fluocinonide (LIDEX) 0.05 % external solution Apply topically 2 times daily as needed (scalp irritation). 60 mL 1     furosemide (LASIX) 20 MG tablet Take 1 tablet (20 mg) by mouth daily as needed (Take daily if your weight increases by more than 3 lbs in one day or  5 lbs in three days.). 90 tablet 3     LORazepam (ATIVAN) 0.5 MG tablet TAKE 2 TABLETS(1 MG) BY MOUTH EVERY 6 HOURS AS NEEDED FOR ANXIETY 15 tablet 0     melatonin 3 MG tablet Take 3 mg by mouth nightly as needed       metoprolol succinate ER (TOPROL XL) 50 MG 24 hr tablet Take 1 tablet (50 mg) by mouth 2 times daily. 180 tablet 3     metoprolol tartrate (LOPRESSOR) 25 MG tablet Take 1 tablet (25 mg) by mouth daily as needed (take for increased fluttering/palpitations and for HR greater than 110 bpm). 90 tablet 1     nitroGLYcerin (NITROSTAT) 0.4 MG sublingual tablet For chest pain place 1 tablet under the tongue every 5 minutes for 3 doses. If symptoms persist 5 minutes after 1st dose call 911. 10 tablet 1     ondansetron (ZOFRAN ODT) 4 MG ODT tab Place 1 tablet (4 mg) under the tongue as needed for nausea. 30 tablet 0     sacubitril-valsartan (ENTRESTO)  MG per tablet Take 1 tablet by mouth 2 times daily. 180 tablet 3     spironolactone (ALDACTONE) 25 MG tablet Take 0.5 tablets (12.5 mg) by mouth daily. 90 tablet 3     loperamide (IMODIUM) 2 MG capsule Take 2 capsules (4 mg) by mouth as needed for diarrhea. 90 capsule 3     pantoprazole (PROTONIX) 20 MG EC tablet Take 1 tablet (20 mg)  "by mouth daily. (Patient not taking: Reported on 6/23/2025) 90 tablet 0     tiZANidine (ZANAFLEX) 2 MG tablet Take 1 tablet (2 mg) by mouth 3 times daily as needed for muscle spasms. (Patient not taking: Reported on 6/23/2025) 90 tablet 2            Allergies:     No Known Allergies         Physical Examiniation:   VITAL SIGNS: BP 98/63   Pulse 62   Resp 16   SpO2 99%   BMI: Estimated body mass index is 25.24 kg/m  as calculated from the following:    Height as of 6/17/25: 1.626 m (5' 4\").    Weight as of 6/27/25: 66.7 kg (147 lb 0.8 oz).    Gen: NAD, pleasant and cooperative   Cardio: regular pulse  Pulm: non-labored breathing in room air  Abd: benign  Ext: WWP, no edema in BLE, no tenderness in calves  Back: Tender points in left mid thoracic paraspinals, left inferior rhomboids, left trapezius / scapular levator scapulae.  None are trigger points.  Neuro/MSK:   Moves all extremities volitionally.         Laboratory/Imaging:     Recent Labs   Lab Test 06/19/25  0808   WBC 3.3*   RBC 5.56*   HGB 15.3   HCT 48.3*   MCV 87   MCH 27.5   MCHC 31.7   RDW 13.8         Recent Labs   Lab Test 06/19/25  0808      POTASSIUM 4.1   CHLORIDE 104   CO2 26   ANIONGAP 9   GLC 87   BUN 11.1   CR 0.70   GFRESTIMATED >90   WHITNEY 9.6     Recent Labs   Lab Test 06/19/25  0808   PROTTOTAL 7.6   ALBUMIN 4.6   BILITOTAL 0.9   ALKPHOS 71   AST 44   ALT 17     Recent Labs   Lab Test 08/02/24  0356   CHOL 139   HDL 50   LDL 78   TRIG 57          Assessment/Plan:     Mary Shaikh is a 24 year old female with a relevant PMH of Danon disease, hypertrophic cardiomyopathy, and upper back pain who has a rehabilitation diagnosis of myofascial pain limiting ADL functional independence.    #Upper back myofascial pain  Limitation in function out of proportion to intensity of pain.  Myofascial in nature.   Mechanical myofascial release had a negative effect in the past, making trigger points less likely to be helpful. We discussed the " risks/benefits of trigger point injections and she elected not to proceed with them at this time. She still has several non-procedural treatment / lifestyle modifications to try.  Incorporating aerobic activity with patient-modulated exertion should allow her to maintain cardiovascular conditioning without overexertion or worsening of cardiomyopathy.  - patient declined trigger point injections after discussion of potential benefits  - Reviewed therapy recommended exercise regimen and I suggested progressive strength training with different levels of resistance bands  - reviewed AHA physical activity / aerobic exercise guidelines, graded by respiratory rate  - prescribed capsaicin for pain gating / c-fiber neurotransmitter depletion effect  - asked Dr. Fuentes about the safety of diclofenac, to try if capsaicin not sufficient    #Follow up  - 3 months    Rahul Rubalcava MD    I spent a total of 60 minutes on the date of service doing chart review, history and exam, documentation, and further activities as noted above.    Again, thank you for allowing me to participate in the care of your patient.      Sincerely,    Rahul Rubalcava MD

## 2025-07-10 ENCOUNTER — CARE COORDINATION (OUTPATIENT)
Dept: TRANSPLANT | Facility: CLINIC | Age: 25
End: 2025-07-10
Payer: COMMERCIAL

## 2025-07-10 DIAGNOSIS — I50.22 CHRONIC SYSTOLIC HEART FAILURE (H): Primary | ICD-10-CM

## 2025-07-15 NOTE — PATIENT INSTRUCTIONS
If you have labs or imaging done, the results will automatically release in [employer] without an interpretation.  Your health care professional will review those results and send an interpretation with recommendations as soon as possible, but this may be 1-3 business days.    If you have any questions regarding your visit, please contact your care team.     [facility]: [phone]  [facility] CLINIC HOURS TELEPHONE NUMBER   Kristen Angel, KEVON Frias-RN  Elaine-RN  Kendal Mayorga-Surgery Scheduler  Blanka-           Tuesday- [place]  8:00 am-4:00 pm    Wednesday- [place] 8:00 am-4:00 pm    Thursday- [place] 8:00 am-4:00 pm    Friday- [place]  8:00 am-4:00 pm [facility]  [address] [place] [address]  PH: [phone]  Fax: [phone]    Imaging Scheduling all locations  PH: [phone] ([place])    [facility]  [address] [address]  [phone]    [facility]  [address] [place] [address]  PH: [phone]     **Surgeries** Our Surgery Schedulers will contact you to schedule. If you do not receive a call within 3 business days, please call [phone].  Urgent Care locations:  [facility]       Monday-Friday   10 am - 8 pm    Saturday and Sunday   9 am - 5 pm   [phone]   If you need a medication refill, please contact your pharmacy. Please allow 3 business days for your refill to be completed.  As always, Thank you for trusting us with your healthcare needs!

## 2025-07-17 ENCOUNTER — OFFICE VISIT (OUTPATIENT)
Dept: OBGYN | Facility: CLINIC | Age: 25
End: 2025-07-17
Payer: COMMERCIAL

## 2025-07-17 ENCOUNTER — THERAPY VISIT (OUTPATIENT)
Dept: PHYSICAL THERAPY | Facility: CLINIC | Age: 25
End: 2025-07-17
Payer: COMMERCIAL

## 2025-07-17 VITALS
OXYGEN SATURATION: 97 % | HEART RATE: 60 BPM | SYSTOLIC BLOOD PRESSURE: 99 MMHG | WEIGHT: 147 LBS | HEIGHT: 64 IN | BODY MASS INDEX: 25.1 KG/M2 | DIASTOLIC BLOOD PRESSURE: 66 MMHG

## 2025-07-17 DIAGNOSIS — N91.2 AMENORRHEA: Primary | ICD-10-CM

## 2025-07-17 DIAGNOSIS — M54.9 PAIN, UPPER BACK: Primary | ICD-10-CM

## 2025-07-17 ASSESSMENT — PATIENT HEALTH QUESTIONNAIRE - PHQ9: SUM OF ALL RESPONSES TO PHQ QUESTIONS 1-9: 12

## 2025-07-17 NOTE — PROGRESS NOTES
Subjective:  Mary is a 24 year old   is here today with the following concerns:    Amenorrhea:  seen originally on 4/3/25 for amenorrhea. On OCPs for multiple years with monthly bleeds. She stopped these in 2024 and had no bleeding since. Subsequent work-up does appear to be consistent with PCOS, however, patient has complex, particularly cardiac, medical history and is currently expecting to have heart transplant soon. At previous visit, endocrinology referral was placed and she is still waiting to get in with them, appt in Dec 2025. She was given Prometrium x 10 days after previous visit and did have subsequent withdrawal bleed. Since, she has had one bleeding episode at the end of  - pink/brown spotting from -.    ROS: Pertinent ROS as above.    Medical history  OB History    Para Term  AB Living   0 0 0 0 0 0   SAB IAB Ectopic Multiple Live Births   0 0 0 0 0      Past Medical History:   Diagnosis Date    Congestive heart failure (H)     Depressive disorder     Uncomplicated asthma       Past Surgical History:   Procedure Laterality Date    CARDIAC SURGERY      Distributor placemnt    CV RIGHT HEART CATH MEASUREMENTS RECORDED N/A 2024    Procedure: Heart Cath Right Heart Cath;  Surgeon: Tanmay Brice MD;  [facility]    CV RIGHT HEART CATH MEASUREMENTS RECORDED N/A 11/15/2024    Procedure: Heart Cath Right Heart Cath;  Surgeon: Tanmay Brice MD;  [facility]    CV RIGHT HEART CATH MEASUREMENTS RECORDED N/A 2024    Procedure: Right Heart Catheterization;  Surgeon: Evens Rodrigues MD;  [facility]    CV RIGHT HEART CATH MEASUREMENTS RECORDED N/A 2025    Procedure: Heart Cath Right Heart Cath;  Surgeon: Johnnie Camejo MD;  [facility]       ALL/Meds: Her medication and allergy histories were reviewed and are documented in their appropriate chart  "areas.    SH: Reviewed and documented in the appropriate area of the chart.  FH:  Her family history is reviewed and updated in the chart, today.  PMH: Her past medical, surgical, and obstetric histories were reviewed and updated today in the appropriate chart areas.    Objective:  PE: BP 99/66 (BP Location: Left arm, Patient Position: Sitting, Cuff Size: Adult Regular)   Pulse 60   Ht 1.626 m (5' 4\")   Wt 66.7 kg (147 lb)   LMP 2025   SpO2 97%   BMI 25.23 kg/m    Body mass index is 25.23 kg/m .    Pertinent Physical exam findings:    GENERAL: Active, alert, in no acute distress.  SKIN: Clear. No significant rash, abnormal pigmentation or lesions  MS: no gross musculoskeletal defects noted, no edema  PSYCH: Age-appropriate alertness and orientation    A/P:  Mary Shaikh is a 24 year old  here today with the following concerns:  (N91.2) Amenorrhea  (primary encounter diagnosis)  Plan: We discussed the importance of protecting the uterine lining from thickening due to chronic amenorrhea seen in PCOS. In the context of her medical history, we spent time discussing Mirena IUD as possible option and informed that I would like to verify there is no reason cardiology is not OK with this prior to placement. Message sent and will await respond. Pt is agreeable to Mirena if Cardiology is as well. We also discussed that she may need to pursue private endocrinology clinic appointment if she wants to be seen earlier than scheduled. Advised she reach out with clinic she wants to go to for specific referral to be placed. She is agreeable and has no concerns today.     She is agreeable to the plan above and has no further questions or concerns. She did not request a chaperone for the physical exam component of the visit today.     Kristen Angel, ZITA CNP    "

## 2025-07-21 ENCOUNTER — ANESTHESIA EVENT (OUTPATIENT)
Dept: GASTROENTEROLOGY | Facility: CLINIC | Age: 25
End: 2025-07-21
Payer: COMMERCIAL

## 2025-07-21 ENCOUNTER — OFFICE VISIT (OUTPATIENT)
Dept: SURGERY | Facility: CLINIC | Age: 25
End: 2025-07-21
Payer: COMMERCIAL

## 2025-07-21 ENCOUNTER — TELEPHONE (OUTPATIENT)
Dept: GASTROENTEROLOGY | Facility: CLINIC | Age: 25
End: 2025-07-21

## 2025-07-21 VITALS
DIASTOLIC BLOOD PRESSURE: 58 MMHG | TEMPERATURE: 97.9 F | OXYGEN SATURATION: 100 % | HEIGHT: 64 IN | SYSTOLIC BLOOD PRESSURE: 103 MMHG | RESPIRATION RATE: 16 BRPM | BODY MASS INDEX: 25.85 KG/M2 | HEART RATE: 52 BPM | WEIGHT: 151.4 LBS

## 2025-07-21 DIAGNOSIS — Z01.818 PRE-OPERATIVE EXAMINATION: Primary | ICD-10-CM

## 2025-07-21 DIAGNOSIS — R10.84 ABDOMINAL PAIN, GENERALIZED: ICD-10-CM

## 2025-07-21 PROCEDURE — 99203 OFFICE O/P NEW LOW 30 MIN: CPT | Performed by: PHYSICIAN ASSISTANT

## 2025-07-21 ASSESSMENT — PAIN SCALES - GENERAL: PAINLEVEL_OUTOF10: SEVERE PAIN (8)

## 2025-07-21 ASSESSMENT — ENCOUNTER SYMPTOMS
DYSRHYTHMIAS: 1
SEIZURES: 0

## 2025-07-21 NOTE — TELEPHONE ENCOUNTER
Pre assessment completed for upcoming procedure.   (Please see previous telephone encounter notes for complete details)      Procedure details:    Procedure date 8/7/25, arrival time 1130 and facility location reviewed.    Pre op exam needed? Yes. PAC eval completed on 7/21 due to pulmonary hypertension    Designated  policy reviewed and that site requests drivers to check in and stay on campus. Instructed to have someone stay 24  hours post procedure.   *Disclaimer - please notify the [facility] Op Manager with any  issues/concerns.    Medication review:    Medications reviewed. Please see supporting documentation below. Holding recommendations discussed (if applicable).     Follow PAC medication recommendations given at appointment today.    Prep for procedure:    Procedure prep instructions reviewed.        Any additional information needed:  N/A      Patient verbalized understanding and had no questions or concerns at this time.      Doreen Huang LPN  Endoscopy Procedure Pre Assessment   [phone] option 3

## 2025-07-21 NOTE — H&P
Pre-Operative H & P     CC:  Preoperative exam to assess for increased cardiopulmonary risk while undergoing surgery and anesthesia.    Date of Encounter: 7/21/2025  Primary Care Physician:  Gaye Combs     Reason for visit:   Encounter Diagnoses   Name Primary?    Pre-operative examination Yes    Abdominal pain, generalized        HPI  Mary Shaikh is a 24 year old female who presents for pre-operative H & P in preparation for  Procedure Information       Case: [id number] Date/Time: 08/07/25 1300    Procedure: Esophagoscopy, gastroscopy, duodenoscopy (EGD), combined (Esophagus)    Anesthesia type: MAC    Diagnosis: Abdominal pain, generalized [R10.84]    Pre-op diagnosis: Abdominal pain, generalized [R10.84]    Location:  GI  /  GI    Providers: Johnson Molina MD            The patient is a 24-year-old woman with a complex cardiac history significant for Dannon disease, hokum, CHF, Josselyn-Parkinson-White, NSVT status post ICD placement with inappropriate shocks status post ablation, atrial fibrillation, asthma, abdominal pain, adjustment disorder and back pain.  The patient has been following with cardiology and is being listed for heart transplant.  As a part of this workup given her abdominal pain she is now scheduled for the procedure as above.    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)     Depressive disorder     NSVT (nonsustained ventricular tachycardia) (H)     Uncomplicated asthma     WPW (Josselyn-Parkinson-White syndrome)        Past Surgical History  Past Surgical History:   Procedure Laterality Date    CARDIAC SURGERY      Distributor placemnt     CCL ABLATION      CV RIGHT HEART CATH MEASUREMENTS RECORDED N/A 07/31/2024    Procedure: Heart Cath Right Heart Cath;  Surgeon: Tanmay Brice MD;  Location:  HEART CARDIAC CATH LAB    CV RIGHT HEART CATH MEASUREMENTS RECORDED N/A 11/15/2024    Procedure: Heart Cath Right Heart Cath;  Surgeon: Tanmay Brice MD;  Location: [facility] CARD[facility]AC CATH LAB    CV RIGHT HEART CATH MEASUREMENTS RECORDED N/A 12/16/2024    Procedure: Right Heart Catheterization;  Surgeon: Evens Rodrigues MD;  Location:  HEART CARDIAC CATH LAB    CV RIGHT HEART CATH MEASUREMENTS RECORDED N/A 06/19/2025    Procedure: Heart Cath Right Heart Cath;  Surgeon: Johnnie Camejo MD;  Location:  HEART CARDIAC CATH LAB    LAPAROSCOPIC CHOLECYSTECTOMY         Prior to Admission Medications  Current Outpatient Medications   Medication Sig Dispense Refill    acetaminophen (TYLENOL) 325 MG tablet Take 325-650 mg by mouth every 6 hours as needed for mild pain.      albuterol (PROAIR HFA/PROVENTIL HFA/VENTOLIN HFA) 108 (90 Base) MCG/ACT inhaler Inhale 1-2 puffs into the lungs every 6 hours as needed for shortness of breath, wheezing or cough. 18 g 1    capsaicin (ZOSTRIX) 0.025 % external cream Apply 1 Application topically 4 times daily. 120 g 4    digoxin (LANOXIN) 250 MCG tablet Take 1 tablet (250 mcg) by mouth daily. (Patient taking differently: Take 250 mcg by mouth every morning.) 90 tablet 3    empagliflozin (JARDIANCE) 25 MG TABS tablet Take 1 tablet (25 mg) by mouth daily. (Patient taking differently: Take 25 mg by mouth every morning.) 90 tablet 3    fluocinonide (LIDEX) 0.05 % external solution Apply topically 2 times daily as needed (scalp irritation). 60 mL 1    furosemide (LASIX) 20 MG tablet Take 1 tablet (20 mg) by mouth daily as needed (Take daily if your weight increases by more than 3 lbs in one day or  5 lbs in three days.). 90 tablet 3    LORazepam (ATIVAN) 0.5 MG tablet TAKE 2 TABLETS(1 MG) BY MOUTH EVERY 6 HOURS AS NEEDED FOR  ANXIETY 15 tablet 0    melatonin 3 MG tablet Take 3 mg by mouth nightly as needed      metoprolol succinate ER (TOPROL XL) 50 MG 24 hr tablet Take 1 tablet (50 mg) by mouth 2 times daily. 180 tablet 3    metoprolol tartrate (LOPRESSOR) 25 MG tablet Take 1 tablet (25 mg) by mouth daily as needed (take for increased fluttering/palpitations and for HR greater than 110 bpm). 90 tablet 1    nitroGLYcerin (NITROSTAT) 0.4 MG sublingual tablet For chest pain place 1 tablet under the tongue every 5 minutes for 3 doses. If symptoms persist 5 minutes after 1st dose call 911. 10 tablet 1    ondansetron (ZOFRAN ODT) 4 MG ODT tab Place 1 tablet (4 mg) under the tongue as needed for nausea. 30 tablet 0    sacubitril-valsartan (ENTRESTO)  MG per tablet Take 1 tablet by mouth 2 times daily. 180 tablet 3    spironolactone (ALDACTONE) 25 MG tablet Take 0.5 tablets (12.5 mg) by mouth daily. (Patient taking differently: Take 12.5 mg by mouth every morning.) 90 tablet 3       Allergies  No Known Allergies    Social History  Social History     Socioeconomic History    Marital status: Single     Spouse name: Not on file    Number of children: Not on file    Years of education: Not on file    Highest education level: Not on file   Occupational History    Not on file   Tobacco Use    Smoking status: Never     Passive exposure: Never (per pt)    Smokeless tobacco: Never   Vaping Use    Vaping status: Never Used   Substance and Sexual Activity    Alcohol use: Never    Drug use: Never    Sexual activity: Not Currently   Other Topics Concern    Not on file   Social History Narrative    Not on file     Social Drivers of Health     Financial Resource Strain: Low Risk  (3/7/2025)    Financial Resource Strain     Within the past 12 months, have you or your family members you live with been unable to get utilities (heat, electricity) when it was really needed?: No   Food Insecurity: Low Risk  (3/7/2025)    Food Insecurity     Within the past 12  months, did you worry that your food would run out before you got money to buy more?: No     Within the past 12 months, did the food you bought just not last and you didn t have money to get more?: Patient declined   Transportation Needs: Low Risk  (3/7/2025)    Transportation Needs     Within the past 12 months, has lack of transportation kept you from medical appointments, getting your medicines, non-medical meetings or appointments, work, or from getting things that you need?: No   Physical Activity: Not on file   Stress: Not on file   Social Connections: Socially Integrated (7/10/2024)    Received from [facility]    Social Connections     Do you often feel lonely or isolated from those around you?: 0   Interpersonal Safety: Low Risk  (6/19/2025)    Interpersonal Safety     Do you feel physically and emotionally safe where you currently live?: Yes     Within the past 12 months, have you been hit, slapped, kicked or otherwise physically hurt by someone?: No     Within the past 12 months, have you been humiliated or emotionally abused in other ways by your partner or ex-partner?: No   Housing Stability: Low Risk  (3/7/2025)    Housing Stability     Do you have housing? : Yes     Are you worried about losing your housing?: No       Family History  Family History   Problem Relation Age of Onset    Asthma Mother     Anesthesia Reaction No family hx of     Deep Vein Thrombosis (DVT) No family hx of        Review of Systems  The complete review of systems is negative other than noted in the HPI or here.   Anesthesia Evaluation   Pt has had prior anesthetic. Type: General.    No history of anesthetic complications       ROS/MED HX  ENT/Pulmonary:     (+)                     Intermittent, asthma  Treatment: Inhaler prn,                 Neurologic:  - neg neurologic ROS  (-) no seizures, no CVA and no TIA   Cardiovascular: Comment: Danon disease    (+)  - -   -  - -      CHF etiology:  "Danon disease Last EF: 25-30% date: 5/28/25          ICD Reason placed:NSVT.  type;: [employer] D533 RESONATE HF ICD   dysrhythmias (NSVT), WPW and Other,        Previous cardiac testing   Echo: Date: 5/28/25 Results:    Stress Test:  Date: 6/19/25 Results:  Show Result Comparison      A cardiopulmonary stress test was performed following a Vivek ramp protocol with the patient exercising for 5 minutes and 19 seconds under the supervision of Dr. Josh Landeros.  Blood pressure demonstrated a blunted response to exercise.  Heart rate demonstrated a blunted response to exercise.     The patient achieved a profoundly impaired, class III functional capacity with a cardiac limitation to exercise. A cardiac limitation is suggested given the low peak VO2 and oxygen pulse as well as the abnormal hemodynamic response.    ECG Reviewed:  Date: 4/29/25 Results:  Atrial-paced rhythm   Artifact impairs   Non-specific intra-ventricular conduction block   T wave abnormality, consider inferior ischemia   T wave abnormality, consider anterolateral ischemia   Abnormal ECG     Cath:  Date: 6/19/25 Results:      METS/Exercise Tolerance: 3 - Able to walk 1-2 blocks without stopping Comment: Patient reports she can slowly walk 1 block or up stairs   Hematologic:  - neg hematologic  ROS     Musculoskeletal: Comment: Back pain       GI/Hepatic: Comment: Chronic abdominal pain     (+)          cholecystitis/cholelithiasis (s/p cholecystectomy),          Renal/Genitourinary:  - neg Renal ROS     Endo:  - neg endo ROS     Psychiatric/Substance Use:     (+) psychiatric history depression       Infectious Disease:  - neg infectious disease ROS     Malignancy:  - neg malignancy ROS     Other:  - neg other ROS          /58 (BP Location: Right arm, Patient Position: Sitting, Cuff Size: Adult Regular)   Pulse 52   Temp 97.9  F (36.6  C) (Oral)   Resp 16   Ht 1.626 m (5' 4\")   Wt 68.7 kg (151 lb 6.4 oz)   LMP 06/26/2025   SpO2 100%   " Breastfeeding No   BMI 25.99 kg/m      Physical Exam   Constitutional: Awake, alert, cooperative, no apparent distress, and appears stated age.  Eyes: Pupils equal, round and reactive to light, extra ocular muscles intact, sclera clear, conjunctiva normal.  HENT: Normocephalic, oral pharynx with moist mucus membranes, good dentition. No goiter appreciated.   Respiratory: Clear to auscultation bilaterally, no crackles or wheezing.  Cardiovascular: Regular rate and rhythm, normal S1 and S2, and no murmur noted.  Carotids +2, no bruits. No edema. Palpable pulses to radial  DP and PT arteries.   GI: Normal bowel sounds, soft, non-distended, non-tender, no masses palpated, no hepatosplenomegaly.  Surgical scars: well healed  Lymph/Hematologic: No cervical lymphadenopathy and no supraclavicular lymphadenopathy.  Genitourinary:  defer  Skin: Warm and dry.  No rashes at anticipated surgical site.   Musculoskeletal: Full ROM of neck. There is no redness, warmth, or swelling of the joints. Gross motor strength is normal.    Neurologic: Awake, alert, oriented to name, place and time. Cranial nerves II-XII are grossly intact. Gait is normal.   Neuropsychiatric: Calm, cooperative. Normal affect.     Prior Labs/Diagnostic Studies   All labs and imaging pertinent to the visit personally reviewed    Latest Reference Range & Units 06/19/25 08:08   Sodium 135 - 145 mmol/L 139   Potassium 3.4 - 5.3 mmol/L 4.1   Chloride 98 - 107 mmol/L 104   Carbon Dioxide (CO2) 22 - 29 mmol/L 26   Urea Nitrogen 6.0 - 20.0 mg/dL 11.1   Creatinine 0.51 - 0.95 mg/dL 0.70   GFR Estimate >60 mL/min/1.73m2 >90   Calcium 8.8 - 10.4 mg/dL 9.6   Anion Gap 7 - 15 mmol/L 9   Albumin 3.5 - 5.2 g/dL 4.6   Protein Total 6.4 - 8.3 g/dL 7.6   Alkaline Phosphatase 40 - 150 U/L 71   ALT 0 - 50 U/L 17   AST 0 - 45 U/L 44   Bilirubin Total <=1.2 mg/dL 0.9   Glucose 70 - 99 mg/dL 87   hCG Quantitative <5 mIU/mL <1   WBC 4.0 - 11.0 10e3/uL 3.3 (L)   Hemoglobin 11.7 -  15.7 g/dL 15.3   Hematocrit 35.0 - 47.0 % 48.3 (H)   Platelet Count 150 - 450 10e3/uL 250   RBC Count 3.80 - 5.20 10e6/uL 5.56 (H)   MCV 78 - 100 fL 87   MCH 26.5 - 33.0 pg 27.5   MCHC 31.5 - 36.5 g/dL 31.7   RDW 10.0 - 15.0 % 13.8   % Neutrophils % 34   % Lymphocytes % 57   % Monocytes % 8   % Eosinophils % 0   % Basophils % 1   % Immature Granulocytes % 0   NRBC/W <1 /100 0   Absolute Neutrophil 1.6 - 8.3 10e3/uL 1.1 (L)   Absolute Lymphocytes 0.8 - 5.3 10e3/uL 1.9   Absolute Monocytes 0.0 - 1.3 10e3/uL 0.3   Absolute Eosinophils 0.0 - 0.7 10e3/uL 0.0   Absolute Basophils 0.0 - 0.2 10e3/uL 0.0   Absolute Immature Granulocytes <=0.4 10e3/uL 0.0   Absolute NRBCs 10e3/uL 0.0   INR 0.85 - 1.15  1.11   PT 11.8 - 14.8 Seconds 14.6   (L): Data is abnormally low  (H): Data is abnormally high    Device Check 6/27/25  Device: [employer] D533 RESONATE HF ICD  Normal device function  Mode: DDD  bpm  AP: 75%  : 3%  Intrinsic rhythm: AS/VS @ 65 bpm  Lead Trends Appear Stable.  PMT triggered w/ V threshold testing.   Estimated battery longevity to RRT = 10.5 years.   Atrial Arrhythmia: 31 ATR episodes recorded 1 EGM, episode lasting 10 sec.  Stored EGM suggests 1:1 AT.   AF [place]: <1%  Anticoagulant: none  Ventricular Arrhythmia: none  Setting Changes: none  Patient has an appointment to see Dr. Levi today.   Plan: Device follow-up every 3 months.  Tran Garza RN     Dual lead ICD    RHC 6/19/25  Conclusion         Right sided filling pressures are normal.    Left sided filling pressures are normal.    Normal PA pressures.    Normal cardiac output level.     RIJ was removed at the end of the procedure. Manual compression was used to achieve hemostasis.     Cardiopulmonary stress 6/19/25  Conclusion  Show Result Comparison     A cardiopulmonary stress test was performed following a Vivek ramp protocol with the patient exercising for 5 minutes and 19 seconds under the supervision of Dr. Josh Landeros.  Blood  pressure demonstrated a blunted response to exercise.  Heart rate demonstrated a blunted response to exercise.     The patient achieved a profoundly impaired, class III functional capacity with a cardiac limitation to exercise. A cardiac limitation is suggested given the low peak VO2 and oxygen pulse as well as the abnormal hemodynamic response.      Echo 5/28/25  Interpretation Summary     Left ventricular function is decreased. The ejection fraction is 25-30%  (severely reduced). LVIDd 5.5cm.  Right ventricular function, chamber size, wall motion, and thickness are  normal.  No significant valvular abnormalities present.  No pericardial effusion is present.  The inferior vena cava was normal in size with preserved respiratory  variability.     This study was compared with the study from 12/16/2024. No significant change.      EKG 4/29/25  Atrial-paced rhythm   Artifact impairs   Non-specific intra-ventricular conduction block   T wave abnormality, consider inferior ischemia   T wave abnormality, consider anterolateral ischemia   Abnormal ECG           Latest Ref Rng & Units 11/6/2024    11:31 AM   PFT   FVC L 2.41    FEV1 L 2.03    FVC% % 67    FEV1% % 64          The patient's records and results pertinent to the visit personally reviewed by this provider.     Outside records reviewed from: Care Everywhere      Assessment    Mary Shaikh is a 24 year old female seen as a PAC referral for risk assessment and optimization for anesthesia.    Plan/Recommendations  Pt will be optimized for the proposed procedure.  See below for details on the assessment, risk, and preoperative recommendations    NEUROLOGY  - No history of TIA, CVA or seizure  - Chronic Pain: Zanaflex, tylenol  -Post Op delirium risk factors:  No risk identified    ENT  - No current airway concerns.  Will need to be reassessed day of surgery.  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 Echo on 5/28/25 shows EF 25-30%  ~ history of NSVT - ICD placed as secondary SCD prevention after syncope and SVT.   ~ RHC in 7/2024 showed patient in ambulatory cardiogenic shock. She had ICD shock on 10/2024 for VT. She had shocks in 12/2024 and device interrogation showed 1 VT zone lasting 5 minutes s/p 4 failed ATP then 3 shocks, suggested AF with RVR (CHADSVASC=2), 6 NSVT, suggest atrial in origin.   ~ She has followed up with Dr. Fuentes and Dr. Levi. She is now being listed for heart transplant.   ~ The patient had cardiopulmonary stress test in 6/23/25 as well as RHC on 6/19/25 with normal right and left filling pressures and cardiac output levels. She is being managed by the [facility] as well.   - METS (Metabolic Equivalents)  Patient CANNOT perform 4 METS exercise without symptoms             Total Score: 1    Functional Capacity: Unable to complete 4 METS      RCRI-Low risk: Class 2 0.9% complication rate             Total Score: 1    RCRI: CHF    ~ The patient reports in the past months she's had 1 second to minute sharp chest pain. She reports this is very rare \"once in a blue moon\" and associated with activity. She denies any recent shocks from her ICD. She does have chronic BUI from her Danon disease and is limited in activity secondary to this. Given her very recent cardiac testing message sent to cardiac testing but no further testing indicated. The patient was counseled to hold Jardiance for 3 days given [facility] Perioperative medication guidelines. She wanted to make sure her cardiac team was aware and okay with this recommendation. Message sent to them as well.     PULMONARY  - Obstructive Sleep Apnea  ASHLEY Low Risk             Total Score: 0      - Asthma  Well controlled  - Tobacco History    History   Smoking Status    Never   Smokeless Tobacco    Never       GI  - biliary dyskinesia - s/p cholecystectomy  ~ Abdominal pain - procedure as above. " "  ~ patient denies GERD symptoms.   PONV Medium Risk  Total Score: 2           1 AN PONV: Pt is Female    1 AN PONV: Patient is not a current smoker        /RENAL  - Baseline Creatinine  0.70    ENDOCRINE    - BMI: Estimated body mass index is 25.99 kg/m  as calculated from the following:    Height as of this encounter: 1.626 m (5' 4\").    Weight as of this encounter: 68.7 kg (151 lb 6.4 oz).  Overweight (BMI 25.0-29.9)  - No history of Diabetes Mellitus    HEME  VTE Low Risk 0.26%             Total Score: 0      - No history of abnormal bleeding or antiplatelet use.    MSK  ~ Back pain - followed by neurology. After seen on 4/30/25 now felt to have any objective evidence of myopathy. Felt to be related to myofascial pain and not Danon disease. The patient is doing PT and reports having a pillow for positioning is helpful.     PSYCH  - Adjustment disorder - followed by psychology       Different anesthesia methods/types have been discussed with the patient, but they are aware that the final plan will be decided by the assigned anesthesia provider on the date of service.    Patient was discussed with Dr Proctor    The patient is optimized for their procedure. AVS with information on meds given by nursing staff. No further diagnostic testing indicated.        39 minutes were spent on the date of the encounter performing chart review, history and exam, documentation and/or discussion with other providers about the issues documented above.    Corrine Ayala PA-C  Preoperative Assessment Center  [facility]  Clinic and [facility] Center  Phone: [phone]  Fax: [phone]    "

## 2025-07-21 NOTE — TELEPHONE ENCOUNTER
Pre visit planning completed.      Procedure details:    Patient scheduled for Upper endoscopy (EGD) on 8/7/25.     Arrival time: 1130. Procedure time 1300    Facility location: [facility]; [address], [place] [address]. Check in location: Main entrance at registration desk.  *Disclaimer: Drivers are to check in with patient and stay on campus during procedure.     Sedation type: MAC    Pre op exam needed? Yes. PAC eval scheduled on 7/21/25 due to pulmonary hypertension    Indication for procedure: Abdominal pain, generalized [R10.84]       Chart review:     Electronic implanted devices? Yes: ICD    Recent diagnosis of diverticulitis within the last 6 weeks? No      Medication review:    Diabetic? Yes. Oral diabetic medications: Jardiance (empagliflozin): HOLD  3 days before procedure.    Anticoagulants? No    Weight loss medication/injectable? No GLP-1 medication per patient's medication list. Nursing to verify with pre-assessment call.    Other medication HOLDING recommendations:  N/A      Prep for procedure:     Bowel prep recommendation: N/A    Procedure information and instructions sent via [facility] RN  Endoscopy Procedure Pre Assessment   [phone] option 3

## 2025-07-22 ENCOUNTER — TELEPHONE (OUTPATIENT)
Dept: CARDIOLOGY | Facility: CLINIC | Age: 25
End: 2025-07-22
Payer: COMMERCIAL

## 2025-07-22 ENCOUNTER — CARE COORDINATION (OUTPATIENT)
Dept: TRANSPLANT | Facility: CLINIC | Age: 25
End: 2025-07-22
Payer: COMMERCIAL

## 2025-07-22 ENCOUNTER — TELEPHONE (OUTPATIENT)
Dept: TRANSPLANT | Facility: CLINIC | Age: 25
End: 2025-07-22
Payer: COMMERCIAL

## 2025-07-22 NOTE — TELEPHONE ENCOUNTER
"----- Message from Hipolito Fuentes sent at 7/22/2025 11:17 AM CDT -----  Charbel Otoole,   Thank your for your message.     We will confirm with Mary that she can safely hold her jardiance. I am also in agreement with holding diuretics the day of procedure and continuing all other medications.     Thanks,   Hipolito Fuentes MD, PhD, [facility]      Section of Advanced Heart Failure and Transplantation  Cardiovascular Division  ----- Message -----  From: Corrine Ayala PA-C  Sent: 7/21/2025   8:51 AM CDT  To: Lizbeth Clayton PA-C; Hipolito Fuentes MD; #    Hello,    I saw this patient today in the PAC for her H&P prior to her EGD at [facility] for her abdominal pain. Our [facility] perioperative guidelines for medication instruct patients to hold Jardiance for 3 days prior to anesthesia. The patient was given these instructions today and given her Danon disease she was worried about holding this and wanted to make sure her cardiology team was aware and okay for her to hold this medication. She will also be holding lasix on the day of surgery but will continue digoxin, metoprolol, entresto and aldactone. She requested someone from the cardiology team to contact her with letting her know if her cardiology team agrees with our instructions.     Also, Dr. Fuentes and Lizbeth - the patient informed me in the past month she has experienced a sharp chest pain associated with too much activity that last for a second to maybe a minute. She said this is very infrequent, \"once in a blue moon\". I discussed her with our attending anesthesiologist who given her recent RHC and cardiopulmonary stress test didn't feel for MAC anesthesia for the EGD any further follow up was needed but wanted to make sure her cardiology team was aware.     Thanks,  Sydnie  "

## 2025-07-22 NOTE — TELEPHONE ENCOUNTER
Date: 7/22/2025    Time of Call: 12:02 PM     Diagnosis:  HF     [ TORB ] Ordering provider: Dr. Fuentes  Order:   -Okay to hold Jardiance for 3 days [facility]   -Okay to hold Lasix day of procedure.     Order received by: Va Becerra RN       Follow-up/additional notes: [employer] message sent to pt with recs.

## 2025-07-22 NOTE — PROGRESS NOTES
Status 4E Heart Listing 07/22/25  Listing Criteria:  Exception    Mary Shaikh was approved for activation on the heart transplant waitlist by the Heart Transplant Committee.      Dr. 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 She presented with ambulatory cardiogenic shock with Juan CI 1.68, RA 20, PCWP 29. Her course has also been complicated by ventricular and atrial arrhythmias with ICD shocks. She has undergone ablation and remains on beta blockers and given the extent of her fibrosis she does not have further options for ablation. We are not initiating an inotrope because of the high risk of worsening arrhythmias. While her cardiac index has improved to 2.0 with very close clinic follow up, she has a high risk of decompensation from arrhythmias. Additionally, she is not a durable LVAD candidate due to her small LV cavity size and ongoing arrhythmias.  In summary, we are requesting a status 4 listing by exception due to her high-risk Danon phenotype with significant arrhythmias, prohibitive risk of inotropic support, and lack of durable LVAD option.     Insurance Approval:  YES (verify prior to listing with PFR)  ABO verification complete:  YES  2nd ABO verified by:  Camila Samayoa    On-call transplant coordinators, transplant LPN, immunology lab and PFR notified of listing.  Patient's primary cardiologist and/or attending physician is in agreement with this plan.      Status 4E Extension due 10/21/25

## 2025-07-22 NOTE — LETTER
2025    Udaysandy PENDLETON Hawa  [address]  [place] [address]      Dear MsJuan Puentesam,    This letter is sent to confirm that you have completed your transplant work-up and you are a candidate in the heart transplant program at the [facility].  You were placed on the heart active waitlist on 25.      When you are active on the waitlist and an organ becomes available, a coordinator will need to speak to you immediately.  You could be contacted at any time during the day or night as an organ could become available at any time.  Please make certain our office always has your current telephone numbers and address.      Items we will need from you:    We have received approval from your insurance company for the transplant procedure.  It is critical that you notify us if there is any change in your insurance.  It is also important that you familiarize yourself with the details of your specific insurance policy.  Our patient  is available to assist you if you should have any questions regarding your coverage.    An ALA or PRA blood sample may need to be sent here every 3 to 6 months to match you with  donors or any potential living donors.  If you need this testing, special mailing boxes (called mailers) will be sent to you directly from the Outreach Department.  You should take the physician order form and the  to your home laboratory when it is time to for this testing to be done.  Additional mailers can be obtained by calling the Transplant Office and asking to speak to a heart .    During this waiting period, we may request additional periodic laboratory tests with your primary physician.  It will be your responsibility to remind your physician to forward your results to the Transplant Office.    We need to be kept informed of any changes in your medical condition such as:    changes in your  medications,   significant changes in your health  significant infections (such as pneumonia or abscesses)  blood transfusions  any condition which requires hospitalization  any surgery    Remember to complete any routine cancer screening tests required before your transplant.  This includes colonoscopy; prostate screening for men, and mammogram and gynecologic testing for women, as well as dental work.  Your primary care clinic can assist you with arranging for these exams.  Remind your caregivers to forward copies of the records and final reports.      We want you to know that the [facility] Transplant Program has physician and surgeon coverage 24 hours a day, 365 days a year, and is readily available to facilitate organ acceptance, procurement, and transplantation in a timely manner. Our additional transplant surgeons and physicians are credentialed by the hospital to provide transplant services and can independently manage the care of transplant patients. Our additional transplant surgeons are also able to independently perform transplant operations and organ procurement procedures. Additionally, our transplant surgeons and physicians will not be on call for two or more transplant programs more than 30 miles apart unless the circumstances have been reviewed and approved by the [employer] ([employer]) Membership and Professional Standards Committee (MPSC). Finally, our primary physicians and primary surgeons are not designated as the primary surgeon or primary physician at more than 1 transplant hospital. If this coverage changes or there are substantial program changes, you will be notified in writing by letter.    Attached is a letter from [employer] that describes the services and information offered to patients by [employer] and the Organ Procurement and Transplantation Network (OPTN).    We appreciate having had the opportunity to participate in your care.  If you have questions, please feel free  to call the Transplant Office at [phone] or [phone].      Sincerely,      Solid Organ Transplant  [facility], [facility]      Enclosures: [employer] Letter  cc: Care Team                  The [facility] Network Toll-free patient services line:  [phone]  Your resource for organ transplant information    Staffed 8:30 am - 5:00 pm ET Monday - Friday Leave a message 24/7 to receive a call back    The Organ Procurement and Transplantation Network (OPTN) is the national transplant system. It makes the policies that decide how donated organs are matched to patients waiting for a transplant. The OPTN:    Makes sure donated organs get matched to people on the transplant waiting list  Tells people about the donation and transplant processes  Makes sure that the public knows about the need for more organ and tissue donations    The OPTN has a free patient services line that you can call to:  Get more information about:  Organ donation and organ transplants  Donation and transplant policies  Get an information kit with:  A list of transplant hospitals  Waiting list information  Talk about any questions you may have about your transplant hospital or organ procurement organization. The staff will do their best to help you or point you to others who may help.  Find out how you can volunteer with the OPTN and help shape transplant policy     The patient services line number is: 4-222-515-7370    Patient services line staff CANNOT answer questions about your own medical care, including:  Waiting list status  Test results  Medical records  You will need to call your transplant hospital for this information.    The following websites have more information about transplantation and donation:    OPTN: https://optn.transplant.hrsa.gov/    For potential living donors and transplant recipients:    Living with  transplant: https://www.transplantliving.org/    Living donation process: https://optn.transplant.hrsa.gov/living-donation/    Financial assistance: https://www.livingdonorassistance.org/    Transplantation data: https://www.srtr.org/    Organ donation: https://www.organdonor.gov/    Volunteer with the OPTN: https://optn.transplant.hrsa.gov/get-involved/

## 2025-07-22 NOTE — PROGRESS NOTES
D: Pt approved for status 4E heart transplant listing. Updated PRAs resulted overnight. She is now ready to be listed for a heart transplant. Discussed with Dr. Fuentes, who approves of the listing.   I: Spoke with pt and her parents regarding updated PRA results and ability to list for heart transplant today. Discussed status exception and need to wait for decision of Regional Review Board before accepting any offers. Pt and her family confirm they do not want offers for DCD of Hep C + donors at this time.   A: Pt and family verbalized understanding of the above information. Pt is now listed for a heart transplant.  P: Willl plan to communicate Review Board decision regarding status exception to pt and family. Will continue to offer support throughout her transplant journey.

## 2025-07-23 DIAGNOSIS — Z76.82 PRE-HEART TRANSPLANT, LISTED: Primary | ICD-10-CM

## 2025-07-24 ENCOUNTER — THERAPY VISIT (OUTPATIENT)
Dept: PHYSICAL THERAPY | Facility: CLINIC | Age: 25
End: 2025-07-24
Payer: COMMERCIAL

## 2025-07-24 DIAGNOSIS — M54.9 PAIN, UPPER BACK: Primary | ICD-10-CM

## 2025-07-28 ENCOUNTER — MYC MEDICAL ADVICE (OUTPATIENT)
Dept: CARDIOLOGY | Facility: CLINIC | Age: 25
End: 2025-07-28

## 2025-07-30 ASSESSMENT — ANXIETY QUESTIONNAIRES
7. FEELING AFRAID AS IF SOMETHING AWFUL MIGHT HAPPEN: MORE THAN HALF THE DAYS
2. NOT BEING ABLE TO STOP OR CONTROL WORRYING: MORE THAN HALF THE DAYS
6. BECOMING EASILY ANNOYED OR IRRITABLE: MORE THAN HALF THE DAYS
4. TROUBLE RELAXING: MORE THAN HALF THE DAYS
GAD7 TOTAL SCORE: 14
IF YOU CHECKED OFF ANY PROBLEMS ON THIS QUESTIONNAIRE, HOW DIFFICULT HAVE THESE PROBLEMS MADE IT FOR YOU TO DO YOUR WORK, TAKE CARE OF THINGS AT HOME, OR GET ALONG WITH OTHER PEOPLE: VERY DIFFICULT
GAD7 TOTAL SCORE: 14
5. BEING SO RESTLESS THAT IT IS HARD TO SIT STILL: MORE THAN HALF THE DAYS
1. FEELING NERVOUS, ANXIOUS, OR ON EDGE: MORE THAN HALF THE DAYS
8. IF YOU CHECKED OFF ANY PROBLEMS, HOW DIFFICULT HAVE THESE MADE IT FOR YOU TO DO YOUR WORK, TAKE CARE OF THINGS AT HOME, OR GET ALONG WITH OTHER PEOPLE?: VERY DIFFICULT
3. WORRYING TOO MUCH ABOUT DIFFERENT THINGS: MORE THAN HALF THE DAYS

## 2025-08-04 ENCOUNTER — TELEPHONE (OUTPATIENT)
Dept: CARDIOLOGY | Facility: CLINIC | Age: 25
End: 2025-08-04
Payer: COMMERCIAL

## 2025-08-04 ENCOUNTER — VIRTUAL VISIT (OUTPATIENT)
Dept: PSYCHIATRY | Facility: CLINIC | Age: 25
End: 2025-08-04
Payer: COMMERCIAL

## 2025-08-04 VITALS — WEIGHT: 147 LBS | HEIGHT: 64 IN | BODY MASS INDEX: 25.1 KG/M2

## 2025-08-04 DIAGNOSIS — F43.9 TRAUMA AND STRESSOR-RELATED DISORDER: ICD-10-CM

## 2025-08-04 DIAGNOSIS — F43.23 ADJUSTMENT DISORDER WITH MIXED ANXIETY AND DEPRESSED MOOD: Primary | ICD-10-CM

## 2025-08-04 PROCEDURE — 98006 SYNCH AUDIO-VIDEO EST MOD 30: CPT

## 2025-08-04 ASSESSMENT — PAIN SCALES - GENERAL: PAINLEVEL_OUTOF10: SEVERE PAIN (7)

## 2025-08-06 ASSESSMENT — ENCOUNTER SYMPTOMS
DYSRHYTHMIAS: 1
SEIZURES: 0

## 2025-08-07 ENCOUNTER — ANESTHESIA (OUTPATIENT)
Dept: GASTROENTEROLOGY | Facility: CLINIC | Age: 25
End: 2025-08-07
Payer: COMMERCIAL

## 2025-08-07 ENCOUNTER — HOSPITAL ENCOUNTER (OUTPATIENT)
Facility: CLINIC | Age: 25
Discharge: HOME | End: 2025-08-07
Attending: INTERNAL MEDICINE | Admitting: INTERNAL MEDICINE
Payer: COMMERCIAL

## 2025-08-07 VITALS
HEART RATE: 65 BPM | TEMPERATURE: 97.7 F | OXYGEN SATURATION: 100 % | SYSTOLIC BLOOD PRESSURE: 106 MMHG | RESPIRATION RATE: 17 BRPM | DIASTOLIC BLOOD PRESSURE: 77 MMHG

## 2025-08-07 LAB
GLUCOSE BLDC GLUCOMTR-MCNC: 85 MG/DL (ref 70–99)
UPPER GI ENDOSCOPY: NORMAL

## 2025-08-07 PROCEDURE — 999N000010 HC STATISTIC ANES STAT CODE-CRNA PER MINUTE: Performed by: INTERNAL MEDICINE

## 2025-08-07 PROCEDURE — 250N000011 HC RX IP 250 OP 636: Performed by: NURSE ANESTHETIST, CERTIFIED REGISTERED

## 2025-08-07 PROCEDURE — 258N000003 HC RX IP 258 OP 636: Performed by: NURSE ANESTHETIST, CERTIFIED REGISTERED

## 2025-08-07 PROCEDURE — 88305 TISSUE EXAM BY PATHOLOGIST: CPT | Mod: 26 | Performed by: STUDENT IN AN ORGANIZED HEALTH CARE EDUCATION/TRAINING PROGRAM

## 2025-08-07 PROCEDURE — 250N000009 HC RX 250: Performed by: NURSE ANESTHETIST, CERTIFIED REGISTERED

## 2025-08-07 PROCEDURE — 82962 GLUCOSE BLOOD TEST: CPT

## 2025-08-07 PROCEDURE — 370N000017 HC ANESTHESIA TECHNICAL FEE, PER MIN: Performed by: INTERNAL MEDICINE

## 2025-08-07 PROCEDURE — 88305 TISSUE EXAM BY PATHOLOGIST: CPT | Mod: TC | Performed by: INTERNAL MEDICINE

## 2025-08-07 PROCEDURE — 43239 EGD BIOPSY SINGLE/MULTIPLE: CPT | Performed by: INTERNAL MEDICINE

## 2025-08-07 RX ORDER — ONDANSETRON 4 MG/1
4 TABLET, ORALLY DISINTEGRATING ORAL EVERY 30 MIN PRN
Status: DISCONTINUED | OUTPATIENT
Start: 2025-08-07 | End: 2025-08-07 | Stop reason: HOSPADM

## 2025-08-07 RX ORDER — FENTANYL CITRATE 50 UG/ML
25 INJECTION, SOLUTION INTRAMUSCULAR; INTRAVENOUS EVERY 5 MIN PRN
Status: DISCONTINUED | OUTPATIENT
Start: 2025-08-07 | End: 2025-08-07 | Stop reason: HOSPADM

## 2025-08-07 RX ORDER — FLUMAZENIL 0.1 MG/ML
0.2 INJECTION, SOLUTION INTRAVENOUS
Status: DISCONTINUED | OUTPATIENT
Start: 2025-08-07 | End: 2025-08-07 | Stop reason: HOSPADM

## 2025-08-07 RX ORDER — DEXAMETHASONE SODIUM PHOSPHATE 4 MG/ML
4 INJECTION, SOLUTION INTRA-ARTICULAR; INTRALESIONAL; INTRAMUSCULAR; INTRAVENOUS; SOFT TISSUE
Status: DISCONTINUED | OUTPATIENT
Start: 2025-08-07 | End: 2025-08-07 | Stop reason: HOSPADM

## 2025-08-07 RX ORDER — ONDANSETRON 2 MG/ML
4 INJECTION INTRAMUSCULAR; INTRAVENOUS EVERY 30 MIN PRN
Status: DISCONTINUED | OUTPATIENT
Start: 2025-08-07 | End: 2025-08-07 | Stop reason: HOSPADM

## 2025-08-07 RX ORDER — PROPOFOL 10 MG/ML
INJECTION, EMULSION INTRAVENOUS CONTINUOUS PRN
Status: DISCONTINUED | OUTPATIENT
Start: 2025-08-07 | End: 2025-08-07

## 2025-08-07 RX ORDER — NALOXONE HYDROCHLORIDE 0.4 MG/ML
0.2 INJECTION, SOLUTION INTRAMUSCULAR; INTRAVENOUS; SUBCUTANEOUS
Status: DISCONTINUED | OUTPATIENT
Start: 2025-08-07 | End: 2025-08-07 | Stop reason: HOSPADM

## 2025-08-07 RX ORDER — PROCHLORPERAZINE MALEATE 5 MG/1
10 TABLET ORAL EVERY 6 HOURS PRN
Status: DISCONTINUED | OUTPATIENT
Start: 2025-08-07 | End: 2025-08-07 | Stop reason: HOSPADM

## 2025-08-07 RX ORDER — SODIUM CHLORIDE, SODIUM LACTATE, POTASSIUM CHLORIDE, CALCIUM CHLORIDE 600; 310; 30; 20 MG/100ML; MG/100ML; MG/100ML; MG/100ML
INJECTION, SOLUTION INTRAVENOUS CONTINUOUS PRN
Status: DISCONTINUED | OUTPATIENT
Start: 2025-08-07 | End: 2025-08-07

## 2025-08-07 RX ORDER — LIDOCAINE 40 MG/G
CREAM TOPICAL
Status: DISCONTINUED | OUTPATIENT
Start: 2025-08-07 | End: 2025-08-07 | Stop reason: HOSPADM

## 2025-08-07 RX ORDER — NALOXONE HYDROCHLORIDE 0.4 MG/ML
0.4 INJECTION, SOLUTION INTRAMUSCULAR; INTRAVENOUS; SUBCUTANEOUS
Status: DISCONTINUED | OUTPATIENT
Start: 2025-08-07 | End: 2025-08-07 | Stop reason: HOSPADM

## 2025-08-07 RX ORDER — OXYCODONE HYDROCHLORIDE 10 MG/1
10 TABLET ORAL
Status: DISCONTINUED | OUTPATIENT
Start: 2025-08-07 | End: 2025-08-07 | Stop reason: HOSPADM

## 2025-08-07 RX ORDER — NALOXONE HYDROCHLORIDE 0.4 MG/ML
0.1 INJECTION, SOLUTION INTRAMUSCULAR; INTRAVENOUS; SUBCUTANEOUS
Status: DISCONTINUED | OUTPATIENT
Start: 2025-08-07 | End: 2025-08-07 | Stop reason: HOSPADM

## 2025-08-07 RX ORDER — ONDANSETRON 2 MG/ML
4 INJECTION INTRAMUSCULAR; INTRAVENOUS
Status: DISCONTINUED | OUTPATIENT
Start: 2025-08-07 | End: 2025-08-07 | Stop reason: HOSPADM

## 2025-08-07 RX ORDER — LIDOCAINE HYDROCHLORIDE 20 MG/ML
INJECTION, SOLUTION INFILTRATION; PERINEURAL PRN
Status: DISCONTINUED | OUTPATIENT
Start: 2025-08-07 | End: 2025-08-07

## 2025-08-07 RX ORDER — HYDROMORPHONE HCL IN WATER/PF 6 MG/30 ML
0.4 PATIENT CONTROLLED ANALGESIA SYRINGE INTRAVENOUS EVERY 5 MIN PRN
Status: DISCONTINUED | OUTPATIENT
Start: 2025-08-07 | End: 2025-08-07 | Stop reason: HOSPADM

## 2025-08-07 RX ORDER — SODIUM CHLORIDE, SODIUM LACTATE, POTASSIUM CHLORIDE, CALCIUM CHLORIDE 600; 310; 30; 20 MG/100ML; MG/100ML; MG/100ML; MG/100ML
INJECTION, SOLUTION INTRAVENOUS CONTINUOUS
Status: DISCONTINUED | OUTPATIENT
Start: 2025-08-07 | End: 2025-08-07 | Stop reason: HOSPADM

## 2025-08-07 RX ORDER — ONDANSETRON 2 MG/ML
4 INJECTION INTRAMUSCULAR; INTRAVENOUS EVERY 6 HOURS PRN
Status: DISCONTINUED | OUTPATIENT
Start: 2025-08-07 | End: 2025-08-07 | Stop reason: HOSPADM

## 2025-08-07 RX ORDER — FENTANYL CITRATE 50 UG/ML
50 INJECTION, SOLUTION INTRAMUSCULAR; INTRAVENOUS EVERY 5 MIN PRN
Status: DISCONTINUED | OUTPATIENT
Start: 2025-08-07 | End: 2025-08-07 | Stop reason: HOSPADM

## 2025-08-07 RX ORDER — ACETAMINOPHEN 325 MG/1
975 TABLET ORAL ONCE
Status: DISCONTINUED | OUTPATIENT
Start: 2025-08-07 | End: 2025-08-07 | Stop reason: HOSPADM

## 2025-08-07 RX ORDER — PROPOFOL 10 MG/ML
INJECTION, EMULSION INTRAVENOUS PRN
Status: DISCONTINUED | OUTPATIENT
Start: 2025-08-07 | End: 2025-08-07

## 2025-08-07 RX ORDER — ONDANSETRON 4 MG/1
4 TABLET, ORALLY DISINTEGRATING ORAL EVERY 6 HOURS PRN
Status: DISCONTINUED | OUTPATIENT
Start: 2025-08-07 | End: 2025-08-07 | Stop reason: HOSPADM

## 2025-08-07 RX ORDER — HYDROMORPHONE HCL IN WATER/PF 6 MG/30 ML
0.2 PATIENT CONTROLLED ANALGESIA SYRINGE INTRAVENOUS EVERY 5 MIN PRN
Status: DISCONTINUED | OUTPATIENT
Start: 2025-08-07 | End: 2025-08-07 | Stop reason: HOSPADM

## 2025-08-07 RX ORDER — OXYCODONE HYDROCHLORIDE 5 MG/1
5 TABLET ORAL
Status: DISCONTINUED | OUTPATIENT
Start: 2025-08-07 | End: 2025-08-07 | Stop reason: HOSPADM

## 2025-08-07 RX ADMIN — PROPOFOL 150 MCG/KG/MIN: 10 INJECTION, EMULSION INTRAVENOUS at 12:53

## 2025-08-07 RX ADMIN — PHENYLEPHRINE HYDROCHLORIDE 100 MCG: 10 INJECTION INTRAVENOUS at 13:11

## 2025-08-07 RX ADMIN — PHENYLEPHRINE HYDROCHLORIDE 100 MCG: 10 INJECTION INTRAVENOUS at 13:39

## 2025-08-07 RX ADMIN — PROPOFOL 30 MG: 10 INJECTION, EMULSION INTRAVENOUS at 13:29

## 2025-08-07 RX ADMIN — DEXMEDETOMIDINE HYDROCHLORIDE 12 MCG: 100 INJECTION, SOLUTION INTRAVENOUS at 13:34

## 2025-08-07 RX ADMIN — PROPOFOL 30 MG: 10 INJECTION, EMULSION INTRAVENOUS at 12:58

## 2025-08-07 RX ADMIN — DEXMEDETOMIDINE HYDROCHLORIDE 8 MCG: 100 INJECTION, SOLUTION INTRAVENOUS at 13:36

## 2025-08-07 RX ADMIN — DEXMEDETOMIDINE HYDROCHLORIDE 8 MCG: 100 INJECTION, SOLUTION INTRAVENOUS at 12:54

## 2025-08-07 RX ADMIN — LIDOCAINE HYDROCHLORIDE 60 MG: 20 INJECTION, SOLUTION INFILTRATION; PERINEURAL at 12:56

## 2025-08-07 RX ADMIN — DEXMEDETOMIDINE HYDROCHLORIDE 8 MCG: 100 INJECTION, SOLUTION INTRAVENOUS at 13:01

## 2025-08-07 RX ADMIN — LIDOCAINE HYDROCHLORIDE 40 MG: 20 INJECTION, SOLUTION INFILTRATION; PERINEURAL at 12:52

## 2025-08-07 RX ADMIN — DEXMEDETOMIDINE HYDROCHLORIDE 4 MCG: 100 INJECTION, SOLUTION INTRAVENOUS at 12:57

## 2025-08-07 RX ADMIN — PHENYLEPHRINE HYDROCHLORIDE 100 MCG: 10 INJECTION INTRAVENOUS at 12:58

## 2025-08-07 RX ADMIN — PROPOFOL 20 MG: 10 INJECTION, EMULSION INTRAVENOUS at 13:35

## 2025-08-07 RX ADMIN — PHENYLEPHRINE HYDROCHLORIDE 100 MCG: 10 INJECTION INTRAVENOUS at 13:07

## 2025-08-07 RX ADMIN — PHENYLEPHRINE HYDROCHLORIDE 100 MCG: 10 INJECTION INTRAVENOUS at 13:13

## 2025-08-07 RX ADMIN — SODIUM CHLORIDE, SODIUM LACTATE, POTASSIUM CHLORIDE, AND CALCIUM CHLORIDE: .6; .31; .03; .02 INJECTION, SOLUTION INTRAVENOUS at 12:50

## 2025-08-07 ASSESSMENT — ACTIVITIES OF DAILY LIVING (ADL)
ADLS_ACUITY_SCORE: 58

## 2025-08-08 LAB
PATH REPORT.COMMENTS IMP SPEC: NORMAL
PATH REPORT.COMMENTS IMP SPEC: NORMAL
PATH REPORT.FINAL DX SPEC: NORMAL
PATH REPORT.GROSS SPEC: NORMAL
PATH REPORT.MICROSCOPIC SPEC OTHER STN: NORMAL
PATH REPORT.RELEVANT HX SPEC: NORMAL
PHOTO IMAGE: NORMAL

## 2025-08-10 ASSESSMENT — ANXIETY QUESTIONNAIRES
4. TROUBLE RELAXING: MORE THAN HALF THE DAYS
7. FEELING AFRAID AS IF SOMETHING AWFUL MIGHT HAPPEN: MORE THAN HALF THE DAYS
2. NOT BEING ABLE TO STOP OR CONTROL WORRYING: MORE THAN HALF THE DAYS
GAD7 TOTAL SCORE: 14
5. BEING SO RESTLESS THAT IT IS HARD TO SIT STILL: MORE THAN HALF THE DAYS
7. FEELING AFRAID AS IF SOMETHING AWFUL MIGHT HAPPEN: MORE THAN HALF THE DAYS
IF YOU CHECKED OFF ANY PROBLEMS ON THIS QUESTIONNAIRE, HOW DIFFICULT HAVE THESE PROBLEMS MADE IT FOR YOU TO DO YOUR WORK, TAKE CARE OF THINGS AT HOME, OR GET ALONG WITH OTHER PEOPLE: VERY DIFFICULT
1. FEELING NERVOUS, ANXIOUS, OR ON EDGE: MORE THAN HALF THE DAYS
GAD7 TOTAL SCORE: 14
GAD7 TOTAL SCORE: 14
3. WORRYING TOO MUCH ABOUT DIFFERENT THINGS: MORE THAN HALF THE DAYS
6. BECOMING EASILY ANNOYED OR IRRITABLE: MORE THAN HALF THE DAYS
8. IF YOU CHECKED OFF ANY PROBLEMS, HOW DIFFICULT HAVE THESE MADE IT FOR YOU TO DO YOUR WORK, TAKE CARE OF THINGS AT HOME, OR GET ALONG WITH OTHER PEOPLE?: VERY DIFFICULT

## 2025-08-14 ENCOUNTER — LAB (OUTPATIENT)
Dept: LAB | Facility: CLINIC | Age: 25
End: 2025-08-14
Payer: COMMERCIAL

## 2025-08-14 ENCOUNTER — OFFICE VISIT (OUTPATIENT)
Dept: CARDIOLOGY | Facility: CLINIC | Age: 25
End: 2025-08-14
Attending: NURSE PRACTITIONER
Payer: COMMERCIAL

## 2025-08-14 VITALS
WEIGHT: 150.5 LBS | OXYGEN SATURATION: 100 % | DIASTOLIC BLOOD PRESSURE: 73 MMHG | HEART RATE: 60 BPM | SYSTOLIC BLOOD PRESSURE: 109 MMHG | BODY MASS INDEX: 25.83 KG/M2

## 2025-08-14 DIAGNOSIS — Z13.6 SCREENING FOR CARDIOVASCULAR CONDITION: ICD-10-CM

## 2025-08-14 DIAGNOSIS — I42.8 NONISCHEMIC CARDIOMYOPATHY (H): Primary | ICD-10-CM

## 2025-08-14 DIAGNOSIS — E74.05 DANON DISEASE IN HETEROZYGOUS FEMALE (H): ICD-10-CM

## 2025-08-14 DIAGNOSIS — R07.89 ATYPICAL CHEST PAIN: ICD-10-CM

## 2025-08-14 DIAGNOSIS — I50.20 HEART FAILURE WITH REDUCED EJECTION FRACTION (H): ICD-10-CM

## 2025-08-14 DIAGNOSIS — E11.9 DIABETES MELLITUS, TYPE 2 (H): Primary | ICD-10-CM

## 2025-08-14 DIAGNOSIS — Z76.82 PRE-HEART TRANSPLANT, LISTED: ICD-10-CM

## 2025-08-14 DIAGNOSIS — R10.84 ABDOMINAL PAIN, GENERALIZED: ICD-10-CM

## 2025-08-14 DIAGNOSIS — I42.8 NONISCHEMIC CARDIOMYOPATHY (H): ICD-10-CM

## 2025-08-14 DIAGNOSIS — R07.89 ATYPICAL CHEST PAIN: Primary | ICD-10-CM

## 2025-08-14 DIAGNOSIS — I50.22 CHRONIC SYSTOLIC HEART FAILURE (H): ICD-10-CM

## 2025-08-14 LAB
ALBUMIN SERPL BCG-MCNC: 4.7 G/DL (ref 3.5–5.2)
ALP SERPL-CCNC: 77 U/L (ref 40–150)
ALT SERPL W P-5'-P-CCNC: 33 U/L (ref 0–50)
ANION GAP SERPL CALCULATED.3IONS-SCNC: 13 MMOL/L (ref 7–15)
AST SERPL W P-5'-P-CCNC: 45 U/L (ref 0–45)
BILIRUB SERPL-MCNC: 0.7 MG/DL
BILIRUBIN DIRECT (ROCHE PRO & PURE): 0.33 MG/DL (ref 0–0.45)
BUN SERPL-MCNC: 12.4 MG/DL (ref 6–20)
CALCIUM SERPL-MCNC: 9.9 MG/DL (ref 8.8–10.4)
CHLORIDE SERPL-SCNC: 101 MMOL/L (ref 98–107)
CHOLEST SERPL-MCNC: 157 MG/DL
CREAT SERPL-MCNC: 0.63 MG/DL (ref 0.51–0.95)
CRP SERPL-MCNC: <3 MG/L
EGFRCR SERPLBLD CKD-EPI 2021: >90 ML/MIN/1.73M2
EST. AVERAGE GLUCOSE BLD GHB EST-MCNC: 117 MG/DL
FASTING STATUS PATIENT QL REPORTED: NO
FASTING STATUS PATIENT QL REPORTED: NO
GLUCOSE SERPL-MCNC: 87 MG/DL (ref 70–99)
HBA1C MFR BLD: 5.7 %
HCO3 SERPL-SCNC: 26 MMOL/L (ref 22–29)
HDLC SERPL-MCNC: 62 MG/DL
LAB ORDER RESULT STATUS: NORMAL
LDLC SERPL CALC-MCNC: 87 MG/DL
Lab: NORMAL
NONHDLC SERPL-MCNC: 95 MG/DL
PERFORMING LABORATORY: NORMAL
POTASSIUM SERPL-SCNC: 4.2 MMOL/L (ref 3.4–5.3)
PROT SERPL-MCNC: 8 G/DL (ref 6.4–8.3)
SODIUM SERPL-SCNC: 140 MMOL/L (ref 135–145)
TEST NAME: NORMAL
TRIGL SERPL-MCNC: 41 MG/DL
TROPONIN T SERPL HS-MCNC: 35 NG/L

## 2025-08-14 PROCEDURE — 82784 ASSAY IGA/IGD/IGG/IGM EACH: CPT | Performed by: PHYSICIAN ASSISTANT

## 2025-08-14 PROCEDURE — 86364 TISS TRNSGLTMNASE EA IG CLAS: CPT | Performed by: PHYSICIAN ASSISTANT

## 2025-08-14 PROCEDURE — 83036 HEMOGLOBIN GLYCOSYLATED A1C: CPT | Performed by: NURSE PRACTITIONER

## 2025-08-14 PROCEDURE — 99213 OFFICE O/P EST LOW 20 MIN: CPT | Performed by: NURSE PRACTITIONER

## 2025-08-14 ASSESSMENT — PATIENT HEALTH QUESTIONNAIRE - PHQ9
10. IF YOU CHECKED OFF ANY PROBLEMS, HOW DIFFICULT HAVE THESE PROBLEMS MADE IT FOR YOU TO DO YOUR WORK, TAKE CARE OF THINGS AT HOME, OR GET ALONG WITH OTHER PEOPLE: VERY DIFFICULT
SUM OF ALL RESPONSES TO PHQ QUESTIONS 1-9: 18
SUM OF ALL RESPONSES TO PHQ QUESTIONS 1-9: 18

## 2025-08-14 ASSESSMENT — PAIN SCALES - GENERAL: PAINLEVEL_OUTOF10: NO PAIN (0)

## 2025-08-15 ENCOUNTER — VIRTUAL VISIT (OUTPATIENT)
Dept: PSYCHOLOGY | Facility: CLINIC | Age: 25
End: 2025-08-15
Payer: COMMERCIAL

## 2025-08-15 DIAGNOSIS — F43.23 ADJUSTMENT DISORDER WITH MIXED ANXIETY AND DEPRESSED MOOD: Primary | ICD-10-CM

## 2025-08-15 PROCEDURE — 90832 PSYTX W PT 30 MINUTES: CPT | Mod: 95 | Performed by: COUNSELOR

## 2025-08-16 LAB
LABORATORY REPORT: NORMAL
PETH INTERPRETATION: NORMAL
PLPETH BLD-MCNC: <10 NG/ML
POPETH BLD-MCNC: <10 NG/ML

## 2025-08-18 LAB
ANABASINE UR-MCNC: <5 NG/ML
COTININE UR-MCNC: <15 NG/ML
NICOTINE UR-MCNC: <15 NG/ML
TRANS-3-OH-COTININE UR-MCNC: <50 NG/ML

## 2025-08-19 LAB — LABCORP INTERFACED MISCELLANEOUS TEST RESULT: NORMAL

## 2025-08-20 ASSESSMENT — ANXIETY QUESTIONNAIRES
4. TROUBLE RELAXING: MORE THAN HALF THE DAYS
8. IF YOU CHECKED OFF ANY PROBLEMS, HOW DIFFICULT HAVE THESE MADE IT FOR YOU TO DO YOUR WORK, TAKE CARE OF THINGS AT HOME, OR GET ALONG WITH OTHER PEOPLE?: NOT DIFFICULT AT ALL
2. NOT BEING ABLE TO STOP OR CONTROL WORRYING: MORE THAN HALF THE DAYS
1. FEELING NERVOUS, ANXIOUS, OR ON EDGE: SEVERAL DAYS
GAD7 TOTAL SCORE: 9
7. FEELING AFRAID AS IF SOMETHING AWFUL MIGHT HAPPEN: NOT AT ALL
5. BEING SO RESTLESS THAT IT IS HARD TO SIT STILL: MORE THAN HALF THE DAYS
IF YOU CHECKED OFF ANY PROBLEMS ON THIS QUESTIONNAIRE, HOW DIFFICULT HAVE THESE PROBLEMS MADE IT FOR YOU TO DO YOUR WORK, TAKE CARE OF THINGS AT HOME, OR GET ALONG WITH OTHER PEOPLE: NOT DIFFICULT AT ALL
3. WORRYING TOO MUCH ABOUT DIFFERENT THINGS: SEVERAL DAYS
GAD7 TOTAL SCORE: 9
GAD7 TOTAL SCORE: 9
7. FEELING AFRAID AS IF SOMETHING AWFUL MIGHT HAPPEN: NOT AT ALL
6. BECOMING EASILY ANNOYED OR IRRITABLE: SEVERAL DAYS

## 2025-08-20 ASSESSMENT — PAIN SCALES - PAIN ENJOYMENT GENERAL ACTIVITY SCALE (PEG)
INTERFERED_GENERAL_ACTIVITY: 8
INTERFERED_ENJOYMENT_LIFE: 6
PEG_TOTALSCORE: 7.33
AVG_PAIN_PASTWEEK: 8
INTERFERED_GENERAL_ACTIVITY: 8
AVG_PAIN_PASTWEEK: 8
INTERFERED_ENJOYMENT_LIFE: 6
PEG_TOTALSCORE: 7.33

## 2025-08-25 ENCOUNTER — OFFICE VISIT (OUTPATIENT)
Dept: PALLIATIVE MEDICINE | Facility: CLINIC | Age: 25
End: 2025-08-25
Attending: PHYSICIAN ASSISTANT
Payer: COMMERCIAL

## 2025-08-25 ENCOUNTER — PRE VISIT (OUTPATIENT)
Dept: PALLIATIVE MEDICINE | Facility: CLINIC | Age: 25
End: 2025-08-25

## 2025-08-25 VITALS
HEART RATE: 60 BPM | BODY MASS INDEX: 25.85 KG/M2 | SYSTOLIC BLOOD PRESSURE: 101 MMHG | WEIGHT: 150.6 LBS | DIASTOLIC BLOOD PRESSURE: 68 MMHG

## 2025-08-25 DIAGNOSIS — R10.84 ABDOMINAL PAIN, GENERALIZED: ICD-10-CM

## 2025-08-25 PROCEDURE — 3078F DIAST BP <80 MM HG: CPT

## 2025-08-25 PROCEDURE — 99204 OFFICE O/P NEW MOD 45 MIN: CPT

## 2025-08-25 PROCEDURE — 1125F AMNT PAIN NOTED PAIN PRSNT: CPT

## 2025-08-25 PROCEDURE — 3074F SYST BP LT 130 MM HG: CPT

## 2025-08-25 RX ORDER — METHOCARBAMOL 500 MG/1
500 TABLET, FILM COATED ORAL 4 TIMES DAILY PRN
Qty: 90 TABLET | Refills: 1 | Status: SHIPPED | OUTPATIENT
Start: 2025-08-25

## 2025-08-25 ASSESSMENT — PAIN SCALES - GENERAL: PAINLEVEL_OUTOF10: SEVERE PAIN (7)

## 2025-09-04 ENCOUNTER — PATIENT OUTREACH (OUTPATIENT)
Dept: CARE COORDINATION | Facility: CLINIC | Age: 25
End: 2025-09-04
Payer: COMMERCIAL

## (undated) DEVICE — INTRO SHEATH 7FRX10CM PINNACLE RSS702

## (undated) DEVICE — PACK HEART RIGHT CUSTOM SAN32RHF18

## (undated) DEVICE — Device

## (undated) DEVICE — KIT RIGHT HEART CATH 60130719

## (undated) DEVICE — INTRO SHTH 7FR 12CM DIL GW LL HUB FSTCTH .038IN

## (undated) DEVICE — KIT MICROINTRODUCER VASCULAR VSI 4FRX0.018INX40CM 7195X

## (undated) RX ORDER — LIDOCAINE 40 MG/G
CREAM TOPICAL
Status: DISPENSED
Start: 2024-11-15

## (undated) RX ORDER — LIDOCAINE HYDROCHLORIDE 20 MG/ML
INJECTION, SOLUTION INFILTRATION; PERINEURAL
Status: DISPENSED
Start: 2024-11-15

## (undated) RX ORDER — LIDOCAINE HYDROCHLORIDE 20 MG/ML
INJECTION, SOLUTION INFILTRATION; PERINEURAL
Status: DISPENSED
Start: 2025-06-19

## (undated) RX ORDER — LIDOCAINE 40 MG/G
CREAM TOPICAL
Status: DISPENSED
Start: 2024-07-31

## (undated) RX ORDER — SIMETHICONE 40MG/0.6ML
SUSPENSION, DROPS(FINAL DOSAGE FORM)(ML) ORAL
Status: DISPENSED
Start: 2025-08-07

## (undated) RX ORDER — LIDOCAINE 40 MG/G
CREAM TOPICAL
Status: DISPENSED
Start: 2025-06-19